# Patient Record
Sex: FEMALE | Race: WHITE | NOT HISPANIC OR LATINO | URBAN - METROPOLITAN AREA
[De-identification: names, ages, dates, MRNs, and addresses within clinical notes are randomized per-mention and may not be internally consistent; named-entity substitution may affect disease eponyms.]

---

## 2018-03-14 ENCOUNTER — OUTPATIENT (OUTPATIENT)
Dept: OUTPATIENT SERVICES | Facility: HOSPITAL | Age: 73
LOS: 1 days | Discharge: HOME | End: 2018-03-14
Payer: MEDICARE

## 2018-03-14 ENCOUNTER — INPATIENT (INPATIENT)
Facility: HOSPITAL | Age: 73
LOS: 15 days | Discharge: NURSING, REHAB OR LONG-TERM CARE | End: 2018-03-30
Attending: INTERNAL MEDICINE | Admitting: INTERNAL MEDICINE
Payer: MEDICARE

## 2018-03-14 VITALS
OXYGEN SATURATION: 95 % | HEART RATE: 138 BPM | TEMPERATURE: 98 F | RESPIRATION RATE: 16 BRPM | HEIGHT: 65.5 IN | DIASTOLIC BLOOD PRESSURE: 77 MMHG | SYSTOLIC BLOOD PRESSURE: 124 MMHG | WEIGHT: 259.93 LBS

## 2018-03-14 VITALS — HEIGHT: 65 IN | WEIGHT: 220.02 LBS

## 2018-03-14 DIAGNOSIS — M17.11 UNILATERAL PRIMARY OSTEOARTHRITIS, RIGHT KNEE: ICD-10-CM

## 2018-03-14 DIAGNOSIS — R33.9 RETENTION OF URINE, UNSPECIFIED: ICD-10-CM

## 2018-03-14 DIAGNOSIS — R60.9 EDEMA, UNSPECIFIED: ICD-10-CM

## 2018-03-14 DIAGNOSIS — Z98.890 OTHER SPECIFIED POSTPROCEDURAL STATES: Chronic | ICD-10-CM

## 2018-03-14 DIAGNOSIS — N39.0 URINARY TRACT INFECTION, SITE NOT SPECIFIED: ICD-10-CM

## 2018-03-14 DIAGNOSIS — M19.90 UNSPECIFIED OSTEOARTHRITIS, UNSPECIFIED SITE: ICD-10-CM

## 2018-03-14 DIAGNOSIS — Z98.891 HISTORY OF UTERINE SCAR FROM PREVIOUS SURGERY: Chronic | ICD-10-CM

## 2018-03-14 DIAGNOSIS — J15.9 UNSPECIFIED BACTERIAL PNEUMONIA: ICD-10-CM

## 2018-03-14 DIAGNOSIS — E78.5 HYPERLIPIDEMIA, UNSPECIFIED: ICD-10-CM
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
## 2018-03-14 DIAGNOSIS — I10 ESSENTIAL (PRIMARY) HYPERTENSION: ICD-10-CM

## 2018-03-14 DIAGNOSIS — Z88.0 ALLERGY STATUS TO PENICILLIN: ICD-10-CM

## 2018-03-14 DIAGNOSIS — L40.9 PSORIASIS, UNSPECIFIED: ICD-10-CM

## 2018-03-14 DIAGNOSIS — E87.2 ACIDOSIS: ICD-10-CM

## 2018-03-14 DIAGNOSIS — J44.1 CHRONIC OBSTRUCTIVE PULMONARY DISEASE WITH (ACUTE) EXACERBATION: ICD-10-CM

## 2018-03-14 DIAGNOSIS — N17.0 ACUTE KIDNEY FAILURE WITH TUBULAR NECROSIS: ICD-10-CM
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
## 2018-03-14 DIAGNOSIS — M17.10 UNILATERAL PRIMARY OSTEOARTHRITIS, UNSPECIFIED KNEE: ICD-10-CM

## 2018-03-14 DIAGNOSIS — J95.821 ACUTE POSTPROCEDURAL RESPIRATORY FAILURE: ICD-10-CM

## 2018-03-14 DIAGNOSIS — I08.0 RHEUMATIC DISORDERS OF BOTH MITRAL AND AORTIC VALVES: ICD-10-CM

## 2018-03-14 DIAGNOSIS — F17.210 NICOTINE DEPENDENCE, CIGARETTES, UNCOMPLICATED: ICD-10-CM

## 2018-03-14 DIAGNOSIS — N13.2 HYDRONEPHROSIS WITH RENAL AND URETERAL CALCULOUS OBSTRUCTION: ICD-10-CM

## 2018-03-14 DIAGNOSIS — M79.81 NONTRAUMATIC HEMATOMA OF SOFT TISSUE: ICD-10-CM

## 2018-03-14 DIAGNOSIS — Z79.82 LONG TERM (CURRENT) USE OF ASPIRIN: ICD-10-CM

## 2018-03-14 DIAGNOSIS — R65.21 SEVERE SEPSIS WITH SEPTIC SHOCK: ICD-10-CM

## 2018-03-14 DIAGNOSIS — J80 ACUTE RESPIRATORY DISTRESS SYNDROME: ICD-10-CM

## 2018-03-14 DIAGNOSIS — E87.0 HYPEROSMOLALITY AND HYPERNATREMIA: ICD-10-CM

## 2018-03-14 DIAGNOSIS — T83.598A INFECTION AND INFLAMMATORY REACTION DUE TO OTHER PROSTHETIC DEVICE, IMPLANT AND GRAFT IN URINARY SYSTEM, INITIAL ENCOUNTER: ICD-10-CM

## 2018-03-14 DIAGNOSIS — I48.91 UNSPECIFIED ATRIAL FIBRILLATION: ICD-10-CM

## 2018-03-14 DIAGNOSIS — I50.33 ACUTE ON CHRONIC DIASTOLIC (CONGESTIVE) HEART FAILURE: ICD-10-CM

## 2018-03-14 DIAGNOSIS — E87.6 HYPOKALEMIA: ICD-10-CM

## 2018-03-14 DIAGNOSIS — A41.51 SEPSIS DUE TO ESCHERICHIA COLI [E. COLI]: ICD-10-CM

## 2018-03-14 DIAGNOSIS — R31.9 HEMATURIA, UNSPECIFIED: ICD-10-CM

## 2018-03-14 LAB
ALBUMIN SERPL ELPH-MCNC: 2.7 G/DL — LOW (ref 3.3–5)
ALP SERPL-CCNC: 103 U/L — SIGNIFICANT CHANGE UP (ref 40–120)
ALT FLD-CCNC: 20 U/L — SIGNIFICANT CHANGE UP (ref 12–78)
ANION GAP SERPL CALC-SCNC: 10 MMOL/L — SIGNIFICANT CHANGE UP (ref 5–17)
ANION GAP SERPL CALC-SCNC: 10 MMOL/L — SIGNIFICANT CHANGE UP (ref 5–17)
APPEARANCE UR: (no result)
APTT BLD: 22.2 SEC — LOW (ref 27.5–37.4)
AST SERPL-CCNC: 24 U/L — SIGNIFICANT CHANGE UP (ref 15–37)
BACTERIA # UR AUTO: (no result)
BASOPHILS # BLD AUTO: 0.04 K/UL — SIGNIFICANT CHANGE UP (ref 0–0.2)
BASOPHILS # BLD AUTO: 0.1 K/UL — SIGNIFICANT CHANGE UP (ref 0–0.2)
BASOPHILS NFR BLD AUTO: 0.2 % — SIGNIFICANT CHANGE UP (ref 0–2)
BASOPHILS NFR BLD AUTO: 0.5 % — SIGNIFICANT CHANGE UP (ref 0–2)
BILIRUB SERPL-MCNC: 0.8 MG/DL — SIGNIFICANT CHANGE UP (ref 0.2–1.2)
BILIRUB UR-MCNC: (no result)
BLD GP AB SCN SERPL QL: SIGNIFICANT CHANGE UP
BUN SERPL-MCNC: 25 MG/DL — HIGH (ref 7–23)
BUN SERPL-MCNC: 25 MG/DL — HIGH (ref 7–23)
CALCIUM SERPL-MCNC: 8.8 MG/DL — SIGNIFICANT CHANGE UP (ref 8.5–10.1)
CALCIUM SERPL-MCNC: 8.9 MG/DL — SIGNIFICANT CHANGE UP (ref 8.5–10.1)
CHLORIDE SERPL-SCNC: 110 MMOL/L — HIGH (ref 96–108)
CHLORIDE SERPL-SCNC: 110 MMOL/L — HIGH (ref 96–108)
CO2 SERPL-SCNC: 22 MMOL/L — SIGNIFICANT CHANGE UP (ref 22–31)
CO2 SERPL-SCNC: 23 MMOL/L — SIGNIFICANT CHANGE UP (ref 22–31)
COLOR SPEC: YELLOW — SIGNIFICANT CHANGE UP
CREAT SERPL-MCNC: 1.55 MG/DL — HIGH (ref 0.5–1.3)
CREAT SERPL-MCNC: 1.56 MG/DL — HIGH (ref 0.5–1.3)
DIFF PNL FLD: (no result)
EOSINOPHIL # BLD AUTO: 0.01 K/UL — SIGNIFICANT CHANGE UP (ref 0–0.5)
EOSINOPHIL # BLD AUTO: 0.01 K/UL — SIGNIFICANT CHANGE UP (ref 0–0.5)
EOSINOPHIL NFR BLD AUTO: 0.1 % — SIGNIFICANT CHANGE UP (ref 0–6)
EOSINOPHIL NFR BLD AUTO: 0.1 % — SIGNIFICANT CHANGE UP (ref 0–6)
EPI CELLS # UR: (no result)
GLUCOSE SERPL-MCNC: 116 MG/DL — HIGH (ref 70–99)
GLUCOSE SERPL-MCNC: 127 MG/DL — HIGH (ref 70–99)
GLUCOSE UR QL: NEGATIVE MG/DL — SIGNIFICANT CHANGE UP
HCT VFR BLD CALC: 37.9 % — SIGNIFICANT CHANGE UP (ref 34.5–45)
HCT VFR BLD CALC: 40.1 % — SIGNIFICANT CHANGE UP (ref 34.5–45)
HGB BLD-MCNC: 12.4 G/DL — SIGNIFICANT CHANGE UP (ref 11.5–15.5)
HGB BLD-MCNC: 13 G/DL — SIGNIFICANT CHANGE UP (ref 11.5–15.5)
IMM GRANULOCYTES NFR BLD AUTO: 0.6 % — SIGNIFICANT CHANGE UP (ref 0–1.5)
IMM GRANULOCYTES NFR BLD AUTO: 1.2 % — SIGNIFICANT CHANGE UP (ref 0–1.5)
INR BLD: 1.25 RATIO — HIGH (ref 0.88–1.16)
KETONES UR-MCNC: NEGATIVE — SIGNIFICANT CHANGE UP
LEUKOCYTE ESTERASE UR-ACNC: (no result)
LYMPHOCYTES # BLD AUTO: 0.62 K/UL — LOW (ref 1–3.3)
LYMPHOCYTES # BLD AUTO: 0.66 K/UL — LOW (ref 1–3.3)
LYMPHOCYTES # BLD AUTO: 3.1 % — LOW (ref 13–44)
LYMPHOCYTES # BLD AUTO: 3.4 % — LOW (ref 13–44)
MCHC RBC-ENTMCNC: 28.8 PG — SIGNIFICANT CHANGE UP (ref 27–34)
MCHC RBC-ENTMCNC: 29 PG — SIGNIFICANT CHANGE UP (ref 27–34)
MCHC RBC-ENTMCNC: 32.4 GM/DL — SIGNIFICANT CHANGE UP (ref 32–36)
MCHC RBC-ENTMCNC: 32.7 GM/DL — SIGNIFICANT CHANGE UP (ref 32–36)
MCV RBC AUTO: 88.8 FL — SIGNIFICANT CHANGE UP (ref 80–100)
MCV RBC AUTO: 88.9 FL — SIGNIFICANT CHANGE UP (ref 80–100)
MONOCYTES # BLD AUTO: 0.59 K/UL — SIGNIFICANT CHANGE UP (ref 0–0.9)
MONOCYTES # BLD AUTO: 0.81 K/UL — SIGNIFICANT CHANGE UP (ref 0–0.9)
MONOCYTES NFR BLD AUTO: 3.1 % — SIGNIFICANT CHANGE UP (ref 2–14)
MONOCYTES NFR BLD AUTO: 4.1 % — SIGNIFICANT CHANGE UP (ref 2–14)
NEUTROPHILS # BLD AUTO: 17.83 K/UL — HIGH (ref 1.8–7.4)
NEUTROPHILS # BLD AUTO: 17.96 K/UL — HIGH (ref 1.8–7.4)
NEUTROPHILS NFR BLD AUTO: 91 % — HIGH (ref 43–77)
NEUTROPHILS NFR BLD AUTO: 92.6 % — HIGH (ref 43–77)
NITRITE UR-MCNC: POSITIVE
NRBC # BLD: 0 /100 WBCS — SIGNIFICANT CHANGE UP (ref 0–0)
NRBC # BLD: 0 /100 WBCS — SIGNIFICANT CHANGE UP (ref 0–0)
PH UR: 5 — SIGNIFICANT CHANGE UP (ref 5–8)
PLATELET # BLD AUTO: 232 K/UL — SIGNIFICANT CHANGE UP (ref 150–400)
PLATELET # BLD AUTO: 252 K/UL — SIGNIFICANT CHANGE UP (ref 150–400)
POTASSIUM SERPL-MCNC: 3.5 MMOL/L — SIGNIFICANT CHANGE UP (ref 3.5–5.3)
POTASSIUM SERPL-MCNC: 3.8 MMOL/L — SIGNIFICANT CHANGE UP (ref 3.5–5.3)
POTASSIUM SERPL-SCNC: 3.5 MMOL/L — SIGNIFICANT CHANGE UP (ref 3.5–5.3)
POTASSIUM SERPL-SCNC: 3.8 MMOL/L — SIGNIFICANT CHANGE UP (ref 3.5–5.3)
PROT SERPL-MCNC: 6.9 GM/DL — SIGNIFICANT CHANGE UP (ref 6–8.3)
PROT UR-MCNC: 100 MG/DL
PROTHROM AB SERPL-ACNC: 13.6 SEC — HIGH (ref 9.8–12.7)
RBC # BLD: 4.27 M/UL — SIGNIFICANT CHANGE UP (ref 3.8–5.2)
RBC # BLD: 4.51 M/UL — SIGNIFICANT CHANGE UP (ref 3.8–5.2)
RBC # FLD: 16 % — HIGH (ref 10.3–14.5)
RBC # FLD: 16.1 % — HIGH (ref 10.3–14.5)
RBC CASTS # UR COMP ASSIST: (no result) /HPF (ref 0–4)
SODIUM SERPL-SCNC: 142 MMOL/L — SIGNIFICANT CHANGE UP (ref 135–145)
SODIUM SERPL-SCNC: 143 MMOL/L — SIGNIFICANT CHANGE UP (ref 135–145)
SP GR SPEC: 1.02 — SIGNIFICANT CHANGE UP (ref 1.01–1.02)
TROPONIN I SERPL-MCNC: <0.015 NG/ML — SIGNIFICANT CHANGE UP (ref 0.01–0.04)
TROPONIN I SERPL-MCNC: <0.015 NG/ML — SIGNIFICANT CHANGE UP (ref 0.01–0.04)
TYPE + AB SCN PNL BLD: SIGNIFICANT CHANGE UP
UROBILINOGEN FLD QL: 4 MG/DL
WBC # BLD: 19.25 K/UL — HIGH (ref 3.8–10.5)
WBC # BLD: 19.73 K/UL — HIGH (ref 3.8–10.5)
WBC # FLD AUTO: 19.25 K/UL — HIGH (ref 3.8–10.5)
WBC # FLD AUTO: 19.73 K/UL — HIGH (ref 3.8–10.5)
WBC UR QL: >50

## 2018-03-14 PROCEDURE — 93010 ELECTROCARDIOGRAM REPORT: CPT

## 2018-03-14 PROCEDURE — 99285 EMERGENCY DEPT VISIT HI MDM: CPT

## 2018-03-14 PROCEDURE — 71045 X-RAY EXAM CHEST 1 VIEW: CPT | Mod: 26

## 2018-03-14 PROCEDURE — 74176 CT ABD & PELVIS W/O CONTRAST: CPT | Mod: 26

## 2018-03-14 PROCEDURE — 93306 TTE W/DOPPLER COMPLETE: CPT | Mod: 26

## 2018-03-14 RX ORDER — SODIUM CHLORIDE 9 MG/ML
2000 INJECTION INTRAMUSCULAR; INTRAVENOUS; SUBCUTANEOUS ONCE
Qty: 0 | Refills: 0 | Status: COMPLETED | OUTPATIENT
Start: 2018-03-14 | End: 2018-03-14

## 2018-03-14 RX ORDER — SODIUM CHLORIDE 9 MG/ML
1000 INJECTION INTRAMUSCULAR; INTRAVENOUS; SUBCUTANEOUS
Qty: 0 | Refills: 0 | Status: DISCONTINUED | OUTPATIENT
Start: 2018-03-14 | End: 2018-03-15

## 2018-03-14 RX ORDER — AZITHROMYCIN 500 MG/1
500 TABLET, FILM COATED ORAL EVERY 24 HOURS
Qty: 0 | Refills: 0 | Status: DISCONTINUED | OUTPATIENT
Start: 2018-03-15 | End: 2018-03-18

## 2018-03-14 RX ORDER — CEFTRIAXONE 500 MG/1
1000 INJECTION, POWDER, FOR SOLUTION INTRAMUSCULAR; INTRAVENOUS EVERY 24 HOURS
Qty: 0 | Refills: 0 | Status: DISCONTINUED | OUTPATIENT
Start: 2018-03-14 | End: 2018-03-17

## 2018-03-14 RX ORDER — SIMVASTATIN 20 MG/1
20 TABLET, FILM COATED ORAL AT BEDTIME
Qty: 0 | Refills: 0 | Status: DISCONTINUED | OUTPATIENT
Start: 2018-03-14 | End: 2018-03-21

## 2018-03-14 RX ORDER — SODIUM CHLORIDE 9 MG/ML
3 INJECTION INTRAMUSCULAR; INTRAVENOUS; SUBCUTANEOUS ONCE
Qty: 0 | Refills: 0 | Status: COMPLETED | OUTPATIENT
Start: 2018-03-14 | End: 2018-03-14

## 2018-03-14 RX ORDER — ASPIRIN/CALCIUM CARB/MAGNESIUM 324 MG
325 TABLET ORAL DAILY
Qty: 0 | Refills: 0 | Status: DISCONTINUED | OUTPATIENT
Start: 2018-03-14 | End: 2018-03-18

## 2018-03-14 RX ORDER — HEPARIN SODIUM 5000 [USP'U]/ML
5000 INJECTION INTRAVENOUS; SUBCUTANEOUS EVERY 12 HOURS
Qty: 0 | Refills: 0 | Status: DISCONTINUED | OUTPATIENT
Start: 2018-03-14 | End: 2018-03-15

## 2018-03-14 RX ADMIN — CEFTRIAXONE 1000 MILLIGRAM(S): 500 INJECTION, POWDER, FOR SOLUTION INTRAMUSCULAR; INTRAVENOUS at 23:17

## 2018-03-14 RX ADMIN — SODIUM CHLORIDE 2000 MILLILITER(S): 9 INJECTION INTRAMUSCULAR; INTRAVENOUS; SUBCUTANEOUS at 18:47

## 2018-03-14 RX ADMIN — SIMVASTATIN 20 MILLIGRAM(S): 20 TABLET, FILM COATED ORAL at 23:17

## 2018-03-14 RX ADMIN — SODIUM CHLORIDE 3 MILLILITER(S): 9 INJECTION INTRAMUSCULAR; INTRAVENOUS; SUBCUTANEOUS at 17:12

## 2018-03-14 RX ADMIN — SODIUM CHLORIDE 75 MILLILITER(S): 9 INJECTION INTRAMUSCULAR; INTRAVENOUS; SUBCUTANEOUS at 23:51

## 2018-03-14 NOTE — H&P PST ADULT - HISTORY OF PRESENT ILLNESS
72 year old female PMH of HTN, HLD, bilateral lower extremity edema; pt diagnosed with OA of the right knee c/o knee pain x 1.5 years presents to PST for Right TKR  Pt sent to the ER after PST for Rapid Afib in EKG 72 year old female PMH of HTN, HLD, bilateral lower extremity edema; pt diagnosed with OA of the right knee c/o knee pain x 1.5 years presents to PST for Right TKR  Pt sent to the ER from PST for Rapid Afib in EKG
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
## 2018-03-14 NOTE — ED PROVIDER NOTE - NS_ ATTENDINGSCRIBEDETAILS _ED_A_ED_FT
I, Reese Lewis MD,  performed the initial face to face bedside interview with this patient regarding history of present illness, review of symptoms and relevant past medical, social and family history.  I completed an independent physical examination.    The history, relevant review of systems, past medical and surgical history, medical decision making, and physical examination was documented by the scribe in my presence and I attest to the accuracy of the documentation.
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
## 2018-03-14 NOTE — H&P PST ADULT - PROBLEM SELECTOR PLAN 1
right TKR  pt has loose tooth advised pt to go see dentist to have it removed due to risk of aspiration  Pt sent to ER  for rapid afib new onset spoke with pts covering cardiologist Dr Anderson  Informed Elyssa from Dr Clancy office regarding above right TKR  pt has loose tooth advised pt to go see dentist to have it removed due to risk of aspiration  EZ wash instructions and mupirocin instructions given   CAPRINI SCORE 11  Pt sent to ER  for rapid afib new onset spoke with pts covering cardiologist Dr Anderson  Informed Elyssa from Dr Clancy office regarding above

## 2018-03-14 NOTE — H&P PST ADULT - PSH
H/O arthroscopy of shoulder  left   H/O bladder repair surgery    H/O: hysterectomy  due to bleeding   S/P

## 2018-03-14 NOTE — ED ADULT NURSE REASSESSMENT NOTE - NS ED NURSE REASSESS COMMENT FT1
Received report from Adilene WANG. Patient resting comfortably. Returned from CT. Patient undressed and placed in gown. VS stable. Eating dinner. Family at bedside. Will continue to monitor.

## 2018-03-14 NOTE — ED PROVIDER NOTE - OBJECTIVE STATEMENT
73 y/o female PMHx HTN, HLD, diverticulitis, kidney stones, psoriasis presents to the ED for evaluation of rapid heart beat starting today. Pt states she has never had rapid heart beat before. Denies SOB, CP, NVD, fever or any other sx. Smoker (0.5 pack a day). Caffeine intake today (0.5 can of diet coke). Cardio/ PCP- Dr. Pat 73 y/o female PMHx HTN, HLD, diverticulitis, kidney stones, psoriasis presents to the ED for evaluation of rapid heart beat found during presurgical testing. Pt states she has never had rapid heart beat before. Denies SOB, CP, NVD, fever or any other sx. Smoker (0.5 pack a day). Caffeine intake today (0.5 can of diet coke). Pt is scheduled for knee replacement surgery. Cardio/ PCP- Dr. Pat

## 2018-03-14 NOTE — H&P ADULT - NSHPPHYSICALEXAM_GEN_ALL_CORE
Vital Signs Last 24 Hrs  T(C): 36.9 (14 Mar 2018 16:16), Max: 36.9 (14 Mar 2018 16:16)  T(F): 98.4 (14 Mar 2018 16:16), Max: 98.4 (14 Mar 2018 16:16)  HR: 106 (14 Mar 2018 18:48) (106 - 140)  BP: 117/66 (14 Mar 2018 18:48) (117/66 - 138/101)  BP(mean): 92 (14 Mar 2018 15:21) (92 - 92)  RR: 16 (14 Mar 2018 18:48) (16 - 18)  SpO2: 99% (14 Mar 2018 18:48) (95% - 99%)    GEN: appears comfortable  Neuro: AAOx3, nonfocal  HEENT: NC/AT, EOMI  Neck: no thyroidmegaly, no JVD  Cardiovascular: S1S2 present, irregularly irregular rhythm, no murmur  Respiratory: breath sounds normal bilaterally, no wheezing, no rales, no rhonchi  Gastrointestinal: bowel sounds normal, soft, no abdominal tenderness  Musculoskeletal: no muscle tenderness  Extremities: 2+ pitting edema bilaterally  Skin: No rash

## 2018-03-15 LAB
ADD ON TEST-SPECIMEN IN LAB: SIGNIFICANT CHANGE UP
ANION GAP SERPL CALC-SCNC: 11 MMOL/L — SIGNIFICANT CHANGE UP (ref 5–17)
ANION GAP SERPL CALC-SCNC: 11 MMOL/L — SIGNIFICANT CHANGE UP (ref 5–17)
APTT BLD: 29 SEC — SIGNIFICANT CHANGE UP (ref 27.5–37.4)
BASOPHILS NFR BLD AUTO: 0 % — SIGNIFICANT CHANGE UP (ref 0–2)
BUN SERPL-MCNC: 24 MG/DL — HIGH (ref 7–23)
BUN SERPL-MCNC: 31 MG/DL — HIGH (ref 7–23)
CALCIUM SERPL-MCNC: 6.4 MG/DL — CRITICAL LOW (ref 8.5–10.1)
CALCIUM SERPL-MCNC: 8.8 MG/DL — SIGNIFICANT CHANGE UP (ref 8.5–10.1)
CHLORIDE SERPL-SCNC: 112 MMOL/L — HIGH (ref 96–108)
CHLORIDE SERPL-SCNC: 119 MMOL/L — HIGH (ref 96–108)
CO2 SERPL-SCNC: 17 MMOL/L — LOW (ref 22–31)
CO2 SERPL-SCNC: 22 MMOL/L — SIGNIFICANT CHANGE UP (ref 22–31)
CREAT SERPL-MCNC: 1.37 MG/DL — HIGH (ref 0.5–1.3)
CREAT SERPL-MCNC: 1.66 MG/DL — HIGH (ref 0.5–1.3)
E COLI DNA BLD POS QL NAA+NON-PROBE: SIGNIFICANT CHANGE UP
EOSINOPHIL NFR BLD AUTO: 0 % — SIGNIFICANT CHANGE UP (ref 0–6)
GIANT PLATELETS BLD QL SMEAR: PRESENT — SIGNIFICANT CHANGE UP
GLUCOSE BLDC GLUCOMTR-MCNC: 103 MG/DL — HIGH (ref 70–99)
GLUCOSE SERPL-MCNC: 132 MG/DL — HIGH (ref 70–99)
GLUCOSE SERPL-MCNC: 133 MG/DL — HIGH (ref 70–99)
GRAM STN FLD: SIGNIFICANT CHANGE UP
HCT VFR BLD CALC: 31.2 % — LOW (ref 34.5–45)
HCT VFR BLD CALC: 35.9 % — SIGNIFICANT CHANGE UP (ref 34.5–45)
HGB BLD-MCNC: 11.6 G/DL — SIGNIFICANT CHANGE UP (ref 11.5–15.5)
HGB BLD-MCNC: 9.7 G/DL — LOW (ref 11.5–15.5)
LACTATE SERPL-SCNC: 3.2 MMOL/L — HIGH (ref 0.7–2)
LACTATE SERPL-SCNC: 5.1 MMOL/L — CRITICAL HIGH (ref 0.7–2)
LYMPHOCYTES # BLD AUTO: 0.35 K/UL — LOW (ref 1–3.3)
LYMPHOCYTES # BLD AUTO: 2 % — LOW (ref 13–44)
MCHC RBC-ENTMCNC: 28.7 PG — SIGNIFICANT CHANGE UP (ref 27–34)
MCHC RBC-ENTMCNC: 28.7 PG — SIGNIFICANT CHANGE UP (ref 27–34)
MCHC RBC-ENTMCNC: 31.1 GM/DL — LOW (ref 32–36)
MCHC RBC-ENTMCNC: 32.3 GM/DL — SIGNIFICANT CHANGE UP (ref 32–36)
MCV RBC AUTO: 88.9 FL — SIGNIFICANT CHANGE UP (ref 80–100)
MCV RBC AUTO: 92.3 FL — SIGNIFICANT CHANGE UP (ref 80–100)
METAMYELOCYTES # FLD: 1 % — HIGH (ref 0–0)
METHOD TYPE: SIGNIFICANT CHANGE UP
MONOCYTES NFR BLD AUTO: 1 % — LOW (ref 2–14)
MRSA PCR RESULT.: SIGNIFICANT CHANGE UP
NEUTROPHILS # BLD AUTO: 16.37 K/UL — HIGH (ref 1.8–7.4)
NEUTROPHILS NFR BLD AUTO: 90 % — HIGH (ref 43–77)
NEUTS BAND # BLD: 4 % — SIGNIFICANT CHANGE UP (ref 0–8)
NRBC # BLD: 0 /100 WBCS — SIGNIFICANT CHANGE UP (ref 0–0)
NRBC # BLD: 0 /100 — SIGNIFICANT CHANGE UP (ref 0–0)
NRBC # BLD: SIGNIFICANT CHANGE UP /100 WBCS (ref 0–0)
PLAT MORPH BLD: SIGNIFICANT CHANGE UP
PLATELET # BLD AUTO: 175 K/UL — SIGNIFICANT CHANGE UP (ref 150–400)
PLATELET # BLD AUTO: 217 K/UL — SIGNIFICANT CHANGE UP (ref 150–400)
PLATELET CLUMP BLD QL SMEAR: SIGNIFICANT CHANGE UP
POLYCHROMASIA BLD QL SMEAR: SLIGHT — SIGNIFICANT CHANGE UP
POTASSIUM SERPL-MCNC: 3.2 MMOL/L — LOW (ref 3.5–5.3)
POTASSIUM SERPL-MCNC: 3.8 MMOL/L — SIGNIFICANT CHANGE UP (ref 3.5–5.3)
POTASSIUM SERPL-SCNC: 3.2 MMOL/L — LOW (ref 3.5–5.3)
POTASSIUM SERPL-SCNC: 3.8 MMOL/L — SIGNIFICANT CHANGE UP (ref 3.5–5.3)
RBC # BLD: 3.38 M/UL — LOW (ref 3.8–5.2)
RBC # BLD: 4.04 M/UL — SIGNIFICANT CHANGE UP (ref 3.8–5.2)
RBC # FLD: 16 % — HIGH (ref 10.3–14.5)
RBC # FLD: 16.3 % — HIGH (ref 10.3–14.5)
RBC BLD AUTO: SIGNIFICANT CHANGE UP
S AUREUS DNA NOSE QL NAA+PROBE: SIGNIFICANT CHANGE UP
SODIUM SERPL-SCNC: 145 MMOL/L — SIGNIFICANT CHANGE UP (ref 135–145)
SODIUM SERPL-SCNC: 147 MMOL/L — HIGH (ref 135–145)
SPECIMEN SOURCE: SIGNIFICANT CHANGE UP
SPECIMEN SOURCE: SIGNIFICANT CHANGE UP
SPHEROCYTES BLD QL SMEAR: SIGNIFICANT CHANGE UP
T3 SERPL-MCNC: 83 NG/DL — SIGNIFICANT CHANGE UP (ref 80–200)
T3FREE SERPL-MCNC: 1.61 PG/ML — LOW (ref 1.8–4.6)
T4 FREE SERPL-MCNC: 1.79 NG/DL — HIGH (ref 0.76–1.46)
TROPONIN I SERPL-MCNC: <0.015 NG/ML — SIGNIFICANT CHANGE UP (ref 0.01–0.04)
TSH SERPL-MCNC: 0.32 UU/ML — LOW (ref 0.36–3.74)
VARIANT LYMPHS # BLD: 2 % — SIGNIFICANT CHANGE UP (ref 0–6)
WBC # BLD: 0.79 K/UL — CRITICAL LOW (ref 3.8–10.5)
WBC # BLD: 17.42 K/UL — HIGH (ref 3.8–10.5)
WBC # FLD AUTO: 0.79 K/UL — CRITICAL LOW (ref 3.8–10.5)
WBC # FLD AUTO: 17.42 K/UL — HIGH (ref 3.8–10.5)

## 2018-03-15 PROCEDURE — 50432 PLMT NEPHROSTOMY CATHETER: CPT | Mod: LT

## 2018-03-15 PROCEDURE — 71045 X-RAY EXAM CHEST 1 VIEW: CPT | Mod: 26

## 2018-03-15 PROCEDURE — 93010 ELECTROCARDIOGRAM REPORT: CPT

## 2018-03-15 RX ORDER — HEPARIN SODIUM 5000 [USP'U]/ML
INJECTION INTRAVENOUS; SUBCUTANEOUS
Qty: 25000 | Refills: 0 | Status: DISCONTINUED | OUTPATIENT
Start: 2018-03-15 | End: 2018-03-21

## 2018-03-15 RX ORDER — OXYCODONE HYDROCHLORIDE 5 MG/1
5 TABLET ORAL EVERY 4 HOURS
Qty: 0 | Refills: 0 | Status: DISCONTINUED | OUTPATIENT
Start: 2018-03-15 | End: 2018-03-17

## 2018-03-15 RX ORDER — DIGOXIN 250 MCG
0.25 TABLET ORAL ONCE
Qty: 0 | Refills: 0 | Status: COMPLETED | OUTPATIENT
Start: 2018-03-15 | End: 2018-03-15

## 2018-03-15 RX ORDER — ACETAMINOPHEN 500 MG
1000 TABLET ORAL ONCE
Qty: 0 | Refills: 0 | Status: COMPLETED | OUTPATIENT
Start: 2018-03-15 | End: 2018-03-15

## 2018-03-15 RX ORDER — FUROSEMIDE 40 MG
40 TABLET ORAL ONCE
Qty: 0 | Refills: 0 | Status: COMPLETED | OUTPATIENT
Start: 2018-03-15 | End: 2018-03-15

## 2018-03-15 RX ORDER — ACETAMINOPHEN 500 MG
650 TABLET ORAL EVERY 6 HOURS
Qty: 0 | Refills: 0 | Status: DISCONTINUED | OUTPATIENT
Start: 2018-03-15 | End: 2018-03-15

## 2018-03-15 RX ORDER — METOPROLOL TARTRATE 50 MG
5 TABLET ORAL ONCE
Qty: 0 | Refills: 0 | Status: COMPLETED | OUTPATIENT
Start: 2018-03-15 | End: 2018-03-15

## 2018-03-15 RX ORDER — SODIUM CHLORIDE 9 MG/ML
1500 INJECTION INTRAMUSCULAR; INTRAVENOUS; SUBCUTANEOUS ONCE
Qty: 0 | Refills: 0 | Status: COMPLETED | OUTPATIENT
Start: 2018-03-15 | End: 2018-03-15

## 2018-03-15 RX ORDER — OXYCODONE AND ACETAMINOPHEN 5; 325 MG/1; MG/1
1 TABLET ORAL EVERY 4 HOURS
Qty: 0 | Refills: 0 | Status: DISCONTINUED | OUTPATIENT
Start: 2018-03-15 | End: 2018-03-20

## 2018-03-15 RX ORDER — HEPARIN SODIUM 5000 [USP'U]/ML
8000 INJECTION INTRAVENOUS; SUBCUTANEOUS EVERY 6 HOURS
Qty: 0 | Refills: 0 | Status: DISCONTINUED | OUTPATIENT
Start: 2018-03-15 | End: 2018-03-21

## 2018-03-15 RX ORDER — ONDANSETRON 8 MG/1
4 TABLET, FILM COATED ORAL EVERY 6 HOURS
Qty: 0 | Refills: 0 | Status: DISCONTINUED | OUTPATIENT
Start: 2018-03-15 | End: 2018-03-30

## 2018-03-15 RX ORDER — DILTIAZEM HCL 120 MG
5 CAPSULE, EXT RELEASE 24 HR ORAL
Qty: 125 | Refills: 0 | Status: DISCONTINUED | OUTPATIENT
Start: 2018-03-15 | End: 2018-03-18

## 2018-03-15 RX ORDER — SODIUM CHLORIDE 9 MG/ML
1000 INJECTION INTRAMUSCULAR; INTRAVENOUS; SUBCUTANEOUS ONCE
Qty: 0 | Refills: 0 | Status: COMPLETED | OUTPATIENT
Start: 2018-03-15 | End: 2018-03-15

## 2018-03-15 RX ORDER — DOCUSATE SODIUM 100 MG
100 CAPSULE ORAL
Qty: 0 | Refills: 0 | Status: DISCONTINUED | OUTPATIENT
Start: 2018-03-15 | End: 2018-03-30

## 2018-03-15 RX ORDER — FENTANYL CITRATE 50 UG/ML
25 INJECTION INTRAVENOUS
Qty: 0 | Refills: 0 | Status: DISCONTINUED | OUTPATIENT
Start: 2018-03-15 | End: 2018-03-16

## 2018-03-15 RX ORDER — POTASSIUM CHLORIDE 20 MEQ
40 PACKET (EA) ORAL ONCE
Qty: 0 | Refills: 0 | Status: COMPLETED | OUTPATIENT
Start: 2018-03-15 | End: 2018-03-15

## 2018-03-15 RX ORDER — SODIUM CHLORIDE 9 MG/ML
1000 INJECTION, SOLUTION INTRAVENOUS
Qty: 0 | Refills: 0 | Status: DISCONTINUED | OUTPATIENT
Start: 2018-03-15 | End: 2018-03-15

## 2018-03-15 RX ORDER — HEPARIN SODIUM 5000 [USP'U]/ML
4000 INJECTION INTRAVENOUS; SUBCUTANEOUS EVERY 6 HOURS
Qty: 0 | Refills: 0 | Status: DISCONTINUED | OUTPATIENT
Start: 2018-03-15 | End: 2018-03-21

## 2018-03-15 RX ORDER — ONDANSETRON 8 MG/1
4 TABLET, FILM COATED ORAL ONCE
Qty: 0 | Refills: 0 | Status: DISCONTINUED | OUTPATIENT
Start: 2018-03-15 | End: 2018-03-17

## 2018-03-15 RX ORDER — IPRATROPIUM/ALBUTEROL SULFATE 18-103MCG
3 AEROSOL WITH ADAPTER (GRAM) INHALATION EVERY 6 HOURS
Qty: 0 | Refills: 0 | Status: DISCONTINUED | OUTPATIENT
Start: 2018-03-15 | End: 2018-03-18

## 2018-03-15 RX ADMIN — OXYCODONE AND ACETAMINOPHEN 1 TABLET(S): 5; 325 TABLET ORAL at 11:49

## 2018-03-15 RX ADMIN — SODIUM CHLORIDE 2000 MILLILITER(S): 9 INJECTION INTRAMUSCULAR; INTRAVENOUS; SUBCUTANEOUS at 19:00

## 2018-03-15 RX ADMIN — Medication 1000 MILLIGRAM(S): at 17:32

## 2018-03-15 RX ADMIN — Medication 40 MILLIEQUIVALENT(S): at 23:22

## 2018-03-15 RX ADMIN — Medication 5 MILLIGRAM(S): at 02:33

## 2018-03-15 RX ADMIN — AZITHROMYCIN 255 MILLIGRAM(S): 500 TABLET, FILM COATED ORAL at 23:52

## 2018-03-15 RX ADMIN — HEPARIN SODIUM 1800 UNIT(S)/HR: 5000 INJECTION INTRAVENOUS; SUBCUTANEOUS at 23:33

## 2018-03-15 RX ADMIN — Medication 40 MILLIGRAM(S): at 23:23

## 2018-03-15 RX ADMIN — SODIUM CHLORIDE 100 MILLILITER(S): 9 INJECTION, SOLUTION INTRAVENOUS at 19:32

## 2018-03-15 RX ADMIN — CEFTRIAXONE 1000 MILLIGRAM(S): 500 INJECTION, POWDER, FOR SOLUTION INTRAMUSCULAR; INTRAVENOUS at 23:07

## 2018-03-15 RX ADMIN — Medication 0.25 MILLIGRAM(S): at 20:17

## 2018-03-15 RX ADMIN — Medication 3 MILLILITER(S): at 20:18

## 2018-03-15 RX ADMIN — AZITHROMYCIN 255 MILLIGRAM(S): 500 TABLET, FILM COATED ORAL at 00:48

## 2018-03-15 RX ADMIN — SODIUM CHLORIDE 1500 MILLILITER(S): 9 INJECTION INTRAMUSCULAR; INTRAVENOUS; SUBCUTANEOUS at 17:30

## 2018-03-15 RX ADMIN — HEPARIN SODIUM 5000 UNIT(S): 5000 INJECTION INTRAVENOUS; SUBCUTANEOUS at 05:30

## 2018-03-15 NOTE — CHART NOTE - NSCHARTNOTEFT_GEN_A_CORE
Called by RN to evaluate pt for rapid a. fib and increased work of breathing. Pt is s/p PCN tube. She was a code sepsis earlier in the day. On IV abx ceftriaxone and azithromycin. Lactate earlier 5.1. Was bolused 1.5L (concern for CHF). Cardizem drip is maximized to 15mg. Pt seen and examined. Pt noted to be tachypneic but denies SOB. No chest pain.     Cardio: s1s2+, irregularly irregular  Lungs: mild wheezes b/l    A/P  Sepsis s/p PCN placement  Rapid A. fib  Wheezing in smoker  - transfer to CICU  - f/u repeat lactate  - continue IV fluids  - continue IV abx - ceftriaxone and azithromycin  - continue cardizem drip  - give dose of digoxin  - duonebs for wheezing  - check CXR Called by RN to evaluate pt for rapid a. fib and increased work of breathing. Pt is s/p PCN tube. She was a code sepsis earlier in the day. On IV abx ceftriaxone and azithromycin. Lactate earlier 5.1. Was bolused 1.5L (concern for CHF). Cardizem drip is maximized to 15mg. Pt seen and examined. Pt noted to be tachypneic but denies SOB. No chest pain.     Cardio: s1s2+, irregularly irregular  Lungs: mild wheezes b/l    A/P  Sepsis s/p PCN placement  Rapid A. fib  Wheezing in smoker  - transfer to CICU  - f/u repeat lactate  - continue IV fluids  - continue IV abx - ceftriaxone and azithromycin  - continue cardizem drip  - give dose of digoxin  - duonebs for wheezing  - check CXR    Addendum:   CXR with pulmonary edema  stop fluids  lasix 40mg IV x1  rosado for strict I/Os Called by RN to evaluate pt for rapid a. fib and increased work of breathing. Pt is s/p PCN tube. She was a code sepsis earlier in the day. On IV abx ceftriaxone and azithromycin. Lactate earlier 5.1. Was bolused 1.5L (concern for CHF). Cardizem drip is maximized to 15mg. Pt seen and examined. Pt noted to be tachypneic but denies SOB. No chest pain.     Cardio: s1s2+, irregularly irregular  Lungs: mild wheezes b/l    A/P  Sepsis s/p PCN placement  Rapid A. fib  Wheezing in smoker  - transfer to CICU  - f/u repeat lactate  - continue IV fluids  - continue IV abx - ceftriaxone and azithromycin  - continue cardizem drip  - give dose of digoxin  - duonebs for wheezing  - check CXR    Addendum:   CXR with pulmonary edema  stop fluids  lasix 40mg IV x1  rosado for strict I/Os  if does not improve will consider bipap and nitropaste if BP tolerates

## 2018-03-15 NOTE — CHART NOTE - NSCHARTNOTEFT_GEN_A_CORE
Called to a CODE Sepsis in PACU    PAtient seen and chart reviewed    Patient returned from a L PCN.  After arrival she developed severe rigors and a temp yo 100.  She tachy to 150s--But has been in AFIB and on a Cardizem drip.    Septic post PCN placement    BP [id number]/80    Plan:  Stat Lactate, CBC, BMP, BC  Bolus 1.5 L because did not want to fluid overload  IV tylenol given  Continue Cardizem drip  likely go back to 3N  She does not require the ICU at this time    Time spent 30 min

## 2018-03-15 NOTE — SEPSIS NOTE - ASSESSMENT
A/P: Sepsis    Plan:  Repeat lactate in 3 HRS  Bolus done  BP stable  HR improving  Continue cardizem drip  She is more awake and alert
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
## 2018-03-15 NOTE — PROGRESS NOTE ADULT - SUBJECTIVE AND OBJECTIVE BOX
Follow up as bedside RN concerned about pt resp status.  Pt is awake and alert.  No complaints other than thirsty.  Slight increase in WOB but denies SOB--she is a long time smoker.  On dilt gtt for New onset AF 140s.  Hemodynamically stable.  O2 sat 100% on face tent.  + mild wheezing on exam.    Case d/w Dr JESSICA Russell at bedside.  Would upgrade pt to SD level bed.  Does not need ICU.  Consider Rx for bronchospasm (BD and short course of steroids)
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
## 2018-03-15 NOTE — PROGRESS NOTE ADULT - SUBJECTIVE AND OBJECTIVE BOX
CC Abnormal ECG  HPI Pt transferred to ED from admitting d/t abnormal ECG demostrating AF with RVR. Office redocrds reviewed. This is new AF. States having on and off leg swelling but no orthopnea, angina, palp, PND.      3/15 - Not feeling better, had episode of sweating and SOB recently. Blood work revealed hydronephrosis, obstructive calculus, UTI, questionable pneumonia, low TSH, elevated WBC, elevated cr. Tele with AF and VR around 135 now.  CT reviewed, no signs of pleural effusions or lung edema in included lungs segments. ECHO reviewed too. Mild AS, MR, preserved LVEF.   Suggest starting IV cardizem drip titratable to HR < 100. Also start UFH IV for stroke ppx. Cont PO cardizem as well, this will assist in weaning her off IV cardizem ultimately. Cont statin too.   Obtain urology consult re: hydronephrosis. Also endocrinology re: low TSH.  Would defer stress MPI until her HR is more stable and normal.  Discussed with patient also re: CV. She would need 3 weeks strict OAC and therefore gigi also interfere should she need percutaneous hydronephrosis drainage, not to mention her future knee surgery as discussed yesterday. Should HR become an issue difficult to control with drugs, would then proceed to CV and deal with the rest later on as needed. For time being, and as long as she is hemodynamically stable, would cont rate control, AC and address first UTI and possible hyperthyroidism.  Case d/w hospitalist.  Will follow    Review of systems:  Negative except for HPI    PMH  HLP, HTN, knee OA  Allergies reviewed with pt  SH: No smoker etoh/druhs  Fam hx: irrelevant  Surgical Hx: No prior cardiac surgeries  Meds reviewed with pt.      Physical exam  Gral; alert, in no distress  Neck, supple, no JVD  Resp, BLAE, no rales, wheezing  CV IIR, tachy, no m/g/r  Abd, benign  Neuro AAOX3, non focal    ECG AF with RVR and PVCs vs aberrant supraventricular beats.    A/P  * New onset AF with RVR.  Rate controlled advised at moment, she wants to have knee surgery 3/23, having her go through CV imposes at least 3 weeks mandatory OAC which would preclude surgery.  Suggest IV cardizem as needed and then transition to PO cardizem for rest HR < 100.  Will discuss with her OAC of choice after echo and recent chem is available  Cont optimal BP control, statin.  Will do stress pharm MPI and echo in AM.  Check CBC, TSH, trops and chem today per ED protocol  Suggest admit to tele    * leg swelling, possible PVD, will do echo to exclude HF.  *HTN  *HLP    Will follow up in AM    Case d/w ED staff  Daughters at bedside
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
## 2018-03-16 DIAGNOSIS — J96.01 ACUTE RESPIRATORY FAILURE WITH HYPOXIA: ICD-10-CM

## 2018-03-16 DIAGNOSIS — J15.9 UNSPECIFIED BACTERIAL PNEUMONIA: ICD-10-CM

## 2018-03-16 DIAGNOSIS — J81.0 ACUTE PULMONARY EDEMA: ICD-10-CM

## 2018-03-16 DIAGNOSIS — A41.51 SEPSIS DUE TO ESCHERICHIA COLI [E. COLI]: ICD-10-CM

## 2018-03-16 DIAGNOSIS — N39.0 URINARY TRACT INFECTION, SITE NOT SPECIFIED: ICD-10-CM

## 2018-03-16 LAB
ANION GAP SERPL CALC-SCNC: 13 MMOL/L — SIGNIFICANT CHANGE UP (ref 5–17)
APTT BLD: 46.9 SEC — HIGH (ref 27.5–37.4)
APTT BLD: 96.3 SEC — HIGH (ref 27.5–37.4)
BUN SERPL-MCNC: 40 MG/DL — HIGH (ref 7–23)
CALCIUM SERPL-MCNC: 8.3 MG/DL — LOW (ref 8.5–10.1)
CHLORIDE SERPL-SCNC: 113 MMOL/L — HIGH (ref 96–108)
CO2 SERPL-SCNC: 17 MMOL/L — LOW (ref 22–31)
CREAT SERPL-MCNC: 2.23 MG/DL — HIGH (ref 0.5–1.3)
CULTURE RESULTS: SIGNIFICANT CHANGE UP
GLUCOSE SERPL-MCNC: 204 MG/DL — HIGH (ref 70–99)
HCT VFR BLD CALC: 37.4 % — SIGNIFICANT CHANGE UP (ref 34.5–45)
HGB BLD-MCNC: 12 G/DL — SIGNIFICANT CHANGE UP (ref 11.5–15.5)
LACTATE SERPL-SCNC: 2.1 MMOL/L — HIGH (ref 0.7–2)
MCHC RBC-ENTMCNC: 28.7 PG — SIGNIFICANT CHANGE UP (ref 27–34)
MCHC RBC-ENTMCNC: 32.1 GM/DL — SIGNIFICANT CHANGE UP (ref 32–36)
MCV RBC AUTO: 89.5 FL — SIGNIFICANT CHANGE UP (ref 80–100)
NRBC # BLD: 0 /100 WBCS — SIGNIFICANT CHANGE UP (ref 0–0)
PLATELET # BLD AUTO: 153 K/UL — SIGNIFICANT CHANGE UP (ref 150–400)
POTASSIUM SERPL-MCNC: 4.5 MMOL/L — SIGNIFICANT CHANGE UP (ref 3.5–5.3)
POTASSIUM SERPL-SCNC: 4.5 MMOL/L — SIGNIFICANT CHANGE UP (ref 3.5–5.3)
RBC # BLD: 4.18 M/UL — SIGNIFICANT CHANGE UP (ref 3.8–5.2)
RBC # FLD: 16.7 % — HIGH (ref 10.3–14.5)
SODIUM SERPL-SCNC: 143 MMOL/L — SIGNIFICANT CHANGE UP (ref 135–145)
SPECIMEN SOURCE: SIGNIFICANT CHANGE UP
WBC # BLD: 19.61 K/UL — HIGH (ref 3.8–10.5)
WBC # FLD AUTO: 19.61 K/UL — HIGH (ref 3.8–10.5)

## 2018-03-16 PROCEDURE — 99223 1ST HOSP IP/OBS HIGH 75: CPT

## 2018-03-16 PROCEDURE — 71045 X-RAY EXAM CHEST 1 VIEW: CPT | Mod: 26

## 2018-03-16 RX ORDER — FUROSEMIDE 40 MG
40 TABLET ORAL DAILY
Qty: 0 | Refills: 0 | Status: DISCONTINUED | OUTPATIENT
Start: 2018-03-16 | End: 2018-03-17

## 2018-03-16 RX ADMIN — Medication 3 MILLILITER(S): at 14:20

## 2018-03-16 RX ADMIN — Medication 0.5 MILLIGRAM(S): at 13:30

## 2018-03-16 RX ADMIN — Medication 0.5 MILLIGRAM(S): at 11:03

## 2018-03-16 RX ADMIN — Medication 40 MILLIGRAM(S): at 06:00

## 2018-03-16 RX ADMIN — Medication 5 MG/HR: at 13:30

## 2018-03-16 RX ADMIN — Medication 40 MILLIGRAM(S): at 22:18

## 2018-03-16 RX ADMIN — Medication 0.5 MILLIGRAM(S): at 00:18

## 2018-03-16 RX ADMIN — CEFTRIAXONE 1000 MILLIGRAM(S): 500 INJECTION, POWDER, FOR SOLUTION INTRAMUSCULAR; INTRAVENOUS at 22:17

## 2018-03-16 RX ADMIN — HEPARIN SODIUM 2000 UNIT(S)/HR: 5000 INJECTION INTRAVENOUS; SUBCUTANEOUS at 17:42

## 2018-03-16 RX ADMIN — HEPARIN SODIUM 2000 UNIT(S)/HR: 5000 INJECTION INTRAVENOUS; SUBCUTANEOUS at 09:44

## 2018-03-16 RX ADMIN — Medication 40 MILLIGRAM(S): at 09:53

## 2018-03-16 RX ADMIN — Medication 40 MILLIGRAM(S): at 00:18

## 2018-03-16 RX ADMIN — Medication 60 MILLIGRAM(S): at 00:19

## 2018-03-16 RX ADMIN — Medication 40 MILLIGRAM(S): at 15:48

## 2018-03-16 RX ADMIN — Medication 3 MILLILITER(S): at 19:39

## 2018-03-16 RX ADMIN — Medication 0.5 MILLIGRAM(S): at 21:54

## 2018-03-16 RX ADMIN — Medication 3 MILLILITER(S): at 08:51

## 2018-03-16 RX ADMIN — Medication 3 MILLILITER(S): at 01:56

## 2018-03-16 RX ADMIN — HEPARIN SODIUM 4000 UNIT(S): 5000 INJECTION INTRAVENOUS; SUBCUTANEOUS at 09:53

## 2018-03-16 NOTE — PROGRESS NOTE ADULT - SUBJECTIVE AND OBJECTIVE BOX
CC Abnormal ECG  HPI Pt transferred to ED from admitting d/t abnormal ECG demostrating AF with RVR. Office redocrds reviewed. This is new AF. States having on and off leg swelling but no orthopnea, angina, palp, PND.      3/15 - Not feeling better, had episode of sweating and SOB recently. Blood work revealed hydronephrosis, obstructive calculus, UTI, questionable pneumonia, low TSH, elevated WBC, elevated cr. Tele with AF and VR around 135 now.  CT reviewed, no signs of pleural effusions or lung edema in included lungs segments. ECHO reviewed too. Mild AS, MR, preserved LVEF.   Suggest starting IV cardizem drip titratable to HR < 100. Also start UFH IV for stroke ppx. Cont PO cardizem as well, this will assist in weaning her off IV cardizem ultimately. Cont statin too.   Obtain urology consult re: hydronephrosis. Also endocrinology re: low TSH.  Would defer stress MPI until her HR is more stable and normal.  Discussed with patient also re: CV. She would need 3 weeks strict OAC and therefore gigi also interfere should she need percutaneous hydronephrosis drainage, not to mention her future knee surgery as discussed yesterday. Should HR become an issue difficult to control with drugs, would then proceed to CV and deal with the rest later on as needed. For time being, and as long as she is hemodynamically stable, would cont rate control, AC and address first UTI and possible hyperthyroidism.  Case d/w hospitalist.  Will follow    3/16 - Pt on BiPAP, in no resp distress at moment. Tele AF with HR round 70-90. BP stable off pressors. Awake and alert. Had resp distress yesterday after PCN, possibly diastolic heart failure. Suggest continuing IV diuresis as needed, continue rate control with IV diltiazem, will start weaning her to PO once off BiPAP. Continue UFH, will transition to OAC once kidney fx is stable as well. Will continue to defer stress test and CV for time being.  Will follow.     Review of systems:  Negative except for HPI    PMH  HLP, HTN, knee OA  Allergies reviewed with pt  SH: No smoker etoh/druhs  Fam hx: irrelevant  Surgical Hx: No prior cardiac surgeries  Meds reviewed with pt.      Physical exam  Gral; alert, in no distress  Neck, supple, no JVD  Resp, BLAE, no rales, wheezing  CV IIR, tachy, no m/g/r  Abd, benign  Neuro AAOX3, non focal    ECG AF with RVR and PVCs vs aberrant supraventricular beats.    A/P  * New onset AF with RVR.  Rate controlled advised at moment, she wants to have knee surgery 3/23, having her go through CV imposes at least 3 weeks mandatory OAC which would preclude surgery.  Suggest IV cardizem as needed and then transition to PO cardizem for rest HR < 100.  Will discuss with her OAC of choice after echo and recent chem is available  Cont optimal BP control, statin.  Will do stress pharm MPI and echo in AM.  Check CBC, TSH, trops and chem today per ED protocol  Suggest admit to tele    * leg swelling, possible PVD, will do echo to exclude HF.  *HTN  *HLP    Will follow up in AM    Case d/w ED staff  Daughters at bedside

## 2018-03-16 NOTE — PROGRESS NOTE ADULT - SUBJECTIVE AND OBJECTIVE BOX
s/p neph tube placement improving  will need definitive mgmt of stone with eswl as outpt when medicall stable
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
## 2018-03-16 NOTE — PROGRESS NOTE ADULT - ASSESSMENT
IMP:    Acute lung edema--suspected pt has HFpEF and along with mild as and tachycardia--which are all contributing factors to pulm edema--pt is also life long smoker so most likely has element of COPD  S/P L PCN  New onset AF    Suggest:    Tx to CCU    Another dose of lasix 40  Bipap   Ativan for anxiety  Steroids and BD  Tylenol for fever control  Cont with IV abx  NPO except for meds  Repeat CXR in AM    Above d/w CICU and CCU staff and RN supervisor

## 2018-03-16 NOTE — PROGRESS NOTE ADULT - SUBJECTIVE AND OBJECTIVE BOX
Responded to RRT on CICU for Resp distress    CC:  Sepsis     HPI:    71 y/o female with HTN, HL, smoker and renal stone underwent L PCN earlier today.  Pt was seen in PACU following IR by Dr may--pt had elevated lactate and was given 2.5L NS--lactate decreased to 3.2  Pt was again seen by myself in PACU and decision was made to upgrade pt to SD level bed.  CXR revealed fluid overload--given lasix 40 IV with 200ml uo thus far.    On arrival, pt is awake and alert, increased WOB compared to prior, BP stable, HR 120s AF and on IV hep and dilt gtt.      TTE 0n 3/14--EF 50-55%/dilated LA/mild AS/1+ MR    Pt is tx to CCU       PMH:  As above.     PSH:  As above.     FH: Non Contributory other than those listed in HPI    Social History:      MEDICATIONS  (STANDING):  ALBUTerol/ipratropium for Nebulization 3 milliLiter(s) Nebulizer every 6 hours  aspirin 325 milliGRAM(s) Oral daily  azithromycin  IVPB 500 milliGRAM(s) IV Intermittent every 24 hours  cefTRIAXone Injectable. 1000 milliGRAM(s) IV Push every 24 hours  diltiazem    Tablet 60 milliGRAM(s) Oral every 4 hours  diltiazem Infusion 5 mG/Hr (5 mL/Hr) IV Continuous <Continuous>  furosemide   Injectable 40 milliGRAM(s) IV Push once  heparin  Infusion.  Unit(s)/Hr (18 mL/Hr) IV Continuous <Continuous>  LORazepam   Injectable 0.5 milliGRAM(s) IV Push once  methylPREDNISolone sodium succinate Injectable 60 milliGRAM(s) IV Push once  methylPREDNISolone sodium succinate Injectable 40 milliGRAM(s) IV Push every 8 hours  simvastatin 20 milliGRAM(s) Oral at bedtime    MEDICATIONS  (PRN):  docusate sodium 100 milliGRAM(s) Oral two times a day PRN Constipation  fentaNYL    Injectable 25 MICROGram(s) IV Push every 5 minutes PRN Moderate Pain  heparin  Injectable 8000 Unit(s) IV Push every 6 hours PRN For aPTT less than 40  heparin  Injectable 4000 Unit(s) IV Push every 6 hours PRN For aPTT between 40 - 57  ondansetron Injectable 4 milliGRAM(s) IV Push every 6 hours PRN Nausea and/or Vomiting  ondansetron Injectable 4 milliGRAM(s) IV Push once PRN Nausea and/or Vomiting  oxyCODONE    5 mG/acetaminophen 325 mG 1 Tablet(s) Oral every 4 hours PRN Moderate Pain (4 - 6)  oxyCODONE    IR 5 milliGRAM(s) Oral every 4 hours PRN For moderate pain      Allergies: NKDA    ROS:  SEE BELOW        ICU Vital Signs Last 24 Hrs  T(C): 37.8 (15 Mar 2018 22:26), Max: 37.8 (15 Mar 2018 22:26)  T(F): 100.1 (15 Mar 2018 22:26), Max: 100.1 (15 Mar 2018 22:26)  HR: 133 (15 Mar 2018 21:45) (107 - 146)  BP: 146/86 (15 Mar 2018 22:) (92/51 - 152/76)  BP(mean): 99 (15 Mar 2018 18:33) (81 - 99)  ABP: --  ABP(mean): --  RR: 31 (15 Mar 2018 22:26) (16 - 39)  SpO2: 96% (15 Mar 2018 22:26) (88% - 100%)          I&O's Summary    14 Mar 2018 07:01  -  15 Mar 2018 07:00  --------------------------------------------------------  IN: 525 mL / OUT: 0 mL / NET: 525 mL    15 Mar 2018 07:01  -  16 Mar 2018 00:01  --------------------------------------------------------  IN: 2130 mL / OUT: 500 mL / NET: 1630 mL        Physical Exam:  SEE BELOW                          9.7    0.79  )-----------( 175      ( 15 Mar 2018 17:20 )             31.2       03-15    147<H>  |  119<H>  |  24<H>  ----------------------------<  132<H>  3.2<L>   |  17<L>  |  1.37<H>    Ca    6.4<LL>      15 Mar 2018 17:37    TPro  6.9  /  Alb  2.7<L>  /  TBili  0.8  /  DBili  x   /  AST  24  /  ALT  20  /  AlkPhos  103  03-14      CARDIAC MARKERS ( 15 Mar 2018 06:23 )  <0.015 ng/mL / x     / x     / x     / x      CARDIAC MARKERS ( 14 Mar 2018 19:40 )  <0.015 ng/mL / x     / x     / x     / x      CARDIAC MARKERS ( 14 Mar 2018 16:30 )  <0.015 ng/mL / x     / x     / x     / x                Urinalysis Basic - ( 14 Mar 2018 15:06 )    Color: Yellow / Appearance: very cloudy / S.020 / pH: x  Gluc: x / Ketone: Negative  / Bili: Small / Urobili: 4 mg/dL   Blood: x / Protein: 100 mg/dL / Nitrite: Positive   Leuk Esterase: Moderate / RBC: 6-10 /HPF / WBC >50   Sq Epi: x / Non Sq Epi: Moderate / Bacteria: Many        DVT Prophylaxis:                                                            Contraindication:     Advanced Directives:    Discussed with:    Visit Information:  Time spent excluding procedure:  45 cc mins    ** Time is exclusive of billed procedures and/or teaching and/or routine family updates.

## 2018-03-16 NOTE — PROGRESS NOTE ADULT - SUBJECTIVE AND OBJECTIVE BOX
Patient is a 71 y/o female smoker who presented for a pre op testing and noted to be in AFIB.  She was sent to  and noted to be STACIE.  CT ABD showed a L ureteral obstructing.  She had a L PCN placed and then developed severe sepsis in the PACU.  After the IVF bolus she went into pulmonary edema and required diuresis and NIV.        3/16: Patient on NIV and desated after it was removed.  She is more comfortable today.  CXR c/w pulm edema      PMH: as above  PSH: c section x2, hysterectomy, left torn rotator cuff sx  Social Hx: denies tobacco, EtOH  Family Hx: Mother-breast ca; Grandfather-heart disease  ROS: per hpi (14 Mar 2018 21:50)       PAST MEDICAL & SURGICAL HISTORY:  Diverticulitis  Kidney stones  Psoriasis  Edema  HLD (hyperlipidemia)  HTN (hypertension)  S/P   H/O bladder repair surgery  H/O arthroscopy of shoulder: left   H/O: hysterectomy: due to bleeding       FAMILY HISTORY:      Social Hx:    Allergies    Macrobid (Other)    Intolerances            ICU Vital Signs Last 24 Hrs  T(C): 36.4 (16 Mar 2018 08:46), Max: 37.8 (15 Mar 2018 22:26)  T(F): 97.5 (16 Mar 2018 08:46), Max: 100.1 (15 Mar 2018 22:26)  HR: 88 (16 Mar 2018 06:00) (88 - 146)  BP: 97/67 (16 Mar 2018 06:00) (90/66 - 152/76)  BP(mean): 72 (16 Mar 2018 06:00) (63 - 99)  ABP: --  ABP(mean): --  RR: 30 (16 Mar 2018 06:00) (16 - 39)  SpO2: 97% (16 Mar 2018 06:00) (88% - 100%)          I&O's Summary    15 Mar 2018 07:01  -  16 Mar 2018 07:00  --------------------------------------------------------  IN: 2130 mL / OUT: 1290 mL / NET: 840 mL                              12.0   19.61 )-----------( 153      ( 16 Mar 2018 07:04 )             37.4       03-16    143  |  113<H>  |  40<H>  ----------------------------<  204<H>  4.5   |  17<L>  |  2.23<H>    Ca    8.3<L>      16 Mar 2018 07:04    TPro  6.9  /  Alb  2.7<L>  /  TBili  0.8  /  DBili  x   /  AST  24  /  ALT  20  /  AlkPhos  103  03-14      CARDIAC MARKERS ( 15 Mar 2018 06:23 )  <0.015 ng/mL / x     / x     / x     / x      CARDIAC MARKERS ( 14 Mar 2018 19:40 )  <0.015 ng/mL / x     / x     / x     / x      CARDIAC MARKERS ( 14 Mar 2018 16:30 )  <0.015 ng/mL / x     / x     / x     / x                Urinalysis Basic - ( 14 Mar 2018 15:06 )    Color: Yellow / Appearance: very cloudy / S.020 / pH: x  Gluc: x / Ketone: Negative  / Bili: Small / Urobili: 4 mg/dL   Blood: x / Protein: 100 mg/dL / Nitrite: Positive   Leuk Esterase: Moderate / RBC: 6-10 /HPF / WBC >50   Sq Epi: x / Non Sq Epi: Moderate / Bacteria: Many        MEDICATIONS  (STANDING):  ALBUTerol/ipratropium for Nebulization 3 milliLiter(s) Nebulizer every 6 hours  aspirin 325 milliGRAM(s) Oral daily  azithromycin  IVPB 500 milliGRAM(s) IV Intermittent every 24 hours  cefTRIAXone Injectable. 1000 milliGRAM(s) IV Push every 24 hours  diltiazem    Tablet 60 milliGRAM(s) Oral every 4 hours  diltiazem Infusion 5 mG/Hr (5 mL/Hr) IV Continuous <Continuous>  furosemide   Injectable 40 milliGRAM(s) IV Push daily  heparin  Infusion.  Unit(s)/Hr (18 mL/Hr) IV Continuous <Continuous>  methylPREDNISolone sodium succinate Injectable 40 milliGRAM(s) IV Push every 8 hours  simvastatin 20 milliGRAM(s) Oral at bedtime    MEDICATIONS  (PRN):  docusate sodium 100 milliGRAM(s) Oral two times a day PRN Constipation  fentaNYL    Injectable 25 MICROGram(s) IV Push every 5 minutes PRN Moderate Pain  heparin  Injectable 8000 Unit(s) IV Push every 6 hours PRN For aPTT less than 40  heparin  Injectable 4000 Unit(s) IV Push every 6 hours PRN For aPTT between 40 - 57  ondansetron Injectable 4 milliGRAM(s) IV Push every 6 hours PRN Nausea and/or Vomiting  ondansetron Injectable 4 milliGRAM(s) IV Push once PRN Nausea and/or Vomiting  oxyCODONE    5 mG/acetaminophen 325 mG 1 Tablet(s) Oral every 4 hours PRN Moderate Pain (4 - 6)  oxyCODONE    IR 5 milliGRAM(s) Oral every 4 hours PRN For moderate pain      DVT Prophylaxis:    Advanced Directives:  Discussed with:    Visit Information: 30 min    ** Time is exclusive of billed procedures and/or teaching and/or routine family updates.
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
## 2018-03-16 NOTE — PROGRESS NOTE ADULT - ASSESSMENT
A/P72 female who developed septic shock from L hydronephrosis, Rapid AFIB, ? LLL PNA vs. lung mass      Plan:  CCU    PULM: Continue NIV, will try again later to take off NIV.  Continue antibiotics for possible PNA.  Consulted pulmonary so she can have F/U as an out patiet to make sure the  possible LLL pneumonia resolves and if not w/u for a mass.      Cardio: AFIB--Continue cardizem drip, on AC now, cardio following, Continue UFH    Renal: In STACIE from likely ATN, worsening acidosis.  Will add a low dose bicarb drip.  IF UO does not improve and cr. continues to worsen will need to get nephro involved.      GI: NPO while on NIV A/P72 female who developed septic shock from L hydronephrosis, Rapid AFIB, ? LLL PNA vs. lung mass      Plan:  CCU    PULM: Continue NIV, will try again later to take off NIV.  Continue antibiotics for possible PNA.  Consulted pulmonary so she can have F/U as an out patiet to make sure the  possible LLL pneumonia resolves and if not w/u for a mass.  Continue rocephin and zithromax    Cardio: AFIB--Continue cardizem drip, on AC now, cardio following, Continue UFH    Renal: In STACIE from likely ATN, worsening acidosis.  Will add a low dose bicarb drip.  IF UO does not improve and cr. continues to worsen will need to get nephro involved.      GI: NPO while on NIV    ID: Gram negative bacteremia from L hydronephrosis.  Continue Rocephin  for Now    D/W Pulmonary

## 2018-03-17 DIAGNOSIS — N17.9 ACUTE KIDNEY FAILURE, UNSPECIFIED: ICD-10-CM

## 2018-03-17 DIAGNOSIS — I48.91 UNSPECIFIED ATRIAL FIBRILLATION: ICD-10-CM

## 2018-03-17 DIAGNOSIS — I35.0 NONRHEUMATIC AORTIC (VALVE) STENOSIS: ICD-10-CM

## 2018-03-17 DIAGNOSIS — I50.31 ACUTE DIASTOLIC (CONGESTIVE) HEART FAILURE: ICD-10-CM

## 2018-03-17 LAB
-  AMIKACIN: SIGNIFICANT CHANGE UP
-  AMPICILLIN/SULBACTAM: SIGNIFICANT CHANGE UP
-  AMPICILLIN: SIGNIFICANT CHANGE UP
-  AZTREONAM: SIGNIFICANT CHANGE UP
-  CEFAZOLIN: SIGNIFICANT CHANGE UP
-  CEFEPIME: SIGNIFICANT CHANGE UP
-  CEFOXITIN: SIGNIFICANT CHANGE UP
-  CEFTAZIDIME: SIGNIFICANT CHANGE UP
-  CEFTRIAXONE: SIGNIFICANT CHANGE UP
-  CIPROFLOXACIN: SIGNIFICANT CHANGE UP
-  ERTAPENEM: SIGNIFICANT CHANGE UP
-  GENTAMICIN: SIGNIFICANT CHANGE UP
-  IMIPENEM: SIGNIFICANT CHANGE UP
-  LEVOFLOXACIN: SIGNIFICANT CHANGE UP
-  MEROPENEM: SIGNIFICANT CHANGE UP
-  PIPERACILLIN/TAZOBACTAM: SIGNIFICANT CHANGE UP
-  TOBRAMYCIN: SIGNIFICANT CHANGE UP
-  TRIMETHOPRIM/SULFAMETHOXAZOLE: SIGNIFICANT CHANGE UP
ADD ON TEST-SPECIMEN IN LAB: SIGNIFICANT CHANGE UP
ANION GAP SERPL CALC-SCNC: 11 MMOL/L — SIGNIFICANT CHANGE UP (ref 5–17)
ANION GAP SERPL CALC-SCNC: 13 MMOL/L — SIGNIFICANT CHANGE UP (ref 5–17)
APTT BLD: 78.9 SEC — HIGH (ref 27.5–37.4)
BASE EXCESS BLDA CALC-SCNC: -6.4 MMOL/L — LOW (ref -2–2)
BUN SERPL-MCNC: 58 MG/DL — HIGH (ref 7–23)
BUN SERPL-MCNC: 64 MG/DL — HIGH (ref 7–23)
CALCIUM SERPL-MCNC: 8.1 MG/DL — LOW (ref 8.5–10.1)
CALCIUM SERPL-MCNC: 8.2 MG/DL — LOW (ref 8.5–10.1)
CHLORIDE SERPL-SCNC: 110 MMOL/L — HIGH (ref 96–108)
CHLORIDE SERPL-SCNC: 110 MMOL/L — HIGH (ref 96–108)
CO2 SERPL-SCNC: 20 MMOL/L — LOW (ref 22–31)
CO2 SERPL-SCNC: 20 MMOL/L — LOW (ref 22–31)
CREAT SERPL-MCNC: 2.75 MG/DL — HIGH (ref 0.5–1.3)
CREAT SERPL-MCNC: 2.85 MG/DL — HIGH (ref 0.5–1.3)
CULTURE RESULTS: SIGNIFICANT CHANGE UP
CULTURE RESULTS: SIGNIFICANT CHANGE UP
GAS PNL BLDA: SIGNIFICANT CHANGE UP
GLUCOSE SERPL-MCNC: 177 MG/DL — HIGH (ref 70–99)
GLUCOSE SERPL-MCNC: 204 MG/DL — HIGH (ref 70–99)
HCO3 BLDA-SCNC: 18 MMOL/L — LOW (ref 21–29)
HCT VFR BLD CALC: 35.7 % — SIGNIFICANT CHANGE UP (ref 34.5–45)
HGB BLD-MCNC: 11.7 G/DL — SIGNIFICANT CHANGE UP (ref 11.5–15.5)
MAGNESIUM SERPL-MCNC: 2.3 MG/DL — SIGNIFICANT CHANGE UP (ref 1.6–2.6)
MCHC RBC-ENTMCNC: 29.3 PG — SIGNIFICANT CHANGE UP (ref 27–34)
MCHC RBC-ENTMCNC: 32.8 GM/DL — SIGNIFICANT CHANGE UP (ref 32–36)
MCV RBC AUTO: 89.5 FL — SIGNIFICANT CHANGE UP (ref 80–100)
METHOD TYPE: SIGNIFICANT CHANGE UP
NRBC # BLD: 0 /100 WBCS — SIGNIFICANT CHANGE UP (ref 0–0)
NT-PROBNP SERPL-SCNC: HIGH PG/ML (ref 0–125)
ORGANISM # SPEC MICROSCOPIC CNT: SIGNIFICANT CHANGE UP
PCO2 BLDA: 36 MMHG — SIGNIFICANT CHANGE UP (ref 32–46)
PH BLDA: 7.33 — LOW (ref 7.35–7.45)
PHOSPHATE SERPL-MCNC: 4.9 MG/DL — HIGH (ref 2.5–4.5)
PLATELET # BLD AUTO: 168 K/UL — SIGNIFICANT CHANGE UP (ref 150–400)
PO2 BLDA: 77 MMHG — SIGNIFICANT CHANGE UP (ref 74–108)
POTASSIUM SERPL-MCNC: 3.8 MMOL/L — SIGNIFICANT CHANGE UP (ref 3.5–5.3)
POTASSIUM SERPL-MCNC: 4.3 MMOL/L — SIGNIFICANT CHANGE UP (ref 3.5–5.3)
POTASSIUM SERPL-SCNC: 3.8 MMOL/L — SIGNIFICANT CHANGE UP (ref 3.5–5.3)
POTASSIUM SERPL-SCNC: 4.3 MMOL/L — SIGNIFICANT CHANGE UP (ref 3.5–5.3)
RBC # BLD: 3.99 M/UL — SIGNIFICANT CHANGE UP (ref 3.8–5.2)
RBC # FLD: 16.7 % — HIGH (ref 10.3–14.5)
SAO2 % BLDA: 94 % — SIGNIFICANT CHANGE UP (ref 92–96)
SODIUM SERPL-SCNC: 141 MMOL/L — SIGNIFICANT CHANGE UP (ref 135–145)
SODIUM SERPL-SCNC: 143 MMOL/L — SIGNIFICANT CHANGE UP (ref 135–145)
SPECIMEN SOURCE: SIGNIFICANT CHANGE UP
SPECIMEN SOURCE: SIGNIFICANT CHANGE UP
WBC # BLD: 25.13 K/UL — HIGH (ref 3.8–10.5)
WBC # FLD AUTO: 25.13 K/UL — HIGH (ref 3.8–10.5)

## 2018-03-17 PROCEDURE — 99233 SBSQ HOSP IP/OBS HIGH 50: CPT

## 2018-03-17 PROCEDURE — 74018 RADEX ABDOMEN 1 VIEW: CPT | Mod: 26,59

## 2018-03-17 PROCEDURE — 71045 X-RAY EXAM CHEST 1 VIEW: CPT | Mod: 26,76

## 2018-03-17 RX ORDER — CEFEPIME 1 G/1
INJECTION, POWDER, FOR SOLUTION INTRAMUSCULAR; INTRAVENOUS
Qty: 0 | Refills: 0 | Status: DISCONTINUED | OUTPATIENT
Start: 2018-03-17 | End: 2018-03-22

## 2018-03-17 RX ORDER — PROPOFOL 10 MG/ML
10 INJECTION, EMULSION INTRAVENOUS
Qty: 1000 | Refills: 0 | Status: DISCONTINUED | OUTPATIENT
Start: 2018-03-17 | End: 2018-03-20

## 2018-03-17 RX ORDER — BUMETANIDE 0.25 MG/ML
1 INJECTION INTRAMUSCULAR; INTRAVENOUS
Qty: 10 | Refills: 0 | Status: DISCONTINUED | OUTPATIENT
Start: 2018-03-17 | End: 2018-03-19

## 2018-03-17 RX ORDER — CEFEPIME 1 G/1
1000 INJECTION, POWDER, FOR SOLUTION INTRAMUSCULAR; INTRAVENOUS ONCE
Qty: 0 | Refills: 0 | Status: COMPLETED | OUTPATIENT
Start: 2018-03-17 | End: 2018-03-17

## 2018-03-17 RX ORDER — CEFEPIME 1 G/1
1000 INJECTION, POWDER, FOR SOLUTION INTRAMUSCULAR; INTRAVENOUS EVERY 12 HOURS
Qty: 0 | Refills: 0 | Status: DISCONTINUED | OUTPATIENT
Start: 2018-03-17 | End: 2018-03-22

## 2018-03-17 RX ORDER — CHLORHEXIDINE GLUCONATE 213 G/1000ML
15 SOLUTION TOPICAL
Qty: 0 | Refills: 0 | Status: DISCONTINUED | OUTPATIENT
Start: 2018-03-17 | End: 2018-03-21

## 2018-03-17 RX ADMIN — HEPARIN SODIUM 2000 UNIT(S)/HR: 5000 INJECTION INTRAVENOUS; SUBCUTANEOUS at 00:50

## 2018-03-17 RX ADMIN — Medication 40 MILLIGRAM(S): at 05:49

## 2018-03-17 RX ADMIN — Medication 325 MILLIGRAM(S): at 18:18

## 2018-03-17 RX ADMIN — BUMETANIDE 10 MG/HR: 0.25 INJECTION INTRAMUSCULAR; INTRAVENOUS at 11:19

## 2018-03-17 RX ADMIN — CEFEPIME 100 MILLIGRAM(S): 1 INJECTION, POWDER, FOR SOLUTION INTRAMUSCULAR; INTRAVENOUS at 12:10

## 2018-03-17 RX ADMIN — Medication 5 MG/HR: at 01:42

## 2018-03-17 RX ADMIN — Medication 1 MILLIGRAM(S): at 20:24

## 2018-03-17 RX ADMIN — PROPOFOL 5.99 MICROGRAM(S)/KG/MIN: 10 INJECTION, EMULSION INTRAVENOUS at 20:23

## 2018-03-17 RX ADMIN — Medication 1 MILLIGRAM(S): at 16:15

## 2018-03-17 RX ADMIN — Medication 0.5 MILLIGRAM(S): at 12:47

## 2018-03-17 RX ADMIN — Medication 40 MILLIGRAM(S): at 22:39

## 2018-03-17 RX ADMIN — Medication 3 MILLILITER(S): at 02:50

## 2018-03-17 RX ADMIN — Medication 0.5 MILLIGRAM(S): at 01:51

## 2018-03-17 RX ADMIN — PROPOFOL 5.99 MICROGRAM(S)/KG/MIN: 10 INJECTION, EMULSION INTRAVENOUS at 12:37

## 2018-03-17 RX ADMIN — Medication 0.5 MILLIGRAM(S): at 05:30

## 2018-03-17 RX ADMIN — AZITHROMYCIN 255 MILLIGRAM(S): 500 TABLET, FILM COATED ORAL at 00:27

## 2018-03-17 RX ADMIN — BUMETANIDE 10 MG/HR: 0.25 INJECTION INTRAMUSCULAR; INTRAVENOUS at 19:40

## 2018-03-17 RX ADMIN — CHLORHEXIDINE GLUCONATE 15 MILLILITER(S): 213 SOLUTION TOPICAL at 18:48

## 2018-03-17 RX ADMIN — SIMVASTATIN 20 MILLIGRAM(S): 20 TABLET, FILM COATED ORAL at 22:39

## 2018-03-17 RX ADMIN — Medication 40 MILLIGRAM(S): at 14:43

## 2018-03-17 RX ADMIN — Medication 3 MILLILITER(S): at 08:07

## 2018-03-17 RX ADMIN — CEFEPIME 100 MILLIGRAM(S): 1 INJECTION, POWDER, FOR SOLUTION INTRAMUSCULAR; INTRAVENOUS at 18:47

## 2018-03-17 RX ADMIN — PROPOFOL 5.99 MICROGRAM(S)/KG/MIN: 10 INJECTION, EMULSION INTRAVENOUS at 10:30

## 2018-03-17 NOTE — PROGRESS NOTE ADULT - SUBJECTIVE AND OBJECTIVE BOX
CC:  Resp failure    HPI:    73 y/o female with HTN, HL, smoker and renal stone underwent L PCN earlier today.  Pt was seen in PACU following IR by Dr may--pt had elevated lactate and was given 2.5L NS--lactate decreased to 3.2  Pt was again seen by myself in PACU and decision was made to upgrade pt to SD level bed.  CXR revealed fluid overload--given lasix 40 IV with 200ml uo thus far.    On arrival, pt is awake and alert, increased WOB compared to prior, BP stable, HR 120s AF and on IV hep and dilt gtt.      TTE 0n 3/14--EF 50-55%/dilated LA/mild AS/1+ MR    3/17:  Pt seen and examined in CCU--on NIV--not much better--CXR with worsening B/L edema--Cr increasing--will plan for intubation      PMH:  As above.     PSH:  As above.     FH: Non Contributory other than those listed in HPI    Social History:      MEDICATIONS  (STANDING):  ALBUTerol/ipratropium for Nebulization 3 milliLiter(s) Nebulizer every 6 hours  aspirin 325 milliGRAM(s) Oral daily  azithromycin  IVPB 500 milliGRAM(s) IV Intermittent every 24 hours  buMETAnide Infusion 1 mG/Hr (10 mL/Hr) IV Continuous <Continuous>  cefTRIAXone Injectable. 1000 milliGRAM(s) IV Push every 24 hours  chlorhexidine 0.12% Liquid 15 milliLiter(s) Swish and Spit two times a day  diltiazem    Tablet 60 milliGRAM(s) Oral every 4 hours  diltiazem Infusion 5 mG/Hr (5 mL/Hr) IV Continuous <Continuous>  heparin  Infusion.  Unit(s)/Hr (18 mL/Hr) IV Continuous <Continuous>  methylPREDNISolone sodium succinate Injectable 40 milliGRAM(s) IV Push every 8 hours  propofol Infusion 10 MICROgram(s)/kG/Min (5.988 mL/Hr) IV Continuous <Continuous>  simvastatin 20 milliGRAM(s) Oral at bedtime    MEDICATIONS  (PRN):  docusate sodium 100 milliGRAM(s) Oral two times a day PRN Constipation  heparin  Injectable 8000 Unit(s) IV Push every 6 hours PRN For aPTT less than 40  heparin  Injectable 4000 Unit(s) IV Push every 6 hours PRN For aPTT between 40 - 57  LORazepam   Injectable 0.5 milliGRAM(s) IntraMuscular three times a day PRN Anxiety  ondansetron Injectable 4 milliGRAM(s) IV Push every 6 hours PRN Nausea and/or Vomiting  oxyCODONE    5 mG/acetaminophen 325 mG 1 Tablet(s) Oral every 4 hours PRN Moderate Pain (4 - 6)      Allergies: NKDA    ROS:  SEE BELOW        ICU Vital Signs Last 24 Hrs  T(C): 37.1 (17 Mar 2018 07:57), Max: 37.1 (17 Mar 2018 05:56)  T(F): 98.8 (17 Mar 2018 07:57), Max: 98.8 (17 Mar 2018 05:56)  HR: 82 (17 Mar 2018 10:00) (74 - 95)  BP: 107/58 (17 Mar 2018 09:00) (101/66 - 126/54)  BP(mean): 68 (17 Mar 2018 09:00) (36 - 88)  ABP: --  ABP(mean): --  RR: 27 (17 Mar 2018 09:00) (26 - 35)  SpO2: 94% (17 Mar 2018 10:00) (87% - 100%)      Mode: AC/ CMV (Assist Control/ Continuous Mandatory Ventilation)  RR (machine): 12  TV (machine): 500  PEEP: 5      I&O's Summary    16 Mar 2018 07:01  -  17 Mar 2018 07:00  --------------------------------------------------------  IN: 724 mL / OUT: 730 mL / NET: -6 mL        Physical Exam:  SEE BELOW                          11.7   25.13 )-----------( 168      ( 17 Mar 2018 06:09 )             35.7       03-17    141  |  110<H>  |  58<H>  ----------------------------<  177<H>  4.3   |  20<L>  |  2.75<H>    Ca    8.1<L>      17 Mar 2018 06:09  Phos  4.9     03-17  Mg     2.3     03-17                      DVT Prophylaxis:                                                            Contraindication:     Advanced Directives:    Discussed with:    Visit Information:  Time spent excluding procedure:  75 cc mins    ** Time is exclusive of billed procedures and/or teaching and/or routine family updates.

## 2018-03-17 NOTE — PROGRESS NOTE ADULT - SUBJECTIVE AND OBJECTIVE BOX
Subjective:  Mrs. Clancy is a 72-year-old female who initially presented to the hospital for preoperative evaluation for knee surgery. She is found to be in atrial fibrillation in [facility] for evaluation. Patient is complaining of flank pain. A CT scan of the abdomen and pelvis was performed which showed hydronephrosis with a kidney stone. There was also evidence of a left lower lobe infiltrate. As patient had a nephrostomy tube placed and was started on antibiotics. Mrs. Cruz became progressively more dyspneic. Blood cultures were positive for Escherichia coli. She developed sepsis with hypotension and was resuscitated with over 3 L of fluid. She developed significant shortness of breath which was treated with BiPAP. She is now on a Ventimask. Mrs. Clancy has no previous history of significant pulmonary disease. She does have a 40+-pack-year smoking history. There is no history of, system anorexia or weight loss. Current O2 saturation is 90% on 50% Ventimask.    3/17: events noted. intubated this AM b/c obtunded on BIPAP. CXR shows increase in bilat alveolar type infitrates. began on IV bumex drip. blood C&S positve for e coli. currently sedated on vent. O2 sat 100%.     MEDICATIONS  (STANDING):  ALBUTerol/ipratropium for Nebulization 3 milliLiter(s) Nebulizer every 6 hours  aspirin 325 milliGRAM(s) Oral daily  azithromycin  IVPB 500 milliGRAM(s) IV Intermittent every 24 hours  cefTRIAXone Injectable. 1000 milliGRAM(s) IV Push every 24 hours  diltiazem    Tablet 60 milliGRAM(s) Oral every 4 hours  diltiazem Infusion 5 mG/Hr (5 mL/Hr) IV Continuous <Continuous>  furosemide   Injectable 40 milliGRAM(s) IV Push daily  heparin  Infusion.  Unit(s)/Hr (18 mL/Hr) IV Continuous <Continuous>  methylPREDNISolone sodium succinate Injectable 40 milliGRAM(s) IV Push every 8 hours  simvastatin 20 milliGRAM(s) Oral at bedtime    MEDICATIONS  (PRN):  docusate sodium 100 milliGRAM(s) Oral two times a day PRN Constipation  heparin  Injectable 8000 Unit(s) IV Push every 6 hours PRN For aPTT less than 40  heparin  Injectable 4000 Unit(s) IV Push every 6 hours PRN For aPTT between 40 - 57  LORazepam   Injectable 0.5 milliGRAM(s) IntraMuscular three times a day PRN Anxiety  ondansetron Injectable 4 milliGRAM(s) IV Push every 6 hours PRN Nausea and/or Vomiting  ondansetron Injectable 4 milliGRAM(s) IV Push once PRN Nausea and/or Vomiting  oxyCODONE    5 mG/acetaminophen 325 mG 1 Tablet(s) Oral every 4 hours PRN Moderate Pain (4 - 6)  oxyCODONE    IR 5 milliGRAM(s) Oral every 4 hours PRN For moderate pain      Allergies    Macrobid (Other)    Intolerances        REVIEW OF SYSTEMS:    CONSTITUTIONAL:  As per HPI.  HEENT:  Eyes:  No diplopia or blurred vision. ENT:  No earache, sore throat or runny nose.  CARDIOVASCULAR:  No pressure, squeezing, tightness, heaviness or aching about the chest, neck, axilla or epigastrium.  RESPIRATORY: see above.  GASTROINTESTINAL:  No nausea, vomiting or diarrhea.  GENITOURINARY:  No dysuria, frequency or urgency.  MUSCULOSKELETAL:  no joint pain, deformity, tenderness  EXTREMITIES: no clubbing cyanosis,edema  SKIN:  No change in skin, hair or nails.  NEUROLOGIC:  No paresthesias, fasciculations, seizures or weakness.  PSYCHIATRIC:  No disorder of thought or mood.  ENDOCRINE:  No heat or cold intolerance, polyuria or polydipsia.  HEMATOLOGICAL:  No easy bruising or bleedings:    Vital Signs Last 24 Hrs  T(C): 36.4 (16 Mar 2018 12:46), Max: 37.8 (15 Mar 2018 22:26)  T(F): 97.6 (16 Mar 2018 12:46), Max: 100.1 (15 Mar 2018 22:26)  HR: 89 (16 Mar 2018 17:44) (80 - 146)  BP: 103/67 (16 Mar 2018 17:00) (90/66 - 146/86)  BP(mean): 74 (16 Mar 2018 17:00) (62 - 99)  RR: 31 (16 Mar 2018 17:00) (16 - 36)  SpO2: 97% (16 Mar 2018 17:44) (90% - 100%)    PHYSICAL EXAMINATION:  SKIN: no rashes  HEAD: NC/AT  EYES: PERRLA, EOMI  EARS: TM's intact  NOSE: no abnormalities  NECK:  Supple. No lymphadenopathy. Jugular venous pressure not elevated. Carotids equal.   HEART:   The cardiac impulse has a normal quality. HR 90, irreg.  CHEST:  Bilateral rhonchi.  ABDOMEN:  Soft and nontender.   EXTREMITIES: swelling of LE's.  NEURO: sedated on ventilator.       LABS:                        12.0   19.61 )-----------( 153      ( 16 Mar 2018 07:04 )             37.4     03-16    143  |  113<H>  |  40<H>  ----------------------------<  204<H>  4.5   |  17<L>  |  2.23<H>    Ca    8.3<L>      16 Mar 2018 07:04      PTT - ( 16 Mar 2018 16:29 )  PTT:96.3 sec      RADIOLOGY & ADDITIONAL TESTS:

## 2018-03-17 NOTE — PROGRESS NOTE ADULT - ASSESSMENT
Continue ventilatory support, nebs, solumedrol.  ET tube position is good on following CXR.  decrease FIO2 as tolerated.  Diuresis as tolerated. (on IV bumex drip).  ID following regarding antibiotic coverage.  for sputum gram stain and C&S.   pro BNP level.    DVT prophylaxis. on therapeutic heparin drip. Continue ventilatory support, nebs, solumedrol.  ET tube position is good on following CXR.  decrease FIO2 as tolerated.  Diuresis as tolerated. (on IV bumex drip).  ID following regarding antibiotic coverage. on cefepime, azithro.  for sputum gram stain and C&S.   pro BNP level.    DVT prophylaxis. on therapeutic heparin drip.

## 2018-03-17 NOTE — PROGRESS NOTE ADULT - SUBJECTIVE AND OBJECTIVE BOX
Patient is a 72y old  Female who presents with a chief complaint of came for pre op testing (15 Mar 2018 00:51)    HPI Pt transferred to ED from admitting d/t abnormal ECG demostrating AF with RVR. Office redocrds reviewed. This is new AF. States having on and off leg swelling but no orthopnea, angina, palp, PND.      3/15 - Not feeling better, had episode of sweating and SOB recently. Blood work revealed hydronephrosis, obstructive calculus, UTI, questionable pneumonia, low TSH, elevated WBC, elevated cr. Tele with AF and VR around 135 now.  CT reviewed, no signs of pleural effusions or lung edema in included lungs segments. ECHO reviewed too. Mild AS, MR, preserved LVEF.   Suggest starting IV cardizem drip titratable to HR < 100. Also start UFH IV for stroke ppx. Cont PO cardizem as well, this will assist in weaning her off IV cardizem ultimately. Cont statin too.   Obtain urology consult re: hydronephrosis. Also endocrinology re: low TSH.  Would defer stress MPI until her HR is more stable and normal.  Discussed with patient also re: CV. She would need 3 weeks strict OAC and therefore gigi also interfere should she need percutaneous hydronephrosis drainage, not to mention her future knee surgery as discussed yesterday. Should HR become an issue difficult to control with drugs, would then proceed to CV and deal with the rest later on as needed. For time being, and as long as she is hemodynamically stable, would cont rate control, AC and address first UTI and possible hyperthyroidism.  Case d/w hospitalist.  Will follow    3/16 - Pt on BiPAP, in no resp distress at moment. Tele AF with HR round 70-90. BP stable off pressors. Awake and alert. Had resp distress yesterday after PCN, possibly diastolic heart failure. Suggest continuing IV diuresis as needed, continue rate control with IV diltiazem, will start weaning her to PO once off BiPAP. Continue UFH, will transition to OAC once kidney fx is stable as well. Will continue to defer stress test and CV for time being.    3/17-  Hasn't responded to BIPAP and seemed obtunded today. Chest xray showed worsened heart failure. Critical care decided to intubate patient.   She remains in afib. On a cardizem drip. After sedation, blood pressure decreased to the high 90s.  overnight, her vital signs have been stable.   Received Lasix 40 mg IV at 5 am and has responded with urine output.     Allergies    Macrobid (Other)    Intolerances        MEDICATIONS  (STANDING):  ALBUTerol/ipratropium for Nebulization 3 milliLiter(s) Nebulizer every 6 hours  aspirin 325 milliGRAM(s) Oral daily  azithromycin  IVPB 500 milliGRAM(s) IV Intermittent every 24 hours  cefTRIAXone Injectable. 1000 milliGRAM(s) IV Push every 24 hours  diltiazem    Tablet 60 milliGRAM(s) Oral every 4 hours  diltiazem Infusion 5 mG/Hr (5 mL/Hr) IV Continuous <Continuous>  furosemide   Injectable 40 milliGRAM(s) IV Push daily  heparin  Infusion.  Unit(s)/Hr (18 mL/Hr) IV Continuous <Continuous>  methylPREDNISolone sodium succinate Injectable 40 milliGRAM(s) IV Push every 8 hours  simvastatin 20 milliGRAM(s) Oral at bedtime    MEDICATIONS  (PRN):  docusate sodium 100 milliGRAM(s) Oral two times a day PRN Constipation  heparin  Injectable 8000 Unit(s) IV Push every 6 hours PRN For aPTT less than 40  heparin  Injectable 4000 Unit(s) IV Push every 6 hours PRN For aPTT between 40 - 57  LORazepam   Injectable 0.5 milliGRAM(s) IntraMuscular three times a day PRN Anxiety  ondansetron Injectable 4 milliGRAM(s) IV Push every 6 hours PRN Nausea and/or Vomiting  oxyCODONE    5 mG/acetaminophen 325 mG 1 Tablet(s) Oral every 4 hours PRN Moderate Pain (4 - 6)    REVIEW OF SYSTEMS:    RESPIRATORY: No cough, wheezing, hemoptysis; No shortness of breath  CARDIOVASCULAR: No chest pain or palpitations  All other review of systems is negative unless indicated above      PHYSICAL EXAM:  Daily     Daily Weight in k.3 (17 Mar 2018 05:56)  Vital Signs Last 24 Hrs  T(C): 37.1 (17 Mar 2018 07:57), Max: 37.1 (17 Mar 2018 05:56)  T(F): 98.8 (17 Mar 2018 07:57), Max: 98.8 (17 Mar 2018 05:56)  HR: 86 (17 Mar 2018 09:00) (74 - 95)  BP: 107/58 (17 Mar 2018 09:00) (101/66 - 126/54)  BP(mean): 68 (17 Mar 2018 09:00) (36 - 88)  RR: 27 (17 Mar 2018 09:00) (26 - 35)  SpO2: 98% (17 Mar 2018 09:00) (87% - 100%)    Constitutional: NAD, awake and alert, well-developed  HEENT: PERR, EOMI, Normal Hearing, MMM  Neck: Soft and supple, No LAD, No JVD  Respiratory: Breath sounds are clear bilaterally, No wheezing, rales or rhonchi  Cardiovascular: S1 and S2, regular rate and rhythm, no Murmurs, gallops or rubs  Gastrointestinal: Bowel Sounds present, soft, nontender, nondistended, no guarding, no rebound  Extremities: No peripheral edema  Vascular: 2+ peripheral pulses  Neurological: A/O x 3, no focal deficits  Musculoskeletal: 5/5 strength b/l upper and lower extremities  Skin: No rashes    LABS: All Labs Reviewed:                        11.7   25.13 )-----------( 168      ( 17 Mar 2018 06:09 )             35.7     03-17    141  |  110<H>  |  58<H>  ----------------------------<  177<H>  4.3   |  20<L>  |  2.75<H>    Ca    8.1<L>      17 Mar 2018 06:09  Phos  4.9     03-17  Mg     2.3     03-17      PTT - ( 16 Mar 2018 23:51 )  PTT:78.9 sec      CHEST XRAY:  < from: Xray Chest 1 View AP/PA. (18 @ 09:39) >  EXAM:  XR CHEST AP OR PA 1V                            PROCEDURE DATE:  2018          INTERPRETATION:  Exam Date: 3/16/2018 9:39 AM    History: Difficulty breathing    Technique: Single frontal portable view of the chest with comparison to    3/15/2018    Findings:    Heart is enlarged. Pulmonary vascular congestion with small bilateral   pleural effusions with questionable superimposed left upper lobe   pneumonia versus compared to prior exam.. Degenerative changes of the   visualized osseous structures.    Impression:    Pulmonary vascular congestion with small bilateral pleural effusions with   questionable superimposed left upper lobe pneumonia versus compared to   prior exam          TELEMETRY/EKG: rate controlled Afib in the 90s

## 2018-03-17 NOTE — PROGRESS NOTE ADULT - ASSESSMENT
Pt transferred to ED from admitting d/t abnormal ECG demostrating AF with RVR. Office redocrds reviewed. This is new AF. States having on and off leg swelling but no orthopnea, angina, palp, PND.  Admitted with Diastolic Heart Failure and Afib, Renal disease. Was intubated this morning, urgently, for respiratory failure and worsening heart failure.     3/17-  Intubated  Bumex drip.   If the blood pressure remains low ( from the cardiazem, diuresis, and sedation ) will hold the diltiazem. We can give it, if tolerated, by OGT.  continued care as per pulmonary in terms of vent management and need for antibiotics.
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
## 2018-03-17 NOTE — PROGRESS NOTE ADULT - ASSESSMENT
IMP:    Acute hypoxic resp failure--will need intubation--failed NIV--?? worsening edema vs infiltrates  STACIE  COPD with life long smoking hx  S/P L PCN  New onset AF    Suggest:    Vent support  Sedate with propofol  Cont with CTX/zithromax  Bumex gtt as per cards  Steroids and BD  Tylenol for fever control  NPO except for meds  HOB > 30    CCU care--d/w CCU staff and goddaughter at bedside--also d/w pulm and cards

## 2018-03-17 NOTE — PROGRESS NOTE ADULT - SUBJECTIVE AND OBJECTIVE BOX
Patient is a 72y old  Female who presents with a chief complaint of came for pre op testing (15 Mar 2018 00:51)    Date of service: 18 @ 11:24    Events noted: noted with worsening SOB  She was intubated and placed on ventilatory support  No fever or chills  Has minimal respiratory secretions    ROS: unobtainable    MEDICATIONS  (STANDING):  ALBUTerol/ipratropium for Nebulization 3 milliLiter(s) Nebulizer every 6 hours  aspirin 325 milliGRAM(s) Oral daily  azithromycin  IVPB 500 milliGRAM(s) IV Intermittent every 24 hours  buMETAnide Infusion 1 mG/Hr (10 mL/Hr) IV Continuous <Continuous>  cefTRIAXone Injectable. 1000 milliGRAM(s) IV Push every 24 hours  chlorhexidine 0.12% Liquid 15 milliLiter(s) Swish and Spit two times a day  diltiazem    Tablet 60 milliGRAM(s) Oral every 4 hours  diltiazem Infusion 5 mG/Hr (5 mL/Hr) IV Continuous <Continuous>  heparin  Infusion.  Unit(s)/Hr (18 mL/Hr) IV Continuous <Continuous>  methylPREDNISolone sodium succinate Injectable 40 milliGRAM(s) IV Push every 8 hours  propofol Infusion 10 MICROgram(s)/kG/Min (5.988 mL/Hr) IV Continuous <Continuous>  simvastatin 20 milliGRAM(s) Oral at bedtime    MEDICATIONS  (PRN):  docusate sodium 100 milliGRAM(s) Oral two times a day PRN Constipation  heparin  Injectable 8000 Unit(s) IV Push every 6 hours PRN For aPTT less than 40  heparin  Injectable 4000 Unit(s) IV Push every 6 hours PRN For aPTT between 40 - 57  LORazepam   Injectable 0.5 milliGRAM(s) IntraMuscular three times a day PRN Anxiety  ondansetron Injectable 4 milliGRAM(s) IV Push every 6 hours PRN Nausea and/or Vomiting  oxyCODONE    5 mG/acetaminophen 325 mG 1 Tablet(s) Oral every 4 hours PRN Moderate Pain (4 - 6)      Vital Signs Last 24 Hrs  T(C): 37.1 (17 Mar 2018 07:57), Max: 37.1 (17 Mar 2018 05:56)  T(F): 98.8 (17 Mar 2018 07:57), Max: 98.8 (17 Mar 2018 05:56)  HR: 82 (17 Mar 2018 10:00) (74 - 95)  BP: 107/58 (17 Mar 2018 09:00) (101/66 - 126/54)  BP(mean): 68 (17 Mar 2018 09:00) (36 - 88)  RR: 27 (17 Mar 2018 09:00) (26 - 35)  SpO2: 94% (17 Mar 2018 10:00) (87% - 100%)    Physical Exam:      Constitutional: frail looking  HEENT: NC/AT, EOMI, PERRLA  Neck: supple  Back: no tenderness  Respiratory: b/l rhonchi  Cardiovascular: S1S2 irregular, no murmurs  Abdomen: soft, not tender, not distended, positive BS  Genitourinary: no LN palpable; left nephrostomy tube  Rectal: deferred  Musculoskeletal: no muscle tenderness, no joint swelling or tenderness  Extremities: no pedal edema  Neurological: AxOx3, moving all extremities  Skin: no rashes    Labs:                        11.7   25.13 )-----------( 168      ( 17 Mar 2018 06:09 )             35.7     03-17    141  |  110<H>  |  58<H>  ----------------------------<  177<H>  4.3   |  20<L>  |  2.75<H>    Ca    8.1<L>      17 Mar 2018 06:09  Phos  4.9     03-17  Mg     2.3     03-17                        12.0   19.61 )-----------( 153      ( 16 Mar 2018 07:04 )             37.4     03-16    143  |  113<H>  |  40<H>  ----------------------------<  204<H>  4.5   |  17<L>  |  2.23<H>    Ca    8.3<L>      16 Mar 2018 07:04    TPro  6.9  /  Alb  2.7<L>  /  TBili  0.8  /  DBili  x   /  AST  24  /  ALT  20  /  AlkPhos  103  03-14     LIVER FUNCTIONS - ( 14 Mar 2018 16:30 )  Alb: 2.7 g/dL / Pro: 6.9 gm/dL / ALK PHOS: 103 U/L / ALT: 20 U/L / AST: 24 U/L / GGT: x           Urinalysis Basic - ( 14 Mar 2018 15:06 )    Color: Yellow / Appearance: very cloudy / S.020 / pH: x  Gluc: x / Ketone: Negative  / Bili: Small / Urobili: 4 mg/dL   Blood: x / Protein: 100 mg/dL / Nitrite: Positive   Leuk Esterase: Moderate / RBC: 6-10 /HPF / WBC >50   Sq Epi: x / Non Sq Epi: Moderate / Bacteria: Many      Culture - Blood (collected 14 Mar 2018 19:41)  Source: .Blood None  Gram Stain (15 Mar 2018 20:51):    Growth in aerobic bottle: Gram Negative Rods    Growth in anaerobic bottle: Gram Negative Rods  Preliminary Report (15 Mar 2018 20:51):    Growth in aerobic bottle: Gram Negative Rods    Growth in anaerobic bottle: Gram Negative Rods     Organism: Blood Culture PCR (15 Mar 2018 18:32)      -  Escherichia coli: Detec      Method Type: PCR    Culture - Blood (collected 14 Mar 2018 19:40)  Source: .Blood None  Gram Stain (15 Mar 2018 20:53):    Growth in anaerobic bottle: Gram Negative Rods  Preliminary Report (15 Mar 2018 20:53):    Growth in anaerobic bottle: Gram Negative Rods    Culture - Urine (03.15.18 @ 16:05)    Specimen Source: .Urine None    Culture Results:   <10,000 CFU/ml  Normal Urogenital radha present      Radiology:    < from: CT Abdomen and Pelvis No Cont (18 @ 19:12) >  mild LEFT hydronephrosis secondary to an obstructing 6 mm   calculus at the LEFT ureteropelvic junction. There is no RIGHT   nephrolithiasis or hydronephrosis. Small adrenal adenomas.   Cholelithiasis. 3.1 cm duodenal diverticulum. Moderate sigmoid   diverticulosis. Small alveolar infiltrate in the LEFT lower lobe.     < end of copied text >    < from: Xray Chest 1 View AP/PA. (18 @ 17:39) >   No active pulmonary disease.    < end of copied text >    < from: Xray Chest 1 View AP/PA. (18 @ 09:39) >  Pulmonary vascular congestion with small bilateral pleural effusions with   questionable superimposed left upper lobe pneumonia versus compared to   prior exam    < end of copied text >      Advanced directives addressed: full resuscitation

## 2018-03-18 LAB
ANION GAP SERPL CALC-SCNC: 13 MMOL/L — SIGNIFICANT CHANGE UP (ref 5–17)
APTT BLD: 43.9 SEC — HIGH (ref 27.5–37.4)
APTT BLD: 67.1 SEC — HIGH (ref 27.5–37.4)
APTT BLD: 76.1 SEC — HIGH (ref 27.5–37.4)
BUN SERPL-MCNC: 78 MG/DL — HIGH (ref 7–23)
CALCIUM SERPL-MCNC: 8.3 MG/DL — LOW (ref 8.5–10.1)
CHLORIDE SERPL-SCNC: 105 MMOL/L — SIGNIFICANT CHANGE UP (ref 96–108)
CO2 SERPL-SCNC: 22 MMOL/L — SIGNIFICANT CHANGE UP (ref 22–31)
CREAT SERPL-MCNC: 3.04 MG/DL — HIGH (ref 0.5–1.3)
GLUCOSE SERPL-MCNC: 178 MG/DL — HIGH (ref 70–99)
GRAM STN FLD: SIGNIFICANT CHANGE UP
HCT VFR BLD CALC: 32.5 % — LOW (ref 34.5–45)
HCT VFR BLD CALC: 34.3 % — LOW (ref 34.5–45)
HCT VFR BLD CALC: 34.6 % — SIGNIFICANT CHANGE UP (ref 34.5–45)
HCT VFR BLD CALC: 35.9 % — SIGNIFICANT CHANGE UP (ref 34.5–45)
HGB BLD-MCNC: 10.8 G/DL — LOW (ref 11.5–15.5)
HGB BLD-MCNC: 11.2 G/DL — LOW (ref 11.5–15.5)
HGB BLD-MCNC: 11.6 G/DL — SIGNIFICANT CHANGE UP (ref 11.5–15.5)
HGB BLD-MCNC: 11.6 G/DL — SIGNIFICANT CHANGE UP (ref 11.5–15.5)
MCHC RBC-ENTMCNC: 28.4 PG — SIGNIFICANT CHANGE UP (ref 27–34)
MCHC RBC-ENTMCNC: 28.4 PG — SIGNIFICANT CHANGE UP (ref 27–34)
MCHC RBC-ENTMCNC: 29 PG — SIGNIFICANT CHANGE UP (ref 27–34)
MCHC RBC-ENTMCNC: 29.1 PG — SIGNIFICANT CHANGE UP (ref 27–34)
MCHC RBC-ENTMCNC: 32.3 GM/DL — SIGNIFICANT CHANGE UP (ref 32–36)
MCHC RBC-ENTMCNC: 32.7 GM/DL — SIGNIFICANT CHANGE UP (ref 32–36)
MCHC RBC-ENTMCNC: 33.2 GM/DL — SIGNIFICANT CHANGE UP (ref 32–36)
MCHC RBC-ENTMCNC: 33.5 GM/DL — SIGNIFICANT CHANGE UP (ref 32–36)
MCV RBC AUTO: 86.7 FL — SIGNIFICANT CHANGE UP (ref 80–100)
MCV RBC AUTO: 87.1 FL — SIGNIFICANT CHANGE UP (ref 80–100)
MCV RBC AUTO: 87.1 FL — SIGNIFICANT CHANGE UP (ref 80–100)
MCV RBC AUTO: 87.8 FL — SIGNIFICANT CHANGE UP (ref 80–100)
NRBC # BLD: 0 /100 WBCS — SIGNIFICANT CHANGE UP (ref 0–0)
PLATELET # BLD AUTO: 147 K/UL — LOW (ref 150–400)
PLATELET # BLD AUTO: 148 K/UL — LOW (ref 150–400)
PLATELET # BLD AUTO: 160 K/UL — SIGNIFICANT CHANGE UP (ref 150–400)
PLATELET # BLD AUTO: 165 K/UL — SIGNIFICANT CHANGE UP (ref 150–400)
POTASSIUM SERPL-MCNC: 3.7 MMOL/L — SIGNIFICANT CHANGE UP (ref 3.5–5.3)
POTASSIUM SERPL-SCNC: 3.7 MMOL/L — SIGNIFICANT CHANGE UP (ref 3.5–5.3)
RBC # BLD: 3.73 M/UL — LOW (ref 3.8–5.2)
RBC # BLD: 3.94 M/UL — SIGNIFICANT CHANGE UP (ref 3.8–5.2)
RBC # BLD: 3.99 M/UL — SIGNIFICANT CHANGE UP (ref 3.8–5.2)
RBC # BLD: 4.09 M/UL — SIGNIFICANT CHANGE UP (ref 3.8–5.2)
RBC # FLD: 16.1 % — HIGH (ref 10.3–14.5)
RBC # FLD: 16.1 % — HIGH (ref 10.3–14.5)
RBC # FLD: 16.2 % — HIGH (ref 10.3–14.5)
RBC # FLD: 16.5 % — HIGH (ref 10.3–14.5)
SODIUM SERPL-SCNC: 140 MMOL/L — SIGNIFICANT CHANGE UP (ref 135–145)
SPECIMEN SOURCE: SIGNIFICANT CHANGE UP
WBC # BLD: 14.85 K/UL — HIGH (ref 3.8–10.5)
WBC # BLD: 15.79 K/UL — HIGH (ref 3.8–10.5)
WBC # BLD: 15.88 K/UL — HIGH (ref 3.8–10.5)
WBC # BLD: 16.24 K/UL — HIGH (ref 3.8–10.5)
WBC # FLD AUTO: 14.85 K/UL — HIGH (ref 3.8–10.5)
WBC # FLD AUTO: 15.79 K/UL — HIGH (ref 3.8–10.5)
WBC # FLD AUTO: 15.88 K/UL — HIGH (ref 3.8–10.5)
WBC # FLD AUTO: 16.24 K/UL — HIGH (ref 3.8–10.5)

## 2018-03-18 PROCEDURE — 99233 SBSQ HOSP IP/OBS HIGH 50: CPT

## 2018-03-18 RX ORDER — PANTOPRAZOLE SODIUM 20 MG/1
40 TABLET, DELAYED RELEASE ORAL DAILY
Qty: 0 | Refills: 0 | Status: DISCONTINUED | OUTPATIENT
Start: 2018-03-18 | End: 2018-03-19

## 2018-03-18 RX ORDER — ALBUTEROL 90 UG/1
2 AEROSOL, METERED ORAL EVERY 6 HOURS
Qty: 0 | Refills: 0 | Status: DISCONTINUED | OUTPATIENT
Start: 2018-03-18 | End: 2018-03-18

## 2018-03-18 RX ORDER — ALBUTEROL 90 UG/1
2 AEROSOL, METERED ORAL EVERY 6 HOURS
Qty: 0 | Refills: 0 | Status: DISCONTINUED | OUTPATIENT
Start: 2018-03-18 | End: 2018-03-20

## 2018-03-18 RX ADMIN — Medication 1 MILLIGRAM(S): at 19:39

## 2018-03-18 RX ADMIN — CEFEPIME 100 MILLIGRAM(S): 1 INJECTION, POWDER, FOR SOLUTION INTRAMUSCULAR; INTRAVENOUS at 17:05

## 2018-03-18 RX ADMIN — Medication 1 MILLIGRAM(S): at 01:17

## 2018-03-18 RX ADMIN — CHLORHEXIDINE GLUCONATE 15 MILLILITER(S): 213 SOLUTION TOPICAL at 17:08

## 2018-03-18 RX ADMIN — PANTOPRAZOLE SODIUM 40 MILLIGRAM(S): 20 TABLET, DELAYED RELEASE ORAL at 13:56

## 2018-03-18 RX ADMIN — SIMVASTATIN 20 MILLIGRAM(S): 20 TABLET, FILM COATED ORAL at 21:29

## 2018-03-18 RX ADMIN — Medication 40 MILLIGRAM(S): at 05:15

## 2018-03-18 RX ADMIN — OXYCODONE AND ACETAMINOPHEN 1 TABLET(S): 5; 325 TABLET ORAL at 21:30

## 2018-03-18 RX ADMIN — PROPOFOL 5.99 MICROGRAM(S)/KG/MIN: 10 INJECTION, EMULSION INTRAVENOUS at 23:27

## 2018-03-18 RX ADMIN — HEPARIN SODIUM 4000 UNIT(S): 5000 INJECTION INTRAVENOUS; SUBCUTANEOUS at 07:10

## 2018-03-18 RX ADMIN — CEFEPIME 100 MILLIGRAM(S): 1 INJECTION, POWDER, FOR SOLUTION INTRAMUSCULAR; INTRAVENOUS at 05:15

## 2018-03-18 RX ADMIN — PROPOFOL 5.99 MICROGRAM(S)/KG/MIN: 10 INJECTION, EMULSION INTRAVENOUS at 10:30

## 2018-03-18 RX ADMIN — HEPARIN SODIUM 2200 UNIT(S)/HR: 5000 INJECTION INTRAVENOUS; SUBCUTANEOUS at 07:13

## 2018-03-18 RX ADMIN — CHLORHEXIDINE GLUCONATE 15 MILLILITER(S): 213 SOLUTION TOPICAL at 05:15

## 2018-03-18 RX ADMIN — Medication 1 MILLIGRAM(S): at 15:56

## 2018-03-18 RX ADMIN — Medication 40 MILLIGRAM(S): at 17:09

## 2018-03-18 RX ADMIN — OXYCODONE AND ACETAMINOPHEN 1 TABLET(S): 5; 325 TABLET ORAL at 21:29

## 2018-03-18 RX ADMIN — AZITHROMYCIN 255 MILLIGRAM(S): 500 TABLET, FILM COATED ORAL at 00:30

## 2018-03-18 RX ADMIN — HEPARIN SODIUM 2200 UNIT(S)/HR: 5000 INJECTION INTRAVENOUS; SUBCUTANEOUS at 13:40

## 2018-03-18 RX ADMIN — ALBUTEROL 2 PUFF(S): 90 AEROSOL, METERED ORAL at 18:08

## 2018-03-18 RX ADMIN — Medication 325 MILLIGRAM(S): at 10:33

## 2018-03-18 RX ADMIN — HEPARIN SODIUM 2200 UNIT(S)/HR: 5000 INJECTION INTRAVENOUS; SUBCUTANEOUS at 20:32

## 2018-03-18 RX ADMIN — PROPOFOL 5.99 MICROGRAM(S)/KG/MIN: 10 INJECTION, EMULSION INTRAVENOUS at 03:13

## 2018-03-18 NOTE — PROGRESS NOTE ADULT - SUBJECTIVE AND OBJECTIVE BOX
CC:  Resp failure    HPI:    73 y/o female with HTN, HL, smoker and renal stone underwent L PCN earlier today.  Pt was seen in PACU following IR by Dr may--pt had elevated lactate and was given 2.5L NS--lactate decreased to 3.2  Pt was again seen by myself in PACU and decision was made to upgrade pt to SD level bed.  CXR revealed fluid overload--given lasix 40 IV with 200ml uo thus far.    On arrival, pt is awake and alert, increased WOB compared to prior, BP stable, HR 120s AF and on IV hep and dilt gtt.      TTE 0n 3/14--EF 50-55%/dilated LA/mild AS/1+ MR    3/17:  Pt seen and examined in CCU--on NIV--not much better--CXR with worsening B/L edema--Cr increasing--will plan for intubation    3/18:  As above.  Vented. FIO2 60%.  Bumex gtt with good uo.  Cr up to 3. Off dilt gtt.       PMH:  As above.     PSH:  As above.     FH: Non Contributory other than those listed in HPI    Social History:      MEDICATIONS  (STANDING):  ALBUTerol/ipratropium for Nebulization 3 milliLiter(s) Nebulizer every 6 hours  aspirin 325 milliGRAM(s) Oral daily  azithromycin  IVPB 500 milliGRAM(s) IV Intermittent every 24 hours  buMETAnide Infusion 1 mG/Hr (10 mL/Hr) IV Continuous <Continuous>  cefepime  IVPB      cefepime  IVPB 1000 milliGRAM(s) IV Intermittent every 12 hours  chlorhexidine 0.12% Liquid 15 milliLiter(s) Swish and Spit two times a day  diltiazem    Tablet 60 milliGRAM(s) Oral every 4 hours  heparin  Infusion.  Unit(s)/Hr (18 mL/Hr) IV Continuous <Continuous>  methylPREDNISolone sodium succinate Injectable 40 milliGRAM(s) IV Push every 8 hours  propofol Infusion 10 MICROgram(s)/kG/Min (5.988 mL/Hr) IV Continuous <Continuous>  simvastatin 20 milliGRAM(s) Oral at bedtime    MEDICATIONS  (PRN):  docusate sodium 100 milliGRAM(s) Oral two times a day PRN Constipation  heparin  Injectable 8000 Unit(s) IV Push every 6 hours PRN For aPTT less than 40  heparin  Injectable 4000 Unit(s) IV Push every 6 hours PRN For aPTT between 40 - 57  LORazepam   Injectable 1 milliGRAM(s) IV Push every 4 hours PRN Anxiety  ondansetron Injectable 4 milliGRAM(s) IV Push every 6 hours PRN Nausea and/or Vomiting  oxyCODONE    5 mG/acetaminophen 325 mG 1 Tablet(s) Oral every 4 hours PRN Moderate Pain (4 - 6)      Allergies: NKDA    ROS:  SEE BELOW        ICU Vital Signs Last 24 Hrs  T(C): 36.5 (18 Mar 2018 00:25), Max: 36.9 (17 Mar 2018 21:22)  T(F): 97.7 (18 Mar 2018 00:25), Max: 98.4 (17 Mar 2018 21:22)  HR: 91 (18 Mar 2018 08:00) (82 - 106)  BP: 107/64 (18 Mar 2018 08:00) (85/49 - 137/110)  BP(mean): 73 (18 Mar 2018 08:00) (56 - 115)  ABP: --  ABP(mean): --  RR: 29 (18 Mar 2018 08:00) (22 - 34)  SpO2: 99% (18 Mar 2018 08:00) (90% - 100%)      Mode: AC/ CMV (Assist Control/ Continuous Mandatory Ventilation)  RR (machine): 12  TV (machine): 500  FiO2: 60  PEEP: 5  ITime: 1  MAP: 9.5  PIP: 18      I&O's Summary    17 Mar 2018 07:01  -  18 Mar 2018 07:00  --------------------------------------------------------  IN: 1355 mL / OUT: 2870 mL / NET: -1515 mL        Physical Exam:  SEE BELOW                          11.6   15.79 )-----------( 160      ( 18 Mar 2018 05:16 )             35.9       03-18    140  |  105  |  78<H>  ----------------------------<  178<H>  3.7   |  22  |  3.04<H>    Ca    8.3<L>      18 Mar 2018 05:16  Phos  4.9     03-17  Mg     2.3     03-17              ABG - ( 17 Mar 2018 13:22 )  pH: 7.33  /  pCO2: 36    /  pO2: 77    / HCO3: 18    / Base Excess: -6.4  /  SaO2: 94                      DVT Prophylaxis:                                                            Contraindication:     Advanced Directives:    Discussed with:    Visit Information:  Time spent excluding procedure:  45 cc mins    ** Time is exclusive of billed procedures and/or teaching and/or routine family updates.

## 2018-03-18 NOTE — PROGRESS NOTE ADULT - SUBJECTIVE AND OBJECTIVE BOX
Patient is a 72y old  Female who presents with a chief complaint of came for pre op testing (15 Mar 2018 00:51)    HPI Pt transferred to ED from admitting d/t abnormal ECG demostrating AF with RVR. Office redocrds reviewed. This is new AF. States having on and off leg swelling but no orthopnea, angina, palp, PND.      3/15 - Not feeling better, had episode of sweating and SOB recently. Blood work revealed hydronephrosis, obstructive calculus, UTI, questionable pneumonia, low TSH, elevated WBC, elevated cr. Tele with AF and VR around 135 now.  CT reviewed, no signs of pleural effusions or lung edema in included lungs segments. ECHO reviewed too. Mild AS, MR, preserved LVEF.   Suggest starting IV cardizem drip titratable to HR < 100. Also start UFH IV for stroke ppx. Cont PO cardizem as well, this will assist in weaning her off IV cardizem ultimately. Cont statin too.   Obtain urology consult re: hydronephrosis. Also endocrinology re: low TSH.  Would defer stress MPI until her HR is more stable and normal.  Discussed with patient also re: CV. She would need 3 weeks strict OAC and therefore gigi also interfere should she need percutaneous hydronephrosis drainage, not to mention her future knee surgery as discussed yesterday. Should HR become an issue difficult to control with drugs, would then proceed to CV and deal with the rest later on as needed. For time being, and as long as she is hemodynamically stable, would cont rate control, AC and address first UTI and possible hyperthyroidism.  Case d/w hospitalist.  Will follow    3/16 - Pt on BiPAP, in no resp distress at moment. Tele AF with HR round 70-90. BP stable off pressors. Awake and alert. Had resp distress yesterday after PCN, possibly diastolic heart failure. Suggest continuing IV diuresis as needed, continue rate control with IV diltiazem, will start weaning her to PO once off BiPAP. Continue UFH, will transition to OAC once kidney fx is stable as well. Will continue to defer stress test and CV for time being.    3/17-  Hasn't responded to BIPAP and seemed obtunded today. Chest xray showed worsened heart failure. Critical care decided to intubate patient.   She remains in afib. On a cardizem drip. After sedation, blood pressure decreased to the high 90s.  overnight, her vital signs have been stable.   Received Lasix 40 mg IV at 5 am and has responded with urine output.     3/18-  Intubated yesterday.   Cardizem drip was discontinued and started on QID cardizem by OGT. She has tolerated it well with good heart rate control and adequate blood pressures.   Was started on a Bumex drip. Has diuresed over 2000 ml and there was been an increase in the serum creatinine.     Allergies    Macrobid (Other)    Intolerances        MEDICATIONS  (STANDING):  ALBUTerol/ipratropium for Nebulization 3 milliLiter(s) Nebulizer every 6 hours  aspirin 325 milliGRAM(s) Oral daily  azithromycin  IVPB 500 milliGRAM(s) IV Intermittent every 24 hours  buMETAnide Infusion 1 mG/Hr (10 mL/Hr) IV Continuous <Continuous>  cefepime  IVPB      cefepime  IVPB 1000 milliGRAM(s) IV Intermittent every 12 hours  chlorhexidine 0.12% Liquid 15 milliLiter(s) Swish and Spit two times a day  diltiazem    Tablet 60 milliGRAM(s) Oral every 4 hours  heparin  Infusion.  Unit(s)/Hr (18 mL/Hr) IV Continuous <Continuous>  methylPREDNISolone sodium succinate Injectable 40 milliGRAM(s) IV Push every 8 hours  propofol Infusion 10 MICROgram(s)/kG/Min (5.988 mL/Hr) IV Continuous <Continuous>  simvastatin 20 milliGRAM(s) Oral at bedtime    MEDICATIONS  (PRN):  docusate sodium 100 milliGRAM(s) Oral two times a day PRN Constipation  heparin  Injectable 8000 Unit(s) IV Push every 6 hours PRN For aPTT less than 40  heparin  Injectable 4000 Unit(s) IV Push every 6 hours PRN For aPTT between 40 - 57  LORazepam   Injectable 1 milliGRAM(s) IV Push every 4 hours PRN Anxiety  ondansetron Injectable 4 milliGRAM(s) IV Push every 6 hours PRN Nausea and/or Vomiting  oxyCODONE    5 mG/acetaminophen 325 mG 1 Tablet(s) Oral every 4 hours PRN Moderate Pain (4 - 6)    REVIEW OF SYSTEMS:    RESPIRATORY: No cough, wheezing, hemoptysis; No shortness of breath  CARDIOVASCULAR: No chest pain or palpitations  All other review of systems is negative unless indicated above      PHYSICAL EXAM:  Daily     Daily Weight in k.5 (18 Mar 2018 05:00)  Vital Signs Last 24 Hrs  T(C): 36.4 (18 Mar 2018 08:51), Max: 36.9 (17 Mar 2018 21:22)  T(F): 97.5 (18 Mar 2018 08:51), Max: 98.4 (17 Mar 2018 21:22)  HR: 91 (18 Mar 2018 08:00) (86 - 106)  BP: 107/64 (18 Mar 2018 08:00) (85/49 - 137/110)  BP(mean): 73 (18 Mar 2018 08:00) (56 - 115)  RR: 29 (18 Mar 2018 08:00) (26 - 34)  SpO2: 99% (18 Mar 2018 08:00) (94% - 100%)    Constitutional: NAD, awake and alert, well-developed  HEENT: PERR, EOMI, Normal Hearing, MMM  Neck: Soft and supple, No LAD, No JVD  Respiratory: Breath sounds are clear bilaterally, No wheezing, rales or rhonchi  Cardiovascular: S1 and S2, regular rate and rhythm, no Murmurs, gallops or rubs  Gastrointestinal: Bowel Sounds present, soft, nontender, nondistended, no guarding, no rebound  Extremities: No peripheral edema  Vascular: 2+ peripheral pulses  Neurological: A/O x 3, no focal deficits  Musculoskeletal: 5/5 strength b/l upper and lower extremities  Skin: No rashes    LABS: All Labs Reviewed:                        11.6   15.79 )-----------( 160      ( 18 Mar 2018 05:16 )             35.9     03-18    140  |  105  |  78<H>  ----------------------------<  178<H>  3.7   |  22  |  3.04<H>    Ca    8.3<L>      18 Mar 2018 05:16  Phos  4.9     03-17  Mg     2.3     03-17      PTT - ( 18 Mar 2018 05:16 )  PTT:43.9 sec          TELEMETRY/EKG: rate controlled Afib

## 2018-03-18 NOTE — PROGRESS NOTE ADULT - SUBJECTIVE AND OBJECTIVE BOX
Subjective:  Mrs. Clancy is a 72-year-old female who initially presented to the hospital for preoperative evaluation for knee surgery. She is found to be in atrial fibrillation in [facility] for evaluation. Patient is complaining of flank pain. A CT scan of the abdomen and pelvis was performed which showed hydronephrosis with a kidney stone. There was also evidence of a left lower lobe infiltrate. As patient had a nephrostomy tube placed and was started on antibiotics. Mrs. Cruz became progressively more dyspneic. Blood cultures were positive for Escherichia coli. She developed sepsis with hypotension and was resuscitated with over 3 L of fluid. She developed significant shortness of breath which was treated with BiPAP. She is now on a Ventimask. Mrs. Clancy has no previous history of significant pulmonary disease. She does have a 40+-pack-year smoking history. There is no history of, system anorexia or weight loss. Current O2 saturation is 90% on 50% Ventimask.    3/17: events noted. intubated this AM b/c obtunded on BIPAP. CXR shows increase in bilat alveolar type infitrates. began on IV bumex drip. blood C&S positve for e coli. currently sedated on vent. O2 sat 100%.   3/18: sedated on ventilator. O2 sat 100%.  brown coffee grounds noted on OG tube aspirate  (start PPI).    MEDICATIONS  (STANDING):  ALBUTerol/ipratropium for Nebulization 3 milliLiter(s) Nebulizer every 6 hours  aspirin 325 milliGRAM(s) Oral daily  azithromycin  IVPB 500 milliGRAM(s) IV Intermittent every 24 hours  cefTRIAXone Injectable. 1000 milliGRAM(s) IV Push every 24 hours  diltiazem    Tablet 60 milliGRAM(s) Oral every 4 hours  diltiazem Infusion 5 mG/Hr (5 mL/Hr) IV Continuous <Continuous>  furosemide   Injectable 40 milliGRAM(s) IV Push daily  heparin  Infusion.  Unit(s)/Hr (18 mL/Hr) IV Continuous <Continuous>  methylPREDNISolone sodium succinate Injectable 40 milliGRAM(s) IV Push every 8 hours  simvastatin 20 milliGRAM(s) Oral at bedtime    MEDICATIONS  (PRN):  docusate sodium 100 milliGRAM(s) Oral two times a day PRN Constipation  heparin  Injectable 8000 Unit(s) IV Push every 6 hours PRN For aPTT less than 40  heparin  Injectable 4000 Unit(s) IV Push every 6 hours PRN For aPTT between 40 - 57  LORazepam   Injectable 0.5 milliGRAM(s) IntraMuscular three times a day PRN Anxiety  ondansetron Injectable 4 milliGRAM(s) IV Push every 6 hours PRN Nausea and/or Vomiting  ondansetron Injectable 4 milliGRAM(s) IV Push once PRN Nausea and/or Vomiting  oxyCODONE    5 mG/acetaminophen 325 mG 1 Tablet(s) Oral every 4 hours PRN Moderate Pain (4 - 6)  oxyCODONE    IR 5 milliGRAM(s) Oral every 4 hours PRN For moderate pain      Allergies    Macrobid (Other)    Intolerances        REVIEW OF SYSTEMS:    CONSTITUTIONAL:  As per HPI.  HEENT:  Eyes:  No diplopia or blurred vision. ENT:  No earache, sore throat or runny nose.  CARDIOVASCULAR:  No pressure, squeezing, tightness, heaviness or aching about the chest, neck, axilla or epigastrium.  RESPIRATORY: see above.  GASTROINTESTINAL:  No nausea, vomiting or diarrhea.  GENITOURINARY:  No dysuria, frequency or urgency.  MUSCULOSKELETAL:  no joint pain, deformity, tenderness  EXTREMITIES: no clubbing cyanosis,edema  SKIN:  No change in skin, hair or nails.  NEUROLOGIC:  No paresthesias, fasciculations, seizures or weakness.  PSYCHIATRIC:  No disorder of thought or mood.  ENDOCRINE:  No heat or cold intolerance, polyuria or polydipsia.  HEMATOLOGICAL:  No easy bruising or bleedings:    Vital Signs Last 24 Hrs  T(C): 36.4 (16 Mar 2018 12:46), Max: 37.8 (15 Mar 2018 22:26)  T(F): 97.6 (16 Mar 2018 12:46), Max: 100.1 (15 Mar 2018 22:26)  HR: 89 (16 Mar 2018 17:44) (80 - 146)  BP: 103/67 (16 Mar 2018 17:00) (90/66 - 146/86)  BP(mean): 74 (16 Mar 2018 17:00) (62 - 99)  RR: 31 (16 Mar 2018 17:00) (16 - 36)  SpO2: 97% (16 Mar 2018 17:44) (90% - 100%)    PHYSICAL EXAMINATION:  SKIN: no rashes  HEAD: NC/AT  EYES: PERRLA, EOMI  EARS: TM's intact  NOSE: no abnormalities  NECK:  Supple. No lymphadenopathy. Jugular venous pressure not elevated. Carotids equal.   HEART:   The cardiac impulse has a normal quality. HR 90, irreg.  CHEST:  few rhonchi bilat.   ABDOMEN:  Soft and nontender.   EXTREMITIES: swelling of LE's.  NEURO: sedated on ventilator.       LABS:                        12.0   19.61 )-----------( 153      ( 16 Mar 2018 07:04 )             37.4     03-16    143  |  113<H>  |  40<H>  ----------------------------<  204<H>  4.5   |  17<L>  |  2.23<H>    Ca    8.3<L>      16 Mar 2018 07:04      PTT - ( 16 Mar 2018 16:29 )  PTT:96.3 sec      RADIOLOGY & ADDITIONAL TESTS:    Mode: AC/ CMV (Assist Control/ Continuous Mandatory Ventilation)  RR (machine): 12  TV (machine): 500  FiO2: 60  PEEP: 5  ITime: 1  MAP: 9.5  PIP: 25
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
## 2018-03-18 NOTE — PROGRESS NOTE ADULT - ASSESSMENT
Pt transferred to ED from admitting d/t abnormal ECG demostrating AF with RVR. Office redocrds reviewed. This is new AF. States having on and off leg swelling but no orthopnea, angina, palp, PND.  Admitted with Diastolic Heart Failure and Afib, Renal disease. Was intubated this morning, urgently, for respiratory failure and worsening heart failure.     3/17-  Intubated  Bumex drip.   If the blood pressure remains low ( from the cardizem, diuresis, and sedation ) will hold the diltiazem. We can give it, if tolerated, by OGT.  continued care as per pulmonary in terms of vent management and need for antibiotics.     3/18-  Has responded well to Bumex drip. Will place on hold today and follow urine output, weight, and Serum Creatinine.   continue Cardizem drip.   repeat chest xray today  respiratory and vent management as per pulmonary.

## 2018-03-18 NOTE — PROGRESS NOTE ADULT - CARDIOVASCULAR DETAILS
irregular rate and rhythm
irregular rate and rhythm/positive S1/positive S2
irregular rate and rhythm
irregular rate and rhythm

## 2018-03-18 NOTE — PROGRESS NOTE ADULT - ASSESSMENT
IMP:    Acute hypoxic resp failure--pulm edema--acute decompensated HFpEF  STACIE  COPD with life long smoking hx  S/P L PCN  New onset AF on IV hep    Suggest:    Vent support--decrease FIO2 as tolerated  Sedate with propofol  Cont with IV abx per ID  PO dilt for rate control  Start TF  Bumex gtt as per cards--strict I+Os--keep rosado in  Follow renal Fx  Steroids and BD  Tylenol for fever control  HOB > 30  DVT prophy--IV hep    CCU care--d/w CCU staff and goddaughter at bedside--also d/w pulm and cards

## 2018-03-18 NOTE — PROGRESS NOTE ADULT - SUBJECTIVE AND OBJECTIVE BOX
Patient is a 72y old  Female who presents with a chief complaint of came for pre op testing (15 Mar 2018 00:51)    Date of service: 18 @ 11:33    Remains intubated on ventilatory support  Lethargic  No fever or chills  Sedated    ROS: unobtainable    MEDICATIONS  (STANDING):  ALBUTerol    90 MICROgram(s) HFA Inhaler 2 Puff(s) Inhalation every 6 hours  aspirin 325 milliGRAM(s) Oral daily  buMETAnide Infusion 1 mG/Hr (10 mL/Hr) IV Continuous <Continuous>  cefepime  IVPB      cefepime  IVPB 1000 milliGRAM(s) IV Intermittent every 12 hours  chlorhexidine 0.12% Liquid 15 milliLiter(s) Swish and Spit two times a day  diltiazem    Tablet 60 milliGRAM(s) Oral every 4 hours  heparin  Infusion.  Unit(s)/Hr (18 mL/Hr) IV Continuous <Continuous>  methylPREDNISolone sodium succinate Injectable 40 milliGRAM(s) IV Push every 12 hours  propofol Infusion 10 MICROgram(s)/kG/Min (5.988 mL/Hr) IV Continuous <Continuous>  simvastatin 20 milliGRAM(s) Oral at bedtime    MEDICATIONS  (PRN):  docusate sodium 100 milliGRAM(s) Oral two times a day PRN Constipation  heparin  Injectable 8000 Unit(s) IV Push every 6 hours PRN For aPTT less than 40  heparin  Injectable 4000 Unit(s) IV Push every 6 hours PRN For aPTT between 40 - 57  LORazepam   Injectable 1 milliGRAM(s) IV Push every 4 hours PRN Anxiety  ondansetron Injectable 4 milliGRAM(s) IV Push every 6 hours PRN Nausea and/or Vomiting  oxyCODONE    5 mG/acetaminophen 325 mG 1 Tablet(s) Oral every 4 hours PRN Moderate Pain (4 - 6)      Vital Signs Last 24 Hrs  T(C): 36.4 (18 Mar 2018 08:51), Max: 36.9 (17 Mar 2018 21:22)  T(F): 97.5 (18 Mar 2018 08:51), Max: 98.4 (17 Mar 2018 21:22)  HR: 91 (18 Mar 2018 10:00) (86 - 106)  BP: 105/69 (18 Mar 2018 10:00) (85/49 - 137/110)  BP(mean): 78 (18 Mar 2018 10:00) (58 - 115)  RR: 23 (18 Mar 2018 10:00) (23 - 34)  SpO2: 100% (18 Mar 2018 10:00) (94% - 100%)    Physical Exam:      Constitutional: frail looking  HEENT: NC/AT, EOMI, PERRLA  Neck: supple  Back: no tenderness  Respiratory: b/l rhonchi  Cardiovascular: S1S2 irregular, no murmurs  Abdomen: soft, not tender, not distended, positive BS  Genitourinary: no LN palpable; left nephrostomy tube  Rectal: deferred  Musculoskeletal: no muscle tenderness, no joint swelling or tenderness  Extremities: no pedal edema  Neurological: confused, moving all extremities  Skin: no rashes    Labs:                        11.6   15.79 )-----------( 160      ( 18 Mar 2018 05:16 )             35.9     03-18    140  |  105  |  78<H>  ----------------------------<  178<H>  3.7   |  22  |  3.04<H>    Ca    8.3<L>      18 Mar 2018 05:16  Phos  4.9     03-17  Mg     2.3     03-17                        11.7   25.13 )-----------( 168      ( 17 Mar 2018 06:09 )             35.7     03-17    141  |  110<H>  |  58<H>  ----------------------------<  177<H>  4.3   |  20<L>  |  2.75<H>    Ca    8.1<L>      17 Mar 2018 06:09  Phos  4.9     03-17  Mg     2.3     03-17               TPro  6.9  /  Alb  2.7<L>  /  TBili  0.8  /  DBili  x   /  AST  24  /  ALT  20  /  AlkPhos  103  03-14     LIVER FUNCTIONS - ( 14 Mar 2018 16:30 )  Alb: 2.7 g/dL / Pro: 6.9 gm/dL / ALK PHOS: 103 U/L / ALT: 20 U/L / AST: 24 U/L / GGT: x           Urinalysis Basic - ( 14 Mar 2018 15:06 )    Color: Yellow / Appearance: very cloudy / S.020 / pH: x  Gluc: x / Ketone: Negative  / Bili: Small / Urobili: 4 mg/dL   Blood: x / Protein: 100 mg/dL / Nitrite: Positive   Leuk Esterase: Moderate / RBC: 6-10 /HPF / WBC >50   Sq Epi: x / Non Sq Epi: Moderate / Bacteria: Many      Culture - Blood (collected 14 Mar 2018 19:41)  Source: .Blood None  Gram Stain (15 Mar 2018 20:51):    Growth in aerobic bottle: Gram Negative Rods    Growth in anaerobic bottle: Gram Negative Rods  Preliminary Report (15 Mar 2018 20:51):    Growth in aerobic bottle: Gram Negative Rods    Growth in anaerobic bottle: Gram Negative Rods     Organism: Blood Culture PCR (15 Mar 2018 18:32)      -  Escherichia coli: Detec      Method Type: PCR    Culture - Blood (collected 14 Mar 2018 19:40)  Source: .Blood None  Gram Stain (15 Mar 2018 20:53):    Growth in anaerobic bottle: Gram Negative Rods  Preliminary Report (15 Mar 2018 20:53):    Growth in anaerobic bottle: Gram Negative Rods    Culture - Urine (03.15.18 @ 16:05)    Specimen Source: .Urine None    Culture Results:   <10,000 CFU/ml  Normal Urogenital radha present    Culture - Blood (03.15.18 @ 17:37)    Specimen Source: .Blood None    Culture Results:   No growth to date.        Radiology:    < from: CT Abdomen and Pelvis No Cont (18 @ 19:12) >  mild LEFT hydronephrosis secondary to an obstructing 6 mm   calculus at the LEFT ureteropelvic junction. There is no RIGHT   nephrolithiasis or hydronephrosis. Small adrenal adenomas.   Cholelithiasis. 3.1 cm duodenal diverticulum. Moderate sigmoid   diverticulosis. Small alveolar infiltrate in the LEFT lower lobe.     < end of copied text >    < from: Xray Chest 1 View AP/PA. (18 @ 17:39) >   No active pulmonary disease.    < end of copied text >    < from: Xray Chest 1 View AP/PA. (18 @ 09:39) >  Pulmonary vascular congestion with small bilateral pleural effusions with   questionable superimposed left upper lobe pneumonia versus compared to   prior exam    < end of copied text >      Advanced directives addressed: full resuscitation

## 2018-03-18 NOTE — PROGRESS NOTE ADULT - ASSESSMENT
Continue ventilatory support (peak airway pressure about 23), bronchodilaor, solumedrol decreased to 40 mg IV q 12 hrs (continue taper in AM).  decrease FIO2 as tolerated.  Off bumex drip.  ID following regarding antibiotic coverage. on cefepime.   sputum gram stain and C&S.     brown coffee ground noted OG tube aspirate. to begin PPI. follow H/H's closely. GI consultation.    DVT prophylaxis. On heparin drip.

## 2018-03-19 DIAGNOSIS — J80 ACUTE RESPIRATORY DISTRESS SYNDROME: ICD-10-CM

## 2018-03-19 LAB
ANION GAP SERPL CALC-SCNC: 11 MMOL/L — SIGNIFICANT CHANGE UP (ref 5–17)
APTT BLD: 91.3 SEC — HIGH (ref 27.5–37.4)
BUN SERPL-MCNC: 84 MG/DL — HIGH (ref 7–23)
CALCIUM SERPL-MCNC: 8.3 MG/DL — LOW (ref 8.5–10.1)
CHLORIDE SERPL-SCNC: 105 MMOL/L — SIGNIFICANT CHANGE UP (ref 96–108)
CO2 SERPL-SCNC: 24 MMOL/L — SIGNIFICANT CHANGE UP (ref 22–31)
CREAT SERPL-MCNC: 3.01 MG/DL — HIGH (ref 0.5–1.3)
GLUCOSE SERPL-MCNC: 212 MG/DL — HIGH (ref 70–99)
HCT VFR BLD CALC: 35 % — SIGNIFICANT CHANGE UP (ref 34.5–45)
HGB BLD-MCNC: 11.5 G/DL — SIGNIFICANT CHANGE UP (ref 11.5–15.5)
MAGNESIUM SERPL-MCNC: 2.5 MG/DL — SIGNIFICANT CHANGE UP (ref 1.6–2.6)
MCHC RBC-ENTMCNC: 28.4 PG — SIGNIFICANT CHANGE UP (ref 27–34)
MCHC RBC-ENTMCNC: 32.9 GM/DL — SIGNIFICANT CHANGE UP (ref 32–36)
MCV RBC AUTO: 86.4 FL — SIGNIFICANT CHANGE UP (ref 80–100)
NRBC # BLD: 0 /100 WBCS — SIGNIFICANT CHANGE UP (ref 0–0)
OB PNL STL: POSITIVE
PHOSPHATE SERPL-MCNC: 4.9 MG/DL — HIGH (ref 2.5–4.5)
PLATELET # BLD AUTO: 157 K/UL — SIGNIFICANT CHANGE UP (ref 150–400)
POTASSIUM SERPL-MCNC: 3.5 MMOL/L — SIGNIFICANT CHANGE UP (ref 3.5–5.3)
POTASSIUM SERPL-SCNC: 3.5 MMOL/L — SIGNIFICANT CHANGE UP (ref 3.5–5.3)
RBC # BLD: 4.05 M/UL — SIGNIFICANT CHANGE UP (ref 3.8–5.2)
RBC # FLD: 16.3 % — HIGH (ref 10.3–14.5)
SODIUM SERPL-SCNC: 140 MMOL/L — SIGNIFICANT CHANGE UP (ref 135–145)
WBC # BLD: 16.64 K/UL — HIGH (ref 3.8–10.5)
WBC # FLD AUTO: 16.64 K/UL — HIGH (ref 3.8–10.5)

## 2018-03-19 PROCEDURE — 99233 SBSQ HOSP IP/OBS HIGH 50: CPT

## 2018-03-19 PROCEDURE — 71045 X-RAY EXAM CHEST 1 VIEW: CPT | Mod: 26

## 2018-03-19 RX ORDER — FAMOTIDINE 10 MG/ML
20 INJECTION INTRAVENOUS DAILY
Qty: 0 | Refills: 0 | Status: DISCONTINUED | OUTPATIENT
Start: 2018-03-19 | End: 2018-03-22

## 2018-03-19 RX ADMIN — PROPOFOL 5.99 MICROGRAM(S)/KG/MIN: 10 INJECTION, EMULSION INTRAVENOUS at 16:03

## 2018-03-19 RX ADMIN — HEPARIN SODIUM 2200 UNIT(S)/HR: 5000 INJECTION INTRAVENOUS; SUBCUTANEOUS at 06:43

## 2018-03-19 RX ADMIN — CHLORHEXIDINE GLUCONATE 15 MILLILITER(S): 213 SOLUTION TOPICAL at 05:52

## 2018-03-19 RX ADMIN — Medication 20 MILLIGRAM(S): at 19:04

## 2018-03-19 RX ADMIN — Medication 40 MILLIGRAM(S): at 05:45

## 2018-03-19 RX ADMIN — CHLORHEXIDINE GLUCONATE 15 MILLILITER(S): 213 SOLUTION TOPICAL at 19:05

## 2018-03-19 RX ADMIN — SIMVASTATIN 20 MILLIGRAM(S): 20 TABLET, FILM COATED ORAL at 21:51

## 2018-03-19 RX ADMIN — CEFEPIME 100 MILLIGRAM(S): 1 INJECTION, POWDER, FOR SOLUTION INTRAMUSCULAR; INTRAVENOUS at 19:04

## 2018-03-19 RX ADMIN — FAMOTIDINE 20 MILLIGRAM(S): 10 INJECTION INTRAVENOUS at 13:16

## 2018-03-19 RX ADMIN — CEFEPIME 100 MILLIGRAM(S): 1 INJECTION, POWDER, FOR SOLUTION INTRAMUSCULAR; INTRAVENOUS at 05:45

## 2018-03-19 NOTE — PROGRESS NOTE ADULT - ASSESSMENT
73 yo female with PMHX of HTN, OA, presenting with asymptomatic new onset Afib during Presurgical testing so admitted with this and found to have Leukocytosi and UTI and left hdyronephrosis with requriing acute PCN post insertion E coli bactaremia w sepsis and now new onset pulm edema after IVF boluses for sepsis. Required lasix IV and Bumex gtt for pulm edema . UOP was low now improved      STACIE - mulitfactorial -  Cr was rising in setting of UTI/Sepsis/borderline BP - leading to hypoperfusion +/- large diuresis with Bumex gtt    Cr platueaed today with brisk UOP     s/p left hydro with PCN - doubt obstruction as cause as Cr was 1.5 on admission      - continue to hold Bumex gtt today and monitor UOP    - keep slight negative I < O's - if Cr stabilizes then eventually resume intermittent diuretics   - keep SBP > 110 for renal perfusion    - check urine lytes today    - repeat renal sonogram in 24 hours if Cr rises    - strict I and O's - continue rosado    - dose all meds for Cr Cl ~ 15 cc/min with rising Cr   - avoid nephrotoxins    - daily labs    - no acute indication for dialysis - repeat labs and reasses daily    - PCN per urology     Respiratory Failure  - acute CHF vs ARDS - FiO2 improving and CXR improving also    - wean as able per CCU team     Azotemia - noted - pt on IV steroids    - monitor    - taper steroids as able per Pulmonary    CKD - possible Cr baseline ~ 1.3 while on chronic lasix and Advil use PTA.    UTI/E coli sepsis    - IV abx per ID      Thank you for the courtesy of this consult. We will follow this patient with you.   Management is subject to change if new information becomes available or patient condition changes.
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
## 2018-03-19 NOTE — PROGRESS NOTE ADULT - SUBJECTIVE AND OBJECTIVE BOX
Patient is a 72y old  Female who presents with a chief complaint of came for pre op testing (15 Mar 2018 00:51) and found to have incidental rapid afib and WBC 19   - found to have UTI with E.coli and Left hydronpehrosis due to obstructed stone - s.p PCN on 3/15 which revealed purulent drainage, pt subsequently become SOb and given IVF for sepsis in IR . Pt required Bipap and given IV lasix for new bilalteral infiltrates on Cxr c.w pulm edema. pt was then transferred to CCU for repsiratory failure and required intuabtion on 3/17.   On chronc home dose lasix for LE edema and on chronic Advil and oxycontin at home.  Cr was 1.5 --> then 1.36 --> then continued rise to Cr 3 - renal eval called for STACIE       Today   pt is intubated , sedated  on less on Fio2 - 40%   UOP continues to be brisk off the Bumex since 3/18    TOTAL Urine OUT: 2720 mL off the Bumex gtt x 24 hours       PAST MEDICAL & SURGICAL HISTORY:  Diverticulitis  Kidney stones  Psoriasis  Edema  HLD (hyperlipidemia)  HTN (hypertension)  S/P   H/O bladder repair surgery  H/O arthroscopy of shoulder: left   H/O: hysterectomy: due to bleeding     MEDICATIONS  (STANDING):  cefepime  IVPB      cefepime  IVPB 1000 milliGRAM(s) IV Intermittent every 12 hours  chlorhexidine 0.12% Liquid 15 milliLiter(s) Swish and Spit two times a day  diltiazem    Tablet 60 milliGRAM(s) Oral every 4 hours  famotidine    Tablet 20 milliGRAM(s) Oral daily  heparin  Infusion.  Unit(s)/Hr (18 mL/Hr) IV Continuous <Continuous>  methylPREDNISolone sodium succinate Injectable 20 milliGRAM(s) IV Push every 12 hours  propofol Infusion 10 MICROgram(s)/kG/Min (5.988 mL/Hr) IV Continuous <Continuous>  simvastatin 20 milliGRAM(s) Oral at bedtime      Allergies    Macrobid (Other)    Intolerances        REVIEW OF SYSTEMS:  sedated - UTO     All other review of systems is negative unless indicated above.    Vital Signs Last 24 Hrs  T(C): 36.9 (19 Mar 2018 08:20), Max: 36.9 (18 Mar 2018 20:40)  T(F): 98.4 (19 Mar 2018 08:20), Max: 98.4 (18 Mar 2018 20:40)  HR: 86 (19 Mar 2018 06:00) (75 - 118)  BP: 115/64 (19 Mar 2018 06:00) (94/60 - 134/68)  BP(mean): 77 (19 Mar 2018 06:00) (63 - 84)  RR: 18 (18 Mar 2018 12:00) (18 - 26)  SpO2: 100% (19 Mar 2018 06:00) (98% - 100%)    I&O's Summary    18 Mar 2018 07:01  -  19 Mar 2018 07:00  --------------------------------------------------------  IN: 1606 mL / OUT: 2720 mL / NET: -1114 mL      PHYSICAL EXAM:    Constitutional: NAD  HEENT:  EOMI,  MMM  Neck: No LAD, No JVD  Respiratory: coarse breath sounds  Cardiovascular: S1 and S2  Gastrointestinal: BS+, soft, NT/ND  Extremities: ++ peripheral edema  Neurological: sedated on vent  Psychiatric: sedated   : ++ rosado draining clear urine and left PCN draining dark blood tinged fluid  Skin: No rashes  Access: Not applicable    LABS:                                             11.5   16.64 )-----------( 157      ( 19 Mar 2018 05:07 )             35.0                         10.8   14.85 )-----------( 148      ( 18 Mar 2018 23:38 )             32.5     140    |  105    |  84     ----------------------------<  212       19 Mar 2018 05:08  3.5     |  24     |  3.01     140    |  105    |  78     ----------------------------<  178       18 Mar 2018 05:16  3.7     |  22     |  3.04     143    |  110    |  64     ----------------------------<  204       17 Mar 2018 11:37  3.8     |  20     |  2.85     Ca    8.3        19 Mar 2018 05:08  Ca    8.3        18 Mar 2018 05:16    Phos  4.9       19 Mar 2018 05:08  Phos  4.9       17 Mar 2018 06:09      Urine Studies:      Culture - Urine (03.15.18 @ 16:05)    Specimen Source: .Urine None    Culture Results:   <10,000 CFU/ml  Normal Urogenital radha present    Urine Microscopic-Add On (NC) (18 @ 15:06)    Red Blood Cell - Urine: 6-10 /HPF    White Blood Cell - Urine: >50    Epithelial Cells: Moderate    Bacteria: Many    Urinalysis (18 @ 15:06)    Glucose Qualitative, Urine: Negative mg/dL    Blood, Urine: Moderate    pH Urine: 5.0    Color: Yellow    Urine Appearance: very cloudy    Bilirubin: Small    Ketone - Urine: Negative    Specific Gravity: 1.020    Protein, Urine: 100 mg/dL    Urobilinogen: 4 mg/dL    Nitrite: Positive    Leukocyte Esterase Concentration: Moderate    Culture - Blood (18 @ 19:41)    Gram Stain:   Growth in aerobic bottle: Gram Negative Rods  Growth in anaerobic bottle: Gram Negative Rods    -  Amikacin: S <=8    -  Ampicillin: S <=2    -  Ampicillin/Sulbactam: S <=4/2    -  Aztreonam: S <=4    -  Cefazolin: S <=2    -  Cefepime: S <=2    -  Cefoxitin: S <=4    -  Ceftazidime: S <=1    -  Ceftriaxone: S <=1    -  Ciprofloxacin: S <=0.5    -  Ertapenem: S <=0.5    -  Gentamicin: S <=1    -  Imipenem: S <=1    -  Levofloxacin: S <=1    -  Meropenem: S <=1    -  Piperacillin/Tazobactam: S <=8    -  Tobramycin: S <=2    -  Trimethoprim/Sulfamethoxazole: S <=0.5/9.5    -  Escherichia coli: Detec    Specimen Source: .Blood None    Organism: Blood Culture PCR    Organism: Escherichia coli    Culture Results:   Growth in aerobic and anaerobic bottles: Escherichia coli  "Due to technical problems, Proteus sp. will Not be reported as part of  the BCID panel until further notice"    Organism Identification: Blood Culture PCR  Escherichia coli    Method Type: PCR    Method Type: NOEL        RADIOLOGY & ADDITIONAL STUDIES:    Abd Ct   IMPRESSION:  mild LEFT hydronephrosis secondary to an obstructing 6 mm   calculus at the LEFT ureteropelvic junction. There is no RIGHT   nephrolithiasis or hydronephrosis. Small adrenal adenomas.   Cholelithiasis. 3.1 cm duodenal diverticulum. Moderate sigmoid   diverticulosis. Small alveolar infiltrate in the LEFT lower lobe.

## 2018-03-19 NOTE — PROGRESS NOTE ADULT - ASSESSMENT
1. Patient with new onset Atrial fibrillation with e. coli sepsis bacteremia.      developed acutre renal failure, ards.      afib on Hepairn      will give weaning trial      continue cefepime e coli in blood, sputum culture no organisms       will give weaning trial      titrate down steroids
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
## 2018-03-19 NOTE — DIETITIAN INITIAL EVALUATION ADULT. - ENERGY NEEDS
Ht. 65 "        Wt. 99.8 kg         37 BMI                  IBW 59 kg               Pt is at 169 %  IBW

## 2018-03-19 NOTE — PROGRESS NOTE ADULT - SUBJECTIVE AND OBJECTIVE BOX
Patient is a 72y old  Female who presents with a chief complaint of came for pre op testing (15 Mar 2018 00:51)    Date of service: 18 @ 15:14    Lying in bed in NAD  On ventilatory support  No fever or chills    ROS: unobtainable    MEDICATIONS  (STANDING):  cefepime  IVPB      cefepime  IVPB 1000 milliGRAM(s) IV Intermittent every 12 hours  chlorhexidine 0.12% Liquid 15 milliLiter(s) Swish and Spit two times a day  diltiazem    Tablet 60 milliGRAM(s) Oral every 4 hours  famotidine    Tablet 20 milliGRAM(s) Oral daily  heparin  Infusion.  Unit(s)/Hr (18 mL/Hr) IV Continuous <Continuous>  methylPREDNISolone sodium succinate Injectable 20 milliGRAM(s) IV Push every 12 hours  propofol Infusion 10 MICROgram(s)/kG/Min (5.988 mL/Hr) IV Continuous <Continuous>  simvastatin 20 milliGRAM(s) Oral at bedtime    MEDICATIONS  (PRN):  ALBUTerol    90 MICROgram(s) HFA Inhaler 2 Puff(s) Inhalation every 6 hours PRN on ventilator  docusate sodium 100 milliGRAM(s) Oral two times a day PRN Constipation  heparin  Injectable 8000 Unit(s) IV Push every 6 hours PRN For aPTT less than 40  heparin  Injectable 4000 Unit(s) IV Push every 6 hours PRN For aPTT between 40 - 57  LORazepam   Injectable 1 milliGRAM(s) IV Push every 4 hours PRN Anxiety  ondansetron Injectable 4 milliGRAM(s) IV Push every 6 hours PRN Nausea and/or Vomiting  oxyCODONE    5 mG/acetaminophen 325 mG 1 Tablet(s) Oral every 4 hours PRN Moderate Pain (4 - 6)      Vital Signs Last 24 Hrs  T(C): 37 (19 Mar 2018 13:39), Max: 37 (19 Mar 2018 13:39)  T(F): 98.6 (19 Mar 2018 13:39), Max: 98.6 (19 Mar 2018 13:39)  HR: 85 (19 Mar 2018 13:48) (75 - 118)  BP: 115/64 (19 Mar 2018 06:00) (94/60 - 134/68)  BP(mean): 77 (19 Mar 2018 06:00) (63 - 84)  RR: --  SpO2: 99% (19 Mar 2018 13:53) (96% - 100%)    Physical Exam:      Constitutional: frail looking  HEENT: NC/AT, EOMI, PERRLA  Neck: supple  Back: no tenderness  Respiratory: few b/l rhonchi  Cardiovascular: S1S2 irregular, no murmurs  Abdomen: soft, not tender, not distended, positive BS  Genitourinary: no LN palpable; left nephrostomy tube  Rectal: deferred  Musculoskeletal: no muscle tenderness, no joint swelling or tenderness  Extremities: no pedal edema  Neurological: confused, moving all extremities  Skin: no rashes    Labs:                        11.5   16.64 )-----------( 157      ( 19 Mar 2018 05:07 )             35.0     03-19    140  |  105  |  84<H>  ----------------------------<  212<H>  3.5   |  24  |  3.01<H>    Ca    8.3<L>      19 Mar 2018 05:08  Phos  4.9     03-19  Mg     2.5     -19                        11.7   25.13 )-----------( 168      ( 17 Mar 2018 06:09 )             35.7     03-17    141  |  110<H>  |  58<H>  ----------------------------<  177<H>  4.3   |  20<L>  |  2.75<H>    Ca    8.1<L>      17 Mar 2018 06:09  Phos  4.9     03-17  Mg     2.3     03-17               TPro  6.9  /  Alb  2.7<L>  /  TBili  0.8  /  DBili  x   /  AST  24  /  ALT  20  /  AlkPhos  103  03-14     LIVER FUNCTIONS - ( 14 Mar 2018 16:30 )  Alb: 2.7 g/dL / Pro: 6.9 gm/dL / ALK PHOS: 103 U/L / ALT: 20 U/L / AST: 24 U/L / GGT: x           Urinalysis Basic - ( 14 Mar 2018 15:06 )    Color: Yellow / Appearance: very cloudy / S.020 / pH: x  Gluc: x / Ketone: Negative  / Bili: Small / Urobili: 4 mg/dL   Blood: x / Protein: 100 mg/dL / Nitrite: Positive   Leuk Esterase: Moderate / RBC: 6-10 /HPF / WBC >50   Sq Epi: x / Non Sq Epi: Moderate / Bacteria: Many      Culture - Sputum (collected 18 Mar 2018 11:00)  Source: .Sputum Sputum  Gram Stain (18 Mar 2018 22:22):    Few polymorphonuclear leukocytes per low power field    No Squamous epithelial cells per low power field    No organisms seen per oil power field    Culture - Blood (collected 15 Mar 2018 17:37)  Source: .Blood None  Preliminary Report (17 Mar 2018 01:02):    No growth to date.    Culture - Fungal, Other (collected 15 Mar 2018 16:05)  Source: .Other None-urine  Preliminary Report (19 Mar 2018 10:41):    Testing in progress    Culture - Urine (collected 15 Mar 2018 16:05)  Source: .Urine None  Final Report (16 Mar 2018 22:14):    <10,000 CFU/ml    Normal Urogenital radha present    Culture - Blood (collected 14 Mar 2018 19:41)  Source: .Blood None  Gram Stain (15 Mar 2018 20:51):    Growth in aerobic bottle: Gram Negative Rods    Growth in anaerobic bottle: Gram Negative Rods  Final Report (17 Mar 2018 23:26):    Growth in aerobic and anaerobic bottles: Escherichia coli    "Due to technical problems, Proteus sp. will Not be reported as part of    the BCID panel until further notice"  Organism: Blood Culture PCR  Escherichia coli (17 Mar 2018 23:26)  Organism: Escherichia coli (17 Mar 2018 23:26)      -  Amikacin: S <=8      -  Ampicillin: S <=2      -  Ampicillin/Sulbactam: S <=4/2      -  Aztreonam: S <=4      -  Cefazolin: S <=2      -  Cefepime: S <=2      -  Cefoxitin: S <=4      -  Ceftazidime: S <=1      -  Ceftriaxone: S <=1      -  Ciprofloxacin: S <=0.5      -  Ertapenem: S <=0.5      -  Gentamicin: S <=1      -  Imipenem: S <=1      -  Levofloxacin: S <=1      -  Meropenem: S <=1      -  Piperacillin/Tazobactam: S <=8      -  Tobramycin: S <=2      -  Trimethoprim/Sulfamethoxazole: S <=0.5/9.5      Method Type: NOEL  Organism: Blood Culture PCR (17 Mar 2018 23:26)      -  Escherichia coli: Detec      Method Type: PCR    Culture - Blood (collected 14 Mar 2018 19:40)  Source: .Blood None  Gram Stain (15 Mar 2018 20:53):    Growth in anaerobic bottle: Gram Negative Rods  Final Report (17 Mar 2018 23:24):    Growth in anaerobic bottle: Escherichia coli    See previous culture [id number]          Radiology:    < from: CT Abdomen and Pelvis No Cont (18 @ 19:12) >  mild LEFT hydronephrosis secondary to an obstructing 6 mm   calculus at the LEFT ureteropelvic junction. There is no RIGHT   nephrolithiasis or hydronephrosis. Small adrenal adenomas.   Cholelithiasis. 3.1 cm duodenal diverticulum. Moderate sigmoid   diverticulosis. Small alveolar infiltrate in the LEFT lower lobe.     < end of copied text >    < from: Xray Chest 1 View AP/PA. (18 @ 17:39) >   No active pulmonary disease.    < end of copied text >    < from: Xray Chest 1 View AP/PA. (18 @ 09:39) >  Pulmonary vascular congestion with small bilateral pleural effusions with   questionable superimposed left upper lobe pneumonia versus compared to   prior exam    < end of copied text >      Advanced directives addressed: full resuscitation

## 2018-03-19 NOTE — PROGRESS NOTE ADULT - SUBJECTIVE AND OBJECTIVE BOX
Patient is a 72y old  Female who presents with a chief complaint of came for pre op testing (15 Mar 2018 00:51)    HPI Pt transferred to ED from admitting d/t abnormal ECG demostrating AF with RVR. Office redocrds reviewed. This is new AF. States having on and off leg swelling but no orthopnea, angina, palp, PND.      3/15 - Not feeling better, had episode of sweating and SOB recently. Blood work revealed hydronephrosis, obstructive calculus, UTI, questionable pneumonia, low TSH, elevated WBC, elevated cr. Tele with AF and VR around 135 now.  CT reviewed, no signs of pleural effusions or lung edema in included lungs segments. ECHO reviewed too. Mild AS, MR, preserved LVEF.   Suggest starting IV cardizem drip titratable to HR < 100. Also start UFH IV for stroke ppx. Cont PO cardizem as well, this will assist in weaning her off IV cardizem ultimately. Cont statin too.   Obtain urology consult re: hydronephrosis. Also endocrinology re: low TSH.  Would defer stress MPI until her HR is more stable and normal.  Discussed with patient also re: CV. She would need 3 weeks strict OAC and therefore gigi also interfere should she need percutaneous hydronephrosis drainage, not to mention her future knee surgery as discussed yesterday. Should HR become an issue difficult to control with drugs, would then proceed to CV and deal with the rest later on as needed. For time being, and as long as she is hemodynamically stable, would cont rate control, AC and address first UTI and possible hyperthyroidism.  Case d/w hospitalist.  Will follow    3/16 - Pt on BiPAP, in no resp distress at moment. Tele AF with HR round 70-90. BP stable off pressors. Awake and alert. Had resp distress yesterday after PCN, possibly diastolic heart failure. Suggest continuing IV diuresis as needed, continue rate control with IV diltiazem, will start weaning her to PO once off BiPAP. Continue UFH, will transition to OAC once kidney fx is stable as well. Will continue to defer stress test and CV for time being.    3/17-  Hasn't responded to BIPAP and seemed obtunded today. Chest xray showed worsened heart failure. Critical care decided to intubate patient.   She remains in afib. On a cardizem drip. After sedation, blood pressure decreased to the high 90s.  overnight, her vital signs have been stable.   Received Lasix 40 mg IV at 5 am and has responded with urine output.     3/18-  Intubated yesterday.   Cardizem drip was discontinued and started on QID cardizem by OGT. She has tolerated it well with good heart rate control and adequate blood pressures.   Was started on a Bumex drip. Has diuresed over 2000 ml and there was been an increase in the serum creatinine.     3/19 - Examined today, on sedation and vent. CXR 3/17 and clinically without significant improvement despite aggressive IV diuresis and significant UOP, rather her Cr went up. I suspect her bilateral alveolar infiltrates and resp distress may not be cardiogenic at the moment ( ARDS in setting of sepsis?). Would suggest cont to hold diuretics and cont optimal vent support while addressing other acute clinical issues. Appreciated pulm and nephro input.  She remains in AF with appropriate rate control on CCB via OGT. Cont that as well.   Will follow up closely.     Allergies    Macrobid (Other)    Intolerances        MEDICATIONS  (STANDING):  ALBUTerol/ipratropium for Nebulization 3 milliLiter(s) Nebulizer every 6 hours  aspirin 325 milliGRAM(s) Oral daily  azithromycin  IVPB 500 milliGRAM(s) IV Intermittent every 24 hours  buMETAnide Infusion 1 mG/Hr (10 mL/Hr) IV Continuous <Continuous>  cefepime  IVPB      cefepime  IVPB 1000 milliGRAM(s) IV Intermittent every 12 hours  chlorhexidine 0.12% Liquid 15 milliLiter(s) Swish and Spit two times a day  diltiazem    Tablet 60 milliGRAM(s) Oral every 4 hours  heparin  Infusion.  Unit(s)/Hr (18 mL/Hr) IV Continuous <Continuous>  methylPREDNISolone sodium succinate Injectable 40 milliGRAM(s) IV Push every 8 hours  propofol Infusion 10 MICROgram(s)/kG/Min (5.988 mL/Hr) IV Continuous <Continuous>  simvastatin 20 milliGRAM(s) Oral at bedtime    MEDICATIONS  (PRN):  docusate sodium 100 milliGRAM(s) Oral two times a day PRN Constipation  heparin  Injectable 8000 Unit(s) IV Push every 6 hours PRN For aPTT less than 40  heparin  Injectable 4000 Unit(s) IV Push every 6 hours PRN For aPTT between 40 - 57  LORazepam   Injectable 1 milliGRAM(s) IV Push every 4 hours PRN Anxiety  ondansetron Injectable 4 milliGRAM(s) IV Push every 6 hours PRN Nausea and/or Vomiting  oxyCODONE    5 mG/acetaminophen 325 mG 1 Tablet(s) Oral every 4 hours PRN Moderate Pain (4 - 6)    REVIEW OF SYSTEMS:    RESPIRATORY: No cough, wheezing, hemoptysis; No shortness of breath  CARDIOVASCULAR: No chest pain or palpitations  All other review of systems is negative unless indicated above      PHYSICAL EXAM:  Daily     Daily Weight in k.5 (18 Mar 2018 05:00)  Vital Signs Last 24 Hrs  T(C): 36.4 (18 Mar 2018 08:51), Max: 36.9 (17 Mar 2018 21:22)  T(F): 97.5 (18 Mar 2018 08:51), Max: 98.4 (17 Mar 2018 21:22)  HR: 91 (18 Mar 2018 08:00) (86 - 106)  BP: 107/64 (18 Mar 2018 08:00) (85/49 - 137/110)  BP(mean): 73 (18 Mar 2018 08:00) (56 - 115)  RR: 29 (18 Mar 2018 08:00) (26 - 34)  SpO2: 99% (18 Mar 2018 08:00) (94% - 100%)    Constitutional: NAD, awake and alert, well-developed  HEENT: PERR, EOMI, Normal Hearing, MMM  Neck: Soft and supple, No LAD, No JVD  Respiratory: Breath sounds are clear bilaterally, No wheezing, rales or rhonchi  Cardiovascular: S1 and S2, regular rate and rhythm, no Murmurs, gallops or rubs  Gastrointestinal: Bowel Sounds present, soft, nontender, nondistended, no guarding, no rebound  Extremities: No peripheral edema  Vascular: 2+ peripheral pulses  Neurological: A/O x 3, no focal deficits  Musculoskeletal: 5/5 strength b/l upper and lower extremities  Skin: No rashes    LABS: All Labs Reviewed:                        11.6   15.79 )-----------( 160      ( 18 Mar 2018 05:16 )             35.9     03-18    140  |  105  |  78<H>  ----------------------------<  178<H>  3.7   |  22  |  3.04<H>    Ca    8.3<L>      18 Mar 2018 05:16  Phos  4.9     03-17  Mg     2.3     03-17      PTT - ( 18 Mar 2018 05:16 )  PTT:43.9 sec          TELEMETRY/EKG: rate controlled Afib

## 2018-03-19 NOTE — PROGRESS NOTE ADULT - ASSESSMENT
cont vent support  on bumex drip  would decrease steroids  on cefepime for Ecoli bacteremia  CXR's noted  DVT prophylaxis cont vent support  on bumex drip  would decrease steroids  on cefepime for Ecoli bacteremia  CXR's noted  renal fxn worsening  DVT prophylaxis
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
## 2018-03-19 NOTE — PROGRESS NOTE ADULT - SUBJECTIVE AND OBJECTIVE BOX
Subjective:  intubated    MEDICATIONS  (STANDING):  buMETAnide Infusion 1 mG/Hr (10 mL/Hr) IV Continuous <Continuous>  cefepime  IVPB      cefepime  IVPB 1000 milliGRAM(s) IV Intermittent every 12 hours  chlorhexidine 0.12% Liquid 15 milliLiter(s) Swish and Spit two times a day  diltiazem    Tablet 60 milliGRAM(s) Oral every 4 hours  heparin  Infusion.  Unit(s)/Hr (18 mL/Hr) IV Continuous <Continuous>  methylPREDNISolone sodium succinate Injectable 40 milliGRAM(s) IV Push every 12 hours  pantoprazole  Injectable 40 milliGRAM(s) IV Push daily  propofol Infusion 10 MICROgram(s)/kG/Min (5.988 mL/Hr) IV Continuous <Continuous>  simvastatin 20 milliGRAM(s) Oral at bedtime    MEDICATIONS  (PRN):  ALBUTerol    90 MICROgram(s) HFA Inhaler 2 Puff(s) Inhalation every 6 hours PRN on ventilator  docusate sodium 100 milliGRAM(s) Oral two times a day PRN Constipation  heparin  Injectable 8000 Unit(s) IV Push every 6 hours PRN For aPTT less than 40  heparin  Injectable 4000 Unit(s) IV Push every 6 hours PRN For aPTT between 40 - 57  LORazepam   Injectable 1 milliGRAM(s) IV Push every 4 hours PRN Anxiety  ondansetron Injectable 4 milliGRAM(s) IV Push every 6 hours PRN Nausea and/or Vomiting  oxyCODONE    5 mG/acetaminophen 325 mG 1 Tablet(s) Oral every 4 hours PRN Moderate Pain (4 - 6)      Allergies    Macrobid (Other)    Intolerances        REVIEW OF SYSTEMS: intubated    Mode: AC/ CMV (Assist Control/ Continuous Mandatory Ventilation)  RR (machine): 12  TV (machine): 500  FiO2: 60  PEEP: 5  ITime: 1  MAP: 21  PIP: 22      Vital Signs Last 24 Hrs  T(C): 36.4 (19 Mar 2018 05:00), Max: 36.9 (18 Mar 2018 20:40)  T(F): 97.5 (19 Mar 2018 05:00), Max: 98.4 (18 Mar 2018 20:40)  HR: 86 (19 Mar 2018 06:00) (75 - 118)  BP: 115/64 (19 Mar 2018 06:00) (94/60 - 134/68)  BP(mean): 77 (19 Mar 2018 06:00) (63 - 84)  RR: 18 (18 Mar 2018 12:00) (18 - 29)  SpO2: 100% (19 Mar 2018 06:00) (98% - 100%)    PHYSICAL EXAMINATION:  SKIN: no rashes  HEAD: NC/AT  EYES: PERRLA, EOMI  EARS: TM's intact  NOSE: no abnormalities  NECK:  Supple. No lymphadenopathy. Jugular venous pressure not elevated. Carotids equal.   HEART:   The cardiac impulse has a normal quality. Reg., Nl S1 and S2.  There are no murmurs, rubs or gallops noted  CHEST:  bilat ronchi w crackles  ABDOMEN:  Soft and nontender.   EXTREMITIES:  + edema  NEURO: sedated      LABS:                        11.5   16.64 )-----------( 157      ( 19 Mar 2018 05:07 )             35.0     03-19    140  |  105  |  84<H>  ----------------------------<  212<H>  3.5   |  24  |  3.01<H>    Ca    8.3<L>      19 Mar 2018 05:08  Phos  4.9     03-19  Mg     2.5     03-19      PTT - ( 19 Mar 2018 05:08 )  PTT:91.3 sec      RADIOLOGY & ADDITIONAL TESTS:

## 2018-03-19 NOTE — PROGRESS NOTE ADULT - SUBJECTIVE AND OBJECTIVE BOX
Events Overnight: on ventilator, in afib , not on pressors, remains on ventilator                            Acute renal failure creatinine 3.0  HPI:    Mrs. Clancy is a 72-year-old female who initially presented to the hospital for preoperative evaluation for knee surgery. She is found to be in atrial fibrillation and was sent to emergency room. f.  Patient wascomplaining of flank pain. A CT scan of the abdomen and pelvis was performed which showed hydronephrosis with a kidney stone. There was also evidence of a left lower lobe infiltrate. As patient had a nephrostomy tube placed and was started on antibiotics. Mrs. Cruz became progressively more dyspneic. Blood cultures were positive for Escherichia coli. She developed sepsis with hypotension and was resuscitated with over 3 L of fluid. She developed significant shortness of breath which was treated with BiPAP. She is now on a Ventimask. Mrs. Clancy has no previous history of significant pulmonary disease. She does have a 40+-pack-year smoking history. There is no history of, system anorexia or weight loss. Current O2 saturation is 90% on 50% Ventimask.    3/17: events noted. intubated this AM b/c obtunded on BIPAP. CXR shows increase in bilat alveolar type infitrates. began on IV bumex drip. blood C&S positve for e coli. currently sedated on vent. O2 sat 100%.   3/18: sedated on ventilator. O2 sat 100%.  brown coffee grounds noted on OG tube aspirate  (start PPI).                               MEDICATIONS  (STANDING):  buMETAnide Infusion 1 mG/Hr (10 mL/Hr) IV Continuous <Continuous>  cefepime  IVPB      cefepime  IVPB 1000 milliGRAM(s) IV Intermittent every 12 hours  chlorhexidine 0.12% Liquid 15 milliLiter(s) Swish and Spit two times a day  diltiazem    Tablet 60 milliGRAM(s) Oral every 4 hours  heparin  Infusion.  Unit(s)/Hr (18 mL/Hr) IV Continuous <Continuous>  methylPREDNISolone sodium succinate Injectable 40 milliGRAM(s) IV Push every 12 hours  pantoprazole  Injectable 40 milliGRAM(s) IV Push daily  propofol Infusion 10 MICROgram(s)/kG/Min (5.988 mL/Hr) IV Continuous <Continuous>  simvastatin 20 milliGRAM(s) Oral at bedtime    MEDICATIONS  (PRN):  ALBUTerol    90 MICROgram(s) HFA Inhaler 2 Puff(s) Inhalation every 6 hours PRN on ventilator  docusate sodium 100 milliGRAM(s) Oral two times a day PRN Constipation  heparin  Injectable 8000 Unit(s) IV Push every 6 hours PRN For aPTT less than 40  heparin  Injectable 4000 Unit(s) IV Push every 6 hours PRN For aPTT between 40 - 57  LORazepam   Injectable 1 milliGRAM(s) IV Push every 4 hours PRN Anxiety  ondansetron Injectable 4 milliGRAM(s) IV Push every 6 hours PRN Nausea and/or Vomiting  oxyCODONE    5 mG/acetaminophen 325 mG 1 Tablet(s) Oral every 4 hours PRN Moderate Pain (4 - 6)                    ICU Vital Signs Last 24 Hrs  T(C): 36.9 (19 Mar 2018 08:20), Max: 36.9 (18 Mar 2018 20:40)  T(F): 98.4 (19 Mar 2018 08:20), Max: 98.4 (18 Mar 2018 20:40)  HR: 86 (19 Mar 2018 06:00) (75 - 118)  BP: 115/64 (19 Mar 2018 06:00) (94/60 - 134/68)  BP(mean): 77 (19 Mar 2018 06:00) (63 - 84)  ABP: --  ABP(mean): --  RR: 18 (18 Mar 2018 12:00) (18 - 26)  SpO2: 100% (19 Mar 2018 06:00) (98% - 100%)      Mode: AC/ CMV (Assist Control/ Continuous Mandatory Ventilation)  RR (machine): 12  TV (machine): 500  FiO2: 60  PEEP: 5  ITime: 1  MAP: 21  PIP: 22      I&O's Summary    18 Mar 2018 07:01  -  19 Mar 2018 07:00  --------------------------------------------------------  IN: 1606 mL / OUT: 2720 mL / NET: -1114 mL        Physical Exam:                               11.5   16.64 )-----------( 157      ( 19 Mar 2018 05:07 )             35.0       03-19    140  |  105  |  84<H>  ----------------------------<  212<H>  3.5   |  24  |  3.01<H>    Ca    8.3<L>      19 Mar 2018 05:08  Phos  4.9     03-19  Mg     2.5     03-19              ABG - ( 17 Mar 2018 13:22 )  pH: 7.33  /  pCO2: 36    /  pO2: 77    / HCO3: 18    / Base Excess: -6.4  /  SaO2: 94                      DVT Prophylaxis:                                                                 Advanced Directives: Events Overnight: on ventilator, in afib , not on pressors, remains on ventilator                            Acute renal failure creatinine 3.0  HPI:    Mrs. Clancy is a 72-year-old female who initially presented to the hospital for preoperative evaluation for knee surgery. She is found to be in atrial fibrillation and was sent to emergency room. f.  Patient wascomplaining of flank pain. A CT scan of the abdomen and pelvis was performed which showed hydronephrosis with a kidney stone. There was also evidence of a left lower lobe infiltrate.  Patient  had nephrostomy tube  was than a code sepsis in PACU.  Blood cultures were positive for Escherichia coli. She developed sepsis with hypotension and was resuscitated with over 3 L of fluid. She developed significant shortness of breath which was treated with BiPAP . she was subseqauently intubated. cxr showed pneumonia, vs. failure, vs. ARDS,  with diuresis developed acute renal failure, now on vent, cxr improved this am. aspirin help secondary to some coffee grounds       MEDICATIONS  (STANDING):  buMETAnide Infusion 1 mG/Hr (10 mL/Hr) IV Continuous <Continuous>  cefepime  IVPB      cefepime  IVPB 1000 milliGRAM(s) IV Intermittent every 12 hours  chlorhexidine 0.12% Liquid 15 milliLiter(s) Swish and Spit two times a day  diltiazem    Tablet 60 milliGRAM(s) Oral every 4 hours  heparin  Infusion.  Unit(s)/Hr (18 mL/Hr) IV Continuous <Continuous>  methylPREDNISolone sodium succinate Injectable 40 milliGRAM(s) IV Push every 12 hours  pantoprazole  Injectable 40 milliGRAM(s) IV Push daily  propofol Infusion 10 MICROgram(s)/kG/Min (5.988 mL/Hr) IV Continuous <Continuous>  simvastatin 20 milliGRAM(s) Oral at bedtime    MEDICATIONS  (PRN):  ALBUTerol    90 MICROgram(s) HFA Inhaler 2 Puff(s) Inhalation every 6 hours PRN on ventilator  docusate sodium 100 milliGRAM(s) Oral two times a day PRN Constipation  heparin  Injectable 8000 Unit(s) IV Push every 6 hours PRN For aPTT less than 40  heparin  Injectable 4000 Unit(s) IV Push every 6 hours PRN For aPTT between 40 - 57  LORazepam   Injectable 1 milliGRAM(s) IV Push every 4 hours PRN Anxiety  ondansetron Injectable 4 milliGRAM(s) IV Push every 6 hours PRN Nausea and/or Vomiting  oxyCODONE    5 mG/acetaminophen 325 mG 1 Tablet(s) Oral every 4 hours PRN Moderate Pain (4 - 6)           ICU Vital Signs Last 24 Hrs  T(C): 36.9 (19 Mar 2018 08:20), Max: 36.9 (18 Mar 2018 20:40)  T(F): 98.4 (19 Mar 2018 08:20), Max: 98.4 (18 Mar 2018 20:40)  HR: 86 (19 Mar 2018 06:00) (75 - 118)  BP: 115/64 (19 Mar 2018 06:00) (94/60 - 134/68)  BP(mean): 77 (19 Mar 2018 06:00) (63 - 84)  ABP: --  ABP(mean): --  RR: 18 (18 Mar 2018 12:00) (18 - 26)  SpO2: 100% (19 Mar 2018 06:00) (98% - 100%)      Mode: AC/ CMV (Assist Control/ Continuous Mandatory Ventilation)  RR (machine): 12  TV (machine): 500  FiO2: 60  PEEP: 5  ITime: 1  MAP: 21  PIP: 22      I&O's Summary    18 Mar 2018 07:01  -  19 Mar 2018 07:00  --------------------------------------------------------  IN: 1606 mL / OUT: 2720 mL / NET: -1114 mL      PE:  Review of Systems:   · Additional ROS	Unobtainable	    Physical Exam:   · Constitutional	intubated comfortable	  · Constitutional Details	ill	  · ENMT	detailed exam	  · ENMT Comments	Orally intubated	  · Respiratory	detailed exam	  · Respiratory Details	diminished breath sounds, L; diminished breath sounds, R	  · Cardiovascular	detailed exam no murmur	  · Cardiovascular Details	irregular rate and rhythm	  · Gastrointestinal	Soft, non-tender, no hepatosplenomegaly, normal bowel sounds	  · Extremities	detailed exam	  · Extremities Details	pedal edema	  · Pedal Edema Severity	1+	  · Neurological	detailed exam	  · Neurological Details	sedated	  · Musculoskeletal	No joint pain, swelling or deformity; no limitation of movement	                           11.5   16.64 )-----------( 157      ( 19 Mar 2018 05:07 )             35.0       03-19    140  |  105  |  84<H>  ----------------------------<  212<H>  3.5   |  24  |  3.01<H>    Ca    8.3<L>      19 Mar 2018 05:08  Phos  4.9     03-19  Mg     2.5     03-19              ABG - ( 17 Mar 2018 13:22 )  pH: 7.33  /  pCO2: 36    /  pO2: 77    / HCO3: 18    / Base Excess: -6.4  /  SaO2: 94            DVT Prophylaxis:  IV hepairn                                                               Advanced Directives: Full code

## 2018-03-20 LAB
ANION GAP SERPL CALC-SCNC: 9 MMOL/L — SIGNIFICANT CHANGE UP (ref 5–17)
APTT BLD: 131 SEC — CRITICAL HIGH (ref 27.5–37.4)
APTT BLD: 88.8 SEC — HIGH (ref 27.5–37.4)
APTT BLD: 89.1 SEC — HIGH (ref 27.5–37.4)
BUN SERPL-MCNC: 87 MG/DL — HIGH (ref 7–23)
CALCIUM SERPL-MCNC: 8.3 MG/DL — LOW (ref 8.5–10.1)
CHLORIDE SERPL-SCNC: 106 MMOL/L — SIGNIFICANT CHANGE UP (ref 96–108)
CO2 SERPL-SCNC: 25 MMOL/L — SIGNIFICANT CHANGE UP (ref 22–31)
CREAT SERPL-MCNC: 2.42 MG/DL — HIGH (ref 0.5–1.3)
CULTURE RESULTS: SIGNIFICANT CHANGE UP
GLUCOSE SERPL-MCNC: 215 MG/DL — HIGH (ref 70–99)
HCT VFR BLD CALC: 35.9 % — SIGNIFICANT CHANGE UP (ref 34.5–45)
HGB BLD-MCNC: 11.9 G/DL — SIGNIFICANT CHANGE UP (ref 11.5–15.5)
MCHC RBC-ENTMCNC: 28.7 PG — SIGNIFICANT CHANGE UP (ref 27–34)
MCHC RBC-ENTMCNC: 33.1 GM/DL — SIGNIFICANT CHANGE UP (ref 32–36)
MCV RBC AUTO: 86.5 FL — SIGNIFICANT CHANGE UP (ref 80–100)
NRBC # BLD: 0 /100 WBCS — SIGNIFICANT CHANGE UP (ref 0–0)
PLATELET # BLD AUTO: 128 K/UL — LOW (ref 150–400)
POTASSIUM SERPL-MCNC: 3.3 MMOL/L — LOW (ref 3.5–5.3)
POTASSIUM SERPL-SCNC: 3.3 MMOL/L — LOW (ref 3.5–5.3)
RBC # BLD: 4.15 M/UL — SIGNIFICANT CHANGE UP (ref 3.8–5.2)
RBC # FLD: 16.1 % — HIGH (ref 10.3–14.5)
SODIUM SERPL-SCNC: 140 MMOL/L — SIGNIFICANT CHANGE UP (ref 135–145)
SPECIMEN SOURCE: SIGNIFICANT CHANGE UP
WBC # BLD: 15.12 K/UL — HIGH (ref 3.8–10.5)
WBC # FLD AUTO: 15.12 K/UL — HIGH (ref 3.8–10.5)

## 2018-03-20 PROCEDURE — 99233 SBSQ HOSP IP/OBS HIGH 50: CPT

## 2018-03-20 RX ORDER — POTASSIUM CHLORIDE 20 MEQ
40 PACKET (EA) ORAL ONCE
Qty: 0 | Refills: 0 | Status: COMPLETED | OUTPATIENT
Start: 2018-03-20 | End: 2018-03-20

## 2018-03-20 RX ORDER — ALBUTEROL 90 UG/1
2.5 AEROSOL, METERED ORAL EVERY 6 HOURS
Qty: 0 | Refills: 0 | Status: DISCONTINUED | OUTPATIENT
Start: 2018-03-20 | End: 2018-03-30

## 2018-03-20 RX ORDER — ALBUTEROL 90 UG/1
2.5 AEROSOL, METERED ORAL EVERY 6 HOURS
Qty: 0 | Refills: 0 | Status: DISCONTINUED | OUTPATIENT
Start: 2018-03-20 | End: 2018-03-20

## 2018-03-20 RX ADMIN — HEPARIN SODIUM 0 UNIT(S)/HR: 5000 INJECTION INTRAVENOUS; SUBCUTANEOUS at 07:29

## 2018-03-20 RX ADMIN — Medication 40 MILLIEQUIVALENT(S): at 10:38

## 2018-03-20 RX ADMIN — HEPARIN SODIUM 1900 UNIT(S)/HR: 5000 INJECTION INTRAVENOUS; SUBCUTANEOUS at 08:33

## 2018-03-20 RX ADMIN — Medication 5 MILLIGRAM(S): at 10:38

## 2018-03-20 RX ADMIN — CHLORHEXIDINE GLUCONATE 15 MILLILITER(S): 213 SOLUTION TOPICAL at 17:37

## 2018-03-20 RX ADMIN — SIMVASTATIN 20 MILLIGRAM(S): 20 TABLET, FILM COATED ORAL at 21:25

## 2018-03-20 RX ADMIN — FAMOTIDINE 20 MILLIGRAM(S): 10 INJECTION INTRAVENOUS at 16:18

## 2018-03-20 RX ADMIN — CEFEPIME 100 MILLIGRAM(S): 1 INJECTION, POWDER, FOR SOLUTION INTRAMUSCULAR; INTRAVENOUS at 17:36

## 2018-03-20 RX ADMIN — CEFEPIME 100 MILLIGRAM(S): 1 INJECTION, POWDER, FOR SOLUTION INTRAMUSCULAR; INTRAVENOUS at 05:42

## 2018-03-20 RX ADMIN — CHLORHEXIDINE GLUCONATE 15 MILLILITER(S): 213 SOLUTION TOPICAL at 05:42

## 2018-03-20 RX ADMIN — PROPOFOL 5.99 MICROGRAM(S)/KG/MIN: 10 INJECTION, EMULSION INTRAVENOUS at 10:38

## 2018-03-20 RX ADMIN — HEPARIN SODIUM 1900 UNIT(S)/HR: 5000 INJECTION INTRAVENOUS; SUBCUTANEOUS at 16:14

## 2018-03-20 RX ADMIN — Medication 20 MILLIGRAM(S): at 05:42

## 2018-03-20 NOTE — PROGRESS NOTE ADULT - SUBJECTIVE AND OBJECTIVE BOX
Subjective:  sedated on vent  family at bedside    MEDICATIONS  (STANDING):  cefepime  IVPB      cefepime  IVPB 1000 milliGRAM(s) IV Intermittent every 12 hours  chlorhexidine 0.12% Liquid 15 milliLiter(s) Swish and Spit two times a day  diltiazem    Tablet 60 milliGRAM(s) Oral every 4 hours  famotidine    Tablet 20 milliGRAM(s) Oral daily  heparin  Infusion.  Unit(s)/Hr (18 mL/Hr) IV Continuous <Continuous>  methylPREDNISolone sodium succinate Injectable 20 milliGRAM(s) IV Push every 12 hours  propofol Infusion 10 MICROgram(s)/kG/Min (5.988 mL/Hr) IV Continuous <Continuous>  simvastatin 20 milliGRAM(s) Oral at bedtime    MEDICATIONS  (PRN):  ALBUTerol    90 MICROgram(s) HFA Inhaler 2 Puff(s) Inhalation every 6 hours PRN on ventilator  docusate sodium 100 milliGRAM(s) Oral two times a day PRN Constipation  heparin  Injectable 8000 Unit(s) IV Push every 6 hours PRN For aPTT less than 40  heparin  Injectable 4000 Unit(s) IV Push every 6 hours PRN For aPTT between 40 - 57  LORazepam   Injectable 1 milliGRAM(s) IV Push every 4 hours PRN Anxiety  ondansetron Injectable 4 milliGRAM(s) IV Push every 6 hours PRN Nausea and/or Vomiting  oxyCODONE    5 mG/acetaminophen 325 mG 1 Tablet(s) Oral every 4 hours PRN Moderate Pain (4 - 6)      Allergies    Macrobid (Other)    Intolerances        REVIEW OF SYSTEMS: intubated        Vital Signs Last 24 Hrs  T(C): 36.9 (20 Mar 2018 08:00), Max: 37.3 (20 Mar 2018 00:00)  T(F): 98.4 (20 Mar 2018 08:00), Max: 99.2 (20 Mar 2018 00:00)  HR: 79 (20 Mar 2018 08:00) (70 - 116)  BP: 125/69 (20 Mar 2018 08:00) (104/58 - 172/81)  BP(mean): 83 (20 Mar 2018 08:00) (68 - 103)  RR: --  SpO2: 99% (20 Mar 2018 08:00) (88% - 100%)    PHYSICAL EXAMINATION:  SKIN: no rashes  HEAD: NC/AT  EYES: PERRLA, EOMI  EARS: TM's intact  NOSE: no abnormalities  NECK:  Supple. No lymphadenopathy. Jugular venous pressure not elevated. Carotids equal.   HEART:  S1S2 irreg  CHEST:  bilateral ronchi  ABDOMEN:  Soft and nontender.   EXTREMITIES:  no C/C/E  NEURO: sedated on vent       LABS:                        11.9   15.12 )-----------( 128      ( 20 Mar 2018 06:35 )             35.9     03-20    140  |  106  |  87<H>  ----------------------------<  215<H>  3.3<L>   |  25  |  2.42<H>    Ca    8.3<L>      20 Mar 2018 06:35  Phos  4.9     03-19  Mg     2.5     03-19      PTT - ( 20 Mar 2018 06:35 )  PTT:131.0 sec      RADIOLOGY & ADDITIONAL TESTS:

## 2018-03-20 NOTE — PROGRESS NOTE ADULT - SUBJECTIVE AND OBJECTIVE BOX
Patient is a 72y Female who had no new events. Remains intubated.     REVIEW OF SYSTEMS:    UTO, intubated    MEDICATIONS  (STANDING):  cefepime  IVPB      cefepime  IVPB 1000 milliGRAM(s) IV Intermittent every 12 hours  chlorhexidine 0.12% Liquid 15 milliLiter(s) Swish and Spit two times a day  diltiazem    Tablet 60 milliGRAM(s) Oral every 4 hours  famotidine    Tablet 20 milliGRAM(s) Oral daily  heparin  Infusion.  Unit(s)/Hr (18 mL/Hr) IV Continuous <Continuous>  predniSONE   Tablet 5 milliGRAM(s) Oral daily  propofol Infusion 10 MICROgram(s)/kG/Min (5.988 mL/Hr) IV Continuous <Continuous>  simvastatin 20 milliGRAM(s) Oral at bedtime    MEDICATIONS  (PRN):  ALBUTerol    90 MICROgram(s) HFA Inhaler 2 Puff(s) Inhalation every 6 hours PRN on ventilator  docusate sodium 100 milliGRAM(s) Oral two times a day PRN Constipation  heparin  Injectable 8000 Unit(s) IV Push every 6 hours PRN For aPTT less than 40  heparin  Injectable 4000 Unit(s) IV Push every 6 hours PRN For aPTT between 40 - 57  LORazepam   Injectable 1 milliGRAM(s) IV Push every 4 hours PRN Anxiety  ondansetron Injectable 4 milliGRAM(s) IV Push every 6 hours PRN Nausea and/or Vomiting  oxyCODONE    5 mG/acetaminophen 325 mG 1 Tablet(s) Oral every 4 hours PRN Moderate Pain (4 - 6)        T(C): , Max: 37.3 (03-20-18 @ 00:00)  T(F): , Max: 99.2 (03-20-18 @ 00:00)  HR: 86 (03-20-18 @ 10:00)  BP: 125/73 (03-20-18 @ 09:00)  BP(mean): 86 (03-20-18 @ 09:00)  RR: --  SpO2: 96% (03-20-18 @ 10:00)  Wt(kg): --    03-19 @ 07:01  -  03-20 @ 07:00  --------------------------------------------------------  IN: 400 mL / OUT: 950 mL / NET: -550 mL    03-20 @ 07:01  - 03-20 @ 10:50  --------------------------------------------------------  IN: 0 mL / OUT: 100 mL / NET: -100 mL          PHYSICAL EXAM:    Constitutional: frail  HEENT: PERRLA, EOMI,  MMM  Neck: No LAD, No JVD  Respiratory: good aeration, dist BS  Cardiovascular: S1 and S2, RRR  Gastrointestinal: BS+, soft, NT/ND  Extremities: ++ edema, anasraca  Neurological: int  : No Pilar L drain  Skin: No rashes  Access: Not applicable        LABS:                        11.9   15.12 )-----------( 128      ( 20 Mar 2018 06:35 )             35.9     20 Mar 2018 06:35    140    |  106    |  87     ----------------------------<  215    3.3     |  25     |  2.42   19 Mar 2018 05:08    140    |  105    |  84     ----------------------------<  212    3.5     |  24     |  3.01   18 Mar 2018 05:16    140    |  105    |  78     ----------------------------<  178    3.7     |  22     |  3.04   17 Mar 2018 11:37    143    |  110    |  64     ----------------------------<  204    3.8     |  20     |  2.85   17 Mar 2018 06:09    141    |  110    |  58     ----------------------------<  177    4.3     |  20     |  2.75     Ca    8.3        20 Mar 2018 06:35  Ca    8.3        19 Mar 2018 05:08  Ca    8.3        18 Mar 2018 05:16  Ca    8.2        17 Mar 2018 11:37  Ca    8.1        17 Mar 2018 06:09  Phos  4.9       19 Mar 2018 05:08  Phos  4.9       17 Mar 2018 06:09  Mg     2.5       19 Mar 2018 05:08  Mg     2.3       17 Mar 2018 06:09            Urine Studies:          RADIOLOGY & ADDITIONAL STUDIES:

## 2018-03-20 NOTE — PROGRESS NOTE ADULT - ASSESSMENT
cont vent support - wean as tolerated  off bumex drip  would decrease steroids  on cefepime for Ecoli bacteremia  CXR  improved  renal fxn improving  DVT prophylaxis

## 2018-03-20 NOTE — PROGRESS NOTE ADULT - SUBJECTIVE AND OBJECTIVE BOX
Patient is a 72y old  Female who presents with a chief complaint of came for pre op testing (15 Mar 2018 00:51)    HPI Pt transferred to ED from admitting d/t abnormal ECG demostrating AF with RVR. Office redocrds reviewed. This is new AF. States having on and off leg swelling but no orthopnea, angina, palp, PND.      3/15 - Not feeling better, had episode of sweating and SOB recently. Blood work revealed hydronephrosis, obstructive calculus, UTI, questionable pneumonia, low TSH, elevated WBC, elevated cr. Tele with AF and VR around 135 now.  CT reviewed, no signs of pleural effusions or lung edema in included lungs segments. ECHO reviewed too. Mild AS, MR, preserved LVEF.   Suggest starting IV cardizem drip titratable to HR < 100. Also start UFH IV for stroke ppx. Cont PO cardizem as well, this will assist in weaning her off IV cardizem ultimately. Cont statin too.   Obtain urology consult re: hydronephrosis. Also endocrinology re: low TSH.  Would defer stress MPI until her HR is more stable and normal.  Discussed with patient also re: CV. She would need 3 weeks strict OAC and therefore gigi also interfere should she need percutaneous hydronephrosis drainage, not to mention her future knee surgery as discussed yesterday. Should HR become an issue difficult to control with drugs, would then proceed to CV and deal with the rest later on as needed. For time being, and as long as she is hemodynamically stable, would cont rate control, AC and address first UTI and possible hyperthyroidism.  Case d/w hospitalist.  Will follow    3/16 - Pt on BiPAP, in no resp distress at moment. Tele AF with HR round 70-90. BP stable off pressors. Awake and alert. Had resp distress yesterday after PCN, possibly diastolic heart failure. Suggest continuing IV diuresis as needed, continue rate control with IV diltiazem, will start weaning her to PO once off BiPAP. Continue UFH, will transition to OAC once kidney fx is stable as well. Will continue to defer stress test and CV for time being.    3/17-  Hasn't responded to BIPAP and seemed obtunded today. Chest xray showed worsened heart failure. Critical care decided to intubate patient.   She remains in afib. On a cardizem drip. After sedation, blood pressure decreased to the high 90s.  overnight, her vital signs have been stable.   Received Lasix 40 mg IV at 5 am and has responded with urine output.     3/18-  Intubated yesterday.   Cardizem drip was discontinued and started on QID cardizem by OGT. She has tolerated it well with good heart rate control and adequate blood pressures.   Was started on a Bumex drip. Has diuresed over 2000 ml and there was been an increase in the serum creatinine.     3/19 - Examined today, on sedation and vent. CXR 3/17 and clinically without significant improvement despite aggressive IV diuresis and significant UOP, rather her Cr went up. I suspect her bilateral alveolar infiltrates and resp distress may not be cardiogenic at the moment ( ARDS in setting of sepsis?). Would suggest cont to hold diuretics and cont optimal vent support while addressing other acute clinical issues. Appreciated pulm and nephro input.  She remains in AF with appropriate rate control on CCB via OGT. Cont that as well.   Will follow up closely.     3/20 - Sedated, in no distress, FIO2 40% on vent and sats 99%, hemodynamically stable of pressors, rate controlled on CCB via OGT, on UFH, H/H stable, if needed can hold it.  CXR much improved yesterday off IV diuretics, keep holding that, Cr getting better too.   Abx per ID  Vent mgt per ICU/Pulm  Will follow up closely.     Allergies    Macrobid (Other)    Intolerances        MEDICATIONS  (STANDING):  ALBUTerol/ipratropium for Nebulization 3 milliLiter(s) Nebulizer every 6 hours  aspirin 325 milliGRAM(s) Oral daily  azithromycin  IVPB 500 milliGRAM(s) IV Intermittent every 24 hours  buMETAnide Infusion 1 mG/Hr (10 mL/Hr) IV Continuous <Continuous>  cefepime  IVPB      cefepime  IVPB 1000 milliGRAM(s) IV Intermittent every 12 hours  chlorhexidine 0.12% Liquid 15 milliLiter(s) Swish and Spit two times a day  diltiazem    Tablet 60 milliGRAM(s) Oral every 4 hours  heparin  Infusion.  Unit(s)/Hr (18 mL/Hr) IV Continuous <Continuous>  methylPREDNISolone sodium succinate Injectable 40 milliGRAM(s) IV Push every 8 hours  propofol Infusion 10 MICROgram(s)/kG/Min (5.988 mL/Hr) IV Continuous <Continuous>  simvastatin 20 milliGRAM(s) Oral at bedtime    MEDICATIONS  (PRN):  docusate sodium 100 milliGRAM(s) Oral two times a day PRN Constipation  heparin  Injectable 8000 Unit(s) IV Push every 6 hours PRN For aPTT less than 40  heparin  Injectable 4000 Unit(s) IV Push every 6 hours PRN For aPTT between 40 - 57  LORazepam   Injectable 1 milliGRAM(s) IV Push every 4 hours PRN Anxiety  ondansetron Injectable 4 milliGRAM(s) IV Push every 6 hours PRN Nausea and/or Vomiting  oxyCODONE    5 mG/acetaminophen 325 mG 1 Tablet(s) Oral every 4 hours PRN Moderate Pain (4 - 6)    REVIEW OF SYSTEMS:    RESPIRATORY: No cough, wheezing, hemoptysis; No shortness of breath  CARDIOVASCULAR: No chest pain or palpitations  All other review of systems is negative unless indicated above      PHYSICAL EXAM:  Daily     Daily Weight in k.5 (18 Mar 2018 05:00)  Vital Signs Last 24 Hrs  T(C): 36.4 (18 Mar 2018 08:51), Max: 36.9 (17 Mar 2018 21:22)  T(F): 97.5 (18 Mar 2018 08:51), Max: 98.4 (17 Mar 2018 21:22)  HR: 91 (18 Mar 2018 08:00) (86 - 106)  BP: 107/64 (18 Mar 2018 08:00) (85/49 - 137/110)  BP(mean): 73 (18 Mar 2018 08:00) (56 - 115)  RR: 29 (18 Mar 2018 08:00) (26 - 34)  SpO2: 99% (18 Mar 2018 08:00) (94% - 100%)    Constitutional: NAD, awake and alert, well-developed  HEENT: PERR, EOMI, Normal Hearing, MMM  Neck: Soft and supple, No LAD, No JVD  Respiratory: Breath sounds are clear bilaterally, No wheezing, rales or rhonchi  Cardiovascular: S1 and S2, regular rate and rhythm, no Murmurs, gallops or rubs  Gastrointestinal: Bowel Sounds present, soft, nontender, nondistended, no guarding, no rebound  Extremities: No peripheral edema  Vascular: 2+ peripheral pulses  Neurological: A/O x 3, no focal deficits  Musculoskeletal: 5/5 strength b/l upper and lower extremities  Skin: No rashes    LABS: All Labs Reviewed:                        11.6   15.79 )-----------( 160      ( 18 Mar 2018 05:16 )             35.9     03-18    140  |  105  |  78<H>  ----------------------------<  178<H>  3.7   |  22  |  3.04<H>    Ca    8.3<L>      18 Mar 2018 05:16  Phos  4.9     03-17  Mg     2.3     03-17      PTT - ( 18 Mar 2018 05:16 )  PTT:43.9 sec          TELEMETRY/EKG: rate controlled Afib

## 2018-03-20 NOTE — PROGRESS NOTE ADULT - SUBJECTIVE AND OBJECTIVE BOX
Events Overnight: on vent, creatinine better, weaned better yesterday     HPI:      Mrs. Clancy is a 72-year-old female who initially presented to the hospital for preoperative evaluation for knee surgery. She is found to be in atrial fibrillation and was sent to emergency room. f.  Patient wascomplaining of flank pain. A CT scan of the abdomen and pelvis was performed which showed hydronephrosis with a kidney stone. There was also evidence of a left lower lobe infiltrate.  Patient  had nephrostomy tube  was than a code sepsis in PACU.  Blood cultures were positive for Escherichia coli. She developed sepsis with hypotension and was resuscitated with over 3 L of fluid. She developed significant shortness of breath which was treated with BiPAP . she was subseqauently intubated. cxr showed pneumonia, vs. failure, vs. ARDS,  with diuresis developed acute renal failure, now on vent, cxr improved this am.  on heparin for afib  slight bloody drainage in nephrostomy tube         MEDICATIONS  (STANDING):  cefepime  IVPB      cefepime  IVPB 1000 milliGRAM(s) IV Intermittent every 12 hours  chlorhexidine 0.12% Liquid 15 milliLiter(s) Swish and Spit two times a day  diltiazem    Tablet 60 milliGRAM(s) Oral every 4 hours  famotidine    Tablet 20 milliGRAM(s) Oral daily  heparin  Infusion.  Unit(s)/Hr (18 mL/Hr) IV Continuous <Continuous>  potassium chloride   Powder 40 milliEquivalent(s) Oral once  predniSONE   Tablet 5 milliGRAM(s) Oral daily  propofol Infusion 10 MICROgram(s)/kG/Min (5.988 mL/Hr) IV Continuous <Continuous>  simvastatin 20 milliGRAM(s) Oral at bedtime    MEDICATIONS  (PRN):  ALBUTerol    90 MICROgram(s) HFA Inhaler 2 Puff(s) Inhalation every 6 hours PRN on ventilator  docusate sodium 100 milliGRAM(s) Oral two times a day PRN Constipation  heparin  Injectable 8000 Unit(s) IV Push every 6 hours PRN For aPTT less than 40  heparin  Injectable 4000 Unit(s) IV Push every 6 hours PRN For aPTT between 40 - 57  LORazepam   Injectable 1 milliGRAM(s) IV Push every 4 hours PRN Anxiety  ondansetron Injectable 4 milliGRAM(s) IV Push every 6 hours PRN Nausea and/or Vomiting  oxyCODONE    5 mG/acetaminophen 325 mG 1 Tablet(s) Oral every 4 hours PRN Moderate Pain (4 - 6)       ROS - cant obtain, patient is intubated       ICU Vital Signs Last 24 Hrs  T(C): 36.9 (20 Mar 2018 08:00), Max: 37.3 (20 Mar 2018 00:00)  T(F): 98.4 (20 Mar 2018 08:00), Max: 99.2 (20 Mar 2018 00:00)  HR: 86 (20 Mar 2018 10:00) (70 - 116)  BP: 125/73 (20 Mar 2018 09:00) (104/58 - 172/81)  BP(mean): 86 (20 Mar 2018 09:00) (68 - 103)  ABP: --  ABP(mean): --  RR: --  SpO2: 96% (20 Mar 2018 10:00) (88% - 100%)      Mode: AC/ CMV (Assist Control/ Continuous Mandatory Ventilation)  RR (machine): 12  TV (machine): 500  FiO2: 40  PEEP: 5  ITime: 1  MAP: 10  PIP: 25      I&O's Summary    19 Mar 2018 07:01  -  20 Mar 2018 07:00  --------------------------------------------------------  IN: 400 mL / OUT: 950 mL / NET: -550 mL    20 Mar 2018 07:01  -  20 Mar 2018 10:11  --------------------------------------------------------  IN: 0 mL / OUT: 100 mL / NET: -100 mL        Physical Exam:  Physical Exam:   · Constitutional	intubated comfortable	  · Constitutional Details	ill	  · ENMT	detailed exam	  · ENMT Comments	Orally intubated	  · Respiratory	detailed exam	  · Respiratory Details	diminished breath sounds, L; diminished breath sounds, R	  · Cardiovascular	detailed exam no murmur	  · Cardiovascular Details	irregular rate and rhythm	  · Gastrointestinal	Soft, non-tender, no hepatosplenomegaly, normal bowel sounds	  · Extremities	detailed exam	  · Extremities Details	pedal edema	  · Pedal Edema Severity	1+	  · Neurological	detailed exam	  · Neurological Details	sedated	  · Musculoskeletal	No joint pain, swelling or deformity; no limitation of movement	                               11.9   15.12 )-----------( 128      ( 20 Mar 2018 06:35 )             35.9       03-20    140  |  106  |  87<H>  ----------------------------<  215<H>  3.3<L>   |  25  |  2.42<H>    Ca    8.3<L>      20 Mar 2018 06:35  Phos  4.9     03-19  Mg     2.5     03-19      DVT Prophylaxis:  IV heparin Full code

## 2018-03-20 NOTE — PROGRESS NOTE ADULT - ASSESSMENT
Patient with new onset Atrial fibrillation with e. coli sepsis bacteremia.      developed acutre renal failure, ards.      afib on Hepairn      will give weaning trial today      continue cefepime  coli in blood, sputum culture no organisms      will give weaning trial      titrate down steroids change to prednisone

## 2018-03-20 NOTE — PROGRESS NOTE ADULT - ASSESSMENT
71 yo female with PMHX of HTN, OA, presenting with new onset Afib noted left hdyronephrosis with requriing acute PCN post insertion E coli bacteremia w sepsis with respiratory failure.      STACIE -   -Renal function stabilizing, elevated volume  -Keep net even for now, may need intermittent IVP diuresis to optimize edema/volume status  -Daily labs/lytes  -K repltion for goal near 4.0     Azotemia   - monitor values, steroids play a role     CKD - possible Cr baseline ~ 1.3    UTI/E coli sepsis    - IV abx per ID, PCN in place      d/c with Dr. Lovell

## 2018-03-21 LAB
ANION GAP SERPL CALC-SCNC: 6 MMOL/L — SIGNIFICANT CHANGE UP (ref 5–17)
APPEARANCE UR: CLEAR — SIGNIFICANT CHANGE UP
APTT BLD: 113.8 SEC — HIGH (ref 27.5–37.4)
BACTERIA # UR AUTO: (no result)
BILIRUB UR-MCNC: NEGATIVE — SIGNIFICANT CHANGE UP
BUN SERPL-MCNC: 69 MG/DL — HIGH (ref 7–23)
CALCIUM SERPL-MCNC: 8.2 MG/DL — LOW (ref 8.5–10.1)
CHLORIDE SERPL-SCNC: 112 MMOL/L — HIGH (ref 96–108)
CO2 SERPL-SCNC: 28 MMOL/L — SIGNIFICANT CHANGE UP (ref 22–31)
COLOR SPEC: YELLOW — SIGNIFICANT CHANGE UP
CREAT SERPL-MCNC: 1.71 MG/DL — HIGH (ref 0.5–1.3)
CULTURE RESULTS: SIGNIFICANT CHANGE UP
DIFF PNL FLD: (no result)
EPI CELLS # UR: SIGNIFICANT CHANGE UP
GLUCOSE SERPL-MCNC: 116 MG/DL — HIGH (ref 70–99)
GLUCOSE UR QL: NEGATIVE MG/DL — SIGNIFICANT CHANGE UP
HCT VFR BLD CALC: 30.7 % — LOW (ref 34.5–45)
HGB BLD-MCNC: 10.2 G/DL — LOW (ref 11.5–15.5)
KETONES UR-MCNC: NEGATIVE — SIGNIFICANT CHANGE UP
LEUKOCYTE ESTERASE UR-ACNC: (no result)
MCHC RBC-ENTMCNC: 28.6 PG — SIGNIFICANT CHANGE UP (ref 27–34)
MCHC RBC-ENTMCNC: 33.2 GM/DL — SIGNIFICANT CHANGE UP (ref 32–36)
MCV RBC AUTO: 86 FL — SIGNIFICANT CHANGE UP (ref 80–100)
NITRITE UR-MCNC: NEGATIVE — SIGNIFICANT CHANGE UP
NRBC # BLD: 0 /100 WBCS — SIGNIFICANT CHANGE UP (ref 0–0)
PH UR: 5 — SIGNIFICANT CHANGE UP (ref 5–8)
PLATELET # BLD AUTO: 174 K/UL — SIGNIFICANT CHANGE UP (ref 150–400)
POTASSIUM SERPL-MCNC: 3.3 MMOL/L — LOW (ref 3.5–5.3)
POTASSIUM SERPL-SCNC: 3.3 MMOL/L — LOW (ref 3.5–5.3)
PROT UR-MCNC: 30 MG/DL
RBC # BLD: 3.57 M/UL — LOW (ref 3.8–5.2)
RBC # FLD: 15.9 % — HIGH (ref 10.3–14.5)
RBC CASTS # UR COMP ASSIST: (no result) /HPF (ref 0–4)
SODIUM SERPL-SCNC: 146 MMOL/L — HIGH (ref 135–145)
SP GR SPEC: 1.01 — SIGNIFICANT CHANGE UP (ref 1.01–1.02)
SPECIMEN SOURCE: SIGNIFICANT CHANGE UP
UROBILINOGEN FLD QL: NEGATIVE MG/DL — SIGNIFICANT CHANGE UP
WBC # BLD: 25.71 K/UL — HIGH (ref 3.8–10.5)
WBC # FLD AUTO: 25.71 K/UL — HIGH (ref 3.8–10.5)
WBC UR QL: (no result)

## 2018-03-21 PROCEDURE — 74018 RADEX ABDOMEN 1 VIEW: CPT | Mod: 26

## 2018-03-21 PROCEDURE — 99233 SBSQ HOSP IP/OBS HIGH 50: CPT

## 2018-03-21 PROCEDURE — 71045 X-RAY EXAM CHEST 1 VIEW: CPT | Mod: 26

## 2018-03-21 RX ORDER — SODIUM CHLORIDE 9 MG/ML
1000 INJECTION, SOLUTION INTRAVENOUS
Qty: 0 | Refills: 0 | Status: DISCONTINUED | OUTPATIENT
Start: 2018-03-21 | End: 2018-03-22

## 2018-03-21 RX ORDER — SIMVASTATIN 20 MG/1
20 TABLET, FILM COATED ORAL AT BEDTIME
Qty: 0 | Refills: 0 | Status: DISCONTINUED | OUTPATIENT
Start: 2018-03-21 | End: 2018-03-30

## 2018-03-21 RX ORDER — MORPHINE SULFATE 50 MG/1
2 CAPSULE, EXTENDED RELEASE ORAL EVERY 4 HOURS
Qty: 0 | Refills: 0 | Status: DISCONTINUED | OUTPATIENT
Start: 2018-03-21 | End: 2018-03-25

## 2018-03-21 RX ORDER — POTASSIUM CHLORIDE 20 MEQ
40 PACKET (EA) ORAL ONCE
Qty: 0 | Refills: 0 | Status: COMPLETED | OUTPATIENT
Start: 2018-03-21 | End: 2018-03-21

## 2018-03-21 RX ORDER — DILTIAZEM HCL 120 MG
180 CAPSULE, EXT RELEASE 24 HR ORAL DAILY
Qty: 0 | Refills: 0 | Status: DISCONTINUED | OUTPATIENT
Start: 2018-03-21 | End: 2018-03-30

## 2018-03-21 RX ADMIN — CEFEPIME 100 MILLIGRAM(S): 1 INJECTION, POWDER, FOR SOLUTION INTRAMUSCULAR; INTRAVENOUS at 06:16

## 2018-03-21 RX ADMIN — Medication 40 MILLIEQUIVALENT(S): at 08:47

## 2018-03-21 RX ADMIN — Medication 5 MILLIGRAM(S): at 06:16

## 2018-03-21 RX ADMIN — MORPHINE SULFATE 2 MILLIGRAM(S): 50 CAPSULE, EXTENDED RELEASE ORAL at 22:03

## 2018-03-21 RX ADMIN — SIMVASTATIN 20 MILLIGRAM(S): 20 TABLET, FILM COATED ORAL at 22:03

## 2018-03-21 RX ADMIN — SODIUM CHLORIDE 75 MILLILITER(S): 9 INJECTION, SOLUTION INTRAVENOUS at 08:59

## 2018-03-21 RX ADMIN — Medication 180 MILLIGRAM(S): at 08:47

## 2018-03-21 RX ADMIN — CEFEPIME 100 MILLIGRAM(S): 1 INJECTION, POWDER, FOR SOLUTION INTRAMUSCULAR; INTRAVENOUS at 17:33

## 2018-03-21 RX ADMIN — FAMOTIDINE 20 MILLIGRAM(S): 10 INJECTION INTRAVENOUS at 11:03

## 2018-03-21 RX ADMIN — HEPARIN SODIUM 1900 UNIT(S)/HR: 5000 INJECTION INTRAVENOUS; SUBCUTANEOUS at 00:38

## 2018-03-21 NOTE — PHYSICAL THERAPY INITIAL EVALUATION ADULT - ADDITIONAL COMMENTS
Pt. reports residing alone in house w/ no steps w/ 5 NURA w/ HR. Pt. reports ambulating w/ RW at baseline.

## 2018-03-21 NOTE — PROGRESS NOTE ADULT - ASSESSMENT
extubated - cont O2  on cefepime for Ecoli bacteremia  creat improved  repeat cxr  remains in afib  DVT prophylaxis
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
## 2018-03-21 NOTE — PROGRESS NOTE ADULT - SUBJECTIVE AND OBJECTIVE BOX
Subjective:  extubated  awake and alert    MEDICATIONS  (STANDING):  cefepime  IVPB      cefepime  IVPB 1000 milliGRAM(s) IV Intermittent every 12 hours  diltiazem    milliGRAM(s) Oral daily  famotidine    Tablet 20 milliGRAM(s) Oral daily  lactated ringers. 1000 milliLiter(s) (75 mL/Hr) IV Continuous <Continuous>  simvastatin 20 milliGRAM(s) Oral at bedtime    MEDICATIONS  (PRN):  ALBUTerol    0.083% 2.5 milliGRAM(s) Nebulizer every 6 hours PRN Wheezing  docusate sodium 100 milliGRAM(s) Oral two times a day PRN Constipation  ondansetron Injectable 4 milliGRAM(s) IV Push every 6 hours PRN Nausea and/or Vomiting      Allergies    Macrobid (Other)    Intolerances        REVIEW OF SYSTEMS:    CONSTITUTIONAL:  As per HPI.  HEENT:  Eyes:  No diplopia or blurred vision. ENT:  No earache, sore throat or runny nose.  CARDIOVASCULAR:  No pressure, squeezing, tightness, heaviness or aching about the chest, neck, axilla or epigastrium.  RESPIRATORY:  No cough, shortness of breath, PND or orthopnea.  GASTROINTESTINAL:  No nausea, vomiting or diarrhea.  GENITOURINARY:  No dysuria, frequency or urgency.  MUSCULOSKELETAL:  no joint pain, deformity, tenderness  EXTREMITIES: no clubbing cyanosis,edema  SKIN:  No change in skin, hair or nails.  NEUROLOGIC:  No paresthesias, fasciculations, seizures or weakness.  PSYCHIATRIC:  No disorder of thought or mood.  ENDOCRINE:  No heat or cold intolerance, polyuria or polydipsia.  HEMATOLOGICAL:  No easy bruising or bleedings:    Vital Signs Last 24 Hrs  T(C): 36.6 (21 Mar 2018 08:39), Max: 37.1 (21 Mar 2018 05:00)  T(F): 97.8 (21 Mar 2018 08:39), Max: 98.7 (21 Mar 2018 05:00)  HR: 106 (21 Mar 2018 08:00) (78 - 115)  BP: 148/72 (21 Mar 2018 08:00) (125/73 - 158/76)  BP(mean): 87 (21 Mar 2018 08:00) (82 - 102)  RR: 26 (21 Mar 2018 08:00) (15 - 32)  SpO2: 97% (21 Mar 2018 08:00) (89% - 99%)    PHYSICAL EXAMINATION:  SKIN: no rashes  HEAD: NC/AT  EYES: PERRLA, EOMI  EARS: TM's intact  NOSE: no abnormalities  NECK:  Supple. No lymphadenopathy. Jugular venous pressure not elevated. Carotids equal.   HEART:   S1S2 irreg  CHEST:  bilat ronchi  ABDOMEN:  Soft and nontender.   EXTREMITIES:  no C/C/E  NEURO: AAO x 3, no focal deficts       LABS:                        10.2   25.71 )-----------( 174      ( 21 Mar 2018 05:59 )             30.7     03-21    146<H>  |  112<H>  |  69<H>  ----------------------------<  116<H>  3.3<L>   |  28  |  1.71<H>    Ca    8.2<L>      21 Mar 2018 05:59      PTT - ( 21 Mar 2018 05:59 )  PTT:113.8 sec      RADIOLOGY & ADDITIONAL TESTS:

## 2018-03-21 NOTE — PHYSICAL THERAPY INITIAL EVALUATION ADULT - PERTINENT HX OF CURRENT PROBLEM, REHAB EVAL
Pt. presents to  w/ new onset of Afib. Pt. went for pre-op testing for knee replacement surgery when pt. noted to have Afib. Pt. also c/o flank pain. CT scan abd/pelvis: hydronephrosis w/ kidney stone. S/p nephrostomy 3/15/18-code sepsis was called for desaturation in 80s. Blood cultures (+) for E. coli.

## 2018-03-21 NOTE — PROGRESS NOTE ADULT - SUBJECTIVE AND OBJECTIVE BOX
Patient is a 72y old  Female who presents with a chief complaint of came for pre op testing (15 Mar 2018 00:51)    HPI Pt transferred to ED from admitting d/t abnormal ECG demostrating AF with RVR. Office redocrds reviewed. This is new AF. States having on and off leg swelling but no orthopnea, angina, palp, PND.      3/15 - Not feeling better, had episode of sweating and SOB recently. Blood work revealed hydronephrosis, obstructive calculus, UTI, questionable pneumonia, low TSH, elevated WBC, elevated cr. Tele with AF and VR around 135 now.  CT reviewed, no signs of pleural effusions or lung edema in included lungs segments. ECHO reviewed too. Mild AS, MR, preserved LVEF.   Suggest starting IV cardizem drip titratable to HR < 100. Also start UFH IV for stroke ppx. Cont PO cardizem as well, this will assist in weaning her off IV cardizem ultimately. Cont statin too.   Obtain urology consult re: hydronephrosis. Also endocrinology re: low TSH.  Would defer stress MPI until her HR is more stable and normal.  Discussed with patient also re: CV. She would need 3 weeks strict OAC and therefore gigi also interfere should she need percutaneous hydronephrosis drainage, not to mention her future knee surgery as discussed yesterday. Should HR become an issue difficult to control with drugs, would then proceed to CV and deal with the rest later on as needed. For time being, and as long as she is hemodynamically stable, would cont rate control, AC and address first UTI and possible hyperthyroidism.  Case d/w hospitalist.  Will follow    3/16 - Pt on BiPAP, in no resp distress at moment. Tele AF with HR round 70-90. BP stable off pressors. Awake and alert. Had resp distress yesterday after PCN, possibly diastolic heart failure. Suggest continuing IV diuresis as needed, continue rate control with IV diltiazem, will start weaning her to PO once off BiPAP. Continue UFH, will transition to OAC once kidney fx is stable as well. Will continue to defer stress test and CV for time being.    3/17-  Hasn't responded to BIPAP and seemed obtunded today. Chest xray showed worsened heart failure. Critical care decided to intubate patient.   She remains in afib. On a cardizem drip. After sedation, blood pressure decreased to the high 90s.  overnight, her vital signs have been stable.   Received Lasix 40 mg IV at 5 am and has responded with urine output.     3/18-  Intubated yesterday.   Cardizem drip was discontinued and started on QID cardizem by OGT. She has tolerated it well with good heart rate control and adequate blood pressures.   Was started on a Bumex drip. Has diuresed over 2000 ml and there was been an increase in the serum creatinine.     3/19 - Examined today, on sedation and vent. CXR 3/17 and clinically without significant improvement despite aggressive IV diuresis and significant UOP, rather her Cr went up. I suspect her bilateral alveolar infiltrates and resp distress may not be cardiogenic at the moment ( ARDS in setting of sepsis?). Would suggest cont to hold diuretics and cont optimal vent support while addressing other acute clinical issues. Appreciated pulm and nephro input.  She remains in AF with appropriate rate control on CCB via OGT. Cont that as well.   Will follow up closely.     3/20 - Sedated, in no distress, FIO2 40% on vent and sats 99%, hemodynamically stable of pressors, rate controlled on CCB via OGT, on UFH, H/H stable, if needed can hold it.  CXR much improved yesterday off IV diuretics, keep holding that, Cr getting better too.   Abx per ID  Vent mgt per ICU/Pulm  Will follow up closely.     3/21 - Doing better on aerosol mask, good sats, thirsty. Hemodynamically stable off pressors, HR trending up in AF, would switch her to ER Cardizem PO and titrate accordingly or would resume IV drip if unable to tolerate PO route.  Can hold off heparin for the moment d/t bleeding issues, would resume it with OAC once cleared from this. Will defer stress test for later when she is clinically stable. Can use IV furosemide as needed.   Will follow up closely.   Case d/w ICU staff      Allergies    Macrobid (Other)    Intolerances        MEDICATIONS  (STANDING):  ALBUTerol/ipratropium for Nebulization 3 milliLiter(s) Nebulizer every 6 hours  aspirin 325 milliGRAM(s) Oral daily  azithromycin  IVPB 500 milliGRAM(s) IV Intermittent every 24 hours  buMETAnide Infusion 1 mG/Hr (10 mL/Hr) IV Continuous <Continuous>  cefepime  IVPB      cefepime  IVPB 1000 milliGRAM(s) IV Intermittent every 12 hours  chlorhexidine 0.12% Liquid 15 milliLiter(s) Swish and Spit two times a day  diltiazem    Tablet 60 milliGRAM(s) Oral every 4 hours  heparin  Infusion.  Unit(s)/Hr (18 mL/Hr) IV Continuous <Continuous>  methylPREDNISolone sodium succinate Injectable 40 milliGRAM(s) IV Push every 8 hours  propofol Infusion 10 MICROgram(s)/kG/Min (5.988 mL/Hr) IV Continuous <Continuous>  simvastatin 20 milliGRAM(s) Oral at bedtime    MEDICATIONS  (PRN):  docusate sodium 100 milliGRAM(s) Oral two times a day PRN Constipation  heparin  Injectable 8000 Unit(s) IV Push every 6 hours PRN For aPTT less than 40  heparin  Injectable 4000 Unit(s) IV Push every 6 hours PRN For aPTT between 40 - 57  LORazepam   Injectable 1 milliGRAM(s) IV Push every 4 hours PRN Anxiety  ondansetron Injectable 4 milliGRAM(s) IV Push every 6 hours PRN Nausea and/or Vomiting  oxyCODONE    5 mG/acetaminophen 325 mG 1 Tablet(s) Oral every 4 hours PRN Moderate Pain (4 - 6)    REVIEW OF SYSTEMS:    RESPIRATORY: No cough, wheezing, hemoptysis; No shortness of breath  CARDIOVASCULAR: No chest pain or palpitations  All other review of systems is negative unless indicated above      PHYSICAL EXAM:  Daily     Daily Weight in k.5 (18 Mar 2018 05:00)  Vital Signs Last 24 Hrs  T(C): 36.4 (18 Mar 2018 08:51), Max: 36.9 (17 Mar 2018 21:22)  T(F): 97.5 (18 Mar 2018 08:51), Max: 98.4 (17 Mar 2018 21:22)  HR: 91 (18 Mar 2018 08:00) (86 - 106)  BP: 107/64 (18 Mar 2018 08:00) (85/49 - 137/110)  BP(mean): 73 (18 Mar 2018 08:00) (56 - 115)  RR: 29 (18 Mar 2018 08:00) (26 - 34)  SpO2: 99% (18 Mar 2018 08:00) (94% - 100%)    Constitutional: NAD, awake and alert, well-developed  HEENT: PERR, EOMI, Normal Hearing, MMM  Neck: Soft and supple, No LAD, No JVD  Respiratory: Breath sounds are clear bilaterally, No wheezing, rales or rhonchi  Cardiovascular: S1 and S2, regular rate and rhythm, no Murmurs, gallops or rubs  Gastrointestinal: Bowel Sounds present, soft, nontender, nondistended, no guarding, no rebound  Extremities: No peripheral edema  Vascular: 2+ peripheral pulses  Neurological: A/O x 3, no focal deficits  Musculoskeletal: 5/5 strength b/l upper and lower extremities  Skin: No rashes    LABS: All Labs Reviewed:                        11.6   15.79 )-----------( 160      ( 18 Mar 2018 05:16 )             35.9     03-18    140  |  105  |  78<H>  ----------------------------<  178<H>  3.7   |  22  |  3.04<H>    Ca    8.3<L>      18 Mar 2018 05:16  Phos  4.9     03-17  Mg     2.3     03-17      PTT - ( 18 Mar 2018 05:16 )  PTT:43.9 sec          TELEMETRY/EKG: rate controlled Afib

## 2018-03-21 NOTE — PHYSICAL THERAPY INITIAL EVALUATION ADULT - CRITERIA FOR SKILLED THERAPEUTIC INTERVENTIONS
anticipated discharge recommendation/impairments found/functional limitations in following categories

## 2018-03-21 NOTE — PROGRESS NOTE ADULT - SUBJECTIVE AND OBJECTIVE BOX
Events Overnight: extubated yesterday, some hematuria,  afebrile, but inc wbc today    HPI:      Mrs. Clancy is a 72-year-old female who initially presented to the hospital for preoperative evaluation for knee surgery. She is found to be in atrial fibrillation and was sent to emergency room. f.  Patient wascomplaining of flank pain. A CT scan of the abdomen and pelvis was performed which showed hydronephrosis with a kidney stone. There was also evidence of a left lower lobe infiltrate.  Patient  had nephrostomy tube  was than a code sepsis in PACU.  Blood cultures were positive for Escherichia coli. She developed sepsis with hypotension and was resuscitated with over 3 L of fluid. She developed significant shortness of breath which was treated with BiPAP . she was subseqauently intubated. cxr showed pneumonia, vs. failure, vs. ARDS,  with diuresis developed acute renal failure, creatinine improving,,  slight bloody drainage in nephrostomy tube       MEDICATIONS  (STANDING):  cefepime  IVPB      cefepime  IVPB 1000 milliGRAM(s) IV Intermittent every 12 hours  diltiazem    milliGRAM(s) Oral daily  famotidine    Tablet 20 milliGRAM(s) Oral daily  lactated ringers. 1000 milliLiter(s) (75 mL/Hr) IV Continuous <Continuous>  potassium chloride    Tablet ER 40 milliEquivalent(s) Oral once  simvastatin 20 milliGRAM(s) Oral at bedtime  simvastatin 20 milliGRAM(s) Oral at bedtime    MEDICATIONS  (PRN):  ALBUTerol    0.083% 2.5 milliGRAM(s) Nebulizer every 6 hours PRN Wheezing  docusate sodium 100 milliGRAM(s) Oral two times a day PRN Constipation  ondansetron Injectable 4 milliGRAM(s) IV Push every 6 hours PRN Nausea and/or Vomiting    ROS - weakness, no diarrhea        ICU Vital Signs Last 24 Hrs  T(C): 37.1 (21 Mar 2018 05:00), Max: 37.1 (21 Mar 2018 05:00)  T(F): 98.7 (21 Mar 2018 05:00), Max: 98.7 (21 Mar 2018 05:00)  HR: 106 (21 Mar 2018 08:00) (78 - 115)  BP: 148/72 (21 Mar 2018 08:00) (125/73 - 158/76)  BP(mean): 87 (21 Mar 2018 08:00) (82 - 102)  ABP: --  ABP(mean): --  RR: 26 (21 Mar 2018 08:00) (15 - 32)  SpO2: 97% (21 Mar 2018 08:00) (89% - 99%)      I&O's Summary    20 Mar 2018 07:01  -  21 Mar 2018 07:00  --------------------------------------------------------  IN: 640 mL / OUT: 1065 mL / NET: -425 mL                            10.2   25.71 )-----------( 174      ( 21 Mar 2018 05:59 )             30.7       03-21    146<H>  |  112<H>  |  69<H>  ----------------------------<  116<H>  3.3<L>   |  28  |  1.71<H>    Ca    8.2<L>      21 Mar 2018 05:59                      DVT Prophylaxis:                                                                 Advanced Directives:

## 2018-03-21 NOTE — PROGRESS NOTE ADULT - ASSESSMENT
Patient with septic shock ards new onset afib with obstructing kidney stone  breathing improved now extubated, titrate off steroids, check f/u cxr  hypernatremia, will give slight hydration start PO  creatnine improving  continue cefepime, no fever, concer over inc wbc, will follow  hold IV heparin considering persistent bloody drainagei in nephrostomy tube

## 2018-03-21 NOTE — PROGRESS NOTE ADULT - SUBJECTIVE AND OBJECTIVE BOX
Patient is a 72y old  Female who presents with a chief complaint of came for pre op testing (15 Mar 2018 00:51)    Date of service: 18 @ 11:35    Events noted: extubated  Comfortable  Weak looking  SOB is improved    ROS: no fever or chills; denies dizziness, no abdominal pain, no diarrhea or constipation; no dysuria, no urinary frequency, no legs pain, no rashes    MEDICATIONS  (STANDING):  cefepime  IVPB      cefepime  IVPB 1000 milliGRAM(s) IV Intermittent every 12 hours  diltiazem    milliGRAM(s) Oral daily  famotidine    Tablet 20 milliGRAM(s) Oral daily  lactated ringers. 1000 milliLiter(s) (75 mL/Hr) IV Continuous <Continuous>  simvastatin 20 milliGRAM(s) Oral at bedtime    MEDICATIONS  (PRN):  ALBUTerol    0.083% 2.5 milliGRAM(s) Nebulizer every 6 hours PRN Wheezing  docusate sodium 100 milliGRAM(s) Oral two times a day PRN Constipation  ondansetron Injectable 4 milliGRAM(s) IV Push every 6 hours PRN Nausea and/or Vomiting      Vital Signs Last 24 Hrs  T(C): 36.6 (21 Mar 2018 08:39), Max: 37.1 (21 Mar 2018 05:00)  T(F): 97.8 (21 Mar 2018 08:39), Max: 98.7 (21 Mar 2018 05:00)  HR: 110 (21 Mar 2018 11:00) (85 - 115)  BP: 136/78 (21 Mar 2018 11:00) (132/76 - 158/76)  BP(mean): 92 (21 Mar 2018 11:00) (82 - 102)  RR: 18 (21 Mar 2018 11:00) (15 - 32)  SpO2: 98% (21 Mar 2018 11:00) (89% - 99%)    Physical Exam:      Constitutional: frail looking  HEENT: NC/AT, EOMI, PERRLA  Neck: supple  Back: no tenderness  Respiratory: few b/l rhonchi; air entry is improved  Cardiovascular: S1S2 irregular, no murmurs  Abdomen: soft, not tender, not distended, positive BS  Genitourinary: no LN palpable; left nephrostomy tube  Rectal: deferred  Musculoskeletal: no muscle tenderness, no joint swelling or tenderness  Extremities: no pedal edema  Neurological: confused, moving all extremities  Skin: no rashes    Labs:                        10.2    )-----------( 174      ( 21 Mar 2018 05:59 )             30.7     03-21    146<H>  |  112<H>  |  69<H>  ----------------------------<  116<H>  3.3<L>   |  28  |  1.71<H>    Ca    8.2<L>      21 Mar 2018 05:59                                   11.7   25.13 )-----------( 168      ( 17 Mar 2018 06:09 )             35.7     03-17    141  |  110<H>  |  58<H>  ----------------------------<  177<H>  4.3   |  20<L>  |  2.75<H>    Ca    8.1<L>      17 Mar 2018 06:09  Phos  4.9     -  Mg     2.3     -17               TPro  6.9  /  Alb  2.7<L>  /  TBili  0.8  /  DBili  x   /  AST  24  /  ALT  20  /  AlkPhos  103  03-14     LIVER FUNCTIONS - ( 14 Mar 2018 16:30 )  Alb: 2.7 g/dL / Pro: 6.9 gm/dL / ALK PHOS: 103 U/L / ALT: 20 U/L / AST: 24 U/L / GGT: x           Urinalysis Basic - ( 14 Mar 2018 15:06 )    Color: Yellow / Appearance: very cloudy / S.020 / pH: x  Gluc: x / Ketone: Negative  / Bili: Small / Urobili: 4 mg/dL   Blood: x / Protein: 100 mg/dL / Nitrite: Positive   Leuk Esterase: Moderate / RBC: 6-10 /HPF / WBC >50   Sq Epi: x / Non Sq Epi: Moderate / Bacteria: Many      Culture - Sputum (collected 18 Mar 2018 11:00)  Source: .Sputum Sputum  Gram Stain (18 Mar 2018 22:22):    Few polymorphonuclear leukocytes per low power field    No Squamous epithelial cells per low power field    No organisms seen per oil power field    Culture - Blood (collected 15 Mar 2018 17:37)  Source: .Blood None  Preliminary Report (17 Mar 2018 01:02):    No growth to date.    Culture - Fungal, Other (collected 15 Mar 2018 16:05)  Source: .Other None-urine  Preliminary Report (19 Mar 2018 10:41):    Testing in progress    Culture - Urine (collected 15 Mar 2018 16:05)  Source: .Urine None  Final Report (16 Mar 2018 22:14):    <10,000 CFU/ml    Normal Urogenital radha present    Culture - Blood (collected 14 Mar 2018 19:41)  Source: .Blood None  Gram Stain (15 Mar 2018 20:51):    Growth in aerobic bottle: Gram Negative Rods    Growth in anaerobic bottle: Gram Negative Rods  Final Report (17 Mar 2018 23:26):    Growth in aerobic and anaerobic bottles: Escherichia coli    "Due to technical problems, Proteus sp. will Not be reported as part of    the BCID panel until further notice"  Organism: Blood Culture PCR  Escherichia coli (17 Mar 2018 23:26)  Organism: Escherichia coli (17 Mar 2018 23:26)      -  Amikacin: S <=8      -  Ampicillin: S <=2      -  Ampicillin/Sulbactam: S <=4/2      -  Aztreonam: S <=4      -  Cefazolin: S <=2      -  Cefepime: S <=2      -  Cefoxitin: S <=4      -  Ceftazidime: S <=1      -  Ceftriaxone: S <=1      -  Ciprofloxacin: S <=0.5      -  Ertapenem: S <=0.5      -  Gentamicin: S <=1      -  Imipenem: S <=1      -  Levofloxacin: S <=1      -  Meropenem: S <=1      -  Piperacillin/Tazobactam: S <=8      -  Tobramycin: S <=2      -  Trimethoprim/Sulfamethoxazole: S <=0.5/9.5      Method Type: NOEL  Organism: Blood Culture PCR (17 Mar 2018 23:26)      -  Escherichia coli: Detec      Method Type: PCR    Culture - Blood (collected 14 Mar 2018 19:40)  Source: .Blood None  Gram Stain (15 Mar 2018 20:53):    Growth in anaerobic bottle: Gram Negative Rods  Final Report (17 Mar 2018 23:24):    Growth in anaerobic bottle: Escherichia coli    See previous culture [id number]    Culture - Sputum . (18 @ 11:00)    Gram Stain:   Few polymorphonuclear leukocytes per low power field  No Squamous epithelial cells per low power field  No organisms seen per oil power field    Specimen Source: .Sputum Sputum    Culture Results:   No growth at 48 hours          Radiology:    < from: CT Abdomen and Pelvis No Cont (18 @ 19:12) >  mild LEFT hydronephrosis secondary to an obstructing 6 mm   calculus at the LEFT ureteropelvic junction. There is no RIGHT   nephrolithiasis or hydronephrosis. Small adrenal adenomas.   Cholelithiasis. 3.1 cm duodenal diverticulum. Moderate sigmoid   diverticulosis. Small alveolar infiltrate in the LEFT lower lobe.     < end of copied text >    < from: Xray Chest 1 View AP/PA. (18 @ 17:39) >   No active pulmonary disease.    < end of copied text >    < from: Xray Chest 1 View AP/PA. (18 @ 09:39) >  Pulmonary vascular congestion with small bilateral pleural effusions with   questionable superimposed left upper lobe pneumonia versus compared to   prior exam    < end of copied text >      Advanced directives addressed: full resuscitation

## 2018-03-21 NOTE — PROGRESS NOTE ADULT - SUBJECTIVE AND OBJECTIVE BOX
Patient is a 72y Female who had no new events.    extubated yest   arousable   c.o thirst     REVIEW OF SYSTEMS:    limiteddue to mental status     MEDICATIONS  (STANDING):  cefepime  IVPB      cefepime  IVPB 1000 milliGRAM(s) IV Intermittent every 12 hours  diltiazem    milliGRAM(s) Oral daily  famotidine    Tablet 20 milliGRAM(s) Oral daily  lactated ringers. 1000 milliLiter(s) (75 mL/Hr) IV Continuous <Continuous>  simvastatin 20 milliGRAM(s) Oral at bedtime      Vital Signs Last 24 Hrs  T(C): 36.6 (21 Mar 2018 08:39), Max: 37.1 (21 Mar 2018 05:00)  T(F): 97.8 (21 Mar 2018 08:39), Max: 98.7 (21 Mar 2018 05:00)  HR: 106 (21 Mar 2018 08:00) (85 - 115)  BP: 148/72 (21 Mar 2018 08:00) (132/76 - 158/76)  BP(mean): 87 (21 Mar 2018 08:00) (82 - 102)  RR: 26 (21 Mar 2018 08:00) (15 - 32)  SpO2: 97% (21 Mar 2018 08:00) (89% - 99%)        PHYSICAL EXAM:    Constitutional: frail  HEENT: PERRLA, EOMI,  MMM  Neck: No LAD, No JVD  Respiratory: good aeration, dist BS  Cardiovascular: S1 and S2, RRR  Gastrointestinal: BS+, soft, NT/ND  Extremities: ++ edema,   Neurological: lethargic   : No Quezada, L drain  Skin: No rashes  Access: Not applicable        LABS:                                       10.2   25.71 )-----------( 174      ( 21 Mar 2018 05:59 )             30.7                         11.9   15.12 )-----------( 128      ( 20 Mar 2018 06:35 )             35.9     146    |  112    |  69     ----------------------------<  116       21 Mar 2018 05:59  3.3     |  28     |  1.71     140    |  106    |  87     ----------------------------<  215       20 Mar 2018 06:35  3.3     |  25     |  2.42     140    |  105    |  84     ----------------------------<  212       19 Mar 2018 05:08  3.5     |  24     |  3.01     Ca    8.2        21 Mar 2018 05:59  Ca    8.3        20 Mar 2018 06:35    Phos  4.9       19 Mar 2018 05:08    Mg     2.5       19 Mar 2018 05:08      Urine Studies:          RADIOLOGY & ADDITIONAL STUDIES:                EXAM:  XR CHEST PORTABLE ROUTINE 1V                            PROCEDURE DATE:  03/19/2018          INTERPRETATION:  Clinical information: Edema, respiratory failure    Portable AP chest radiograph from 0836 hours:    COMPARISON:  March 17, 2018    FINDINGS:  Endotracheal tube with tip in satisfactory position above the   mahsa and enteric tube entering the stomach with tip not visualized.    Mild pulmonary interstitial edema is markedly improved as compared with   the prior study. There is nopneumothorax. More confluent right   infrahilar opacity is noted.    IMPRESSION:   Marked improvement in pulmonary edema.  More confluent opacity in the right infrahilar region could represent   superimposed pneumonia. Continued radiographic follow-up is recommended.

## 2018-03-21 NOTE — PROGRESS NOTE ADULT - ASSESSMENT
71 yo female with PMHX of HTN, OA, presenting with new onset Afib noted left hdyronephrosis with requriing acute PCN post insertion E coli bacteremia w sepsis with respiratory failure.      STACIE - sec to ATN/sepsis   - Renal function stabilizing,  - K - replete for goal near 4.0     Azotemia   - monitor values, steroids play a role    - monitor Cr trend     UTI/E coli sepsis    - IV abx per ID, PCN in place    d/w Dr. Lovell - would encourage po intake and limit IVF as pt may have element of hypervolemia as well  as pt is NPO now    would repeat cxr  and reasses volume status closely

## 2018-03-22 LAB
ANION GAP SERPL CALC-SCNC: 6 MMOL/L — SIGNIFICANT CHANGE UP (ref 5–17)
BUN SERPL-MCNC: 42 MG/DL — HIGH (ref 7–23)
CALCIUM SERPL-MCNC: 8.4 MG/DL — LOW (ref 8.5–10.1)
CHLORIDE SERPL-SCNC: 113 MMOL/L — HIGH (ref 96–108)
CO2 SERPL-SCNC: 29 MMOL/L — SIGNIFICANT CHANGE UP (ref 22–31)
CREAT SERPL-MCNC: 1.11 MG/DL — SIGNIFICANT CHANGE UP (ref 0.5–1.3)
GLUCOSE SERPL-MCNC: 110 MG/DL — HIGH (ref 70–99)
HCT VFR BLD CALC: 31.4 % — LOW (ref 34.5–45)
HGB BLD-MCNC: 10 G/DL — LOW (ref 11.5–15.5)
MCHC RBC-ENTMCNC: 27.9 PG — SIGNIFICANT CHANGE UP (ref 27–34)
MCHC RBC-ENTMCNC: 31.8 GM/DL — LOW (ref 32–36)
MCV RBC AUTO: 87.7 FL — SIGNIFICANT CHANGE UP (ref 80–100)
NRBC # BLD: 0 /100 WBCS — SIGNIFICANT CHANGE UP (ref 0–0)
PLATELET # BLD AUTO: 154 K/UL — SIGNIFICANT CHANGE UP (ref 150–400)
POTASSIUM SERPL-MCNC: 3.7 MMOL/L — SIGNIFICANT CHANGE UP (ref 3.5–5.3)
POTASSIUM SERPL-SCNC: 3.7 MMOL/L — SIGNIFICANT CHANGE UP (ref 3.5–5.3)
RBC # BLD: 3.58 M/UL — LOW (ref 3.8–5.2)
RBC # FLD: 16 % — HIGH (ref 10.3–14.5)
SODIUM SERPL-SCNC: 148 MMOL/L — HIGH (ref 135–145)
WBC # BLD: 23.32 K/UL — HIGH (ref 3.8–10.5)
WBC # FLD AUTO: 23.32 K/UL — HIGH (ref 3.8–10.5)

## 2018-03-22 PROCEDURE — 99233 SBSQ HOSP IP/OBS HIGH 50: CPT

## 2018-03-22 RX ORDER — DILTIAZEM HCL 120 MG
120 CAPSULE, EXT RELEASE 24 HR ORAL AT BEDTIME
Qty: 0 | Refills: 0 | Status: DISCONTINUED | OUTPATIENT
Start: 2018-03-22 | End: 2018-03-25

## 2018-03-22 RX ORDER — SODIUM CHLORIDE 9 MG/ML
1000 INJECTION, SOLUTION INTRAVENOUS
Qty: 0 | Refills: 0 | Status: DISCONTINUED | OUTPATIENT
Start: 2018-03-22 | End: 2018-03-25

## 2018-03-22 RX ADMIN — Medication 180 MILLIGRAM(S): at 05:20

## 2018-03-22 RX ADMIN — SIMVASTATIN 20 MILLIGRAM(S): 20 TABLET, FILM COATED ORAL at 22:04

## 2018-03-22 RX ADMIN — Medication 120 MILLIGRAM(S): at 22:03

## 2018-03-22 RX ADMIN — OXYCODONE AND ACETAMINOPHEN 1 TABLET(S): 5; 325 TABLET ORAL at 11:42

## 2018-03-22 RX ADMIN — CEFEPIME 100 MILLIGRAM(S): 1 INJECTION, POWDER, FOR SOLUTION INTRAMUSCULAR; INTRAVENOUS at 05:20

## 2018-03-22 NOTE — PROGRESS NOTE ADULT - ASSESSMENT
71 yo female with PMHX of HTN, OA, presenting with new onset Afib noted left hdyronephrosis with requriing acute PCN post insertion E coli bacteremia w sepsis with respiratory failure.      STACIE - sec to ATN/sepsis   - Renal function stable ,   - K - replete for goal near 4.0     Azotemia - improving    - monitor values, steroids played a role    UTI/E coli sepsis    - IV abx per ID, PCN in place   - recurrent Leukocytosis - ID followning - r/o pna based on cxr    Hypernatremia sec to intravascular depltion    cxr noted - r/o  Pna doubt CHF at this time    - gentle IVF - agree with D5W per CCU team  - keep on limited time and reasses volume status     Thank you for the courtesy of this consult. We will follow this patient with you.   Management is subject to change if new information becomes available or patient condition changes.

## 2018-03-22 NOTE — PROGRESS NOTE ADULT - SUBJECTIVE AND OBJECTIVE BOX
HPI:    Mrs. Clancy is a 72-year-old female who initially presented to the hospital for preoperative evaluation for knee surgery. She is found to be in atrial fibrillation and was sent to emergency room. f.  Patient was complaining of flank pain. A CT scan of the abdomen and pelvis was performed which showed hydronephrosis with a kidney stone. There was also evidence of a left lower lobe infiltrate.  Patient  had nephrostomy tube  was than a code sepsis in PACU.  Blood cultures were positive for Escherichia coli. She developed sepsis with hypotension and was resuscitated with over 3 L of fluid. She developed significant shortness of breath which was treated with BiPAP . she was subseqauently intubated. cxr showed pneumonia, vs. failure, vs. ARDS,   acute renal failure, creatinine improving,, now close to baseline  IV heparin held secondary hematuria       MEDICATIONS  (STANDING):  cefepime  IVPB      cefepime  IVPB 1000 milliGRAM(s) IV Intermittent every 12 hours  diltiazem    milliGRAM(s) Oral daily  famotidine    Tablet 20 milliGRAM(s) Oral daily  lactated ringers. 1000 milliLiter(s) (75 mL/Hr) IV Continuous <Continuous>  simvastatin 20 milliGRAM(s) Oral at bedtime    MEDICATIONS  (PRN):  ALBUTerol    0.083% 2.5 milliGRAM(s) Nebulizer every 6 hours PRN Wheezing  docusate sodium 100 milliGRAM(s) Oral two times a day PRN Constipation  morphine  - Injectable 2 milliGRAM(s) IV Push every 4 hours PRN Moderate Pain (4 - 6)  ondansetron Injectable 4 milliGRAM(s) IV Push every 6 hours PRN Nausea and/or Vomiting      ICU Vital Signs Last 24 Hrs  T(C): 36.4 (22 Mar 2018 08:35), Max: 36.9 (21 Mar 2018 23:53)  T(F): 97.5 (22 Mar 2018 08:35), Max: 98.4 (21 Mar 2018 23:53)  HR: 108 (22 Mar 2018 08:00) (101 - 120)  BP: 131/72 (22 Mar 2018 08:00) (123/65 - 159/77)  BP(mean): 88 (22 Mar 2018 08:00) (62 - 111)  ABP: --  ABP(mean): --  RR: 26 (22 Mar 2018 08:00) (18 - 28)  SpO2: 98% (22 Mar 2018 08:00) (86% - 100%)      I&O's Summary    21 Mar 2018 07:01  -  22 Mar 2018 07:00  --------------------------------------------------------  IN: 2890 mL / OUT: 901 mL / NET: 1989 mL                         10.0   23.32 )-----------( 154      ( 22 Mar 2018 05:20 )             31.4     03-22    148<H>  |  113<H>  |  42<H>  ----------------------------<  110<H>  3.7   |  29  |  1.11    Ca    8.4<L>      22 Mar 2018 05:20    Urinalysis Basic - ( 21 Mar 2018 18:15 )    Color: Yellow / Appearance: Clear / S.015 / pH: x  Gluc: x / Ketone: Negative  / Bili: Negative / Urobili: Negative mg/dL   Blood: x / Protein: 30 mg/dL / Nitrite: Negative   Leuk Esterase: Moderate / RBC: 25-50 /HPF / WBC 6-10   Sq Epi: x / Non Sq Epi: Few / Bacteria: Few      DVT Prophylaxis:    heparin subq                                                             Advanced Directives: Full Code
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
## 2018-03-22 NOTE — PROGRESS NOTE ADULT - SUBJECTIVE AND OBJECTIVE BOX
Patient is a 72y old  Female who presents with a chief complaint of came for pre op testing (15 Mar 2018 00:51)    HPI Pt transferred to ED from admitting d/t abnormal ECG demostrating AF with RVR. Office redocrds reviewed. This is new AF. States having on and off leg swelling but no orthopnea, angina, palp, PND.      3/15 - Not feeling better, had episode of sweating and SOB recently. Blood work revealed hydronephrosis, obstructive calculus, UTI, questionable pneumonia, low TSH, elevated WBC, elevated cr. Tele with AF and VR around 135 now.  CT reviewed, no signs of pleural effusions or lung edema in included lungs segments. ECHO reviewed too. Mild AS, MR, preserved LVEF.   Suggest starting IV cardizem drip titratable to HR < 100. Also start UFH IV for stroke ppx. Cont PO cardizem as well, this will assist in weaning her off IV cardizem ultimately. Cont statin too.   Obtain urology consult re: hydronephrosis. Also endocrinology re: low TSH.  Would defer stress MPI until her HR is more stable and normal.  Discussed with patient also re: CV. She would need 3 weeks strict OAC and therefore gigi also interfere should she need percutaneous hydronephrosis drainage, not to mention her future knee surgery as discussed yesterday. Should HR become an issue difficult to control with drugs, would then proceed to CV and deal with the rest later on as needed. For time being, and as long as she is hemodynamically stable, would cont rate control, AC and address first UTI and possible hyperthyroidism.  Case d/w hospitalist.  Will follow    3/16 - Pt on BiPAP, in no resp distress at moment. Tele AF with HR round 70-90. BP stable off pressors. Awake and alert. Had resp distress yesterday after PCN, possibly diastolic heart failure. Suggest continuing IV diuresis as needed, continue rate control with IV diltiazem, will start weaning her to PO once off BiPAP. Continue UFH, will transition to OAC once kidney fx is stable as well. Will continue to defer stress test and CV for time being.    3/17-  Hasn't responded to BIPAP and seemed obtunded today. Chest xray showed worsened heart failure. Critical care decided to intubate patient.   She remains in afib. On a cardizem drip. After sedation, blood pressure decreased to the high 90s.  overnight, her vital signs have been stable.   Received Lasix 40 mg IV at 5 am and has responded with urine output.     3/18-  Intubated yesterday.   Cardizem drip was discontinued and started on QID cardizem by OGT. She has tolerated it well with good heart rate control and adequate blood pressures.   Was started on a Bumex drip. Has diuresed over 2000 ml and there was been an increase in the serum creatinine.     3/19 - Examined today, on sedation and vent. CXR 3/17 and clinically without significant improvement despite aggressive IV diuresis and significant UOP, rather her Cr went up. I suspect her bilateral alveolar infiltrates and resp distress may not be cardiogenic at the moment ( ARDS in setting of sepsis?). Would suggest cont to hold diuretics and cont optimal vent support while addressing other acute clinical issues. Appreciated pulm and nephro input.  She remains in AF with appropriate rate control on CCB via OGT. Cont that as well.   Will follow up closely.     3/20 - Sedated, in no distress, FIO2 40% on vent and sats 99%, hemodynamically stable of pressors, rate controlled on CCB via OGT, on UFH, H/H stable, if needed can hold it.  CXR much improved yesterday off IV diuretics, keep holding that, Cr getting better too.   Abx per ID  Vent mgt per ICU/Pulm  Will follow up closely.     3/21 - Doing better on aerosol mask, good sats, thirsty. Hemodynamically stable off pressors, HR trending up in AF, would switch her to ER Cardizem PO and titrate accordingly or would resume IV drip if unable to tolerate PO route.  Can hold off heparin for the moment d/t bleeding issues, would resume it with OAC once cleared from this. Will defer stress test for later when she is clinically stable. Can use IV furosemide as needed.   Will follow up closely.   Case d/w ICU staff    3/22 - doing well on NC, CXR yesterday with minimal mostly left side infiltrates can use lasix prn. I added a QHS dose of cardizem CD with holding parameters for better average HR control. Cont rest of management as it is.  Will follow closely.   D/W nursing staff.     Allergies    Macrobid (Other)    Intolerances        MEDICATIONS  (STANDING):  ALBUTerol/ipratropium for Nebulization 3 milliLiter(s) Nebulizer every 6 hours  aspirin 325 milliGRAM(s) Oral daily  azithromycin  IVPB 500 milliGRAM(s) IV Intermittent every 24 hours  buMETAnide Infusion 1 mG/Hr (10 mL/Hr) IV Continuous <Continuous>  cefepime  IVPB      cefepime  IVPB 1000 milliGRAM(s) IV Intermittent every 12 hours  chlorhexidine 0.12% Liquid 15 milliLiter(s) Swish and Spit two times a day  diltiazem    Tablet 60 milliGRAM(s) Oral every 4 hours  heparin  Infusion.  Unit(s)/Hr (18 mL/Hr) IV Continuous <Continuous>  methylPREDNISolone sodium succinate Injectable 40 milliGRAM(s) IV Push every 8 hours  propofol Infusion 10 MICROgram(s)/kG/Min (5.988 mL/Hr) IV Continuous <Continuous>  simvastatin 20 milliGRAM(s) Oral at bedtime    MEDICATIONS  (PRN):  docusate sodium 100 milliGRAM(s) Oral two times a day PRN Constipation  heparin  Injectable 8000 Unit(s) IV Push every 6 hours PRN For aPTT less than 40  heparin  Injectable 4000 Unit(s) IV Push every 6 hours PRN For aPTT between 40 - 57  LORazepam   Injectable 1 milliGRAM(s) IV Push every 4 hours PRN Anxiety  ondansetron Injectable 4 milliGRAM(s) IV Push every 6 hours PRN Nausea and/or Vomiting  oxyCODONE    5 mG/acetaminophen 325 mG 1 Tablet(s) Oral every 4 hours PRN Moderate Pain (4 - 6)    REVIEW OF SYSTEMS:    RESPIRATORY: No cough, wheezing, hemoptysis; No shortness of breath  CARDIOVASCULAR: No chest pain or palpitations  All other review of systems is negative unless indicated above      PHYSICAL EXAM:  Daily     Daily Weight in k.5 (18 Mar 2018 05:00)  Vital Signs Last 24 Hrs  T(C): 36.4 (18 Mar 2018 08:51), Max: 36.9 (17 Mar 2018 21:22)  T(F): 97.5 (18 Mar 2018 08:51), Max: 98.4 (17 Mar 2018 21:22)  HR: 91 (18 Mar 2018 08:00) (86 - 106)  BP: 107/64 (18 Mar 2018 08:00) (85/49 - 137/110)  BP(mean): 73 (18 Mar 2018 08:00) (56 - 115)  RR: 29 (18 Mar 2018 08:00) (26 - 34)  SpO2: 99% (18 Mar 2018 08:00) (94% - 100%)    Constitutional: NAD, awake and alert, well-developed  HEENT: PERR, EOMI, Normal Hearing, MMM  Neck: Soft and supple, No LAD, No JVD  Respiratory: Breath sounds are clear bilaterally, No wheezing, rales or rhonchi  Cardiovascular: S1 and S2, regular rate and rhythm, no Murmurs, gallops or rubs  Gastrointestinal: Bowel Sounds present, soft, nontender, nondistended, no guarding, no rebound  Extremities: No peripheral edema  Vascular: 2+ peripheral pulses  Neurological: A/O x 3, no focal deficits  Musculoskeletal: 5/5 strength b/l upper and lower extremities  Skin: No rashes    LABS: All Labs Reviewed:                        11.6   15.79 )-----------( 160      ( 18 Mar 2018 05:16 )             35.9     03-18    140  |  105  |  78<H>  ----------------------------<  178<H>  3.7   |  22  |  3.04<H>    Ca    8.3<L>      18 Mar 2018 05:16  Phos  4.9     03-17  Mg     2.3     03-17      PTT - ( 18 Mar 2018 05:16 )  PTT:43.9 sec          TELEMETRY/EKG: rate controlled Afib

## 2018-03-22 NOTE — PROGRESS NOTE ADULT - SUBJECTIVE AND OBJECTIVE BOX
Patient is a 72y Female who had no new events.    awake  not sob   drinking fluids    REVIEW OF SYSTEMS:  negative        MEDICATIONS  (STANDING):  dextrose 5%. 1000 milliLiter(s) (75 mL/Hr) IV Continuous <Continuous>  diltiazem    milliGRAM(s) Oral daily  diltiazem    milliGRAM(s) Oral at bedtime  simvastatin 20 milliGRAM(s) Oral at bedtime      Vital Signs Last 24 Hrs  T(C): 36.4 (22 Mar 2018 08:35), Max: 36.9 (21 Mar 2018 23:53)  T(F): 97.5 (22 Mar 2018 08:35), Max: 98.4 (21 Mar 2018 23:53)  HR: 99 (22 Mar 2018 11:00) (99 - 120)  BP: 127/78 (22 Mar 2018 10:00) (111/67 - 159/77)  BP(mean): 86 (22 Mar 2018 10:00) (62 - 111)  RR: 25 (22 Mar 2018 11:00) (17 - 28)  SpO2: 93% (22 Mar 2018 11:00) (86% - 100%)  I&O's Summary    21 Mar 2018 07:01  -  22 Mar 2018 07:00  --------------------------------------------------------  IN: 2890 mL / OUT: 901 mL / NET: 1989 mL          PHYSICAL EXAM:    Constitutional: frail  HEENT:  EOMI,  MMM  Neck: No LAD, No JVD  Respiratory: good aeration, dist BS  Cardiovascular: S1 and S2, RRR  Gastrointestinal: BS+, soft, NT/ND  Extremities: ++ edema,   Neurological: lethargic   : No Quezada, L drain  Skin: No rashes  Access: Not applicable        LABS:                                             10.0   23.32 )-----------( 154      ( 22 Mar 2018 05:20 )             31.4                         10.2   25.71 )-----------( 174      ( 21 Mar 2018 05:59 )             30.7     148    |  113    |  42     ----------------------------<  110       22 Mar 2018 05:20  3.7     |  29     |  1.11     146    |  112    |  69     ----------------------------<  116       21 Mar 2018 05:59  3.3     |  28     |  1.71     140    |  106    |  87     ----------------------------<  215       20 Mar 2018 06:35  3.3     |  25     |  2.42     Ca    8.4        22 Mar 2018 05:20  Ca    8.2        21 Mar 2018 05:59    Phos  4.9       19 Mar 2018 05:08      Urine Studies:          RADIOLOGY & ADDITIONAL STUDIES:                EXAM:  XR CHEST PORTABLE ROUTINE 1V                            PROCEDURE DATE:  03/19/2018          INTERPRETATION:  Clinical information: Edema, respiratory failure    Portable AP chest radiograph from 0836 hours:    COMPARISON:  March 17, 2018    FINDINGS:  Endotracheal tube with tip in satisfactory position above the   mahsa and enteric tube entering the stomach with tip not visualized.    Mild pulmonary interstitial edema is markedly improved as compared with   the prior study. There is nopneumothorax. More confluent right   infrahilar opacity is noted.    IMPRESSION:   Marked improvement in pulmonary edema.  More confluent opacity in the right infrahilar region could represent   superimposed pneumonia. Continued radiographic follow-up is recommended.

## 2018-03-22 NOTE — PROGRESS NOTE ADULT - SUBJECTIVE AND OBJECTIVE BOX
Subjective:  awake and alert  on nasal canula  no distress    MEDICATIONS  (STANDING):  cefepime  IVPB      cefepime  IVPB 1000 milliGRAM(s) IV Intermittent every 12 hours  diltiazem    milliGRAM(s) Oral daily  famotidine    Tablet 20 milliGRAM(s) Oral daily  lactated ringers. 1000 milliLiter(s) (75 mL/Hr) IV Continuous <Continuous>  simvastatin 20 milliGRAM(s) Oral at bedtime    MEDICATIONS  (PRN):  ALBUTerol    0.083% 2.5 milliGRAM(s) Nebulizer every 6 hours PRN Wheezing  docusate sodium 100 milliGRAM(s) Oral two times a day PRN Constipation  morphine  - Injectable 2 milliGRAM(s) IV Push every 4 hours PRN Moderate Pain (4 - 6)  ondansetron Injectable 4 milliGRAM(s) IV Push every 6 hours PRN Nausea and/or Vomiting      Allergies    Macrobid (Other)    Intolerances        REVIEW OF SYSTEMS:    CONSTITUTIONAL:  As per HPI.  HEENT:  Eyes:  No diplopia or blurred vision. ENT:  No earache, sore throat or runny nose.  CARDIOVASCULAR:  No pressure, squeezing, tightness, heaviness or aching about the chest, neck, axilla or epigastrium.  RESPIRATORY:  No cough, shortness of breath, PND or orthopnea.  GASTROINTESTINAL:  No nausea, vomiting or diarrhea.  GENITOURINARY:  No dysuria, frequency or urgency.  MUSCULOSKELETAL:  no joint pain, deformity, tenderness  EXTREMITIES: no clubbing cyanosis,edema  SKIN:  No change in skin, hair or nails.  NEUROLOGIC:  No paresthesias, fasciculations, seizures or weakness.  PSYCHIATRIC:  No disorder of thought or mood.  ENDOCRINE:  No heat or cold intolerance, polyuria or polydipsia.  HEMATOLOGICAL:  No easy bruising or bleedings:    Vital Signs Last 24 Hrs  T(C): 36.8 (22 Mar 2018 06:39), Max: 36.9 (21 Mar 2018 23:53)  T(F): 98.3 (22 Mar 2018 06:39), Max: 98.4 (21 Mar 2018 23:53)  HR: 108 (22 Mar 2018 08:00) (101 - 120)  BP: 131/72 (22 Mar 2018 08:00) (123/65 - 159/77)  BP(mean): 88 (22 Mar 2018 08:00) (62 - 111)  RR: 26 (22 Mar 2018 08:00) (18 - 28)  SpO2: 98% (22 Mar 2018 08:00) (86% - 100%)    PHYSICAL EXAMINATION:  SKIN: no rashes  HEAD: NC/AT  EYES: PERRLA, EOMI  EARS: TM's intact  NOSE: no abnormalities  NECK:  Supple. No lymphadenopathy. Jugular venous pressure not elevated. Carotids equal.   HEART:   The cardiac impulse has a normal quality. Reg., Nl S1 and S2.  There are no murmurs, rubs or gallops noted  CHEST:  bilat ronchi w basilar crackles  ABDOMEN:  Soft and nontender.   EXTREMITIES:  no C/C/E  NEURO: AAO x 3, no focal deficts       LABS:                        10.0   23.32 )-----------( 154      ( 22 Mar 2018 05:20 )             31.4     03-22    148<H>  |  113<H>  |  42<H>  ----------------------------<  110<H>  3.7   |  29  |  1.11    Ca    8.4<L>      22 Mar 2018 05:20      PTT - ( 21 Mar 2018 05:59 )  PTT:113.8 sec  Urinalysis Basic - ( 21 Mar 2018 18:15 )    Color: Yellow / Appearance: Clear / S.015 / pH: x  Gluc: x / Ketone: Negative  / Bili: Negative / Urobili: Negative mg/dL   Blood: x / Protein: 30 mg/dL / Nitrite: Negative   Leuk Esterase: Moderate / RBC: 25-50 /HPF / WBC 6-10   Sq Epi: x / Non Sq Epi: Few / Bacteria: Few        RADIOLOGY & ADDITIONAL TESTS:

## 2018-03-22 NOTE — PROGRESS NOTE ADULT - SUBJECTIVE AND OBJECTIVE BOX
Patient is a 72y old  Female who presents with a chief complaint of came for pre op testing (15 Mar 2018 00:51)    Date of service: 18 @ 09:43    ROS: no fever or chills; denies dizziness, no HA, no SOB or cough, no abdominal pain, no diarrhea or constipation; no dysuria, no legs pain, no rashes    MEDICATIONS  (STANDING):  cefepime  IVPB      cefepime  IVPB 1000 milliGRAM(s) IV Intermittent every 12 hours  diltiazem    milliGRAM(s) Oral daily  famotidine    Tablet 20 milliGRAM(s) Oral daily  lactated ringers. 1000 milliLiter(s) (75 mL/Hr) IV Continuous <Continuous>  simvastatin 20 milliGRAM(s) Oral at bedtime    MEDICATIONS  (PRN):  ALBUTerol    0.083% 2.5 milliGRAM(s) Nebulizer every 6 hours PRN Wheezing  docusate sodium 100 milliGRAM(s) Oral two times a day PRN Constipation  morphine  - Injectable 2 milliGRAM(s) IV Push every 4 hours PRN Moderate Pain (4 - 6)  ondansetron Injectable 4 milliGRAM(s) IV Push every 6 hours PRN Nausea and/or Vomiting      Vital Signs Last 24 Hrs  T(C): 36.4 (22 Mar 2018 08:35), Max: 36.9 (21 Mar 2018 23:53)  T(F): 97.5 (22 Mar 2018 08:35), Max: 98.4 (21 Mar 2018 23:53)  HR: 108 (22 Mar 2018 08:00) (101 - 120)  BP: 131/72 (22 Mar 2018 08:00) (123/65 - 159/77)  BP(mean): 88 (22 Mar 2018 08:00) (62 - 111)  RR: 26 (22 Mar 2018 08:00) (18 - 28)  SpO2: 98% (22 Mar 2018 08:00) (86% - 100%)    Physical Exam:    Constitutional: frail looking  HEENT: NC/AT, EOMI, PERRLA  Neck: supple  Back: no tenderness  Respiratory: few b/l rhonchi; air entry is improved  Cardiovascular: S1S2 irregular, no murmurs  Abdomen: soft, not tender, not distended, positive BS  Genitourinary: no LN palpable; left nephrostomy tube  Rectal: deferred  Musculoskeletal: no muscle tenderness, no joint swelling or tenderness  Extremities: no pedal edema  Neurological: confused, moving all extremities  Skin: no rashes    Labs:                        10.2   .71 )-----------( 174      ( 21 Mar 2018 05:59 )             30.7     03-21    146<H>  |  112<H>  |  69<H>  ----------------------------<  116<H>  3.3<L>   |  28  |  1.71<H>    Ca    8.2<L>      21 Mar 2018 05:59                                   11.7   25.13 )-----------( 168      ( 17 Mar 2018 06:09 )             35.7     03-17    141  |  110<H>  |  58<H>  ----------------------------<  177<H>  4.3   |  20<L>  |  2.75<H>    Ca    8.1<L>      17 Mar 2018 06:09  Phos  4.9     03-17  Mg     2.3     03-17               TPro  6.9  /  Alb  2.7<L>  /  TBili  0.8  /  DBili  x   /  AST  24  /  ALT  20  /  AlkPhos  103  03-14     LIVER FUNCTIONS - ( 14 Mar 2018 16:30 )  Alb: 2.7 g/dL / Pro: 6.9 gm/dL / ALK PHOS: 103 U/L / ALT: 20 U/L / AST: 24 U/L / GGT: x           Urinalysis Basic - ( 14 Mar 2018 15:06 )    Color: Yellow / Appearance: very cloudy / S.020 / pH: x  Gluc: x / Ketone: Negative  / Bili: Small / Urobili: 4 mg/dL   Blood: x / Protein: 100 mg/dL / Nitrite: Positive   Leuk Esterase: Moderate / RBC: 6-10 /HPF / WBC >50   Sq Epi: x / Non Sq Epi: Moderate / Bacteria: Many      Culture - Sputum (collected 18 Mar 2018 11:00)  Source: .Sputum Sputum  Gram Stain (18 Mar 2018 22:22):    Few polymorphonuclear leukocytes per low power field    No Squamous epithelial cells per low power field    No organisms seen per oil power field    Culture - Blood (collected 15 Mar 2018 17:37)  Source: .Blood None  Preliminary Report (17 Mar 2018 01:02):    No growth to date.    Culture - Fungal, Other (collected 15 Mar 2018 16:05)  Source: .Other None-urine  Preliminary Report (19 Mar 2018 10:41):    Testing in progress    Culture - Urine (collected 15 Mar 2018 16:05)  Source: .Urine None  Final Report (16 Mar 2018 22:14):    <10,000 CFU/ml    Normal Urogenital radha present    Culture - Blood (collected 14 Mar 2018 19:41)  Source: .Blood None  Gram Stain (15 Mar 2018 20:51):    Growth in aerobic bottle: Gram Negative Rods    Growth in anaerobic bottle: Gram Negative Rods  Final Report (17 Mar 2018 23:26):    Growth in aerobic and anaerobic bottles: Escherichia coli    "Due to technical problems, Proteus sp. will Not be reported as part of    the BCID panel until further notice"  Organism: Blood Culture PCR  Escherichia coli (17 Mar 2018 23:26)  Organism: Escherichia coli (17 Mar 2018 23:26)      -  Amikacin: S <=8      -  Ampicillin: S <=2      -  Ampicillin/Sulbactam: S <=4/2      -  Aztreonam: S <=4      -  Cefazolin: S <=2      -  Cefepime: S <=2      -  Cefoxitin: S <=4      -  Ceftazidime: S <=1      -  Ceftriaxone: S <=1      -  Ciprofloxacin: S <=0.5      -  Ertapenem: S <=0.5      -  Gentamicin: S <=1      -  Imipenem: S <=1      -  Levofloxacin: S <=1      -  Meropenem: S <=1      -  Piperacillin/Tazobactam: S <=8      -  Tobramycin: S <=2      -  Trimethoprim/Sulfamethoxazole: S <=0.5/9.5      Method Type: NOEL  Organism: Blood Culture PCR (17 Mar 2018 23:26)      -  Escherichia coli: Detec      Method Type: PCR    Culture - Blood (collected 14 Mar 2018 19:40)  Source: .Blood None  Gram Stain (15 Mar 2018 20:53):    Growth in anaerobic bottle: Gram Negative Rods  Final Report (17 Mar 2018 23:24):    Growth in anaerobic bottle: Escherichia coli    See previous culture [id number]    Culture - Sputum . (18 @ 11:00)    Gram Stain:   Few polymorphonuclear leukocytes per low power field  No Squamous epithelial cells per low power field  No organisms seen per oil power field    Specimen Source: .Sputum Sputum    Culture Results:   No growth at 48 hours          Radiology:    < from: CT Abdomen and Pelvis No Cont (18 @ 19:12) >  mild LEFT hydronephrosis secondary to an obstructing 6 mm   calculus at the LEFT ureteropelvic junction. There is no RIGHT   nephrolithiasis or hydronephrosis. Small adrenal adenomas.   Cholelithiasis. 3.1 cm duodenal diverticulum. Moderate sigmoid   diverticulosis. Small alveolar infiltrate in the LEFT lower lobe.     < end of copied text >    < from: Xray Chest 1 View AP/PA. (18 @ 17:39) >   No active pulmonary disease.    < end of copied text >    < from: Xray Chest 1 View AP/PA. (18 @ 09:39) >  Pulmonary vascular congestion with small bilateral pleural effusions with   questionable superimposed left upper lobe pneumonia versus compared to   prior exam    < end of copied text >      Advanced directives addressed: full resuscitation

## 2018-03-22 NOTE — PROGRESS NOTE ADULT - ASSESSMENT
1. renal Failure - improved, has neprostomy  2. breathing improved on oxygen, titrate o2 down, completing course of abx  3. Diet as tolerated  4. PT oob to chair  5. D/w ID will d/c abx.  6. monitor wbc    amiodarone po

## 2018-03-23 LAB
ANION GAP SERPL CALC-SCNC: 6 MMOL/L — SIGNIFICANT CHANGE UP (ref 5–17)
BUN SERPL-MCNC: 26 MG/DL — HIGH (ref 7–23)
CALCIUM SERPL-MCNC: 7.9 MG/DL — LOW (ref 8.5–10.1)
CHLORIDE SERPL-SCNC: 107 MMOL/L — SIGNIFICANT CHANGE UP (ref 96–108)
CO2 SERPL-SCNC: 29 MMOL/L — SIGNIFICANT CHANGE UP (ref 22–31)
CREAT SERPL-MCNC: 0.83 MG/DL — SIGNIFICANT CHANGE UP (ref 0.5–1.3)
GLUCOSE SERPL-MCNC: 103 MG/DL — HIGH (ref 70–99)
HCT VFR BLD CALC: 28.5 % — LOW (ref 34.5–45)
HGB BLD-MCNC: 9.3 G/DL — LOW (ref 11.5–15.5)
MCHC RBC-ENTMCNC: 28.6 PG — SIGNIFICANT CHANGE UP (ref 27–34)
MCHC RBC-ENTMCNC: 32.6 GM/DL — SIGNIFICANT CHANGE UP (ref 32–36)
MCV RBC AUTO: 87.7 FL — SIGNIFICANT CHANGE UP (ref 80–100)
NRBC # BLD: 0 /100 WBCS — SIGNIFICANT CHANGE UP (ref 0–0)
PLATELET # BLD AUTO: 163 K/UL — SIGNIFICANT CHANGE UP (ref 150–400)
POTASSIUM SERPL-MCNC: 3.3 MMOL/L — LOW (ref 3.5–5.3)
POTASSIUM SERPL-SCNC: 3.3 MMOL/L — LOW (ref 3.5–5.3)
RBC # BLD: 3.25 M/UL — LOW (ref 3.8–5.2)
RBC # FLD: 15.9 % — HIGH (ref 10.3–14.5)
SODIUM SERPL-SCNC: 142 MMOL/L — SIGNIFICANT CHANGE UP (ref 135–145)
WBC # BLD: 18.12 K/UL — HIGH (ref 3.8–10.5)
WBC # FLD AUTO: 18.12 K/UL — HIGH (ref 3.8–10.5)

## 2018-03-23 PROCEDURE — 99233 SBSQ HOSP IP/OBS HIGH 50: CPT

## 2018-03-23 PROCEDURE — 71045 X-RAY EXAM CHEST 1 VIEW: CPT | Mod: 26

## 2018-03-23 RX ORDER — ENOXAPARIN SODIUM 100 MG/ML
30 INJECTION SUBCUTANEOUS EVERY 24 HOURS
Qty: 0 | Refills: 0 | Status: DISCONTINUED | OUTPATIENT
Start: 2018-03-23 | End: 2018-03-27

## 2018-03-23 RX ADMIN — Medication 180 MILLIGRAM(S): at 06:24

## 2018-03-23 RX ADMIN — SODIUM CHLORIDE 75 MILLILITER(S): 9 INJECTION, SOLUTION INTRAVENOUS at 20:21

## 2018-03-23 RX ADMIN — SODIUM CHLORIDE 75 MILLILITER(S): 9 INJECTION, SOLUTION INTRAVENOUS at 00:08

## 2018-03-23 RX ADMIN — SIMVASTATIN 20 MILLIGRAM(S): 20 TABLET, FILM COATED ORAL at 21:04

## 2018-03-23 RX ADMIN — ENOXAPARIN SODIUM 30 MILLIGRAM(S): 100 INJECTION SUBCUTANEOUS at 15:09

## 2018-03-23 RX ADMIN — Medication 120 MILLIGRAM(S): at 21:04

## 2018-03-23 NOTE — PROGRESS NOTE ADULT - ASSESSMENT
73 yo female with PMHX of HTN, OA, presenting with new onset Afib noted left hdyronephrosis with requriing acute PCN post insertion E coli bacteremia w sepsis with respiratory failure.      STACIE - sec to ATN/sepsis   - Renal function stable   - K - replete for goal near 4.0     Azotemia - improving    - monitor values, steroids played a role    UTI/E coli sepsis    - IV abx completed per ID, PCN in place   - Leukocytosis - improving     Hypernatremia sec to intravascular depltion - resolved    cxr noted - r/o  Pna doubt CHF at this time    - gentle IVF - agree with D5W per CCU team  - keep on limited time and reasses volume status     No further renal intervention - will follow PRN     Thank you for the courtesy of this consult.

## 2018-03-23 NOTE — PROGRESS NOTE ADULT - CARDIOVASCULAR
Regular rate & rhythm, normal S1, S2; no murmurs, gallops or rubs; no S3, S4
Regular rate & rhythm, normal S1, S2; no murmurs, gallops or rubs; no S3, S4
detailed exam

## 2018-03-23 NOTE — PROGRESS NOTE ADULT - SUBJECTIVE AND OBJECTIVE BOX
Patient is a 72y old  Female who presents with a chief complaint of came for pre op testing (15 Mar 2018 00:51)    HPI Pt transferred to ED from admitting d/t abnormal ECG demostrating AF with RVR. Office redocrds reviewed. This is new AF. States having on and off leg swelling but no orthopnea, angina, palp, PND.      3/15 - Not feeling better, had episode of sweating and SOB recently. Blood work revealed hydronephrosis, obstructive calculus, UTI, questionable pneumonia, low TSH, elevated WBC, elevated cr. Tele with AF and VR around 135 now.  CT reviewed, no signs of pleural effusions or lung edema in included lungs segments. ECHO reviewed too. Mild AS, MR, preserved LVEF.   Suggest starting IV cardizem drip titratable to HR < 100. Also start UFH IV for stroke ppx. Cont PO cardizem as well, this will assist in weaning her off IV cardizem ultimately. Cont statin too.   Obtain urology consult re: hydronephrosis. Also endocrinology re: low TSH.  Would defer stress MPI until her HR is more stable and normal.  Discussed with patient also re: CV. She would need 3 weeks strict OAC and therefore gigi also interfere should she need percutaneous hydronephrosis drainage, not to mention her future knee surgery as discussed yesterday. Should HR become an issue difficult to control with drugs, would then proceed to CV and deal with the rest later on as needed. For time being, and as long as she is hemodynamically stable, would cont rate control, AC and address first UTI and possible hyperthyroidism.  Case d/w hospitalist.  Will follow    3/16 - Pt on BiPAP, in no resp distress at moment. Tele AF with HR round 70-90. BP stable off pressors. Awake and alert. Had resp distress yesterday after PCN, possibly diastolic heart failure. Suggest continuing IV diuresis as needed, continue rate control with IV diltiazem, will start weaning her to PO once off BiPAP. Continue UFH, will transition to OAC once kidney fx is stable as well. Will continue to defer stress test and CV for time being.    3/17-  Hasn't responded to BIPAP and seemed obtunded today. Chest xray showed worsened heart failure. Critical care decided to intubate patient.   She remains in afib. On a cardizem drip. After sedation, blood pressure decreased to the high 90s.  overnight, her vital signs have been stable.   Received Lasix 40 mg IV at 5 am and has responded with urine output.     3/18-  Intubated yesterday.   Cardizem drip was discontinued and started on QID cardizem by OGT. She has tolerated it well with good heart rate control and adequate blood pressures.   Was started on a Bumex drip. Has diuresed over 2000 ml and there was been an increase in the serum creatinine.     3/19 - Examined today, on sedation and vent. CXR 3/17 and clinically without significant improvement despite aggressive IV diuresis and significant UOP, rather her Cr went up. I suspect her bilateral alveolar infiltrates and resp distress may not be cardiogenic at the moment ( ARDS in setting of sepsis?). Would suggest cont to hold diuretics and cont optimal vent support while addressing other acute clinical issues. Appreciated pulm and nephro input.  She remains in AF with appropriate rate control on CCB via OGT. Cont that as well.   Will follow up closely.     3/20 - Sedated, in no distress, FIO2 40% on vent and sats 99%, hemodynamically stable of pressors, rate controlled on CCB via OGT, on UFH, H/H stable, if needed can hold it.  CXR much improved yesterday off IV diuretics, keep holding that, Cr getting better too.   Abx per ID  Vent mgt per ICU/Pulm  Will follow up closely.     3/21 - Doing better on aerosol mask, good sats, thirsty. Hemodynamically stable off pressors, HR trending up in AF, would switch her to ER Cardizem PO and titrate accordingly or would resume IV drip if unable to tolerate PO route.  Can hold off heparin for the moment d/t bleeding issues, would resume it with OAC once cleared from this. Will defer stress test for later when she is clinically stable. Can use IV furosemide as needed.   Will follow up closely.   Case d/w ICU staff    3/22 - doing well on NC, CXR yesterday with minimal mostly left side infiltrates can use lasix prn. I added a QHS dose of cardizem CD with holding parameters for better average HR control. Cont rest of management as it is.  Will follow closely.   D/W nursing staff.       3/23 - Improving clinically, heart rate better controlled. Azotemia and leukocytosis improving too.  Will follow up.     Allergies    Macrobid (Other)    Intolerances        MEDICATIONS  (STANDING):  ALBUTerol/ipratropium for Nebulization 3 milliLiter(s) Nebulizer every 6 hours  aspirin 325 milliGRAM(s) Oral daily  azithromycin  IVPB 500 milliGRAM(s) IV Intermittent every 24 hours  buMETAnide Infusion 1 mG/Hr (10 mL/Hr) IV Continuous <Continuous>  cefepime  IVPB      cefepime  IVPB 1000 milliGRAM(s) IV Intermittent every 12 hours  chlorhexidine 0.12% Liquid 15 milliLiter(s) Swish and Spit two times a day  diltiazem    Tablet 60 milliGRAM(s) Oral every 4 hours  heparin  Infusion.  Unit(s)/Hr (18 mL/Hr) IV Continuous <Continuous>  methylPREDNISolone sodium succinate Injectable 40 milliGRAM(s) IV Push every 8 hours  propofol Infusion 10 MICROgram(s)/kG/Min (5.988 mL/Hr) IV Continuous <Continuous>  simvastatin 20 milliGRAM(s) Oral at bedtime    MEDICATIONS  (PRN):  docusate sodium 100 milliGRAM(s) Oral two times a day PRN Constipation  heparin  Injectable 8000 Unit(s) IV Push every 6 hours PRN For aPTT less than 40  heparin  Injectable 4000 Unit(s) IV Push every 6 hours PRN For aPTT between 40 - 57  LORazepam   Injectable 1 milliGRAM(s) IV Push every 4 hours PRN Anxiety  ondansetron Injectable 4 milliGRAM(s) IV Push every 6 hours PRN Nausea and/or Vomiting  oxyCODONE    5 mG/acetaminophen 325 mG 1 Tablet(s) Oral every 4 hours PRN Moderate Pain (4 - 6)    REVIEW OF SYSTEMS:    RESPIRATORY: No cough, wheezing, hemoptysis; No shortness of breath  CARDIOVASCULAR: No chest pain or palpitations  All other review of systems is negative unless indicated above      PHYSICAL EXAM:  Daily     Daily Weight in k.5 (18 Mar 2018 05:00)  Vital Signs Last 24 Hrs  T(C): 36.4 (18 Mar 2018 08:51), Max: 36.9 (17 Mar 2018 21:22)  T(F): 97.5 (18 Mar 2018 08:51), Max: 98.4 (17 Mar 2018 21:22)  HR: 91 (18 Mar 2018 08:00) (86 - 106)  BP: 107/64 (18 Mar 2018 08:00) (85/49 - 137/110)  BP(mean): 73 (18 Mar 2018 08:00) (56 - 115)  RR: 29 (18 Mar 2018 08:00) (26 - 34)  SpO2: 99% (18 Mar 2018 08:00) (94% - 100%)    Constitutional: NAD, awake and alert, well-developed  HEENT: PERR, EOMI, Normal Hearing, MMM  Neck: Soft and supple, No LAD, No JVD  Respiratory: Breath sounds are clear bilaterally, No wheezing, rales or rhonchi  Cardiovascular: S1 and S2, regular rate and rhythm, no Murmurs, gallops or rubs  Gastrointestinal: Bowel Sounds present, soft, nontender, nondistended, no guarding, no rebound  Extremities: No peripheral edema  Vascular: 2+ peripheral pulses  Neurological: A/O x 3, no focal deficits  Musculoskeletal: 5/5 strength b/l upper and lower extremities  Skin: No rashes    LABS: All Labs Reviewed:                        11.6   15.79 )-----------( 160      ( 18 Mar 2018 05:16 )             35.9     03-18    140  |  105  |  78<H>  ----------------------------<  178<H>  3.7   |  22  |  3.04<H>    Ca    8.3<L>      18 Mar 2018 05:16  Phos  4.9     03-17  Mg     2.3     03-17      PTT - ( 18 Mar 2018 05:16 )  PTT:43.9 sec          TELEMETRY/EKG: rate controlled Afib

## 2018-03-23 NOTE — PROGRESS NOTE ADULT - SUBJECTIVE AND OBJECTIVE BOX
Events Overnight; a little stronger today, working with PT, creatinine normal, nephrostomy no further blood    HPI:     Mrs. Clancy is a 72-year-old female who initially presented to the hospital for preoperative evaluation for knee surgery. She is found to be in atrial fibrillation and was sent to emergency room. f.  Patient was complaining of flank pain. A CT scan of the abdomen and pelvis was performed which showed hydronephrosis with a kidney stone. There was also evidence of a left lower lobe infiltrate.  Patient  had nephrostomy tube  was than a code sepsis in PACU.  Blood cultures were positive for Escherichia coli. She developed sepsis with hypotension and was resuscitated with over 3 L of fluid. She developed significant shortness of breath which was treated with BiPAP . she was subseqauently intubated. cxr showed pneumonia, vs. failure, vs. ARDS,   acute renal failure, creatinine  back to baseline, still slightly sob, completed course of abx. weak  IV heparin held secondary hematuria         MEDICATIONS  (STANDING):  dextrose 5%. 1000 milliLiter(s) (75 mL/Hr) IV Continuous <Continuous>  diltiazem    milliGRAM(s) Oral daily  diltiazem    milliGRAM(s) Oral at bedtime  simvastatin 20 milliGRAM(s) Oral at bedtime    MEDICATIONS  (PRN):  ALBUTerol    0.083% 2.5 milliGRAM(s) Nebulizer every 6 hours PRN Wheezing  docusate sodium 100 milliGRAM(s) Oral two times a day PRN Constipation  morphine  - Injectable 2 milliGRAM(s) IV Push every 4 hours PRN Moderate Pain (4 - 6)  ondansetron Injectable 4 milliGRAM(s) IV Push every 6 hours PRN Nausea and/or Vomiting      ICU Vital Signs Last 24 Hrs  T(C): 36.3 (23 Mar 2018 08:32), Max: 36.8 (23 Mar 2018 05:00)  T(F): 97.4 (23 Mar 2018 08:32), Max: 98.2 (23 Mar 2018 05:00)  HR: 96 (23 Mar 2018 08:00) (93 - 112)  BP: 122/72 (23 Mar 2018 08:00) (120/66 - 150/75)  BP(mean): 83 (23 Mar 2018 08:00) (74 - 92)  ABP: --  ABP(mean): --  RR: 22 (23 Mar 2018 08:00) (17 - 30)  SpO2: 96% (23 Mar 2018 08:00) (90% - 100%)      I&O's Summary    22 Mar 2018 07:01  -  23 Mar 2018 07:00  --------------------------------------------------------  IN: 1865 mL / OUT: 175 mL / NET: 1690 mL                            9.3    18.12 )-----------( 163      ( 23 Mar 2018 06:29 )             28.5       03-    142  |  107  |  26<H>  ----------------------------<  103<H>  3.3<L>   |  29  |  0.83    Ca    7.9<L>      23 Mar 2018 06:29        Urinalysis Basic - ( 21 Mar 2018 18:15 )    Color: Yellow / Appearance: Clear / S.015 / pH: x  Gluc: x / Ketone: Negative  / Bili: Negative / Urobili: Negative mg/dL   Blood: x / Protein: 30 mg/dL / Nitrite: Negative   Leuk Esterase: Moderate / RBC: 25-50 /HPF / WBC 6-10   Sq Epi: x / Non Sq Epi: Few / Bacteria: Few        DVT Prophylaxis:  lovenox                                                               Advanced Directives: Full code

## 2018-03-23 NOTE — PROGRESS NOTE ADULT - ASSESSMENT
O2 sat 95% on nasal canula  has increased crackles left side  will repeat cxr  WBC improved  remains in afib  cont cefepime  DVT prophylaxis

## 2018-03-23 NOTE — CHART NOTE - NSCHARTNOTEFT_GEN_A_CORE
Assessment:   Pt on PO intake  Full Liquids  Tolerating full liquids, no GI issues  plan to advance to regular texture  Suggest DASH diet  Will monitor PO intake, tolerance, labs and weight.      Diet Presciption: Diet, Regular (03-23-18 @ 12:37)      Wt Hx:  Height (cm): 166.37 (03-14-18 @ 16:30), 165.1 (03-14-18 @ 16:13)  Weight (kg): 117.9 (03-14-18 @ 16:30), 99.8 (03-14-18 @ 16:13)  BMI (kg/m2): 42.6 (03-14-18 @ 16:30), 36.6 (03-14-18 @ 16:13)    Pertinent Medications: MEDICATIONS  (STANDING):  dextrose 5%. 1000 milliLiter(s) (75 mL/Hr) IV Continuous <Continuous>  diltiazem    milliGRAM(s) Oral daily  diltiazem    milliGRAM(s) Oral at bedtime  enoxaparin Injectable 30 milliGRAM(s) SubCutaneous every 24 hours  simvastatin 20 milliGRAM(s) Oral at bedtime    MEDICATIONS  (PRN):  ALBUTerol    0.083% 2.5 milliGRAM(s) Nebulizer every 6 hours PRN Wheezing  docusate sodium 100 milliGRAM(s) Oral two times a day PRN Constipation  morphine  - Injectable 2 milliGRAM(s) IV Push every 4 hours PRN Moderate Pain (4 - 6)  ondansetron Injectable 4 milliGRAM(s) IV Push every 6 hours PRN Nausea and/or Vomiting    Pertinent Labs: 03-23 Na142 mmol/L Glu 103 mg/dL<H> K+ 3.3 mmol/L<L> Cr  0.83 mg/dL BUN 26 mg/dL<H> 03-19 Phos 4.9 mg/dL<H>     CAPILLARY BLOOD GLUCOSE          Skin: ernesto score =       Estimated Needs:   [x ] no change since previous assessment        Nutrition Diagnosis is [ ] ongoing  [ ] resolved [ ] not applicable       Previous Goal is [ ] met [ ] partially met [ ] not met [ ] no longer applicable [ ] improving    New Nutrition Diagnosis: [ ] not applicable     Recommendations:      Monitoring and Evaluation:   [x] PO intake/Nutr support infusion [ x ] Tolerance to Nutr [ x ] weights [ x ] labs[ x ] follow up per protocol  [ ] other:

## 2018-03-23 NOTE — PROGRESS NOTE ADULT - SUBJECTIVE AND OBJECTIVE BOX
Patient is a 72y Female who had no new events.    awake  not sob   drinking fluids    REVIEW OF SYSTEMS:  negative        MEDICATIONS  (STANDING):  dextrose 5%. 1000 milliLiter(s) (75 mL/Hr) IV Continuous <Continuous>  diltiazem    milliGRAM(s) Oral daily  diltiazem    milliGRAM(s) Oral at bedtime  enoxaparin Injectable 30 milliGRAM(s) SubCutaneous every 24 hours  simvastatin 20 milliGRAM(s) Oral at bedtime        Vital Signs Last 24 Hrs  T(C): 36.3 (23 Mar 2018 08:32), Max: 36.8 (23 Mar 2018 05:00)  T(F): 97.4 (23 Mar 2018 08:32), Max: 98.2 (23 Mar 2018 05:00)  HR: 96 (23 Mar 2018 08:00) (93 - 112)  BP: 122/72 (23 Mar 2018 08:00) (120/66 - 150/75)  BP(mean): 83 (23 Mar 2018 08:00) (74 - 92)  RR: 22 (23 Mar 2018 08:00) (21 - 30)  SpO2: 96% (23 Mar 2018 08:00) (90% - 100%)    I&O's Summary    22 Mar 2018 07:01  -  23 Mar 2018 07:00  --------------------------------------------------------  IN: 1865 mL / OUT: 175 mL / NET: 1690 mL          PHYSICAL EXAM:    Constitutional: frail  HEENT:  EOMI,  MMM  Neck: No LAD, No JVD  Respiratory: good aeration, dist BS  Cardiovascular: S1 and S2, RRR  Gastrointestinal: BS+, soft, NT/ND, Left PCN draining urine   Extremities: ++ edema,   Neurological: lethargic   : No Quezada, L drain  Skin: No rashes  Access: Not applicable        LABS:                                             9.3    18.12 )-----------( 163      ( 23 Mar 2018 06:29 )             28.5                         10.0   23.32 )-----------( 154      ( 22 Mar 2018 05:20 )             31.4     142    |  107    |  26     ----------------------------<  103       23 Mar 2018 06:29  3.3     |  29     |  0.83     148    |  113    |  42     ----------------------------<  110       22 Mar 2018 05:20  3.7     |  29     |  1.11     146    |  112    |  69     ----------------------------<  116       21 Mar 2018 05:59  3.3     |  28     |  1.71     Ca    7.9        23 Mar 2018 06:29  Ca    8.4        22 Mar 2018 05:20        Urine Studies:          RADIOLOGY & ADDITIONAL STUDIES:                EXAM:  XR CHEST PORTABLE ROUTINE 1V                            PROCEDURE DATE:  03/19/2018          INTERPRETATION:  Clinical information: Edema, respiratory failure    Portable AP chest radiograph from 0836 hours:    COMPARISON:  March 17, 2018    FINDINGS:  Endotracheal tube with tip in satisfactory position above the   mahsa and enteric tube entering the stomach with tip not visualized.    Mild pulmonary interstitial edema is markedly improved as compared with   the prior study. There is nopneumothorax. More confluent right   infrahilar opacity is noted.    IMPRESSION:   Marked improvement in pulmonary edema.  More confluent opacity in the right infrahilar region could represent   superimposed pneumonia. Continued radiographic follow-up is recommended.

## 2018-03-23 NOTE — PROGRESS NOTE ADULT - SUBJECTIVE AND OBJECTIVE BOX
Subjective:  feeling fatigued  still some dyspnea    MEDICATIONS  (STANDING):  dextrose 5%. 1000 milliLiter(s) (75 mL/Hr) IV Continuous <Continuous>  diltiazem    milliGRAM(s) Oral daily  diltiazem    milliGRAM(s) Oral at bedtime  simvastatin 20 milliGRAM(s) Oral at bedtime    MEDICATIONS  (PRN):  ALBUTerol    0.083% 2.5 milliGRAM(s) Nebulizer every 6 hours PRN Wheezing  docusate sodium 100 milliGRAM(s) Oral two times a day PRN Constipation  morphine  - Injectable 2 milliGRAM(s) IV Push every 4 hours PRN Moderate Pain (4 - 6)  ondansetron Injectable 4 milliGRAM(s) IV Push every 6 hours PRN Nausea and/or Vomiting      Allergies    Macrobid (Other)    Intolerances        REVIEW OF SYSTEMS:    CONSTITUTIONAL:  As per HPI.  HEENT:  Eyes:  No diplopia or blurred vision. ENT:  No earache, sore throat or runny nose.  CARDIOVASCULAR:  No pressure, squeezing, tightness, heaviness or aching about the chest, neck, axilla or epigastrium.  RESPIRATORY:  No cough, shortness of breath, PND or orthopnea.  GASTROINTESTINAL:  No nausea, vomiting or diarrhea.  GENITOURINARY:  No dysuria, frequency or urgency.  MUSCULOSKELETAL:  no joint pain, deformity, tenderness  EXTREMITIES: no clubbing cyanosis,edema  SKIN:  No change in skin, hair or nails.  NEUROLOGIC:  No paresthesias, fasciculations, seizures or weakness.  PSYCHIATRIC:  No disorder of thought or mood.  ENDOCRINE:  No heat or cold intolerance, polyuria or polydipsia.  HEMATOLOGICAL:  No easy bruising or bleedings:    Vital Signs Last 24 Hrs  T(C): 36.8 (23 Mar 2018 05:00), Max: 36.8 (23 Mar 2018 05:00)  T(F): 98.2 (23 Mar 2018 05:00), Max: 98.2 (23 Mar 2018 05:00)  HR: 98 (23 Mar 2018 06:00) (93 - 112)  BP: 133/61 (23 Mar 2018 06:00) (111/67 - 150/75)  BP(mean): 78 (23 Mar 2018 06:00) (74 - 92)  RR: 30 (23 Mar 2018 06:00) (17 - 30)  SpO2: 94% (23 Mar 2018 06:00) (90% - 99%)    PHYSICAL EXAMINATION:  SKIN: no rashes  HEAD: NC/AT  EYES: PERRLA, EOMI  EARS: TM's intact  NOSE: no abnormalities  NECK:  Supple. No lymphadenopathy. Jugular venous pressure not elevated. Carotids equal.   HEART:  S1S2 irreg  CHEST:  left sided crackles  ABDOMEN:  Soft and nontender.   EXTREMITIES:  no C/C/E  NEURO: AAO x 3, no focal deficts       LABS:                        9.3    18.12 )-----------( 163      ( 23 Mar 2018 06:29 )             28.5     03-23    142  |  107  |  26<H>  ----------------------------<  103<H>  3.3<L>   |  29  |  0.83    Ca    7.9<L>      23 Mar 2018 06:29        Urinalysis Basic - ( 21 Mar 2018 18:15 )    Color: Yellow / Appearance: Clear / S.015 / pH: x  Gluc: x / Ketone: Negative  / Bili: Negative / Urobili: Negative mg/dL   Blood: x / Protein: 30 mg/dL / Nitrite: Negative   Leuk Esterase: Moderate / RBC: 25-50 /HPF / WBC 6-10   Sq Epi: x / Non Sq Epi: Few / Bacteria: Few        RADIOLOGY & ADDITIONAL TESTS:

## 2018-03-23 NOTE — PROGRESS NOTE ADULT - NEUROLOGICAL
Alert & oriented; no sensory, motor or coordination deficits, normal reflexes
detailed exam
detailed exam

## 2018-03-23 NOTE — PROGRESS NOTE ADULT - GASTROINTESTINAL
Soft, non-tender, no hepatosplenomegaly, normal bowel sounds
detailed exam
Soft, non-tender, no hepatosplenomegaly, normal bowel sounds
detailed exam
Soft, non-tender, no hepatosplenomegaly, normal bowel sounds
Soft, non-tender, no hepatosplenomegaly, normal bowel sounds

## 2018-03-23 NOTE — PROGRESS NOTE ADULT - ASSESSMENT
1. Patient who presented with new onset atrial fibrillation      AC on hold secondary hematuria, better nacho need to be restarted at some point  2. Obstructing kidney stone, s/p nephrostomy, e coli sepsis,  completed course of abx  3. ards, pneumonia improving,   4. renal failure - improving  5.physical therapy ambulation

## 2018-03-24 LAB
ANION GAP SERPL CALC-SCNC: 6 MMOL/L — SIGNIFICANT CHANGE UP (ref 5–17)
BUN SERPL-MCNC: 19 MG/DL — SIGNIFICANT CHANGE UP (ref 7–23)
CALCIUM SERPL-MCNC: 7.6 MG/DL — LOW (ref 8.5–10.1)
CHLORIDE SERPL-SCNC: 105 MMOL/L — SIGNIFICANT CHANGE UP (ref 96–108)
CO2 SERPL-SCNC: 30 MMOL/L — SIGNIFICANT CHANGE UP (ref 22–31)
CREAT SERPL-MCNC: 0.74 MG/DL — SIGNIFICANT CHANGE UP (ref 0.5–1.3)
GLUCOSE SERPL-MCNC: 110 MG/DL — HIGH (ref 70–99)
HCT VFR BLD CALC: 26 % — LOW (ref 34.5–45)
HGB BLD-MCNC: 8.3 G/DL — LOW (ref 11.5–15.5)
MCHC RBC-ENTMCNC: 28.4 PG — SIGNIFICANT CHANGE UP (ref 27–34)
MCHC RBC-ENTMCNC: 31.9 GM/DL — LOW (ref 32–36)
MCV RBC AUTO: 89 FL — SIGNIFICANT CHANGE UP (ref 80–100)
NRBC # BLD: 0 /100 WBCS — SIGNIFICANT CHANGE UP (ref 0–0)
PLATELET # BLD AUTO: 192 K/UL — SIGNIFICANT CHANGE UP (ref 150–400)
POTASSIUM SERPL-MCNC: 3.3 MMOL/L — LOW (ref 3.5–5.3)
POTASSIUM SERPL-SCNC: 3.3 MMOL/L — LOW (ref 3.5–5.3)
RBC # BLD: 2.92 M/UL — LOW (ref 3.8–5.2)
RBC # FLD: 16.1 % — HIGH (ref 10.3–14.5)
SODIUM SERPL-SCNC: 141 MMOL/L — SIGNIFICANT CHANGE UP (ref 135–145)
WBC # BLD: 18.22 K/UL — HIGH (ref 3.8–10.5)
WBC # FLD AUTO: 18.22 K/UL — HIGH (ref 3.8–10.5)

## 2018-03-24 RX ORDER — POTASSIUM CHLORIDE 20 MEQ
40 PACKET (EA) ORAL EVERY 4 HOURS
Qty: 0 | Refills: 0 | Status: COMPLETED | OUTPATIENT
Start: 2018-03-24 | End: 2018-03-24

## 2018-03-24 RX ORDER — IPRATROPIUM/ALBUTEROL SULFATE 18-103MCG
3 AEROSOL WITH ADAPTER (GRAM) INHALATION EVERY 6 HOURS
Qty: 0 | Refills: 0 | Status: DISCONTINUED | OUTPATIENT
Start: 2018-03-24 | End: 2018-03-30

## 2018-03-24 RX ADMIN — SODIUM CHLORIDE 75 MILLILITER(S): 9 INJECTION, SOLUTION INTRAVENOUS at 21:30

## 2018-03-24 RX ADMIN — MORPHINE SULFATE 2 MILLIGRAM(S): 50 CAPSULE, EXTENDED RELEASE ORAL at 22:19

## 2018-03-24 RX ADMIN — SODIUM CHLORIDE 75 MILLILITER(S): 9 INJECTION, SOLUTION INTRAVENOUS at 09:27

## 2018-03-24 RX ADMIN — ENOXAPARIN SODIUM 30 MILLIGRAM(S): 100 INJECTION SUBCUTANEOUS at 16:32

## 2018-03-24 RX ADMIN — Medication 40 MILLIEQUIVALENT(S): at 16:32

## 2018-03-24 RX ADMIN — Medication 120 MILLIGRAM(S): at 21:30

## 2018-03-24 RX ADMIN — Medication 40 MILLIEQUIVALENT(S): at 11:40

## 2018-03-24 RX ADMIN — Medication 100 MILLIGRAM(S): at 09:22

## 2018-03-24 RX ADMIN — SIMVASTATIN 20 MILLIGRAM(S): 20 TABLET, FILM COATED ORAL at 21:30

## 2018-03-24 RX ADMIN — Medication 3 MILLILITER(S): at 16:34

## 2018-03-24 RX ADMIN — Medication 180 MILLIGRAM(S): at 05:11

## 2018-03-24 RX ADMIN — MORPHINE SULFATE 2 MILLIGRAM(S): 50 CAPSULE, EXTENDED RELEASE ORAL at 22:34

## 2018-03-24 RX ADMIN — Medication 3 MILLILITER(S): at 19:42

## 2018-03-24 RX ADMIN — Medication 100 MILLIGRAM(S): at 16:31

## 2018-03-24 NOTE — PROGRESS NOTE ADULT - SUBJECTIVE AND OBJECTIVE BOX
HPI:     Mrs. Clancy is a 72-year-old female who initially presented to the hospital for preoperative evaluation for knee surgery. She is found to be in atrial fibrillation and was sent to emergency room.  Patient was complaining of flank pain. A CT scan of the abdomen and pelvis was performed which showed hydronephrosis with a kidney stone. There was also evidence of a left lower lobe infiltrate.  Patient  had nephrostomy tube  was than a code sepsis in PACU.  Blood cultures were positive for Escherichia coli. She developed sepsis with hypotension and was resuscitated with over 3 L of fluid. She developed significant shortness of breath which was treated with BiPAP . she was subseqauently intubated. cxr showed pneumonia, vs. failure, vs. ARDS,   acute renal failure, creatinine  back to baseline, still slightly sob, completed course of abx. weak  IV heparin held secondary to  hematuria.     3/24: urine clear  feels weak  Hb 8.374/F admitted with  K 3.3      PHYSICAL EXAM:    Daily     Daily     ICU Vital Signs Last 24 Hrs  T(C): 36.9 (24 Mar 2018 05:10), Max: 36.9 (24 Mar 2018 05:10)  T(F): 98.5 (24 Mar 2018 05:10), Max: 98.5 (24 Mar 2018 05:10)  HR: 94 (24 Mar 2018 05:10) (93 - 114)  BP: 125/62 (24 Mar 2018 05:10) (113/52 - 127/55)  BP(mean): 80 (23 Mar 2018 16:00) (67 - 85)  ABP: --  ABP(mean): --  RR: 20 (24 Mar 2018 05:10) (20 - 32)  SpO2: 97% (24 Mar 2018 05:10) (94% - 99%)      Constitutional: Weak appearing  HEENT: Atraumatic,  Respiratory: Breath Sounds normal, no rhonchi/wheeze  Cardiovascular: N S1S2; АННА present  Gastrointestinal: Abdomen soft, non tender, Bowel Sounds present; Left sided nephrostomy tube  Extremities: No edema, peripheral pulses present  Neurological: AAO x 3, no gross focal motor deficits                          8.3    18.22 )-----------( 192      ( 24 Mar 2018 05:58 )             26.0       CBC Full  -  ( 24 Mar 2018 05:58 )  WBC Count : 18.22 K/uL  Hemoglobin : 8.3 g/dL  Hematocrit : 26.0 %  Platelet Count - Automated : 192 K/uL  Mean Cell Volume : 89.0 fl  Mean Cell Hemoglobin : 28.4 pg  Mean Cell Hemoglobin Concentration : 31.9 gm/dL  Auto Neutrophil # : x  Auto Lymphocyte # : x  Auto Monocyte # : x  Auto Eosinophil # : x  Auto Basophil # : x  Auto Neutrophil % : x  Auto Lymphocyte % : x  Auto Monocyte % : x  Auto Eosinophil % : x  Auto Basophil % : x      03-24    141  |  105  |  19  ----------------------------<  110<H>  3.3<L>   |  30  |  0.74    Ca    7.6<L>      24 Mar 2018 05:58                              MEDICATIONS  (STANDING):  dextrose 5%. 1000 milliLiter(s) (75 mL/Hr) IV Continuous <Continuous>  diltiazem    milliGRAM(s) Oral daily  diltiazem    milliGRAM(s) Oral at bedtime  enoxaparin Injectable 30 milliGRAM(s) SubCutaneous every 24 hours  potassium chloride    Tablet ER 40 milliEquivalent(s) Oral every 4 hours  simvastatin 20 milliGRAM(s) Oral at bedtime

## 2018-03-24 NOTE — PROGRESS NOTE ADULT - ASSESSMENT
O2 sat 95% on nasal canula  has increased crackles left side  cxr noted  WBC improved but remains elevated  remains in afib  off abx  DVT prophylaxis

## 2018-03-24 NOTE — PROGRESS NOTE ADULT - ASSESSMENT
72/F downgraded from ICU service for:    1. New onset atrial fibrillation:      AC on hold secondary hematuria,   would restart in a day or so if no hematuria reported  2. Obstructing kidney stone, s/p Left sided nephrostomy, e coli sepsis,  completed course of abx  3. ARDS, pneumonia improving,   4. renal failure - improving; Cr normal today  5.physical therapy ambulation  6. Hypokalemia of 3.3: replace and recheck  7. Anemia, Acute on chronic, sec to hematuria , monitor for now  8. DVT PPX: lovenox s/q    poc discussed with pt, team

## 2018-03-24 NOTE — PROGRESS NOTE ADULT - SUBJECTIVE AND OBJECTIVE BOX
Patient is a 72y old  Female who presents with a chief complaint of came for pre op testing (15 Mar 2018 00:51)    HPI Pt transferred to ED from admitting d/t abnormal ECG demostrating AF with RVR. Office redocrds reviewed. This is new AF. States having on and off leg swelling but no orthopnea, angina, palp, PND.      3/15 - Not feeling better, had episode of sweating and SOB recently. Blood work revealed hydronephrosis, obstructive calculus, UTI, questionable pneumonia, low TSH, elevated WBC, elevated cr. Tele with AF and VR around 135 now.  CT reviewed, no signs of pleural effusions or lung edema in included lungs segments. ECHO reviewed too. Mild AS, MR, preserved LVEF.   Suggest starting IV cardizem drip titratable to HR < 100. Also start UFH IV for stroke ppx. Cont PO cardizem as well, this will assist in weaning her off IV cardizem ultimately. Cont statin too.   Obtain urology consult re: hydronephrosis. Also endocrinology re: low TSH.  Would defer stress MPI until her HR is more stable and normal.  Discussed with patient also re: CV. She would need 3 weeks strict OAC and therefore gigi also interfere should she need percutaneous hydronephrosis drainage, not to mention her future knee surgery as discussed yesterday. Should HR become an issue difficult to control with drugs, would then proceed to CV and deal with the rest later on as needed. For time being, and as long as she is hemodynamically stable, would cont rate control, AC and address first UTI and possible hyperthyroidism.  Case d/w hospitalist.  Will follow    3/16 - Pt on BiPAP, in no resp distress at moment. Tele AF with HR round 70-90. BP stable off pressors. Awake and alert. Had resp distress yesterday after PCN, possibly diastolic heart failure. Suggest continuing IV diuresis as needed, continue rate control with IV diltiazem, will start weaning her to PO once off BiPAP. Continue UFH, will transition to OAC once kidney fx is stable as well. Will continue to defer stress test and CV for time being.    3/17-  Hasn't responded to BIPAP and seemed obtunded today. Chest xray showed worsened heart failure. Critical care decided to intubate patient.   She remains in afib. On a cardizem drip. After sedation, blood pressure decreased to the high 90s.  overnight, her vital signs have been stable.   Received Lasix 40 mg IV at 5 am and has responded with urine output.     3/18-  Intubated yesterday.   Cardizem drip was discontinued and started on QID cardizem by OGT. She has tolerated it well with good heart rate control and adequate blood pressures.   Was started on a Bumex drip. Has diuresed over 2000 ml and there was been an increase in the serum creatinine.     3/19 - Examined today, on sedation and vent. CXR 3/17 and clinically without significant improvement despite aggressive IV diuresis and significant UOP, rather her Cr went up. I suspect her bilateral alveolar infiltrates and resp distress may not be cardiogenic at the moment ( ARDS in setting of sepsis?). Would suggest cont to hold diuretics and cont optimal vent support while addressing other acute clinical issues. Appreciated pulm and nephro input.  She remains in AF with appropriate rate control on CCB via OGT. Cont that as well.   Will follow up closely.     3/20 - Sedated, in no distress, FIO2 40% on vent and sats 99%, hemodynamically stable of pressors, rate controlled on CCB via OGT, on UFH, H/H stable, if needed can hold it.  CXR much improved yesterday off IV diuretics, keep holding that, Cr getting better too.   Abx per ID  Vent mgt per ICU/Pulm  Will follow up closely.     3/21 - Doing better on aerosol mask, good sats, thirsty. Hemodynamically stable off pressors, HR trending up in AF, would switch her to ER Cardizem PO and titrate accordingly or would resume IV drip if unable to tolerate PO route.  Can hold off heparin for the moment d/t bleeding issues, would resume it with OAC once cleared from this. Will defer stress test for later when she is clinically stable. Can use IV furosemide as needed.   Will follow up closely.   Case d/w ICU staff    3/22 - doing well on NC, CXR yesterday with minimal mostly left side infiltrates can use lasix prn. I added a QHS dose of cardizem CD with holding parameters for better average HR control. Cont rest of management as it is.  Will follow closely.   D/W nursing staff.       3/23 - Improving clinically, heart rate better controlled. Azotemia and leukocytosis improving too.  Will follow up.     3/24: transferred to telemtry. AF on monitor with rate , on PO Cardizem.     Allergies    Macrobid (Other)    Intolerances        MEDICATIONS  (STANDING):  ALBUTerol/ipratropium for Nebulization 3 milliLiter(s) Nebulizer every 6 hours  aspirin 325 milliGRAM(s) Oral daily  azithromycin  IVPB 500 milliGRAM(s) IV Intermittent every 24 hours  buMETAnide Infusion 1 mG/Hr (10 mL/Hr) IV Continuous <Continuous>  cefepime  IVPB      cefepime  IVPB 1000 milliGRAM(s) IV Intermittent every 12 hours  chlorhexidine 0.12% Liquid 15 milliLiter(s) Swish and Spit two times a day  diltiazem    Tablet 60 milliGRAM(s) Oral every 4 hours  heparin  Infusion.  Unit(s)/Hr (18 mL/Hr) IV Continuous <Continuous>  methylPREDNISolone sodium succinate Injectable 40 milliGRAM(s) IV Push every 8 hours  propofol Infusion 10 MICROgram(s)/kG/Min (5.988 mL/Hr) IV Continuous <Continuous>  simvastatin 20 milliGRAM(s) Oral at bedtime    MEDICATIONS  (PRN):  docusate sodium 100 milliGRAM(s) Oral two times a day PRN Constipation  heparin  Injectable 8000 Unit(s) IV Push every 6 hours PRN For aPTT less than 40  heparin  Injectable 4000 Unit(s) IV Push every 6 hours PRN For aPTT between 40 - 57  LORazepam   Injectable 1 milliGRAM(s) IV Push every 4 hours PRN Anxiety  ondansetron Injectable 4 milliGRAM(s) IV Push every 6 hours PRN Nausea and/or Vomiting  oxyCODONE    5 mG/acetaminophen 325 mG 1 Tablet(s) Oral every 4 hours PRN Moderate Pain (4 - 6)    REVIEW OF SYSTEMS:    RESPIRATORY: No cough, wheezing, hemoptysis; No shortness of breath  CARDIOVASCULAR: No chest pain or palpitations  All other review of systems is negative unless indicated above      PHYSICAL EXAM:  Daily     Daily Weight in k.5 (18 Mar 2018 05:00)  Vital Signs Last 24 Hrs  T(C): 36.4 (18 Mar 2018 08:51), Max: 36.9 (17 Mar 2018 21:22)  T(F): 97.5 (18 Mar 2018 08:51), Max: 98.4 (17 Mar 2018 21:22)  HR: 91 (18 Mar 2018 08:00) (86 - 106)  BP: 107/64 (18 Mar 2018 08:00) (85/49 - 137/110)  BP(mean): 73 (18 Mar 2018 08:00) (56 - 115)  RR: 29 (18 Mar 2018 08:00) (26 - 34)  SpO2: 99% (18 Mar 2018 08:00) (94% - 100%)    Constitutional: NAD, awake and alert, well-developed  HEENT: PERR, EOMI, Normal Hearing, MMM  Neck: Soft and supple, No LAD, No JVD  Respiratory: Breath sounds are clear bilaterally, No wheezing, rales or rhonchi  Cardiovascular: S1 and S2, regular rate and rhythm, no Murmurs, gallops or rubs  Gastrointestinal: Bowel Sounds present, soft, nontender, nondistended, no guarding, no rebound  Extremities: No peripheral edema  Vascular: 2+ peripheral pulses  Neurological: A/O x 3, no focal deficits  Musculoskeletal: 5/5 strength b/l upper and lower extremities  Skin: No rashes    LABS: All Labs Reviewed:                        11.6   15.79 )-----------( 160      ( 18 Mar 2018 05:16 )             35.9     03-18    140  |  105  |  78<H>  ----------------------------<  178<H>  3.7   |  22  |  3.04<H>    Ca    8.3<L>      18 Mar 2018 05:16  Phos  4.9     03-17  Mg     2.3     03-17      PTT - ( 18 Mar 2018 05:16 )  PTT:43.9 sec          TELEMETRY/EKG: rate controlled Afib

## 2018-03-24 NOTE — PROGRESS NOTE ADULT - SUBJECTIVE AND OBJECTIVE BOX
Subjective:  awake and alert  mild dyspnea    MEDICATIONS  (STANDING):  dextrose 5%. 1000 milliLiter(s) (75 mL/Hr) IV Continuous <Continuous>  diltiazem    milliGRAM(s) Oral daily  diltiazem    milliGRAM(s) Oral at bedtime  enoxaparin Injectable 30 milliGRAM(s) SubCutaneous every 24 hours  simvastatin 20 milliGRAM(s) Oral at bedtime    MEDICATIONS  (PRN):  ALBUTerol    0.083% 2.5 milliGRAM(s) Nebulizer every 6 hours PRN Wheezing  docusate sodium 100 milliGRAM(s) Oral two times a day PRN Constipation  guaiFENesin    Syrup 100 milliGRAM(s) Oral every 6 hours PRN Cough  morphine  - Injectable 2 milliGRAM(s) IV Push every 4 hours PRN Moderate Pain (4 - 6)  ondansetron Injectable 4 milliGRAM(s) IV Push every 6 hours PRN Nausea and/or Vomiting      Allergies    Macrobid (Other)    Intolerances        REVIEW OF SYSTEMS:    CONSTITUTIONAL:  As per HPI.  HEENT:  Eyes:  No diplopia or blurred vision. ENT:  No earache, sore throat or runny nose.  CARDIOVASCULAR:  No pressure, squeezing, tightness, heaviness or aching about the chest, neck, axilla or epigastrium.  RESPIRATORY:  No cough, shortness of breath, PND or orthopnea.  GASTROINTESTINAL:  No nausea, vomiting or diarrhea.  GENITOURINARY:  No dysuria, frequency or urgency.  MUSCULOSKELETAL:  no joint pain, deformity, tenderness  EXTREMITIES: no clubbing cyanosis,edema  SKIN:  No change in skin, hair or nails.  NEUROLOGIC:  No paresthesias, fasciculations, seizures or weakness.  PSYCHIATRIC:  No disorder of thought or mood.  ENDOCRINE:  No heat or cold intolerance, polyuria or polydipsia.  HEMATOLOGICAL:  No easy bruising or bleedings:    Vital Signs Last 24 Hrs  T(C): 36.9 (24 Mar 2018 05:10), Max: 36.9 (24 Mar 2018 05:10)  T(F): 98.5 (24 Mar 2018 05:10), Max: 98.5 (24 Mar 2018 05:10)  HR: 94 (24 Mar 2018 05:10) (93 - 114)  BP: 125/62 (24 Mar 2018 05:10) (113/52 - 127/55)  BP(mean): 80 (23 Mar 2018 16:00) (67 - 85)  RR: 20 (24 Mar 2018 05:10) (20 - 32)  SpO2: 97% (24 Mar 2018 05:10) (94% - 99%)    PHYSICAL EXAMINATION:  SKIN: no rashes  HEAD: NC/AT  EYES: PERRLA, EOMI  EARS: TM's intact  NOSE: no abnormalities  NECK:  Supple. No lymphadenopathy. Jugular venous pressure not elevated. Carotids equal.   HEART:   S1S2 reg  CHEST:  bilateral ronchi  ABDOMEN:  Soft and nontender.   EXTREMITIES:  + edema  NEURO: AAO x 3, no focal deficts       LABS:                        8.3    18.22 )-----------( 192      ( 24 Mar 2018 05:58 )             26.0     03-24    141  |  105  |  19  ----------------------------<  110<H>  3.3<L>   |  30  |  0.74    Ca    7.6<L>      24 Mar 2018 05:58            RADIOLOGY & ADDITIONAL TESTS:

## 2018-03-25 LAB
ANION GAP SERPL CALC-SCNC: 7 MMOL/L — SIGNIFICANT CHANGE UP (ref 5–17)
BUN SERPL-MCNC: 16 MG/DL — SIGNIFICANT CHANGE UP (ref 7–23)
CALCIUM SERPL-MCNC: 8 MG/DL — LOW (ref 8.5–10.1)
CHLORIDE SERPL-SCNC: 107 MMOL/L — SIGNIFICANT CHANGE UP (ref 96–108)
CO2 SERPL-SCNC: 28 MMOL/L — SIGNIFICANT CHANGE UP (ref 22–31)
CREAT SERPL-MCNC: 0.79 MG/DL — SIGNIFICANT CHANGE UP (ref 0.5–1.3)
GLUCOSE SERPL-MCNC: 107 MG/DL — HIGH (ref 70–99)
HCT VFR BLD CALC: 26.1 % — LOW (ref 34.5–45)
HGB BLD-MCNC: 8.5 G/DL — LOW (ref 11.5–15.5)
MCHC RBC-ENTMCNC: 29 PG — SIGNIFICANT CHANGE UP (ref 27–34)
MCHC RBC-ENTMCNC: 32.6 GM/DL — SIGNIFICANT CHANGE UP (ref 32–36)
MCV RBC AUTO: 89.1 FL — SIGNIFICANT CHANGE UP (ref 80–100)
NRBC # BLD: 0 /100 WBCS — SIGNIFICANT CHANGE UP (ref 0–0)
PLATELET # BLD AUTO: 242 K/UL — SIGNIFICANT CHANGE UP (ref 150–400)
POTASSIUM SERPL-MCNC: 4 MMOL/L — SIGNIFICANT CHANGE UP (ref 3.5–5.3)
POTASSIUM SERPL-SCNC: 4 MMOL/L — SIGNIFICANT CHANGE UP (ref 3.5–5.3)
RAPID RVP RESULT: SIGNIFICANT CHANGE UP
RBC # BLD: 2.93 M/UL — LOW (ref 3.8–5.2)
RBC # FLD: 16.6 % — HIGH (ref 10.3–14.5)
SODIUM SERPL-SCNC: 142 MMOL/L — SIGNIFICANT CHANGE UP (ref 135–145)
WBC # BLD: 18.62 K/UL — HIGH (ref 3.8–10.5)
WBC # FLD AUTO: 18.62 K/UL — HIGH (ref 3.8–10.5)

## 2018-03-25 PROCEDURE — 71045 X-RAY EXAM CHEST 1 VIEW: CPT | Mod: 26

## 2018-03-25 PROCEDURE — 71250 CT THORAX DX C-: CPT | Mod: 26

## 2018-03-25 PROCEDURE — 99233 SBSQ HOSP IP/OBS HIGH 50: CPT

## 2018-03-25 RX ORDER — DILTIAZEM HCL 120 MG
180 CAPSULE, EXT RELEASE 24 HR ORAL AT BEDTIME
Qty: 0 | Refills: 0 | Status: DISCONTINUED | OUTPATIENT
Start: 2018-03-25 | End: 2018-03-30

## 2018-03-25 RX ADMIN — Medication 3 MILLILITER(S): at 01:56

## 2018-03-25 RX ADMIN — MORPHINE SULFATE 2 MILLIGRAM(S): 50 CAPSULE, EXTENDED RELEASE ORAL at 21:31

## 2018-03-25 RX ADMIN — Medication 3 MILLILITER(S): at 08:20

## 2018-03-25 RX ADMIN — Medication 3 MILLILITER(S): at 19:58

## 2018-03-25 RX ADMIN — ENOXAPARIN SODIUM 30 MILLIGRAM(S): 100 INJECTION SUBCUTANEOUS at 15:49

## 2018-03-25 RX ADMIN — MORPHINE SULFATE 2 MILLIGRAM(S): 50 CAPSULE, EXTENDED RELEASE ORAL at 21:46

## 2018-03-25 RX ADMIN — Medication 180 MILLIGRAM(S): at 21:31

## 2018-03-25 RX ADMIN — SIMVASTATIN 20 MILLIGRAM(S): 20 TABLET, FILM COATED ORAL at 21:31

## 2018-03-25 RX ADMIN — Medication 180 MILLIGRAM(S): at 05:51

## 2018-03-25 NOTE — PROGRESS NOTE ADULT - ASSESSMENT
72/F downgraded from ICU service for:    1. New onset atrial fibrillation:      AC on hold secondary to hematuria,   would restart in a day or so if no hematuria reported  2. Obstructing kidney stone, s/p Left sided nephrostomy, e coli sepsis,  completed course of abx  3. ARDS, acute hypoxic respiratory failure +  pneumonia:  improving, c/o dry cough with room mate found to have HMPV; check CXR, RVPanel  4. renal failure - improving; Cr normal today  5. Physical therapy for ambulation  6. Hypokalemia of 3.3: replace and rechecked; 4.0  7. Anemia, Acute on chronic, sec to hematuria , monitor for now; stable  8. DVT PPX: lovenox s/q    poc discussed with pt, team

## 2018-03-25 NOTE — PROGRESS NOTE ADULT - SUBJECTIVE AND OBJECTIVE BOX
Subjective:  less dyspneic  some cough  still desats w exertion    MEDICATIONS  (STANDING):  ALBUTerol/ipratropium for Nebulization 3 milliLiter(s) Nebulizer every 6 hours  diltiazem    milliGRAM(s) Oral daily  diltiazem    milliGRAM(s) Oral at bedtime  enoxaparin Injectable 30 milliGRAM(s) SubCutaneous every 24 hours  simvastatin 20 milliGRAM(s) Oral at bedtime    MEDICATIONS  (PRN):  ALBUTerol    0.083% 2.5 milliGRAM(s) Nebulizer every 6 hours PRN Wheezing  docusate sodium 100 milliGRAM(s) Oral two times a day PRN Constipation  guaiFENesin    Syrup 100 milliGRAM(s) Oral every 6 hours PRN Cough  morphine  - Injectable 2 milliGRAM(s) IV Push every 4 hours PRN Moderate Pain (4 - 6)  ondansetron Injectable 4 milliGRAM(s) IV Push every 6 hours PRN Nausea and/or Vomiting      Allergies    Macrobid (Other)    Intolerances        REVIEW OF SYSTEMS:    CONSTITUTIONAL:  As per HPI.  HEENT:  Eyes:  No diplopia or blurred vision. ENT:  No earache, sore throat or runny nose.  CARDIOVASCULAR:  No pressure, squeezing, tightness, heaviness or aching about the chest, neck, axilla or epigastrium.  RESPIRATORY:  cough,dyspnea  GASTROINTESTINAL:  No nausea, vomiting or diarrhea.  GENITOURINARY:  No dysuria, frequency or urgency.  MUSCULOSKELETAL:  no joint pain, deformity, tenderness  EXTREMITIES: no clubbing cyanosis,edema  SKIN:  No change in skin, hair or nails.  NEUROLOGIC:  No paresthesias, fasciculations, seizures or weakness.  PSYCHIATRIC:  No disorder of thought or mood.  ENDOCRINE:  No heat or cold intolerance, polyuria or polydipsia.  HEMATOLOGICAL:  No easy bruising or bleedings:    Vital Signs Last 24 Hrs  T(C): 36.7 (25 Mar 2018 05:49), Max: 36.7 (24 Mar 2018 16:17)  T(F): 98 (25 Mar 2018 05:49), Max: 98.1 (24 Mar 2018 16:17)  HR: 118 (25 Mar 2018 08:35) (99 - 126)  BP: 131/64 (25 Mar 2018 05:49) (114/62 - 131/64)  BP(mean): --  RR: 22 (25 Mar 2018 05:49) (20 - 28)  SpO2: 95% (25 Mar 2018 05:49) (92% - 97%)    PHYSICAL EXAMINATION:  SKIN: no rashes  HEAD: NC/AT  EYES: PERRLA, EOMI  EARS: TM's intact  NOSE: no abnormalities  NECK:  Supple. No lymphadenopathy. Jugular venous pressure not elevated. Carotids equal.   HEART:   The cardiac impulse has a normal quality. Reg., Nl S1 and S2.  There are no murmurs, rubs or gallops noted  CHEST:  bilateral ronchi w decreased BS bases  ABDOMEN:  Soft and nontender.   EXTREMITIES:  no C/C/E  NEURO: AAO x 3, no focal deficts       LABS:                        8.5    18.62 )-----------( 242      ( 25 Mar 2018 07:02 )             26.1     03-25    142  |  107  |  16  ----------------------------<  107<H>  4.0   |  28  |  0.79    Ca    8.0<L>      25 Mar 2018 07:02            RADIOLOGY & ADDITIONAL TESTS:

## 2018-03-25 NOTE — PROGRESS NOTE ADULT - ASSESSMENT
O2 sat 95% on nasal canula  desats w exertion  will repeat CT scan chest  WBC improved but remains elevated  has human metapneumovirus  remains in afib  off abx  DVT prophylaxis

## 2018-03-25 NOTE — PROGRESS NOTE ADULT - SUBJECTIVE AND OBJECTIVE BOX
HPI:     Mrs. Clancy is a 72-year-old female who initially presented to the hospital for preoperative evaluation for knee surgery. She is found to be in atrial fibrillation and was sent to emergency room.  Patient was complaining of flank pain. A CT scan of the abdomen and pelvis was performed which showed hydronephrosis with a kidney stone. There was also evidence of a left lower lobe infiltrate.  Patient  had nephrostomy tube  was than a code sepsis in PACU.  Blood cultures were positive for Escherichia coli. She developed sepsis with hypotension and was resuscitated with over 3 L of fluid. She developed significant shortness of breath which was treated with BiPAP . she was subseqauently intubated. cxr showed pneumonia, vs. failure, vs. ARDS,   acute renal failure, creatinine  back to baseline, still slightly sob, completed course of abx. weak  IV heparin held secondary to  hematuria.     3/24: urine clear  feels weak  Hb 8.3  K 3.3    3/25: c/o dry cough  room mate found to have HMPV  K 4  Hb 8.5      PHYSICAL EXAM:    Daily     Daily     ICU Vital Signs Last 24 Hrs  T(C): 36.7 (25 Mar 2018 05:49), Max: 36.7 (24 Mar 2018 16:17)  T(F): 98 (25 Mar 2018 05:49), Max: 98.1 (24 Mar 2018 16:17)  HR: 118 (25 Mar 2018 08:35) (99 - 126)  BP: 131/64 (25 Mar 2018 05:49) (114/62 - 131/64)  BP(mean): --  ABP: --  ABP(mean): --  RR: 22 (25 Mar 2018 05:49) (20 - 28)  SpO2: 95% (25 Mar 2018 05:49) (92% - 97%)      Constitutional: Weak appearing using O2 via nc  HEENT: Atraumatic, VERNELL, Normal, No congestion  Respiratory: Breath Sounds normal, no rhonchi/wheeze  Cardiovascular: N S1S2; АННА present  Gastrointestinal: Left sided nephrostomy tube , Abdomen soft, non tender, Bowel Sounds present  Extremities: No edema, peripheral pulses present  Neurological: AAO x 3, no gross focal motor deficits                          8.5    18.62 )-----------( 242      ( 25 Mar 2018 07:02 )             26.1       CBC Full  -  ( 25 Mar 2018 07:02 )  WBC Count : 18.62 K/uL  Hemoglobin : 8.5 g/dL  Hematocrit : 26.1 %  Platelet Count - Automated : 242 K/uL  Mean Cell Volume : 89.1 fl  Mean Cell Hemoglobin : 29.0 pg  Mean Cell Hemoglobin Concentration : 32.6 gm/dL  Auto Neutrophil # : x  Auto Lymphocyte # : x  Auto Monocyte # : x  Auto Eosinophil # : x  Auto Basophil # : x  Auto Neutrophil % : x  Auto Lymphocyte % : x  Auto Monocyte % : x  Auto Eosinophil % : x  Auto Basophil % : x      03-25    142  |  107  |  16  ----------------------------<  107<H>  4.0   |  28  |  0.79    Ca    8.0<L>      25 Mar 2018 07:02                              MEDICATIONS  (STANDING):  ALBUTerol/ipratropium for Nebulization 3 milliLiter(s) Nebulizer every 6 hours  diltiazem    milliGRAM(s) Oral daily  diltiazem    milliGRAM(s) Oral at bedtime  enoxaparin Injectable 30 milliGRAM(s) SubCutaneous every 24 hours  simvastatin 20 milliGRAM(s) Oral at bedtime

## 2018-03-25 NOTE — PROGRESS NOTE ADULT - SUBJECTIVE AND OBJECTIVE BOX
Patient is a 72y old  Female who presents with a chief complaint of came for pre op testing (15 Mar 2018 00:51)    HPI Pt transferred to ED from admitting d/t abnormal ECG demostrating AF with RVR. Office redocrds reviewed. This is new AF. States having on and off leg swelling but no orthopnea, angina, palp, PND.      3/15 - Not feeling better, had episode of sweating and SOB recently. Blood work revealed hydronephrosis, obstructive calculus, UTI, questionable pneumonia, low TSH, elevated WBC, elevated cr. Tele with AF and VR around 135 now.  CT reviewed, no signs of pleural effusions or lung edema in included lungs segments. ECHO reviewed too. Mild AS, MR, preserved LVEF.   Suggest starting IV cardizem drip titratable to HR < 100. Also start UFH IV for stroke ppx. Cont PO cardizem as well, this will assist in weaning her off IV cardizem ultimately. Cont statin too.   Obtain urology consult re: hydronephrosis. Also endocrinology re: low TSH.  Would defer stress MPI until her HR is more stable and normal.  Discussed with patient also re: CV. She would need 3 weeks strict OAC and therefore gigi also interfere should she need percutaneous hydronephrosis drainage, not to mention her future knee surgery as discussed yesterday. Should HR become an issue difficult to control with drugs, would then proceed to CV and deal with the rest later on as needed. For time being, and as long as she is hemodynamically stable, would cont rate control, AC and address first UTI and possible hyperthyroidism.  Case d/w hospitalist.  Will follow    3/16 - Pt on BiPAP, in no resp distress at moment. Tele AF with HR round 70-90. BP stable off pressors. Awake and alert. Had resp distress yesterday after PCN, possibly diastolic heart failure. Suggest continuing IV diuresis as needed, continue rate control with IV diltiazem, will start weaning her to PO once off BiPAP. Continue UFH, will transition to OAC once kidney fx is stable as well. Will continue to defer stress test and CV for time being.    3/17-  Hasn't responded to BIPAP and seemed obtunded today. Chest xray showed worsened heart failure. Critical care decided to intubate patient.   She remains in afib. On a cardizem drip. After sedation, blood pressure decreased to the high 90s.  overnight, her vital signs have been stable.   Received Lasix 40 mg IV at 5 am and has responded with urine output.     3/18-  Intubated yesterday.   Cardizem drip was discontinued and started on QID cardizem by OGT. She has tolerated it well with good heart rate control and adequate blood pressures.   Was started on a Bumex drip. Has diuresed over 2000 ml and there was been an increase in the serum creatinine.     3/19 - Examined today, on sedation and vent. CXR 3/17 and clinically without significant improvement despite aggressive IV diuresis and significant UOP, rather her Cr went up. I suspect her bilateral alveolar infiltrates and resp distress may not be cardiogenic at the moment ( ARDS in setting of sepsis?). Would suggest cont to hold diuretics and cont optimal vent support while addressing other acute clinical issues. Appreciated pulm and nephro input.  She remains in AF with appropriate rate control on CCB via OGT. Cont that as well.   Will follow up closely.     3/20 - Sedated, in no distress, FIO2 40% on vent and sats 99%, hemodynamically stable of pressors, rate controlled on CCB via OGT, on UFH, H/H stable, if needed can hold it.  CXR much improved yesterday off IV diuretics, keep holding that, Cr getting better too.   Abx per ID  Vent mgt per ICU/Pulm  Will follow up closely.     3/21 - Doing better on aerosol mask, good sats, thirsty. Hemodynamically stable off pressors, HR trending up in AF, would switch her to ER Cardizem PO and titrate accordingly or would resume IV drip if unable to tolerate PO route.  Can hold off heparin for the moment d/t bleeding issues, would resume it with OAC once cleared from this. Will defer stress test for later when she is clinically stable. Can use IV furosemide as needed.   Will follow up closely.   Case d/w ICU staff    3/22 - doing well on NC, CXR yesterday with minimal mostly left side infiltrates can use lasix prn. I added a QHS dose of cardizem CD with holding parameters for better average HR control. Cont rest of management as it is.  Will follow closely.   D/W nursing staff.       3/23 - Improving clinically, heart rate better controlled. Azotemia and leukocytosis improving too.  Will follow up.     3/24: transferred to telemtry. AF on monitor with rate , on PO Cardizem.     3/25: AF on monitor with -110. BP stable. Will increase Cardizem CD.    Allergies    Macrobid (Other)    Intolerances        MEDICATIONS  (STANDING):  ALBUTerol/ipratropium for Nebulization 3 milliLiter(s) Nebulizer every 6 hours  aspirin 325 milliGRAM(s) Oral daily  azithromycin  IVPB 500 milliGRAM(s) IV Intermittent every 24 hours  buMETAnide Infusion 1 mG/Hr (10 mL/Hr) IV Continuous <Continuous>  cefepime  IVPB      cefepime  IVPB 1000 milliGRAM(s) IV Intermittent every 12 hours  chlorhexidine 0.12% Liquid 15 milliLiter(s) Swish and Spit two times a day  diltiazem    Tablet 60 milliGRAM(s) Oral every 4 hours  heparin  Infusion.  Unit(s)/Hr (18 mL/Hr) IV Continuous <Continuous>  methylPREDNISolone sodium succinate Injectable 40 milliGRAM(s) IV Push every 8 hours  propofol Infusion 10 MICROgram(s)/kG/Min (5.988 mL/Hr) IV Continuous <Continuous>  simvastatin 20 milliGRAM(s) Oral at bedtime    MEDICATIONS  (PRN):  docusate sodium 100 milliGRAM(s) Oral two times a day PRN Constipation  heparin  Injectable 8000 Unit(s) IV Push every 6 hours PRN For aPTT less than 40  heparin  Injectable 4000 Unit(s) IV Push every 6 hours PRN For aPTT between 40 - 57  LORazepam   Injectable 1 milliGRAM(s) IV Push every 4 hours PRN Anxiety  ondansetron Injectable 4 milliGRAM(s) IV Push every 6 hours PRN Nausea and/or Vomiting  oxyCODONE    5 mG/acetaminophen 325 mG 1 Tablet(s) Oral every 4 hours PRN Moderate Pain (4 - 6)    REVIEW OF SYSTEMS:    RESPIRATORY: No cough, wheezing, hemoptysis; No shortness of breath  CARDIOVASCULAR: No chest pain or palpitations  All other review of systems is negative unless indicated above      PHYSICAL EXAM:  Daily     Daily Weight in k.5 (18 Mar 2018 05:00)  Vital Signs Last 24 Hrs  T(C): 36.4 (18 Mar 2018 08:51), Max: 36.9 (17 Mar 2018 21:22)  T(F): 97.5 (18 Mar 2018 08:51), Max: 98.4 (17 Mar 2018 21:22)  HR: 91 (18 Mar 2018 08:00) (86 - 106)  BP: 107/64 (18 Mar 2018 08:00) (85/49 - 137/110)  BP(mean): 73 (18 Mar 2018 08:00) (56 - 115)  RR: 29 (18 Mar 2018 08:00) (26 - 34)  SpO2: 99% (18 Mar 2018 08:00) (94% - 100%)    Constitutional: NAD, awake and alert, well-developed  HEENT: PERR, EOMI, Normal Hearing, MMM  Neck: Soft and supple, No LAD, No JVD  Respiratory: Breath sounds are clear bilaterally, No wheezing, rales or rhonchi  Cardiovascular: S1 and S2, regular rate and rhythm, no Murmurs, gallops or rubs  Gastrointestinal: Bowel Sounds present, soft, nontender, nondistended, no guarding, no rebound  Extremities: No peripheral edema  Vascular: 2+ peripheral pulses  Neurological: A/O x 3, no focal deficits  Musculoskeletal: 5/5 strength b/l upper and lower extremities  Skin: No rashes    LABS: All Labs Reviewed:                        11.6   15.79 )-----------( 160      ( 18 Mar 2018 05:16 )             35.9     03-18    140  |  105  |  78<H>  ----------------------------<  178<H>  3.7   |  22  |  3.04<H>    Ca    8.3<L>      18 Mar 2018 05:16  Phos  4.9     03-17  Mg     2.3     03-17      PTT - ( 18 Mar 2018 05:16 )  PTT:43.9 sec          TELEMETRY/EKG: rate controlled Afib

## 2018-03-26 LAB
HCT VFR BLD CALC: 25.2 % — LOW (ref 34.5–45)
HGB BLD-MCNC: 8.1 G/DL — LOW (ref 11.5–15.5)
MCHC RBC-ENTMCNC: 28.9 PG — SIGNIFICANT CHANGE UP (ref 27–34)
MCHC RBC-ENTMCNC: 32.1 GM/DL — SIGNIFICANT CHANGE UP (ref 32–36)
MCV RBC AUTO: 90 FL — SIGNIFICANT CHANGE UP (ref 80–100)
NRBC # BLD: 0 /100 WBCS — SIGNIFICANT CHANGE UP (ref 0–0)
NT-PROBNP SERPL-SCNC: 2800 PG/ML — HIGH (ref 0–125)
PLATELET # BLD AUTO: 292 K/UL — SIGNIFICANT CHANGE UP (ref 150–400)
RBC # BLD: 2.8 M/UL — LOW (ref 3.8–5.2)
RBC # FLD: 17 % — HIGH (ref 10.3–14.5)
WBC # BLD: 15.69 K/UL — HIGH (ref 3.8–10.5)
WBC # FLD AUTO: 15.69 K/UL — HIGH (ref 3.8–10.5)

## 2018-03-26 PROCEDURE — 71045 X-RAY EXAM CHEST 1 VIEW: CPT | Mod: 26

## 2018-03-26 PROCEDURE — 99233 SBSQ HOSP IP/OBS HIGH 50: CPT

## 2018-03-26 RX ORDER — FUROSEMIDE 40 MG
40 TABLET ORAL ONCE
Qty: 0 | Refills: 0 | Status: COMPLETED | OUTPATIENT
Start: 2018-03-26 | End: 2018-03-26

## 2018-03-26 RX ADMIN — Medication 180 MILLIGRAM(S): at 05:16

## 2018-03-26 RX ADMIN — Medication 40 MILLIGRAM(S): at 12:54

## 2018-03-26 RX ADMIN — SIMVASTATIN 20 MILLIGRAM(S): 20 TABLET, FILM COATED ORAL at 22:04

## 2018-03-26 RX ADMIN — Medication 3 MILLILITER(S): at 08:18

## 2018-03-26 RX ADMIN — Medication 3 MILLILITER(S): at 02:06

## 2018-03-26 RX ADMIN — ENOXAPARIN SODIUM 30 MILLIGRAM(S): 100 INJECTION SUBCUTANEOUS at 15:37

## 2018-03-26 RX ADMIN — Medication 180 MILLIGRAM(S): at 22:04

## 2018-03-26 NOTE — PROVIDER CONTACT NOTE (CHANGE IN STATUS NOTIFICATION) - ASSESSMENT
Patient sitting up in chair- assisted by PT. Pt alert and oriented x 3. No acute distress observed. VSS.  Temp-97.6; HR- 106; Resp- 18' BP- 103/60 O2 sats- 95-96%

## 2018-03-26 NOTE — PROGRESS NOTE ADULT - SUBJECTIVE AND OBJECTIVE BOX
Subjective:  less dyspneic  some cough  still desats w exertion    3/26: breathing about same. has dry cough.  positive LE swelling.  O2 sat 100% on 3 L NC bed to chair today.    MEDICATIONS  (STANDING):  ALBUTerol/ipratropium for Nebulization 3 milliLiter(s) Nebulizer every 6 hours  diltiazem    milliGRAM(s) Oral daily  diltiazem    milliGRAM(s) Oral at bedtime  enoxaparin Injectable 30 milliGRAM(s) SubCutaneous every 24 hours  simvastatin 20 milliGRAM(s) Oral at bedtime    MEDICATIONS  (PRN):  ALBUTerol    0.083% 2.5 milliGRAM(s) Nebulizer every 6 hours PRN Wheezing  docusate sodium 100 milliGRAM(s) Oral two times a day PRN Constipation  guaiFENesin    Syrup 100 milliGRAM(s) Oral every 6 hours PRN Cough  morphine  - Injectable 2 milliGRAM(s) IV Push every 4 hours PRN Moderate Pain (4 - 6)  ondansetron Injectable 4 milliGRAM(s) IV Push every 6 hours PRN Nausea and/or Vomiting      Allergies    Macrobid (Other)    Intolerances        REVIEW OF SYSTEMS:    CONSTITUTIONAL:  As per HPI.  HEENT:  Eyes:  No diplopia or blurred vision. ENT:  No earache, sore throat or runny nose.  CARDIOVASCULAR:  No pressure, squeezing, tightness, heaviness or aching about the chest, neck, axilla or epigastrium.  RESPIRATORY:  cough, dyspnea  GASTROINTESTINAL:  No nausea, vomiting or diarrhea.  GENITOURINARY:  No dysuria, frequency or urgency.  MUSCULOSKELETAL:  no joint pain, deformity, tenderness  EXTREMITIES: no clubbing cyanosis  SKIN:  No change in skin, hair or nails.  NEUROLOGIC:  No paresthesias, fasciculations, seizures or weakness.  PSYCHIATRIC:  No disorder of thought or mood.  ENDOCRINE:  No heat or cold intolerance, polyuria or polydipsia.  HEMATOLOGICAL:  No easy bruising or bleedings:    Vital Signs Last 24 Hrs  T(C): 36.7 (25 Mar 2018 05:49), Max: 36.7 (24 Mar 2018 16:17)  T(F): 98 (25 Mar 2018 05:49), Max: 98.1 (24 Mar 2018 16:17)  HR: 118 (25 Mar 2018 08:35) (99 - 126)  BP: 131/64 (25 Mar 2018 05:49) (114/62 - 131/64)  BP(mean): --  RR: 22 (25 Mar 2018 05:49) (20 - 28)  SpO2: 95% (25 Mar 2018 05:49) (92% - 97%)    PHYSICAL EXAMINATION:  SKIN: no rashes  HEAD: NC/AT  EYES: PERRLA, EOMI  EARS: TM's intact  NOSE: no abnormalities  NECK:  Supple. No lymphadenopathy. Jugular venous pressure not elevated. Carotids equal.   HEART:   Irreg, 104  CHEST:  bibasilar crackles.  ABDOMEN:  Soft and nontender.   EXTREMITIES:  no C/C. LE swelling present.  NEURO: AAO x 3, no focal deficts       LABS:                        8.5    18.62 )-----------( 242      ( 25 Mar 2018 07:02 )             26.1     03-25    142  |  107  |  16  ----------------------------<  107<H>  4.0   |  28  |  0.79    Ca    8.0<L>      25 Mar 2018 07:02            RADIOLOGY & ADDITIONAL TESTS:    EXAM:  XR CHEST AP OR PA 1V                            PROCEDURE DATE:  03/26/2018          INTERPRETATION:  Exam Date: 3/26/2018 2:59 PM    Chest radiograph (one view)         CLINICAL INFORMATION:  A. fib and shortness of breath    TECHNIQUE:  Single frontal view of the chest was obtained.    COMPARISON: 3/25/2018    FINDINGS/  IMPRESSION:          Scattered airspace opacities in the bilateral lungs, most prominent in   the left upper lobe is unchanged since prior exam. Cardiomediastinal   silhouette is intact.      EXAM:  CT CHEST                            PROCEDURE DATE:  03/25/2018          INTERPRETATION:  Exam Date: 3/25/2018 1:43 PM  Clinical Information: Cough, hypoxia, shortness of breath  Technique:       CT of the chest was performed with axial images obtained   from the thoracic to the inlet to the bilateral adrenal glands       without IV contrast. Maximum intensity projection images were obtained.  Comparison:  None    FINDINGS:    Lungs, Airways and Pleura: Central tracheobronchial tree is grossly   patent. No endobronchial lesions. Minimal bronchiectasis. Scattered   groundglass of the lower opacities throughout the lung fields   asymmetrically more prominent on the left with associated interlobular   septal thickening and small bilateral pleural effusions most likely   representing pulmonary edema however clinically correlate to exclude   multifocal pneumonia. Minimal bibasilar atelectasis. Scattered   subcentimeter pulmonary nodules.    Heart and Great Vessels: Atherosclerotic changes of the aorta and   coronary vasculature. Heart is normal in size. No pericardial effusions.    Mediastinum and Shaye: Subcentimeter mediastinal lymph nodes.    Neck and Chest Wall: Minimal anasarca.    Bones: Degenerative changes.    Upper Abdomen:  Gallstones and gallbladder sludge. Elevation of the right   hemidiaphragm. Low-attenuation nodular thickening of the bilateral   adrenal glands.    IMPRESSION:         Scattered groundglass of the lower opacities throughout the lung fields   asymmetrically more prominent on the left with associated interlobular   septal thickening and small bilateral pleural effusions most likely   representing pulmonary edema however clinically correlate to exclude   multifocal pneumonia.      Scattered subcentimeter pulmonary nodules, 6 month follow-up

## 2018-03-26 NOTE — PROGRESS NOTE ADULT - SUBJECTIVE AND OBJECTIVE BOX
HPI:     Mrs. Clancy is a 72-year-old female who initially presented to the hospital for preoperative evaluation for knee surgery. She is found to be in atrial fibrillation and was sent to emergency room.  Patient was complaining of flank pain. A CT scan of the abdomen and pelvis was performed which showed hydronephrosis with a kidney stone. There was also evidence of a left lower lobe infiltrate.  Patient  had nephrostomy tube  was than a code sepsis in PACU.  Blood cultures were positive for Escherichia coli. She developed sepsis with hypotension and was resuscitated with over 3 L of fluid. She developed significant shortness of breath which was treated with BiPAP . she was subseqauently intubated. cxr showed pneumonia, vs. failure, vs. ARDS,   acute renal failure, creatinine  back to baseline, still slightly sob, completed course of abx. weak  IV heparin held secondary to  hematuria.     3/24: urine clear  feels weak  Hb 8.3  K 3.3    3/25: c/o dry cough  room mate found to have HMPV  K 4  Hb 8.5    3/26: c/o dry cough  CT chest showed pulmonary edema  RVP negative  still on O2 via nc  Nephrostomy tube came out yesterday  passing a lot of urine  WBC down to 15.69 today      PHYSICAL EXAM:    Daily     Daily     ICU Vital Signs Last 24 Hrs  T(C): 36.7 (26 Mar 2018 09:55), Max: 37.2 (25 Mar 2018 15:37)  T(F): 98.1 (26 Mar 2018 09:55), Max: 99 (25 Mar 2018 15:37)  HR: 115 (26 Mar 2018 09:55) (95 - 117)  BP: 115/48 (26 Mar 2018 09:55) (112/53 - 129/62)  BP(mean): --  ABP: --  ABP(mean): --  RR: 18 (26 Mar 2018 09:55) (18 - 20)  SpO2: 96% (26 Mar 2018 09:55) (96% - 99%)      Constitutional: Weak appearing using O2 via nc  HEENT: Atraumatic, VRENELL, Normal, No congestion  Respiratory: Breath Sounds decreased, no rhonchi/wheeze  Cardiovascular: N S1S2; АННА present  Gastrointestinal: Left flank hematoma, Abdomen soft, non tender, Bowel Sounds present  Extremities: No edema, peripheral pulses present  Neurological: AAO x 3, no gross focal motor deficits                        8.1    15.69 )-----------( 292      ( 26 Mar 2018 07:14 )             25.2       CBC Full  -  ( 26 Mar 2018 07:14 )  WBC Count : 15.69 K/uL  Hemoglobin : 8.1 g/dL  Hematocrit : 25.2 %  Platelet Count - Automated : 292 K/uL  Mean Cell Volume : 90.0 fl  Mean Cell Hemoglobin : 28.9 pg  Mean Cell Hemoglobin Concentration : 32.1 gm/dL  Auto Neutrophil # : x  Auto Lymphocyte # : x  Auto Monocyte # : x  Auto Eosinophil # : x  Auto Basophil # : x  Auto Neutrophil % : x  Auto Lymphocyte % : x  Auto Monocyte % : x  Auto Eosinophil % : x  Auto Basophil % : x      03-25    142  |  107  |  16  ----------------------------<  107<H>  4.0   |  28  |  0.79    Ca    8.0<L>      25 Mar 2018 07:02                              MEDICATIONS  (STANDING):  ALBUTerol/ipratropium for Nebulization 3 milliLiter(s) Nebulizer every 6 hours  diltiazem    milliGRAM(s) Oral daily  diltiazem    milliGRAM(s) Oral at bedtime  enoxaparin Injectable 30 milliGRAM(s) SubCutaneous every 24 hours  furosemide   Injectable 40 milliGRAM(s) IV Push once  simvastatin 20 milliGRAM(s) Oral at bedtime

## 2018-03-26 NOTE — PROGRESS NOTE ADULT - ASSESSMENT
Agree with IV lasix today, recheck CXR in AM.   WBC down to 15,690 today. Afebrile.    In AF. treatment as per cardiology.    off abx.    Percutaneous nephrostomy tube came out yesterday. For urology following.    DVT prophylaxis

## 2018-03-26 NOTE — PROGRESS NOTE ADULT - ASSESSMENT
72/F downgraded from ICU service for:    1. New onset atrial fibrillation:      AC on hold secondary to hematuria,   would restart in a day or so if no hematuria reported  2. Obstructing kidney stone, s/p Left sided nephrostomy tube which fell off on 3/25; awaiting urology input;  E. coli sepsis,  completed course of abx  3. ARDS, acute hypoxic respiratory failure +  pneumonia:  improving, c/o dry cough with room mate found to have HMPV; RVP negative; CT chest showed congestion; lasix one dose given by cardio, monitor response.   4. renal failure - improving; Cr normal   5. Physical therapy for ambulation  6. Hypokalemia of 3.3: replace and rechecked; 4.0  7. Anemia, Acute on chronic, sec to hematuria , monitor for now; stable  8. Leukocytosis; down to 15.69 today  9. DVT PPX: lovenox s/q    poc discussed with pt, team

## 2018-03-26 NOTE — PROGRESS NOTE ADULT - SUBJECTIVE AND OBJECTIVE BOX
Patient is a 72y old  Female who presents with a chief complaint of came for pre op testing (15 Mar 2018 00:51)    HPI Pt transferred to ED from admitting d/t abnormal ECG demostrating AF with RVR. Office redocrds reviewed. This is new AF. States having on and off leg swelling but no orthopnea, angina, palp, PND.      3/15 - Not feeling better, had episode of sweating and SOB recently. Blood work revealed hydronephrosis, obstructive calculus, UTI, questionable pneumonia, low TSH, elevated WBC, elevated cr. Tele with AF and VR around 135 now.  CT reviewed, no signs of pleural effusions or lung edema in included lungs segments. ECHO reviewed too. Mild AS, MR, preserved LVEF.   Suggest starting IV cardizem drip titratable to HR < 100. Also start UFH IV for stroke ppx. Cont PO cardizem as well, this will assist in weaning her off IV cardizem ultimately. Cont statin too.   Obtain urology consult re: hydronephrosis. Also endocrinology re: low TSH.  Would defer stress MPI until her HR is more stable and normal.  Discussed with patient also re: CV. She would need 3 weeks strict OAC and therefore gigi also interfere should she need percutaneous hydronephrosis drainage, not to mention her future knee surgery as discussed yesterday. Should HR become an issue difficult to control with drugs, would then proceed to CV and deal with the rest later on as needed. For time being, and as long as she is hemodynamically stable, would cont rate control, AC and address first UTI and possible hyperthyroidism.  Case d/w hospitalist.  Will follow    3/16 - Pt on BiPAP, in no resp distress at moment. Tele AF with HR round 70-90. BP stable off pressors. Awake and alert. Had resp distress yesterday after PCN, possibly diastolic heart failure. Suggest continuing IV diuresis as needed, continue rate control with IV diltiazem, will start weaning her to PO once off BiPAP. Continue UFH, will transition to OAC once kidney fx is stable as well. Will continue to defer stress test and CV for time being.    3/17-  Hasn't responded to BIPAP and seemed obtunded today. Chest xray showed worsened heart failure. Critical care decided to intubate patient.   She remains in afib. On a cardizem drip. After sedation, blood pressure decreased to the high 90s.  overnight, her vital signs have been stable.   Received Lasix 40 mg IV at 5 am and has responded with urine output.     3/18-  Intubated yesterday.   Cardizem drip was discontinued and started on QID cardizem by OGT. She has tolerated it well with good heart rate control and adequate blood pressures.   Was started on a Bumex drip. Has diuresed over 2000 ml and there was been an increase in the serum creatinine.     3/19 - Examined today, on sedation and vent. CXR 3/17 and clinically without significant improvement despite aggressive IV diuresis and significant UOP, rather her Cr went up. I suspect her bilateral alveolar infiltrates and resp distress may not be cardiogenic at the moment ( ARDS in setting of sepsis?). Would suggest cont to hold diuretics and cont optimal vent support while addressing other acute clinical issues. Appreciated pulm and nephro input.  She remains in AF with appropriate rate control on CCB via OGT. Cont that as well.   Will follow up closely.     3/20 - Sedated, in no distress, FIO2 40% on vent and sats 99%, hemodynamically stable of pressors, rate controlled on CCB via OGT, on UFH, H/H stable, if needed can hold it.  CXR much improved yesterday off IV diuretics, keep holding that, Cr getting better too.   Abx per ID  Vent mgt per ICU/Pulm  Will follow up closely.     3/21 - Doing better on aerosol mask, good sats, thirsty. Hemodynamically stable off pressors, HR trending up in AF, would switch her to ER Cardizem PO and titrate accordingly or would resume IV drip if unable to tolerate PO route.  Can hold off heparin for the moment d/t bleeding issues, would resume it with OAC once cleared from this. Will defer stress test for later when she is clinically stable. Can use IV furosemide as needed.   Will follow up closely.   Case d/w ICU staff    3/22 - doing well on NC, CXR yesterday with minimal mostly left side infiltrates can use lasix prn. I added a QHS dose of cardizem CD with holding parameters for better average HR control. Cont rest of management as it is.  Will follow closely.   D/W nursing staff.       3/23 - Improving clinically, heart rate better controlled. Azotemia and leukocytosis improving too.  Will follow up.     3/24: transferred to telemtry. AF on monitor with rate , on PO Cardizem.     3/25: AF on monitor with -110. BP stable. Will increase Cardizem CD.    3/26 - Feels better but still with REGAN and orthopnea, CT chest done yesterday suggestive of pulmonary edema with bilateral pleural effusions, BMP yesterday with normal Cr. Will give her one dose of IV Lasix now and repeat CXR in AM to assess response. IF obvious response, will keep her on daily dose of Lasix. Also, her HR remains > 100 in AF, she is taking already a total dose of 360 mg PO Cardizem CD, if persistently RVR, will do digoxin loading tomorrow.  Her PCN catheter was dislodged yesterday, she feel fine so far, no back or abd pain, no fever/chills, will need urology/imaging to monitor for hydronephrosis recurrence.   Otherwise, cont rest of CV regimen as it is other than the above addition.   Will follow.       Allergies    Macrobid (Other)    Intolerances        MEDICATIONS  (STANDING):  ALBUTerol/ipratropium for Nebulization 3 milliLiter(s) Nebulizer every 6 hours  aspirin 325 milliGRAM(s) Oral daily  azithromycin  IVPB 500 milliGRAM(s) IV Intermittent every 24 hours  buMETAnide Infusion 1 mG/Hr (10 mL/Hr) IV Continuous <Continuous>  cefepime  IVPB      cefepime  IVPB 1000 milliGRAM(s) IV Intermittent every 12 hours  chlorhexidine 0.12% Liquid 15 milliLiter(s) Swish and Spit two times a day  diltiazem    Tablet 60 milliGRAM(s) Oral every 4 hours  heparin  Infusion.  Unit(s)/Hr (18 mL/Hr) IV Continuous <Continuous>  methylPREDNISolone sodium succinate Injectable 40 milliGRAM(s) IV Push every 8 hours  propofol Infusion 10 MICROgram(s)/kG/Min (5.988 mL/Hr) IV Continuous <Continuous>  simvastatin 20 milliGRAM(s) Oral at bedtime    MEDICATIONS  (PRN):  docusate sodium 100 milliGRAM(s) Oral two times a day PRN Constipation  heparin  Injectable 8000 Unit(s) IV Push every 6 hours PRN For aPTT less than 40  heparin  Injectable 4000 Unit(s) IV Push every 6 hours PRN For aPTT between 40 - 57  LORazepam   Injectable 1 milliGRAM(s) IV Push every 4 hours PRN Anxiety  ondansetron Injectable 4 milliGRAM(s) IV Push every 6 hours PRN Nausea and/or Vomiting  oxyCODONE    5 mG/acetaminophen 325 mG 1 Tablet(s) Oral every 4 hours PRN Moderate Pain (4 - 6)    REVIEW OF SYSTEMS:    RESPIRATORY: No cough, wheezing, hemoptysis; No shortness of breath  CARDIOVASCULAR: No chest pain or palpitations  All other review of systems is negative unless indicated above      PHYSICAL EXAM:  Daily     Daily Weight in k.5 (18 Mar 2018 05:00)  Vital Signs Last 24 Hrs  T(C): 36.4 (18 Mar 2018 08:51), Max: 36.9 (17 Mar 2018 21:22)  T(F): 97.5 (18 Mar 2018 08:51), Max: 98.4 (17 Mar 2018 21:22)  HR: 91 (18 Mar 2018 08:00) (86 - 106)  BP: 107/64 (18 Mar 2018 08:00) (85/49 - 137/110)  BP(mean): 73 (18 Mar 2018 08:00) (56 - 115)  RR: 29 (18 Mar 2018 08:00) (26 - 34)  SpO2: 99% (18 Mar 2018 08:00) (94% - 100%)    Constitutional: NAD, awake and alert, well-developed  HEENT: PERR, EOMI, Normal Hearing, MMM  Neck: Soft and supple, No LAD, No JVD  Respiratory: Breath sounds are clear bilaterally, No wheezing, rales or rhonchi  Cardiovascular: S1 and S2, regular rate and rhythm, no Murmurs, gallops or rubs  Gastrointestinal: Bowel Sounds present, soft, nontender, nondistended, no guarding, no rebound  Extremities: No peripheral edema  Vascular: 2+ peripheral pulses  Neurological: A/O x 3, no focal deficits  Musculoskeletal: 5/5 strength b/l upper and lower extremities  Skin: No rashes    LABS: All Labs Reviewed:                        11.6   15.79 )-----------( 160      ( 18 Mar 2018 05:16 )             35.9     03-18    140  |  105  |  78<H>  ----------------------------<  178<H>  3.7   |  22  |  3.04<H>    Ca    8.3<L>      18 Mar 2018 05:16  Phos  4.9     03-17  Mg     2.3     03-17      PTT - ( 18 Mar 2018 05:16 )  PTT:43.9 sec          TELEMETRY/EKG: rate controlled Afib

## 2018-03-26 NOTE — PROVIDER CONTACT NOTE (CHANGE IN STATUS NOTIFICATION) - ACTION/TREATMENT ORDERED:
Patient was assisted back to bed. Pt states she feels much better. Dr. Rodriguez aware. Will continue to monitor patient closely

## 2018-03-27 LAB
ANION GAP SERPL CALC-SCNC: 6 MMOL/L — SIGNIFICANT CHANGE UP (ref 5–17)
BUN SERPL-MCNC: 16 MG/DL — SIGNIFICANT CHANGE UP (ref 7–23)
CALCIUM SERPL-MCNC: 8.2 MG/DL — LOW (ref 8.5–10.1)
CHLORIDE SERPL-SCNC: 107 MMOL/L — SIGNIFICANT CHANGE UP (ref 96–108)
CO2 SERPL-SCNC: 29 MMOL/L — SIGNIFICANT CHANGE UP (ref 22–31)
CREAT SERPL-MCNC: 0.8 MG/DL — SIGNIFICANT CHANGE UP (ref 0.5–1.3)
GLUCOSE SERPL-MCNC: 101 MG/DL — HIGH (ref 70–99)
HCT VFR BLD CALC: 25.2 % — LOW (ref 34.5–45)
HGB BLD-MCNC: 8 G/DL — LOW (ref 11.5–15.5)
MCHC RBC-ENTMCNC: 29.1 PG — SIGNIFICANT CHANGE UP (ref 27–34)
MCHC RBC-ENTMCNC: 31.7 GM/DL — LOW (ref 32–36)
MCV RBC AUTO: 91.6 FL — SIGNIFICANT CHANGE UP (ref 80–100)
NRBC # BLD: 0 /100 WBCS — SIGNIFICANT CHANGE UP (ref 0–0)
PLATELET # BLD AUTO: 328 K/UL — SIGNIFICANT CHANGE UP (ref 150–400)
POTASSIUM SERPL-MCNC: 3.6 MMOL/L — SIGNIFICANT CHANGE UP (ref 3.5–5.3)
POTASSIUM SERPL-SCNC: 3.6 MMOL/L — SIGNIFICANT CHANGE UP (ref 3.5–5.3)
RBC # BLD: 2.75 M/UL — LOW (ref 3.8–5.2)
RBC # FLD: 17.2 % — HIGH (ref 10.3–14.5)
SODIUM SERPL-SCNC: 142 MMOL/L — SIGNIFICANT CHANGE UP (ref 135–145)
WBC # BLD: 15.01 K/UL — HIGH (ref 3.8–10.5)
WBC # FLD AUTO: 15.01 K/UL — HIGH (ref 3.8–10.5)

## 2018-03-27 PROCEDURE — 74176 CT ABD & PELVIS W/O CONTRAST: CPT | Mod: 26

## 2018-03-27 RX ORDER — FUROSEMIDE 40 MG
40 TABLET ORAL ONCE
Qty: 0 | Refills: 0 | Status: COMPLETED | OUTPATIENT
Start: 2018-03-27 | End: 2018-03-27

## 2018-03-27 RX ADMIN — Medication 40 MILLIGRAM(S): at 09:11

## 2018-03-27 RX ADMIN — SIMVASTATIN 20 MILLIGRAM(S): 20 TABLET, FILM COATED ORAL at 21:52

## 2018-03-27 RX ADMIN — Medication 180 MILLIGRAM(S): at 21:52

## 2018-03-27 RX ADMIN — Medication 3 MILLILITER(S): at 01:53

## 2018-03-27 RX ADMIN — Medication 3 MILLILITER(S): at 12:19

## 2018-03-27 RX ADMIN — Medication 180 MILLIGRAM(S): at 05:31

## 2018-03-27 RX ADMIN — Medication 3 MILLILITER(S): at 20:54

## 2018-03-27 NOTE — PROGRESS NOTE ADULT - ASSESSMENT
72/F downgraded from ICU service for:    1. New onset atrial fibrillation:      AC on hold secondary to hematuria, left rectus sheath hematoma; would restart in a few days once hematoma is stable  2. Obstructing kidney stone, s/p Left sided nephrostomy tube which fell off on 3/25; ;  E. coli sepsis,  completed course of abx  A CT abdo was done on 3/27 and NO hydronephrosis or Obstructing stone was reported; no need for replacement of nephrostomy tube by IR at this time.  2a)  small rectus sheath hematoma reported on CT: monitor for now; d/c lovenox, H/H stable, monitor surgical consult  3. ARDS, acute hypoxic respiratory failure +  pneumonia:  improving, c/o dry cough with room mate found to have HMPV; RVP negative; CT chest showed congestion; lasix one dose given by cardio, monitor response.   4. renal failure - improving; Cr normal   5. Physical therapy for ambulation  6. Hypokalemia of 3.3: replace and rechecked; 3.6  7. Anemia, Acute on chronic, sec to hematuria , monitor for now; stable  8. Leukocytosis; down to 15.01 today  9. DVT PPX: venodynes b/l    poc discussed with pt, team, Dr. Morrow 72/F downgraded from ICU service for:    1. New onset atrial fibrillation:      AC on hold secondary to hematuria, left rectus sheath hematoma; would restart in a few days once hematoma is stable  2. Obstructing kidney stone, s/p Left sided nephrostomy tube which fell off on 3/25; ;  E. coli sepsis,  completed course of abx  A CT abdo was done on 3/27 and NO hydronephrosis or Obstructing stone was reported; 6 mm left renal stone;  no need for replacement of nephrostomy tube by IR at this time.  2a)  small rectus sheath hematoma reported on CT: monitor for now; d/c lovenox, H/H stable, monitor surgical consult  3. ARDS, acute hypoxic respiratory failure +  pneumonia:  improving, c/o dry cough with room mate found to have HMPV; RVP negative; CT chest showed congestion; lasix one dose given by cardio, monitor response.   4. renal failure - improving; Cr normal   5. Physical therapy for ambulation  6. Hypokalemia of 3.3: replace and rechecked; 3.6  7. Anemia, Acute on chronic, sec to hematuria , monitor for now; stable  8. Leukocytosis; down to 15.01 today  9. DVT PPX: venodynes b/l    poc discussed with pt, team, Dr. Morrow

## 2018-03-27 NOTE — PROGRESS NOTE ADULT - SUBJECTIVE AND OBJECTIVE BOX
HPI:     Mrs. Clancy is a 72-year-old female who initially presented to the hospital for preoperative evaluation for knee surgery. She is found to be in atrial fibrillation and was sent to emergency room.  Patient was complaining of flank pain. A CT scan of the abdomen and pelvis was performed which showed hydronephrosis with a kidney stone. There was also evidence of a left lower lobe infiltrate.  Patient  had nephrostomy tube  was than a code sepsis in PACU.  Blood cultures were positive for Escherichia coli. She developed sepsis with hypotension and was resuscitated with over 3 L of fluid. She developed significant shortness of breath which was treated with BiPAP . she was subseqauently intubated. cxr showed pneumonia, vs. failure, vs. ARDS,   acute renal failure, creatinine  back to baseline, still slightly sob, completed course of abx. weak  IV heparin held secondary to  hematuria.     3/24: urine clear  feels weak  Hb 8.3  K 3.3    3/25: c/o dry cough  room mate found to have HMPV  K 4  Hb 8.5    3/26: c/o dry cough  CT chest showed pulmonary edema  RVP negative  still on O2 via nc  Nephrostomy tube came out yesterday  passing a lot of urine  WBC down to 15.69 today    3/27: no new complaints  passing urine      PHYSICAL EXAM:    Daily     Daily     ICU Vital Signs Last 24 Hrs  T(C): 36.7 (27 Mar 2018 10:13), Max: 36.9 (26 Mar 2018 21:26)  T(F): 98.1 (27 Mar 2018 10:13), Max: 98.4 (26 Mar 2018 21:26)  HR: 84 (27 Mar 2018 12:19) (84 - 110)  BP: 117/54 (27 Mar 2018 10:13) (101/60 - 117/54)  BP(mean): --  ABP: --  ABP(mean): --  RR: 18 (27 Mar 2018 10:13) (18 - 18)  SpO2: 99% (27 Mar 2018 10:13) (95% - 99%)    Constitutional: Weak appearing using O2 via nc  HEENT: Atraumatic, VERNELL, Normal, No congestion  Respiratory: Breath Sounds decreased, no rhonchi/wheeze  Cardiovascular: N S1S2; АННА present  Gastrointestinal: Left flank ecchymosis same as before, Abdomen soft, non tender, Bowel Sounds present  Extremities: No edema, peripheral pulses present  Neurological: AAO x 3, no gross focal motor deficits                          8.0    15.01 )-----------( 328      ( 27 Mar 2018 06:36 )             25.2       CBC Full  -  ( 27 Mar 2018 06:36 )  WBC Count : 15.01 K/uL  Hemoglobin : 8.0 g/dL  Hematocrit : 25.2 %  Platelet Count - Automated : 328 K/uL  Mean Cell Volume : 91.6 fl  Mean Cell Hemoglobin : 29.1 pg  Mean Cell Hemoglobin Concentration : 31.7 gm/dL  Auto Neutrophil # : x  Auto Lymphocyte # : x  Auto Monocyte # : x  Auto Eosinophil # : x  Auto Basophil # : x  Auto Neutrophil % : x  Auto Lymphocyte % : x  Auto Monocyte % : x  Auto Eosinophil % : x  Auto Basophil % : x      03-27    142  |  107  |  16  ----------------------------<  101<H>  3.6   |  29  |  0.80    Ca    8.2<L>      27 Mar 2018 06:36                              MEDICATIONS  (STANDING):  ALBUTerol/ipratropium for Nebulization 3 milliLiter(s) Nebulizer every 6 hours  diltiazem    milliGRAM(s) Oral daily  diltiazem    milliGRAM(s) Oral at bedtime  simvastatin 20 milliGRAM(s) Oral at bedtime HPI:     Mrs. Clancy is a 72-year-old female who initially presented to the hospital for preoperative evaluation for knee surgery. She is found to be in atrial fibrillation and was sent to emergency room.  Patient was complaining of flank pain. A CT scan of the abdomen and pelvis was performed which showed hydronephrosis with a kidney stone. There was also evidence of a left lower lobe infiltrate.  Patient  had nephrostomy tube  was than a code sepsis in PACU.  Blood cultures were positive for Escherichia coli. She developed sepsis with hypotension and was resuscitated with over 3 L of fluid. She developed significant shortness of breath which was treated with BiPAP . she was subseqauently intubated. cxr showed pneumonia, vs. failure, vs. ARDS,   acute renal failure, creatinine  back to baseline, still slightly sob, completed course of abx. weak  IV heparin held secondary to  hematuria.     3/24: urine clear  feels weak  Hb 8.3  K 3.3    3/25: c/o dry cough  room mate found to have HMPV  K 4  Hb 8.5    3/26: c/o dry cough  CT chest showed pulmonary edema  RVP negative  still on O2 via nc  Nephrostomy tube came out yesterday  passing a lot of urine  WBC down to 15.69 today    3/27: no new complaints  passing urine      PHYSICAL EXAM:    Daily     Daily     ICU Vital Signs Last 24 Hrs  T(C): 36.7 (27 Mar 2018 10:13), Max: 36.9 (26 Mar 2018 21:26)  T(F): 98.1 (27 Mar 2018 10:13), Max: 98.4 (26 Mar 2018 21:26)  HR: 84 (27 Mar 2018 12:19) (84 - 110)  BP: 117/54 (27 Mar 2018 10:13) (101/60 - 117/54)  BP(mean): --  ABP: --  ABP(mean): --  RR: 18 (27 Mar 2018 10:13) (18 - 18)  SpO2: 99% (27 Mar 2018 10:13) (95% - 99%)    Constitutional: Weak appearing using O2 via nc  HEENT: Atraumatic, VERNELL, Normal, No congestion  Respiratory: Breath Sounds decreased, no rhonchi/wheeze  Cardiovascular: N S1S2; АННА present  Gastrointestinal: Left flank ecchymosis same as before, Abdomen soft, non tender, Bowel Sounds present  Extremities: No edema, peripheral pulses present  Neurological: AAO x 3, no gross focal motor deficits                          8.0    15.01 )-----------( 328      ( 27 Mar 2018 06:36 )             25.2       CBC Full  -  ( 27 Mar 2018 06:36 )  WBC Count : 15.01 K/uL  Hemoglobin : 8.0 g/dL  Hematocrit : 25.2 %  Platelet Count - Automated : 328 K/uL  Mean Cell Volume : 91.6 fl  Mean Cell Hemoglobin : 29.1 pg  Mean Cell Hemoglobin Concentration : 31.7 gm/dL  Auto Neutrophil # : x  Auto Lymphocyte # : x  Auto Monocyte # : x  Auto Eosinophil # : x  Auto Basophil # : x  Auto Neutrophil % : x  Auto Lymphocyte % : x  Auto Monocyte % : x  Auto Eosinophil % : x  Auto Basophil % : x      03-27    142  |  107  |  16  ----------------------------<  101<H>  3.6   |  29  |  0.80    Ca    8.2<L>      27 Mar 2018 06:36            < from: CT Abdomen and Pelvis No Cont (03.27.18 @ 13:12) >  IMPRESSION:     Resolution of left hydronephrosis. No obstructing stone.  New small left rectus sheath hematoma.      < end of copied text >                    MEDICATIONS  (STANDING):  ALBUTerol/ipratropium for Nebulization 3 milliLiter(s) Nebulizer every 6 hours  diltiazem    milliGRAM(s) Oral daily  diltiazem    milliGRAM(s) Oral at bedtime  simvastatin 20 milliGRAM(s) Oral at bedtime

## 2018-03-27 NOTE — PROGRESS NOTE ADULT - SUBJECTIVE AND OBJECTIVE BOX
Patient is a 72y old  Female who presents with a chief complaint of came for pre op testing (15 Mar 2018 00:51)    HPI Pt transferred to ED from admitting d/t abnormal ECG demostrating AF with RVR. Office redocrds reviewed. This is new AF. States having on and off leg swelling but no orthopnea, angina, palp, PND.      3/15 - Not feeling better, had episode of sweating and SOB recently. Blood work revealed hydronephrosis, obstructive calculus, UTI, questionable pneumonia, low TSH, elevated WBC, elevated cr. Tele with AF and VR around 135 now.  CT reviewed, no signs of pleural effusions or lung edema in included lungs segments. ECHO reviewed too. Mild AS, MR, preserved LVEF.   Suggest starting IV cardizem drip titratable to HR < 100. Also start UFH IV for stroke ppx. Cont PO cardizem as well, this will assist in weaning her off IV cardizem ultimately. Cont statin too.   Obtain urology consult re: hydronephrosis. Also endocrinology re: low TSH.  Would defer stress MPI until her HR is more stable and normal.  Discussed with patient also re: CV. She would need 3 weeks strict OAC and therefore gigi also interfere should she need percutaneous hydronephrosis drainage, not to mention her future knee surgery as discussed yesterday. Should HR become an issue difficult to control with drugs, would then proceed to CV and deal with the rest later on as needed. For time being, and as long as she is hemodynamically stable, would cont rate control, AC and address first UTI and possible hyperthyroidism.  Case d/w hospitalist.  Will follow    3/16 - Pt on BiPAP, in no resp distress at moment. Tele AF with HR round 70-90. BP stable off pressors. Awake and alert. Had resp distress yesterday after PCN, possibly diastolic heart failure. Suggest continuing IV diuresis as needed, continue rate control with IV diltiazem, will start weaning her to PO once off BiPAP. Continue UFH, will transition to OAC once kidney fx is stable as well. Will continue to defer stress test and CV for time being.    3/17-  Hasn't responded to BIPAP and seemed obtunded today. Chest xray showed worsened heart failure. Critical care decided to intubate patient.   She remains in afib. On a cardizem drip. After sedation, blood pressure decreased to the high 90s.  overnight, her vital signs have been stable.   Received Lasix 40 mg IV at 5 am and has responded with urine output.     3/18-  Intubated yesterday.   Cardizem drip was discontinued and started on QID cardizem by OGT. She has tolerated it well with good heart rate control and adequate blood pressures.   Was started on a Bumex drip. Has diuresed over 2000 ml and there was been an increase in the serum creatinine.     3/19 - Examined today, on sedation and vent. CXR 3/17 and clinically without significant improvement despite aggressive IV diuresis and significant UOP, rather her Cr went up. I suspect her bilateral alveolar infiltrates and resp distress may not be cardiogenic at the moment ( ARDS in setting of sepsis?). Would suggest cont to hold diuretics and cont optimal vent support while addressing other acute clinical issues. Appreciated pulm and nephro input.  She remains in AF with appropriate rate control on CCB via OGT. Cont that as well.   Will follow up closely.     3/20 - Sedated, in no distress, FIO2 40% on vent and sats 99%, hemodynamically stable of pressors, rate controlled on CCB via OGT, on UFH, H/H stable, if needed can hold it.  CXR much improved yesterday off IV diuretics, keep holding that, Cr getting better too.   Abx per ID  Vent mgt per ICU/Pulm  Will follow up closely.     3/21 - Doing better on aerosol mask, good sats, thirsty. Hemodynamically stable off pressors, HR trending up in AF, would switch her to ER Cardizem PO and titrate accordingly or would resume IV drip if unable to tolerate PO route.  Can hold off heparin for the moment d/t bleeding issues, would resume it with OAC once cleared from this. Will defer stress test for later when she is clinically stable. Can use IV furosemide as needed.   Will follow up closely.   Case d/w ICU staff    3/22 - doing well on NC, CXR yesterday with minimal mostly left side infiltrates can use lasix prn. I added a QHS dose of cardizem CD with holding parameters for better average HR control. Cont rest of management as it is.  Will follow closely.   D/W nursing staff.       3/23 - Improving clinically, heart rate better controlled. Azotemia and leukocytosis improving too.  Will follow up.     3/24: transferred to telemtry. AF on monitor with rate , on PO Cardizem.     3/25: AF on monitor with -110. BP stable. Will increase Cardizem CD.    3/26 - Feels better but still with REGAN and orthopnea, CT chest done yesterday suggestive of pulmonary edema with bilateral pleural effusions, BMP yesterday with normal Cr. Will give her one dose of IV Lasix now and repeat CXR in AM to assess response. IF obvious response, will keep her on daily dose of Lasix. Also, her HR remains > 100 in AF, she is taking already a total dose of 360 mg PO Cardizem CD, if persistently RVR, will do digoxin loading tomorrow.  Her PCN catheter was dislodged yesterday, she feel fine so far, no back or abd pain, no fever/chills, will need urology/imaging to monitor for hydronephrosis recurrence.   Otherwise, cont rest of CV regimen as it is other than the above addition.   Will follow.     3/27 - breathing better, yet CXR yesterday shortly after Lasix dose was unchanged and today's physical exam still shows abundant rales all over left lung field. Will give her another Lasix IV dose today and repeat CXR tomorrow AM. HR is better controlled, around 100. Will keep current Cardizem dose and hold off dig for the moment. She is still awaiting urology follow up.      Allergies    Macrobid (Other)    Intolerances        MEDICATIONS  (STANDING):  ALBUTerol/ipratropium for Nebulization 3 milliLiter(s) Nebulizer every 6 hours  aspirin 325 milliGRAM(s) Oral daily  azithromycin  IVPB 500 milliGRAM(s) IV Intermittent every 24 hours  buMETAnide Infusion 1 mG/Hr (10 mL/Hr) IV Continuous <Continuous>  cefepime  IVPB      cefepime  IVPB 1000 milliGRAM(s) IV Intermittent every 12 hours  chlorhexidine 0.12% Liquid 15 milliLiter(s) Swish and Spit two times a day  diltiazem    Tablet 60 milliGRAM(s) Oral every 4 hours  heparin  Infusion.  Unit(s)/Hr (18 mL/Hr) IV Continuous <Continuous>  methylPREDNISolone sodium succinate Injectable 40 milliGRAM(s) IV Push every 8 hours  propofol Infusion 10 MICROgram(s)/kG/Min (5.988 mL/Hr) IV Continuous <Continuous>  simvastatin 20 milliGRAM(s) Oral at bedtime    MEDICATIONS  (PRN):  docusate sodium 100 milliGRAM(s) Oral two times a day PRN Constipation  heparin  Injectable 8000 Unit(s) IV Push every 6 hours PRN For aPTT less than 40  heparin  Injectable 4000 Unit(s) IV Push every 6 hours PRN For aPTT between 40 - 57  LORazepam   Injectable 1 milliGRAM(s) IV Push every 4 hours PRN Anxiety  ondansetron Injectable 4 milliGRAM(s) IV Push every 6 hours PRN Nausea and/or Vomiting  oxyCODONE    5 mG/acetaminophen 325 mG 1 Tablet(s) Oral every 4 hours PRN Moderate Pain (4 - 6)    REVIEW OF SYSTEMS:    RESPIRATORY: No cough, wheezing, hemoptysis; No shortness of breath  CARDIOVASCULAR: No chest pain or palpitations  All other review of systems is negative unless indicated above      PHYSICAL EXAM:  Daily     Daily Weight in k.5 (18 Mar 2018 05:00)  Vital Signs Last 24 Hrs  T(C): 36.4 (18 Mar 2018 08:51), Max: 36.9 (17 Mar 2018 21:22)  T(F): 97.5 (18 Mar 2018 08:51), Max: 98.4 (17 Mar 2018 21:22)  HR: 91 (18 Mar 2018 08:00) (86 - 106)  BP: 107/64 (18 Mar 2018 08:00) (85/49 - 137/110)  BP(mean): 73 (18 Mar 2018 08:00) (56 - 115)  RR: 29 (18 Mar 2018 08:00) (26 - 34)  SpO2: 99% (18 Mar 2018 08:00) (94% - 100%)    Constitutional: NAD, awake and alert, well-developed  HEENT: PERR, EOMI, Normal Hearing, MMM  Neck: Soft and supple, No LAD, No JVD  Respiratory: Breath sounds are clear bilaterally, No wheezing, rales or rhonchi  Cardiovascular: S1 and S2, regular rate and rhythm, no Murmurs, gallops or rubs  Gastrointestinal: Bowel Sounds present, soft, nontender, nondistended, no guarding, no rebound  Extremities: No peripheral edema  Vascular: 2+ peripheral pulses  Neurological: A/O x 3, no focal deficits  Musculoskeletal: 5/5 strength b/l upper and lower extremities  Skin: No rashes    LABS: All Labs Reviewed:                        11.6   15.79 )-----------( 160      ( 18 Mar 2018 05:16 )             35.9     03-18    140  |  105  |  78<H>  ----------------------------<  178<H>  3.7   |  22  |  3.04<H>    Ca    8.3<L>      18 Mar 2018 05:16  Phos  4.9     03-17  Mg     2.3     03-17      PTT - ( 18 Mar 2018 05:16 )  PTT:43.9 sec          TELEMETRY/EKG: rate controlled Afib

## 2018-03-28 LAB
BASE EXCESS BLDA CALC-SCNC: 4.2 MMOL/L — HIGH (ref -2–2)
BLOOD GAS COMMENTS ARTERIAL: SIGNIFICANT CHANGE UP
CULTURE RESULTS: SIGNIFICANT CHANGE UP
GAS PNL BLDA: SIGNIFICANT CHANGE UP
HCO3 BLDA-SCNC: 27 MMOL/L — SIGNIFICANT CHANGE UP (ref 21–29)
HCT VFR BLD CALC: 25.8 % — LOW (ref 34.5–45)
HGB BLD-MCNC: 8.5 G/DL — LOW (ref 11.5–15.5)
MCHC RBC-ENTMCNC: 29.5 PG — SIGNIFICANT CHANGE UP (ref 27–34)
MCHC RBC-ENTMCNC: 32.9 GM/DL — SIGNIFICANT CHANGE UP (ref 32–36)
MCV RBC AUTO: 89.6 FL — SIGNIFICANT CHANGE UP (ref 80–100)
NRBC # BLD: 0 /100 WBCS — SIGNIFICANT CHANGE UP (ref 0–0)
PCO2 BLDA: 34 MMHG — SIGNIFICANT CHANGE UP (ref 32–46)
PH BLDA: 7.51 — HIGH (ref 7.35–7.45)
PLATELET # BLD AUTO: 335 K/UL — SIGNIFICANT CHANGE UP (ref 150–400)
PO2 BLDA: 72 MMHG — LOW (ref 74–108)
RBC # BLD: 2.88 M/UL — LOW (ref 3.8–5.2)
RBC # FLD: 17.1 % — HIGH (ref 10.3–14.5)
SAO2 % BLDA: 95 % — SIGNIFICANT CHANGE UP (ref 92–96)
SPECIMEN SOURCE: SIGNIFICANT CHANGE UP
WBC # BLD: 17.85 K/UL — HIGH (ref 3.8–10.5)
WBC # FLD AUTO: 17.85 K/UL — HIGH (ref 3.8–10.5)

## 2018-03-28 PROCEDURE — 93308 TTE F-UP OR LMTD: CPT | Mod: 26

## 2018-03-28 PROCEDURE — 71045 X-RAY EXAM CHEST 1 VIEW: CPT | Mod: 26

## 2018-03-28 PROCEDURE — 99233 SBSQ HOSP IP/OBS HIGH 50: CPT

## 2018-03-28 RX ORDER — MAGNESIUM SULFATE 500 MG/ML
1 VIAL (ML) INJECTION ONCE
Qty: 0 | Refills: 0 | Status: COMPLETED | OUTPATIENT
Start: 2018-03-28 | End: 2018-03-28

## 2018-03-28 RX ORDER — DIGOXIN 250 MCG
0.25 TABLET ORAL ONCE
Qty: 0 | Refills: 0 | Status: COMPLETED | OUTPATIENT
Start: 2018-03-28 | End: 2018-03-28

## 2018-03-28 RX ORDER — DIGOXIN 250 MCG
0.5 TABLET ORAL ONCE
Qty: 0 | Refills: 0 | Status: COMPLETED | OUTPATIENT
Start: 2018-03-28 | End: 2018-03-28

## 2018-03-28 RX ORDER — FUROSEMIDE 40 MG
40 TABLET ORAL EVERY 12 HOURS
Qty: 0 | Refills: 0 | Status: DISCONTINUED | OUTPATIENT
Start: 2018-03-28 | End: 2018-03-30

## 2018-03-28 RX ORDER — DIGOXIN 250 MCG
0.25 TABLET ORAL DAILY
Qty: 0 | Refills: 0 | Status: DISCONTINUED | OUTPATIENT
Start: 2018-03-29 | End: 2018-03-30

## 2018-03-28 RX ADMIN — Medication 0.5 MILLIGRAM(S): at 08:37

## 2018-03-28 RX ADMIN — Medication 3 MILLILITER(S): at 01:35

## 2018-03-28 RX ADMIN — Medication 3 MILLILITER(S): at 13:43

## 2018-03-28 RX ADMIN — Medication 180 MILLIGRAM(S): at 22:17

## 2018-03-28 RX ADMIN — SIMVASTATIN 20 MILLIGRAM(S): 20 TABLET, FILM COATED ORAL at 22:17

## 2018-03-28 RX ADMIN — Medication 100 GRAM(S): at 03:29

## 2018-03-28 RX ADMIN — Medication 180 MILLIGRAM(S): at 06:44

## 2018-03-28 RX ADMIN — Medication 40 MILLIGRAM(S): at 08:41

## 2018-03-28 RX ADMIN — Medication 40 MILLIGRAM(S): at 18:08

## 2018-03-28 RX ADMIN — Medication 60 MILLIGRAM(S): at 03:29

## 2018-03-28 RX ADMIN — Medication 0.25 MILLIGRAM(S): at 15:38

## 2018-03-28 NOTE — PROGRESS NOTE ADULT - SUBJECTIVE AND OBJECTIVE BOX
HPI:     Mrs. Clancy is a 72-year-old female who initially presented to the hospital for preoperative evaluation for knee surgery. She is found to be in atrial fibrillation and was sent to emergency room.  Patient was complaining of flank pain. A CT scan of the abdomen and pelvis was performed which showed hydronephrosis with a kidney stone. There was also evidence of a left lower lobe infiltrate.  Patient  had nephrostomy tube  was than a code sepsis in PACU.  Blood cultures were positive for Escherichia coli. She developed sepsis with hypotension and was resuscitated with over 3 L of fluid. She developed significant shortness of breath which was treated with BiPAP . she was subseqauently intubated. cxr showed pneumonia, vs. failure, vs. ARDS,   acute renal failure, creatinine  back to baseline, still slightly sob, completed course of abx. weak  IV heparin held secondary to  hematuria.     3/24: urine clear  feels weak  Hb 8.3  K 3.3    3/25: c/o dry cough  room mate found to have HMPV  K 4  Hb 8.5    3/26: c/o dry cough  CT chest showed pulmonary edema  RVP negative  still on O2 via nc  Nephrostomy tube came out yesterday  passing a lot of urine  WBC down to 15.69 today    3/27: no new complaints  passing urine    3/28: became sob overnight, much better at this time  Left flank ecchymosis much softer today and did not increase from margins; pt has no pain  lasix increased  digoxin being loaded      PHYSICAL EXAM:    Daily     Daily     ICU Vital Signs Last 24 Hrs  T(C): 36.6 (28 Mar 2018 05:51), Max: 36.7 (27 Mar 2018 16:45)  T(F): 97.8 (28 Mar 2018 05:51), Max: 98.1 (27 Mar 2018 16:45)  HR: 108 (28 Mar 2018 08:42) (80 - 131)  BP: 106/55 (28 Mar 2018 08:42) (95/50 - 117/59)  BP(mean): 62 (28 Mar 2018 05:51) (62 - 62)  ABP: --  ABP(mean): --  RR: 19 (28 Mar 2018 05:51) (19 - 22)  SpO2: 100% (28 Mar 2018 05:51) (97% - 100%)         Constitutional: Weak appearing using O2 via nc  HEENT: Atraumatic, VERNELL, Normal, No congestion  Respiratory: Breath Sounds decreased, no rhonchi/wheeze  Cardiovascular: N S1S2; АННА present  Gastrointestinal: Left flank ecchymosis same as before, Abdomen soft, non tender, Bowel Sounds present  Extremities: No edema, peripheral pulses present  Neurological: AAO x 3, no gross focal motor deficits                     8.5    17.85 )-----------( 335      ( 28 Mar 2018 06:23 )             25.8       CBC Full  -  ( 28 Mar 2018 06:23 )  WBC Count : 17.85 K/uL  Hemoglobin : 8.5 g/dL  Hematocrit : 25.8 %  Platelet Count - Automated : 335 K/uL  Mean Cell Volume : 89.6 fl  Mean Cell Hemoglobin : 29.5 pg  Mean Cell Hemoglobin Concentration : 32.9 gm/dL  Auto Neutrophil # : x  Auto Lymphocyte # : x  Auto Monocyte # : x  Auto Eosinophil # : x  Auto Basophil # : x  Auto Neutrophil % : x  Auto Lymphocyte % : x  Auto Monocyte % : x  Auto Eosinophil % : x  Auto Basophil % : x      03-27    142  |  107  |  16  ----------------------------<  101<H>  3.6   |  29  |  0.80    Ca    8.2<L>      27 Mar 2018 06:36                          ABG - ( 28 Mar 2018 02:24 )  pH: 7.51  /  pCO2: 34    /  pO2: 72    / HCO3: 27    / Base Excess: 4.2   /  SaO2: 95                  MEDICATIONS  (STANDING):  ALBUTerol/ipratropium for Nebulization 3 milliLiter(s) Nebulizer every 6 hours  digoxin  Injectable 0.25 milliGRAM(s) IV Push once  digoxin  Injectable 0.25 milliGRAM(s) IV Push once  diltiazem    milliGRAM(s) Oral daily  diltiazem    milliGRAM(s) Oral at bedtime  furosemide   Injectable 40 milliGRAM(s) IV Push every 12 hours  simvastatin 20 milliGRAM(s) Oral at bedtime

## 2018-03-28 NOTE — CONSULT NOTE ADULT - SUBJECTIVE AND OBJECTIVE BOX
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 for new onset AFib. Pt went for pre-op testing prior to her knee replacement surgery and noted to have new AFib. Pt reported urinary incontinence, but no fever at home.     Workup shows bacteremia.    PMH: as above  PSH: as above; C section x 2, hysterectomy, left torn rotator cuff sx  Meds: per reconciliation sheet, noted below  MEDICATIONS  (STANDING):  ALBUTerol/ipratropium for Nebulization 3 milliLiter(s) Nebulizer every 6 hours  aspirin 325 milliGRAM(s) Oral daily  azithromycin  IVPB 500 milliGRAM(s) IV Intermittent every 24 hours  cefTRIAXone Injectable. 1000 milliGRAM(s) IV Push every 24 hours  diltiazem    Tablet 60 milliGRAM(s) Oral every 4 hours  diltiazem Infusion 5 mG/Hr (5 mL/Hr) IV Continuous <Continuous>  furosemide   Injectable 40 milliGRAM(s) IV Push daily  heparin  Infusion.  Unit(s)/Hr (18 mL/Hr) IV Continuous <Continuous>  methylPREDNISolone sodium succinate Injectable 40 milliGRAM(s) IV Push every 8 hours  simvastatin 20 milliGRAM(s) Oral at bedtime    MEDICATIONS  (PRN):  docusate sodium 100 milliGRAM(s) Oral two times a day PRN Constipation  heparin  Injectable 8000 Unit(s) IV Push every 6 hours PRN For aPTT less than 40  heparin  Injectable 4000 Unit(s) IV Push every 6 hours PRN For aPTT between 40 - 57  LORazepam   Injectable 0.5 milliGRAM(s) IntraMuscular three times a day PRN Anxiety  ondansetron Injectable 4 milliGRAM(s) IV Push every 6 hours PRN Nausea and/or Vomiting  ondansetron Injectable 4 milliGRAM(s) IV Push once PRN Nausea and/or Vomiting  oxyCODONE    5 mG/acetaminophen 325 mG 1 Tablet(s) Oral every 4 hours PRN Moderate Pain (4 - 6)  oxyCODONE    IR 5 milliGRAM(s) Oral every 4 hours PRN For moderate pain    Allergies    Macrobid (Other)    Intolerances      Social: no smoking, no alcohol, no illegal drugs; no recent travel, no exposure to TB  FAMILY HISTORY: Mother-breast ca; Grandfather-heart disease    ROS: the patient is on BiPAP; ROS is limited; feels SOB; dose not seem in pain    Vital Signs Last 24 Hrs  T(C): 36.4 (16 Mar 2018 08:46), Max: 37.8 (15 Mar 2018 22:26)  T(F): 97.5 (16 Mar 2018 08:46), Max: 100.1 (15 Mar 2018 22:26)  HR: 88 (16 Mar 2018 08:33) (88 - 146)  BP: 97/67 (16 Mar 2018 06:00) (90/66 - 152/76)  BP(mean): 72 (16 Mar 2018 06:00) (63 - 99)  RR: 30 (16 Mar 2018 06:00) (16 - 39)  SpO2: 100% (16 Mar 2018 08:33) (88% - 100%)  Daily     Daily Weight in k (16 Mar 2018 03:19)    PE:    Constitutional: frail looking  HEENT: NC/AT, EOMI, PERRLA  Neck: supple  Back: no tenderness  Respiratory: crackles at bases  Cardiovascular: S1S2 irregular, no murmurs  Abdomen: soft, not tender, not distended, positive BS  Genitourinary: no LN palpable; left nephrostomy tube  Rectal: deferred  Musculoskeletal: no muscle tenderness, no joint swelling or tenderness  Extremities: no pedal edema  Neurological: AxOx3, moving all extremities  Skin: no rashes    Labs:                        12.0   19.61 )-----------( 153      ( 16 Mar 2018 07:04 )             37.4     03-16    143  |  113<H>  |  40<H>  ----------------------------<  204<H>  4.5   |  17<L>  |  2.23<H>    Ca    8.3<L>      16 Mar 2018 07:04    TPro  6.9  /  Alb  2.7<L>  /  TBili  0.8  /  DBili  x   /  AST  24  /  ALT  20  /  AlkPhos  103  03-14     LIVER FUNCTIONS - ( 14 Mar 2018 16:30 )  Alb: 2.7 g/dL / Pro: 6.9 gm/dL / ALK PHOS: 103 U/L / ALT: 20 U/L / AST: 24 U/L / GGT: x           Urinalysis Basic - ( 14 Mar 2018 15:06 )    Color: Yellow / Appearance: very cloudy / S.020 / pH: x  Gluc: x / Ketone: Negative  / Bili: Small / Urobili: 4 mg/dL   Blood: x / Protein: 100 mg/dL / Nitrite: Positive   Leuk Esterase: Moderate / RBC: 6-10 /HPF / WBC >50   Sq Epi: x / Non Sq Epi: Moderate / Bacteria: Many      Culture - Blood (collected 14 Mar 2018 19:41)  Source: .Blood None  Gram Stain (15 Mar 2018 20:51):    Growth in aerobic bottle: Gram Negative Rods    Growth in anaerobic bottle: Gram Negative Rods  Preliminary Report (15 Mar 2018 20:51):    Growth in aerobic bottle: Gram Negative Rods    Growth in anaerobic bottle: Gram Negative Rods     Organism: Blood Culture PCR (15 Mar 2018 18:32)      -  Escherichia coli: Detec      Method Type: PCR    Culture - Blood (collected 14 Mar 2018 19:40)  Source: .Blood None  Gram Stain (15 Mar 2018 20:53):    Growth in anaerobic bottle: Gram Negative Rods  Preliminary Report (15 Mar 2018 20:53):    Growth in anaerobic bottle: Gram Negative Rods          Radiology:    < from: CT Abdomen and Pelvis No Cont (18 @ 19:12) >  mild LEFT hydronephrosis secondary to an obstructing 6 mm   calculus at the LEFT ureteropelvic junction. There is no RIGHT   nephrolithiasis or hydronephrosis. Small adrenal adenomas.   Cholelithiasis. 3.1 cm duodenal diverticulum. Moderate sigmoid   diverticulosis. Small alveolar infiltrate in the LEFT lower lobe.     < end of copied text >    < from: Xray Chest 1 View AP/PA. (18 @ 17:39) >   No active pulmonary disease.    < end of copied text >        Advanced directives addressed: full resuscitation
Patient is a 72y old  Female who presents with a chief complaint of came for pre op testing (15 Mar 2018 00:51) and found to have incidental rapid afib and WBC 19   - found to have UTI with E.coli and Left hydronpehrosis due to obstructed stone - s.p PCN on 3/15 which revealed purulent drainage, pt subsequently become SOb and given IVF for sepsis in IR . Pt required Bipap and given IV lasix for new bilalteral infiltrates on Cxr c.w pulm edema. pt was then transferred to CCU for repsiratory failure and required intuabtion on 3/17.   On chronc home dose lasix for LE edema and on chronic Advil and oxycontin at home.    Cr was 1.5 --> then 1.36 --> then continued rise to Cr 3 today - renal eval called for STACIE       Today   pt is intubated , sedated   on Fio2 - 60% vent     OUT:while on Bumex gtt    Indwelling Catheter - Urethral: 2260 mL  - whi    Nephrostomy Tube: 410 mL    Voided: 200 mL  Total OUT: 2870 mL  Total NET: -1515 mL      PAST MEDICAL & SURGICAL HISTORY:  Diverticulitis  Kidney stones  Psoriasis  Edema  HLD (hyperlipidemia)  HTN (hypertension)  S/P   H/O bladder repair surgery  H/O arthroscopy of shoulder: left   H/O: hysterectomy: due to bleeding     MEDICATIONS  (STANDING):  ALBUTerol    90 MICROgram(s) HFA Inhaler 2 Puff(s) Inhalation every 6 hours  aspirin 325 milliGRAM(s) Oral daily  buMETAnide Infusion 1 mG/Hr (10 mL/Hr) IV Continuous <Continuous>  cefepime  IVPB      cefepime  IVPB 1000 milliGRAM(s) IV Intermittent every 12 hours  chlorhexidine 0.12% Liquid 15 milliLiter(s) Swish and Spit two times a day  diltiazem    Tablet 60 milliGRAM(s) Oral every 4 hours  heparin  Infusion.  Unit(s)/Hr (18 mL/Hr) IV Continuous <Continuous>  methylPREDNISolone sodium succinate Injectable 40 milliGRAM(s) IV Push every 12 hours  propofol Infusion 10 MICROgram(s)/kG/Min (5.988 mL/Hr) IV Continuous <Continuous>  simvastatin 20 milliGRAM(s) Oral at bedtime      Allergies    Macrobid (Other)    Intolerances      SOCIAL HISTORY:  Denies ETOh,Smoking,     FAMILY HISTORY:      REVIEW OF SYSTEMS:  sedated - UTO     All other review of systems is negative unless indicated above.    Vital Signs Last 24 Hrs  T(C): 36.4 (18 Mar 2018 08:51), Max: 36.9 (17 Mar 2018 21:22)  T(F): 97.5 (18 Mar 2018 08:51), Max: 98.4 (17 Mar 2018 21:22)  HR: 89 (18 Mar 2018 11:00) (86 - 106)  BP: 108/64 (18 Mar 2018 11:00) (85/49 - 137/1  BP(mean): 75 (18 Mar 2018 11:00) (58 - 115)  RR: 26 (18 Mar 2018 11:00) (23 - 34)  SpO2: 100% (18 Mar 2018 11:00) (94% - 100%))  Wt(kg): --    I and O's:    17 @ 07:01  -  -18 @ 07:00  --------------------------------------------------------  IN: 1355 mL / OUT: 2870 mL / NET: -1515 mL        PHYSICAL EXAM:    Constitutional: NAD  HEENT:  EOMI,  MMM  Neck: No LAD, No JVD  Respiratory: coarse breath sounds  Cardiovascular: S1 and S2  Gastrointestinal: BS+, soft, NT/ND  Extremities: ++ peripheral edema  Neurological: sedated on vent  Psychiatric: sedated   : ++ rosado drining clear urine and left PCN draining dark blood tinged fluid  Skin: No rashes  Access: Not applicable    LABS:                                           11.6   15.79 )-----------( 160      ( 18 Mar 2018 05:16 )             35.9                         11.7   25.13 )-----------( 168      ( 17 Mar 2018 06:09 )             35.7     140    |  105    |  78     ----------------------------<  178       18 Mar 2018 05:16  3.7     |  22     |  3.04     143    |  110    |  64     ----------------------------<  204       17 Mar 2018 11:37  3.8     |  20     |  2.85     141    |  110    |  58     ----------------------------<  177       17 Mar 2018 06:09  4.3     |  20     |  2.75     Ca    8.3        18 Mar 2018 05:16  Ca    8.2        17 Mar 2018 11:37    Phos  4.9       17 Mar 2018 06:09    Mg     2.3       17 Mar 2018 06:09      Urine Studies:      Culture - Urine (03.15.18 @ 16:05)    Specimen Source: .Urine None    Culture Results:   <10,000 CFU/ml  Normal Urogenital radha present    Urine Microscopic-Add On (NC) (18 @ 15:06)    Red Blood Cell - Urine: 6-10 /HPF    White Blood Cell - Urine: >50    Epithelial Cells: Moderate    Bacteria: Many    Urinalysis (18 @ 15:06)    Glucose Qualitative, Urine: Negative mg/dL    Blood, Urine: Moderate    pH Urine: 5.0    Color: Yellow    Urine Appearance: very cloudy    Bilirubin: Small    Ketone - Urine: Negative    Specific Gravity: 1.020    Protein, Urine: 100 mg/dL    Urobilinogen: 4 mg/dL    Nitrite: Positive    Leukocyte Esterase Concentration: Moderate    Culture - Blood (18 @ 19:41)    Gram Stain:   Growth in aerobic bottle: Gram Negative Rods  Growth in anaerobic bottle: Gram Negative Rods    -  Amikacin: S <=8    -  Ampicillin: S <=2    -  Ampicillin/Sulbactam: S <=4/2    -  Aztreonam: S <=4    -  Cefazolin: S <=2    -  Cefepime: S <=2    -  Cefoxitin: S <=4    -  Ceftazidime: S <=1    -  Ceftriaxone: S <=1    -  Ciprofloxacin: S <=0.5    -  Ertapenem: S <=0.5    -  Gentamicin: S <=1    -  Imipenem: S <=1    -  Levofloxacin: S <=1    -  Meropenem: S <=1    -  Piperacillin/Tazobactam: S <=8    -  Tobramycin: S <=2    -  Trimethoprim/Sulfamethoxazole: S <=0.5/9.5    -  Escherichia coli: Detec    Specimen Source: .Blood None    Organism: Blood Culture PCR    Organism: Escherichia coli    Culture Results:   Growth in aerobic and anaerobic bottles: Escherichia coli  "Due to technical problems, Proteus sp. will Not be reported as part of  the BCID panel until further notice"    Organism Identification: Blood Culture PCR  Escherichia coli    Method Type: PCR    Method Type: NOEL        RADIOLOGY & ADDITIONAL STUDIES:    Abd Ct   IMPRESSION:  mild LEFT hydronephrosis secondary to an obstructing 6 mm   calculus at the LEFT ureteropelvic junction. There is no RIGHT   nephrolithiasis or hydronephrosis. Small adrenal adenomas.   Cholelithiasis. 3.1 cm duodenal diverticulum. Moderate sigmoid   diverticulosis. Small alveolar infiltrate in the LEFT lower lobe.

## 2018-03-28 NOTE — PROGRESS NOTE ADULT - ASSESSMENT
72/F downgraded from ICU service for:    1. New onset atrial fibrillation:      AC on hold secondary to hematuria, left rectus sheath hematoma; would restart in a few days once hematoma is stable/improving  2. Obstructing kidney stone, s/p Left sided nephrostomy tube which fell off on 3/25; ;  E. coli sepsis,  completed course of abx  A CT abdo was done on 3/27 and NO hydronephrosis or Obstructing stone was reported; 6 mm left renal stone;  no need for replacement of nephrostomy tube by IR at this time.  2a)  small rectus sheath hematoma reported on CT: monitor for now; d/c lovenox, H/H stable, monitor,  surgical consult awaited  3. ARDS, acute hypoxic respiratory failure +  pneumonia + CHF, acute diastolic EF 55-60%:    lasix increased to twice daily  Digoxin being loaded as per cardio   c/o dry cough with room mate found to have HMPV; RVP negative; CT chest showed congestion;   4. renal failure - improving; Cr normal   5. Physical therapy for ambulation  6. Hypokalemia of 3.3: replace and rechecked; 3.6  7. Anemia, Acute on chronic, sec to hematuria , monitor for now; stable  8. Leukocytosis; persisting  9. DVT PPX: venodynes b/l    poc discussed with pt, team

## 2018-03-28 NOTE — CHART NOTE - NSCHARTNOTEFT_GEN_A_CORE
Called by nurse @ ~2:15 AM for SOB after duoneb treatment. Pt previously intubated for acute respiratory failure, now with pulm edema. At time of interview SBP is in 140s, saturating in 90s w/ NC, HR in 130s. Pt reports feeling like her chest is "tight" and is also reporting lightheadedness. She is denying chest pain.    Vital Signs Last 24 Hrs  T(C): 36.6 (03-28-18 @ 02:06)  T(F): 97.9 (03-28-18 @ 02:06), Max: 98.1 (03-27-18 @ 10:13)  HR: 106 (03-28-18 @ 02:12) (80 - 131)  BP: 101/58 (03-28-18 @ 02:06)  BP(mean): --  RR: 22 (03-28-18 @ 02:06) (18 - 22)  SpO2: 97% (03-28-18 @ 02:12) (95% - 100%)  Wt(kg): --    Physical  Gen - AOx3, moderate respiratory distress  Card - IRRR, clear S1/S2  Resp - diffuse wheezing b/l, no crackles appreciated    A/P: 72F with respiratory distress after duoneb treatment.    f/u stat CXR  f/u ABG  Will consider bipap pending results of above  Will consider adding IV steroids  Will continue to monitor    Discussed w/ senior resident Called by nurse @ ~2:15 AM for SOB after duoneb treatment. Pt previously intubated for acute respiratory failure, now with pulm edema. At time of interview SBP is in 140s, saturating in 90s w/ NC, HR in 130s. Pt reports feeling like her chest is "tight" and is also reporting lightheadedness. She is denying chest pain.    Vital Signs Last 24 Hrs  T(C): 36.6 (03-28-18 @ 02:06)  T(F): 97.9 (03-28-18 @ 02:06), Max: 98.1 (03-27-18 @ 10:13)  HR: 106 (03-28-18 @ 02:12) (80 - 131)  BP: 101/58 (03-28-18 @ 02:06)  BP(mean): --  RR: 22 (03-28-18 @ 02:06) (18 - 22)  SpO2: 97% (03-28-18 @ 02:12) (95% - 100%)  Wt(kg): --    Physical  Gen - AOx3, moderate respiratory distress  Card - IRRR, clear S1/S2  Resp - diffuse wheezing b/l, no crackles appreciated    A/P: 72F with respiratory distress after duoneb treatment.    f/u stat CXR  f/u ABG  Will consider bipap pending results of above  Will consider adding IV steroids  Will continue to monitor    Discussed w/ senior resident & nocturnist    ADDENDUM: ABG showed alkalosis w/ pH 7.51, will not start Bipap. CXR performed showing worsened pulm edema (unofficial). Gave 1x 5mg IV cardizem for afib w/ RVR.

## 2018-03-28 NOTE — PROGRESS NOTE ADULT - SUBJECTIVE AND OBJECTIVE BOX
Subjective:  had increased sob last nite  slightly better now    MEDICATIONS  (STANDING):  ALBUTerol/ipratropium for Nebulization 3 milliLiter(s) Nebulizer every 6 hours  digoxin  Injectable 0.25 milliGRAM(s) IV Push once  digoxin  Injectable 0.25 milliGRAM(s) IV Push once  diltiazem    milliGRAM(s) Oral daily  diltiazem    milliGRAM(s) Oral at bedtime  furosemide   Injectable 40 milliGRAM(s) IV Push every 12 hours  simvastatin 20 milliGRAM(s) Oral at bedtime    MEDICATIONS  (PRN):  ALBUTerol    0.083% 2.5 milliGRAM(s) Nebulizer every 6 hours PRN Wheezing  docusate sodium 100 milliGRAM(s) Oral two times a day PRN Constipation  guaiFENesin    Syrup 100 milliGRAM(s) Oral every 6 hours PRN Cough  morphine  - Injectable 2 milliGRAM(s) IV Push every 4 hours PRN Moderate Pain (4 - 6)  ondansetron Injectable 4 milliGRAM(s) IV Push every 6 hours PRN Nausea and/or Vomiting      Allergies    Macrobid (Other)    Intolerances        REVIEW OF SYSTEMS:    CONSTITUTIONAL:  As per HPI.  HEENT:  Eyes:  No diplopia or blurred vision. ENT:  No earache, sore throat or runny nose.  CARDIOVASCULAR:  No pressure, squeezing, tightness, heaviness or aching about the chest, neck, axilla or epigastrium.  RESPIRATORY:  No cough, shortness of breath, PND or orthopnea.  GASTROINTESTINAL:  No nausea, vomiting or diarrhea.  GENITOURINARY:  No dysuria, frequency or urgency.  MUSCULOSKELETAL:  no joint pain, deformity, tenderness  EXTREMITIES: no clubbing cyanosis,edema  SKIN:  No change in skin, hair or nails.  NEUROLOGIC:  No paresthesias, fasciculations, seizures or weakness.  PSYCHIATRIC:  No disorder of thought or mood.  ENDOCRINE:  No heat or cold intolerance, polyuria or polydipsia.  HEMATOLOGICAL:  No easy bruising or bleedings:    Vital Signs Last 24 Hrs  T(C): 36.6 (28 Mar 2018 05:51), Max: 36.7 (27 Mar 2018 10:13)  T(F): 97.8 (28 Mar 2018 05:51), Max: 98.1 (27 Mar 2018 10:13)  HR: 108 (28 Mar 2018 08:42) (80 - 131)  BP: 106/55 (28 Mar 2018 08:42) (95/50 - 117/59)  BP(mean): 62 (28 Mar 2018 05:51) (62 - 62)  RR: 19 (28 Mar 2018 05:51) (18 - 22)  SpO2: 100% (28 Mar 2018 05:51) (97% - 100%)    PHYSICAL EXAMINATION:  SKIN: no rashes  HEAD: NC/AT  EYES: PERRLA, EOMI  EARS: TM's intact  NOSE: no abnormalities  NECK:  Supple. No lymphadenopathy. Jugular venous pressure not elevated. Carotids equal.   HEART:   The cardiac impulse has a normal quality. Reg., Nl S1 and S2.  There are no murmurs, rubs or gallops noted  CHEST: bilat ronchi w crackles  ABDOMEN:  Soft and nontender.   EXTREMITIES:  + edema  NEURO: AAO x 3, no focal deficts       LABS:                        8.5    17.85 )-----------( 335      ( 28 Mar 2018 06:23 )             25.8     03-27    142  |  107  |  16  ----------------------------<  101<H>  3.6   |  29  |  0.80    Ca    8.2<L>      27 Mar 2018 06:36            RADIOLOGY & ADDITIONAL TESTS:

## 2018-03-28 NOTE — CONSULT NOTE ADULT - CONSULT REQUESTED DATE/TIME
16-Mar-2018 18:14
14-Mar-2018 16:40
15-Mar-2018 09:58
16-Mar-2018 10:42
18-Mar-2018 11:33
28-Mar-2018

## 2018-03-28 NOTE — PROGRESS NOTE ADULT - SUBJECTIVE AND OBJECTIVE BOX
Patient is a 72y old  Female who presents with a chief complaint of came for pre op testing (15 Mar 2018 00:51)    HPI Pt transferred to ED from admitting d/t abnormal ECG demostrating AF with RVR. Office redocrds reviewed. This is new AF. States having on and off leg swelling but no orthopnea, angina, palp, PND.      3/15 - Not feeling better, had episode of sweating and SOB recently. Blood work revealed hydronephrosis, obstructive calculus, UTI, questionable pneumonia, low TSH, elevated WBC, elevated cr. Tele with AF and VR around 135 now.  CT reviewed, no signs of pleural effusions or lung edema in included lungs segments. ECHO reviewed too. Mild AS, MR, preserved LVEF.   Suggest starting IV cardizem drip titratable to HR < 100. Also start UFH IV for stroke ppx. Cont PO cardizem as well, this will assist in weaning her off IV cardizem ultimately. Cont statin too.   Obtain urology consult re: hydronephrosis. Also endocrinology re: low TSH.  Would defer stress MPI until her HR is more stable and normal.  Discussed with patient also re: CV. She would need 3 weeks strict OAC and therefore gigi also interfere should she need percutaneous hydronephrosis drainage, not to mention her future knee surgery as discussed yesterday. Should HR become an issue difficult to control with drugs, would then proceed to CV and deal with the rest later on as needed. For time being, and as long as she is hemodynamically stable, would cont rate control, AC and address first UTI and possible hyperthyroidism.  Case d/w hospitalist.  Will follow    3/16 - Pt on BiPAP, in no resp distress at moment. Tele AF with HR round 70-90. BP stable off pressors. Awake and alert. Had resp distress yesterday after PCN, possibly diastolic heart failure. Suggest continuing IV diuresis as needed, continue rate control with IV diltiazem, will start weaning her to PO once off BiPAP. Continue UFH, will transition to OAC once kidney fx is stable as well. Will continue to defer stress test and CV for time being.    3/17-  Hasn't responded to BIPAP and seemed obtunded today. Chest xray showed worsened heart failure. Critical care decided to intubate patient.   She remains in afib. On a cardizem drip. After sedation, blood pressure decreased to the high 90s.  overnight, her vital signs have been stable.   Received Lasix 40 mg IV at 5 am and has responded with urine output.     3/18-  Intubated yesterday.   Cardizem drip was discontinued and started on QID cardizem by OGT. She has tolerated it well with good heart rate control and adequate blood pressures.   Was started on a Bumex drip. Has diuresed over 2000 ml and there was been an increase in the serum creatinine.     3/19 - Examined today, on sedation and vent. CXR 3/17 and clinically without significant improvement despite aggressive IV diuresis and significant UOP, rather her Cr went up. I suspect her bilateral alveolar infiltrates and resp distress may not be cardiogenic at the moment ( ARDS in setting of sepsis?). Would suggest cont to hold diuretics and cont optimal vent support while addressing other acute clinical issues. Appreciated pulm and nephro input.  She remains in AF with appropriate rate control on CCB via OGT. Cont that as well.   Will follow up closely.     3/20 - Sedated, in no distress, FIO2 40% on vent and sats 99%, hemodynamically stable of pressors, rate controlled on CCB via OGT, on UFH, H/H stable, if needed can hold it.  CXR much improved yesterday off IV diuretics, keep holding that, Cr getting better too.   Abx per ID  Vent mgt per ICU/Pulm  Will follow up closely.     3/21 - Doing better on aerosol mask, good sats, thirsty. Hemodynamically stable off pressors, HR trending up in AF, would switch her to ER Cardizem PO and titrate accordingly or would resume IV drip if unable to tolerate PO route.  Can hold off heparin for the moment d/t bleeding issues, would resume it with OAC once cleared from this. Will defer stress test for later when she is clinically stable. Can use IV furosemide as needed.   Will follow up closely.   Case d/w ICU staff    3/22 - doing well on NC, CXR yesterday with minimal mostly left side infiltrates can use lasix prn. I added a QHS dose of cardizem CD with holding parameters for better average HR control. Cont rest of management as it is.  Will follow closely.   D/W nursing staff.       3/23 - Improving clinically, heart rate better controlled. Azotemia and leukocytosis improving too.  Will follow up.     3/24: transferred to telemtry. AF on monitor with rate , on PO Cardizem.     3/25: AF on monitor with -110. BP stable. Will increase Cardizem CD.    3/26 - Feels better but still with REGAN and orthopnea, CT chest done yesterday suggestive of pulmonary edema with bilateral pleural effusions, BMP yesterday with normal Cr. Will give her one dose of IV Lasix now and repeat CXR in AM to assess response. IF obvious response, will keep her on daily dose of Lasix. Also, her HR remains > 100 in AF, she is taking already a total dose of 360 mg PO Cardizem CD, if persistently RVR, will do digoxin loading tomorrow.  Her PCN catheter was dislodged yesterday, she feel fine so far, no back or abd pain, no fever/chills, will need urology/imaging to monitor for hydronephrosis recurrence.   Otherwise, cont rest of CV regimen as it is other than the above addition.   Will follow.     3/27 - breathing better, yet CXR yesterday shortly after Lasix dose was unchanged and today's physical exam still shows abundant rales all over left lung field. Will give her another Lasix IV dose today and repeat CXR tomorrow AM. HR is better controlled, around 100. Will keep current Cardizem dose and hold off dig for the moment. She is still awaiting urology follow up.        3/28 - CT done yesterday, hydro resolved, pleural effusions better but still with b/l ground glass infiltrates, CXR today with same parenchymal findings and she still has dry cough, REGAN and had an episode of orthopnea last night, ABG with mild hypoxia only and alkalosis, no hypercarbia. Suspect HF. Will increase her daily dose of IV lasix to 40 mg BID and monitor Cr q AM. Her HR has also been more erratic and > 100, cardizem PO at max dose and BP on low side, will do IV dig loading today and cont tomorrow with PO. Cont rest of regimen otherwise, consider resuming OAC when feasible.   Will also do limited echo today to follow up on EF and/or valvular issues.      Allergies    Macrobid (Other)    Intolerances        MEDICATIONS  (STANDING):  ALBUTerol/ipratropium for Nebulization 3 milliLiter(s) Nebulizer every 6 hours  aspirin 325 milliGRAM(s) Oral daily  azithromycin  IVPB 500 milliGRAM(s) IV Intermittent every 24 hours  buMETAnide Infusion 1 mG/Hr (10 mL/Hr) IV Continuous <Continuous>  cefepime  IVPB      cefepime  IVPB 1000 milliGRAM(s) IV Intermittent every 12 hours  chlorhexidine 0.12% Liquid 15 milliLiter(s) Swish and Spit two times a day  diltiazem    Tablet 60 milliGRAM(s) Oral every 4 hours  heparin  Infusion.  Unit(s)/Hr (18 mL/Hr) IV Continuous <Continuous>  methylPREDNISolone sodium succinate Injectable 40 milliGRAM(s) IV Push every 8 hours  propofol Infusion 10 MICROgram(s)/kG/Min (5.988 mL/Hr) IV Continuous <Continuous>  simvastatin 20 milliGRAM(s) Oral at bedtime    MEDICATIONS  (PRN):  docusate sodium 100 milliGRAM(s) Oral two times a day PRN Constipation  heparin  Injectable 8000 Unit(s) IV Push every 6 hours PRN For aPTT less than 40  heparin  Injectable 4000 Unit(s) IV Push every 6 hours PRN For aPTT between 40 - 57  LORazepam   Injectable 1 milliGRAM(s) IV Push every 4 hours PRN Anxiety  ondansetron Injectable 4 milliGRAM(s) IV Push every 6 hours PRN Nausea and/or Vomiting  oxyCODONE    5 mG/acetaminophen 325 mG 1 Tablet(s) Oral every 4 hours PRN Moderate Pain (4 - 6)    REVIEW OF SYSTEMS:    RESPIRATORY: No cough, wheezing, hemoptysis; No shortness of breath  CARDIOVASCULAR: No chest pain or palpitations  All other review of systems is negative unless indicated above      PHYSICAL EXAM:  Daily     Daily Weight in k.5 (18 Mar 2018 05:00)  Vital Signs Last 24 Hrs  T(C): 36.4 (18 Mar 2018 08:51), Max: 36.9 (17 Mar 2018 21:22)  T(F): 97.5 (18 Mar 2018 08:51), Max: 98.4 (17 Mar 2018 21:22)  HR: 91 (18 Mar 2018 08:00) (86 - 106)  BP: 107/64 (18 Mar 2018 08:00) (85/49 - 137/110)  BP(mean): 73 (18 Mar 2018 08:00) (56 - 115)  RR: 29 (18 Mar 2018 08:00) (26 - 34)  SpO2: 99% (18 Mar 2018 08:00) (94% - 100%)    Constitutional: NAD, awake and alert, well-developed  HEENT: PERR, EOMI, Normal Hearing, MMM  Neck: Soft and supple, No LAD, No JVD  Respiratory: Breath sounds are clear bilaterally, No wheezing, rales or rhonchi  Cardiovascular: S1 and S2, regular rate and rhythm, no Murmurs, gallops or rubs  Gastrointestinal: Bowel Sounds present, soft, nontender, nondistended, no guarding, no rebound  Extremities: No peripheral edema  Vascular: 2+ peripheral pulses  Neurological: A/O x 3, no focal deficits  Musculoskeletal: 5/5 strength b/l upper and lower extremities  Skin: No rashes    LABS: All Labs Reviewed:                        11.6   15.79 )-----------( 160      ( 18 Mar 2018 05:16 )             35.9     03-18    140  |  105  |  78<H>  ----------------------------<  178<H>  3.7   |  22  |  3.04<H>    Ca    8.3<L>      18 Mar 2018 05:16  Phos  4.9     03-17  Mg     2.3     03-17      PTT - ( 18 Mar 2018 05:16 )  PTT:43.9 sec          TELEMETRY/EKG: rate controlled Afib
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
## 2018-03-28 NOTE — PROVIDER CONTACT NOTE (OTHER) - SITUATION
Spoke with Teo
Spoke with Vinay
Called Dr Candelario regarding consult and spoke to his service
Dr. Guajardo aware of consult.
MEWS score of 8
SERVICE CALLED, MD AWARE OF CONSULT
called on call service, spoke with Zandra, they are aware of consult

## 2018-03-29 LAB
ANION GAP SERPL CALC-SCNC: 9 MMOL/L — SIGNIFICANT CHANGE UP (ref 5–17)
BUN SERPL-MCNC: 30 MG/DL — HIGH (ref 7–23)
CALCIUM SERPL-MCNC: 8.5 MG/DL — SIGNIFICANT CHANGE UP (ref 8.5–10.1)
CHLORIDE SERPL-SCNC: 104 MMOL/L — SIGNIFICANT CHANGE UP (ref 96–108)
CO2 SERPL-SCNC: 29 MMOL/L — SIGNIFICANT CHANGE UP (ref 22–31)
CREAT SERPL-MCNC: 1.08 MG/DL — SIGNIFICANT CHANGE UP (ref 0.5–1.3)
GLUCOSE SERPL-MCNC: 127 MG/DL — HIGH (ref 70–99)
HCT VFR BLD CALC: 25.1 % — LOW (ref 34.5–45)
HGB BLD-MCNC: 8 G/DL — LOW (ref 11.5–15.5)
MAGNESIUM SERPL-MCNC: 2.1 MG/DL — SIGNIFICANT CHANGE UP (ref 1.6–2.6)
MCHC RBC-ENTMCNC: 29.1 PG — SIGNIFICANT CHANGE UP (ref 27–34)
MCHC RBC-ENTMCNC: 31.9 GM/DL — LOW (ref 32–36)
MCV RBC AUTO: 91.3 FL — SIGNIFICANT CHANGE UP (ref 80–100)
NRBC # BLD: 0 /100 WBCS — SIGNIFICANT CHANGE UP (ref 0–0)
PHOSPHATE SERPL-MCNC: 3.6 MG/DL — SIGNIFICANT CHANGE UP (ref 2.5–4.5)
PLATELET # BLD AUTO: 436 K/UL — HIGH (ref 150–400)
POTASSIUM SERPL-MCNC: 3.5 MMOL/L — SIGNIFICANT CHANGE UP (ref 3.5–5.3)
POTASSIUM SERPL-SCNC: 3.5 MMOL/L — SIGNIFICANT CHANGE UP (ref 3.5–5.3)
RBC # BLD: 2.75 M/UL — LOW (ref 3.8–5.2)
RBC # FLD: 16.9 % — HIGH (ref 10.3–14.5)
SODIUM SERPL-SCNC: 142 MMOL/L — SIGNIFICANT CHANGE UP (ref 135–145)
WBC # BLD: 15 K/UL — HIGH (ref 3.8–10.5)
WBC # FLD AUTO: 15 K/UL — HIGH (ref 3.8–10.5)

## 2018-03-29 PROCEDURE — 99233 SBSQ HOSP IP/OBS HIGH 50: CPT

## 2018-03-29 RX ORDER — MORPHINE SULFATE 50 MG/1
2 CAPSULE, EXTENDED RELEASE ORAL ONCE
Qty: 0 | Refills: 0 | Status: DISCONTINUED | OUTPATIENT
Start: 2018-03-29 | End: 2018-03-29

## 2018-03-29 RX ADMIN — Medication 0.25 MILLIGRAM(S): at 06:11

## 2018-03-29 RX ADMIN — Medication 180 MILLIGRAM(S): at 06:11

## 2018-03-29 RX ADMIN — Medication 40 MILLIGRAM(S): at 06:11

## 2018-03-29 RX ADMIN — Medication 3 MILLILITER(S): at 20:08

## 2018-03-29 RX ADMIN — Medication 3 MILLILITER(S): at 01:44

## 2018-03-29 RX ADMIN — SIMVASTATIN 20 MILLIGRAM(S): 20 TABLET, FILM COATED ORAL at 21:35

## 2018-03-29 RX ADMIN — Medication 180 MILLIGRAM(S): at 21:35

## 2018-03-29 RX ADMIN — MORPHINE SULFATE 2 MILLIGRAM(S): 50 CAPSULE, EXTENDED RELEASE ORAL at 02:43

## 2018-03-29 RX ADMIN — Medication 40 MILLIGRAM(S): at 18:05

## 2018-03-29 RX ADMIN — MORPHINE SULFATE 2 MILLIGRAM(S): 50 CAPSULE, EXTENDED RELEASE ORAL at 02:28

## 2018-03-29 NOTE — PROGRESS NOTE ADULT - SUBJECTIVE AND OBJECTIVE BOX
HPI:     Mrs. Clancy is a 72-year-old female who initially presented to the hospital for preoperative evaluation for knee surgery. She is found to be in atrial fibrillation and was sent to emergency room.  Patient was complaining of flank pain. A CT scan of the abdomen and pelvis was performed which showed hydronephrosis with a kidney stone. There was also evidence of a left lower lobe infiltrate.  Patient  had nephrostomy tube  was than a code sepsis in PACU.  Blood cultures were positive for Escherichia coli. She developed sepsis with hypotension and was resuscitated with over 3 L of fluid. She developed significant shortness of breath which was treated with BiPAP . she was subseqauently intubated. cxr showed pneumonia, vs. failure, vs. ARDS,   acute renal failure, creatinine  back to baseline, still slightly sob, completed course of abx. weak  IV heparin held secondary to  hematuria.     3/24: urine clear  feels weak  Hb 8.3  K 3.3    3/25: c/o dry cough  room mate found to have HMPV  K 4  Hb 8.5    3/26: c/o dry cough  CT chest showed pulmonary edema  RVP negative  still on O2 via nc  Nephrostomy tube came out yesterday  passing a lot of urine  WBC down to 15.69 today    3/27: no new complaints  passing urine    3/28: became sob overnight, much better at this time  Left flank ecchymosis much softer today and did not increase from margins; pt has no pain  lasix increased  digoxin being loaded    3/29: left flank ecchymosis stable  Hb 8.0      PHYSICAL EXAM:    Daily     Daily     ICU Vital Signs Last 24 Hrs  T(C): 36.2 (29 Mar 2018 10:18), Max: 36.4 (29 Mar 2018 06:00)  T(F): 97.1 (29 Mar 2018 10:18), Max: 97.5 (29 Mar 2018 06:00)  HR: 94 (29 Mar 2018 10:18) (94 - 96)  BP: 108/59 (29 Mar 2018 10:18) (101/54 - 112/48)  BP(mean): --  ABP: --  ABP(mean): --  RR: 22 (29 Mar 2018 10:18) (22 - 22)  SpO2: 100% (29 Mar 2018 10:18) (90% - 100%)      Constitutional: Weak appearing using O2 via nc  HEENT: Atraumatic, VERNELL, Normal, No congestion  Respiratory: Breath Sounds decreased, no rhonchi/wheeze  Cardiovascular: N S1S2; АННА present  Gastrointestinal: Left flank ecchymosis same as before, Abdomen soft, non tender, Bowel Sounds present  Extremities: No edema, peripheral pulses present  Neurological: AAO x 3, no gross focal motor deficits                        8.0    15.00 )-----------( 436      ( 29 Mar 2018 06:36 )             25.1       CBC Full  -  ( 29 Mar 2018 06:36 )  WBC Count : 15.00 K/uL  Hemoglobin : 8.0 g/dL  Hematocrit : 25.1 %  Platelet Count - Automated : 436 K/uL  Mean Cell Volume : 91.3 fl  Mean Cell Hemoglobin : 29.1 pg  Mean Cell Hemoglobin Concentration : 31.9 gm/dL  Auto Neutrophil # : x  Auto Lymphocyte # : x  Auto Monocyte # : x  Auto Eosinophil # : x  Auto Basophil # : x  Auto Neutrophil % : x  Auto Lymphocyte % : x  Auto Monocyte % : x  Auto Eosinophil % : x  Auto Basophil % : x      03-29    142  |  104  |  30<H>  ----------------------------<  127<H>  3.5   |  29  |  1.08    Ca    8.5      29 Mar 2018 06:36  Phos  3.6     03-29  Mg     2.1     03-29                          ABG - ( 28 Mar 2018 02:24 )  pH: 7.51  /  pCO2: 34    /  pO2: 72    / HCO3: 27    / Base Excess: 4.2   /  SaO2: 95                  MEDICATIONS  (STANDING):  ALBUTerol/ipratropium for Nebulization 3 milliLiter(s) Nebulizer every 6 hours  digoxin     Tablet 0.25 milliGRAM(s) Oral daily  diltiazem    milliGRAM(s) Oral daily  diltiazem    milliGRAM(s) Oral at bedtime  furosemide   Injectable 40 milliGRAM(s) IV Push every 12 hours  simvastatin 20 milliGRAM(s) Oral at bedtime

## 2018-03-29 NOTE — PROGRESS NOTE ADULT - SUBJECTIVE AND OBJECTIVE BOX
Patient is a 72y old  Female who presents with a chief complaint of came for pre op testing (15 Mar 2018 00:51)    HPI Pt transferred to ED from admitting d/t abnormal ECG demostrating AF with RVR. Office redocrds reviewed. This is new AF. States having on and off leg swelling but no orthopnea, angina, palp, PND.      3/15 - Not feeling better, had episode of sweating and SOB recently. Blood work revealed hydronephrosis, obstructive calculus, UTI, questionable pneumonia, low TSH, elevated WBC, elevated cr. Tele with AF and VR around 135 now.  CT reviewed, no signs of pleural effusions or lung edema in included lungs segments. ECHO reviewed too. Mild AS, MR, preserved LVEF.   Suggest starting IV cardizem drip titratable to HR < 100. Also start UFH IV for stroke ppx. Cont PO cardizem as well, this will assist in weaning her off IV cardizem ultimately. Cont statin too.   Obtain urology consult re: hydronephrosis. Also endocrinology re: low TSH.  Would defer stress MPI until her HR is more stable and normal.  Discussed with patient also re: CV. She would need 3 weeks strict OAC and therefore gigi also interfere should she need percutaneous hydronephrosis drainage, not to mention her future knee surgery as discussed yesterday. Should HR become an issue difficult to control with drugs, would then proceed to CV and deal with the rest later on as needed. For time being, and as long as she is hemodynamically stable, would cont rate control, AC and address first UTI and possible hyperthyroidism.  Case d/w hospitalist.  Will follow    3/16 - Pt on BiPAP, in no resp distress at moment. Tele AF with HR round 70-90. BP stable off pressors. Awake and alert. Had resp distress yesterday after PCN, possibly diastolic heart failure. Suggest continuing IV diuresis as needed, continue rate control with IV diltiazem, will start weaning her to PO once off BiPAP. Continue UFH, will transition to OAC once kidney fx is stable as well. Will continue to defer stress test and CV for time being.    3/17-  Hasn't responded to BIPAP and seemed obtunded today. Chest xray showed worsened heart failure. Critical care decided to intubate patient.   She remains in afib. On a cardizem drip. After sedation, blood pressure decreased to the high 90s.  overnight, her vital signs have been stable.   Received Lasix 40 mg IV at 5 am and has responded with urine output.     3/18-  Intubated yesterday.   Cardizem drip was discontinued and started on QID cardizem by OGT. She has tolerated it well with good heart rate control and adequate blood pressures.   Was started on a Bumex drip. Has diuresed over 2000 ml and there was been an increase in the serum creatinine.     3/19 - Examined today, on sedation and vent. CXR 3/17 and clinically without significant improvement despite aggressive IV diuresis and significant UOP, rather her Cr went up. I suspect her bilateral alveolar infiltrates and resp distress may not be cardiogenic at the moment ( ARDS in setting of sepsis?). Would suggest cont to hold diuretics and cont optimal vent support while addressing other acute clinical issues. Appreciated pulm and nephro input.  She remains in AF with appropriate rate control on CCB via OGT. Cont that as well.   Will follow up closely.     3/20 - Sedated, in no distress, FIO2 40% on vent and sats 99%, hemodynamically stable of pressors, rate controlled on CCB via OGT, on UFH, H/H stable, if needed can hold it.  CXR much improved yesterday off IV diuretics, keep holding that, Cr getting better too.   Abx per ID  Vent mgt per ICU/Pulm  Will follow up closely.     3/21 - Doing better on aerosol mask, good sats, thirsty. Hemodynamically stable off pressors, HR trending up in AF, would switch her to ER Cardizem PO and titrate accordingly or would resume IV drip if unable to tolerate PO route.  Can hold off heparin for the moment d/t bleeding issues, would resume it with OAC once cleared from this. Will defer stress test for later when she is clinically stable. Can use IV furosemide as needed.   Will follow up closely.   Case d/w ICU staff    3/22 - doing well on NC, CXR yesterday with minimal mostly left side infiltrates can use lasix prn. I added a QHS dose of cardizem CD with holding parameters for better average HR control. Cont rest of management as it is.  Will follow closely.   D/W nursing staff.       3/23 - Improving clinically, heart rate better controlled. Azotemia and leukocytosis improving too.  Will follow up.     3/24: transferred to telemtry. AF on monitor with rate , on PO Cardizem.     3/25: AF on monitor with -110. BP stable. Will increase Cardizem CD.    3/26 - Feels better but still with REGAN and orthopnea, CT chest done yesterday suggestive of pulmonary edema with bilateral pleural effusions, BMP yesterday with normal Cr. Will give her one dose of IV Lasix now and repeat CXR in AM to assess response. IF obvious response, will keep her on daily dose of Lasix. Also, her HR remains > 100 in AF, she is taking already a total dose of 360 mg PO Cardizem CD, if persistently RVR, will do digoxin loading tomorrow.  Her PCN catheter was dislodged yesterday, she feel fine so far, no back or abd pain, no fever/chills, will need urology/imaging to monitor for hydronephrosis recurrence.   Otherwise, cont rest of CV regimen as it is other than the above addition.   Will follow.     3/27 - breathing better, yet CXR yesterday shortly after Lasix dose was unchanged and today's physical exam still shows abundant rales all over left lung field. Will give her another Lasix IV dose today and repeat CXR tomorrow AM. HR is better controlled, around 100. Will keep current Cardizem dose and hold off dig for the moment. She is still awaiting urology follow up.        3/28 - CT done yesterday, hydro resolved, pleural effusions better but still with b/l ground glass infiltrates, CXR today with same parenchymal findings and she still has dry cough, REGAN and had an episode of orthopnea last night, ABG with mild hypoxia only and alkalosis, no hypercarbia. Suspect HF. Will increase her daily dose of IV lasix to 40 mg BID and monitor Cr q AM. Her HR has also been more erratic and > 100, cardizem PO at max dose and BP on low side, will do IV dig loading today and cont tomorrow with PO. Cont rest of regimen otherwise, consider resuming OAC when feasible.   Will also do limited echo today to follow up on EF and/or valvular issues.        3/29 - Feels better, no more PND, CXR improved too but not clear completely. BUN up but Cr still within normal range. Will keep IV lasix today and tentatively switch her to oral tomorrow. HR controlled. Cont PO cardizem and dig.   Cont rest of regimen otherwise, consider resuming OAC when feasible.   Limited echo reviewed, unchanged findings.        Allergies    Macrobid (Other)    Intolerances        MEDICATIONS  (STANDING):  ALBUTerol/ipratropium for Nebulization 3 milliLiter(s) Nebulizer every 6 hours  aspirin 325 milliGRAM(s) Oral daily  azithromycin  IVPB 500 milliGRAM(s) IV Intermittent every 24 hours  buMETAnide Infusion 1 mG/Hr (10 mL/Hr) IV Continuous <Continuous>  cefepime  IVPB      cefepime  IVPB 1000 milliGRAM(s) IV Intermittent every 12 hours  chlorhexidine 0.12% Liquid 15 milliLiter(s) Swish and Spit two times a day  diltiazem    Tablet 60 milliGRAM(s) Oral every 4 hours  heparin  Infusion.  Unit(s)/Hr (18 mL/Hr) IV Continuous <Continuous>  methylPREDNISolone sodium succinate Injectable 40 milliGRAM(s) IV Push every 8 hours  propofol Infusion 10 MICROgram(s)/kG/Min (5.988 mL/Hr) IV Continuous <Continuous>  simvastatin 20 milliGRAM(s) Oral at bedtime    MEDICATIONS  (PRN):  docusate sodium 100 milliGRAM(s) Oral two times a day PRN Constipation  heparin  Injectable 8000 Unit(s) IV Push every 6 hours PRN For aPTT less than 40  heparin  Injectable 4000 Unit(s) IV Push every 6 hours PRN For aPTT between 40 - 57  LORazepam   Injectable 1 milliGRAM(s) IV Push every 4 hours PRN Anxiety  ondansetron Injectable 4 milliGRAM(s) IV Push every 6 hours PRN Nausea and/or Vomiting  oxyCODONE    5 mG/acetaminophen 325 mG 1 Tablet(s) Oral every 4 hours PRN Moderate Pain (4 - 6)    REVIEW OF SYSTEMS:    RESPIRATORY: No cough, wheezing, hemoptysis; No shortness of breath  CARDIOVASCULAR: No chest pain or palpitations  All other review of systems is negative unless indicated above      PHYSICAL EXAM:  Daily     Daily Weight in k.5 (18 Mar 2018 05:00)  Vital Signs Last 24 Hrs  T(C): 36.4 (18 Mar 2018 08:51), Max: 36.9 (17 Mar 2018 21:22)  T(F): 97.5 (18 Mar 2018 08:51), Max: 98.4 (17 Mar 2018 21:22)  HR: 91 (18 Mar 2018 08:00) (86 - 106)  BP: 107/64 (18 Mar 2018 08:00) (85/49 - 137/110)  BP(mean): 73 (18 Mar 2018 08:00) (56 - 115)  RR: 29 (18 Mar 2018 08:00) (26 - 34)  SpO2: 99% (18 Mar 2018 08:00) (94% - 100%)    Constitutional: NAD, awake and alert, well-developed  HEENT: PERR, EOMI, Normal Hearing, MMM  Neck: Soft and supple, No LAD, No JVD  Respiratory: Breath sounds are clear bilaterally, No wheezing, rales or rhonchi  Cardiovascular: S1 and S2, regular rate and rhythm, no Murmurs, gallops or rubs  Gastrointestinal: Bowel Sounds present, soft, nontender, nondistended, no guarding, no rebound  Extremities: No peripheral edema  Vascular: 2+ peripheral pulses  Neurological: A/O x 3, no focal deficits  Musculoskeletal: 5/5 strength b/l upper and lower extremities  Skin: No rashes    LABS: All Labs Reviewed:                        11.6   15.79 )-----------( 160      ( 18 Mar 2018 05:16 )             35.9     03-18    140  |  105  |  78<H>  ----------------------------<  178<H>  3.7   |  22  |  3.04<H>    Ca    8.3<L>      18 Mar 2018 05:16  Phos  4.9     03-17  Mg     2.3     03-17      PTT - ( 18 Mar 2018 05:16 )  PTT:43.9 sec          TELEMETRY/EKG: rate controlled Afib

## 2018-03-29 NOTE — PROGRESS NOTE ADULT - ASSESSMENT
cxr looks like pulmonary edema  nephrostomy is out  on iv lasix - BUN increased. Would change to PO daily  check O2 sat on room air  on bronchodilators.  dvt proph

## 2018-03-29 NOTE — PROGRESS NOTE ADULT - ASSESSMENT
72/F downgraded from ICU service for:    1. New onset atrial fibrillation:   AC on hold secondary to hematuria, left rectus sheath hematoma; would restart on 3/30 if hematoma and Hb is stable/improving  2. Obstructing kidney stone, s/p Left sided nephrostomy tube which fell off on 3/25; ;  E. coli sepsis,  completed course of abx  A CT abdo was done on 3/27 and NO hydronephrosis or Obstructing stone was reported; 6 mm left renal stone;  no need for replacement of nephrostomy tube by IR at this time.  2a)  small rectus sheath hematoma reported on CT: monitor for now; d/c lovenox, H/H stable, monitor,  surgical consult appreciated; no acute intervention, monitor.   3. ARDS, acute hypoxic respiratory failure +  pneumonia + CHF, acute diastolic EF 55-60%:    lasix increased to twice daily  Digoxin being loaded as per cardio   c/o dry cough with room mate found to have HMPV; RVP negative; CT chest showed congestion;   4. renal failure - improving; Cr normal   5. Physical therapy for ambulation  6. Hypokalemia of 3.3: replace and rechecked; 3.5  7. Anemia, Acute on chronic, sec to hematuria/left flank hematoma, monitor for now; stable  8. Leukocytosis; persisting  9. DVT PPX: venodynes b/l    poc discussed with pt, her son at bedside,  team

## 2018-03-29 NOTE — PROGRESS NOTE ADULT - SUBJECTIVE AND OBJECTIVE BOX
Subjective:  looking much better  sitting in chair    MEDICATIONS  (STANDING):  ALBUTerol/ipratropium for Nebulization 3 milliLiter(s) Nebulizer every 6 hours  digoxin     Tablet 0.25 milliGRAM(s) Oral daily  diltiazem    milliGRAM(s) Oral daily  diltiazem    milliGRAM(s) Oral at bedtime  furosemide   Injectable 40 milliGRAM(s) IV Push every 12 hours  simvastatin 20 milliGRAM(s) Oral at bedtime    MEDICATIONS  (PRN):  ALBUTerol    0.083% 2.5 milliGRAM(s) Nebulizer every 6 hours PRN Wheezing  docusate sodium 100 milliGRAM(s) Oral two times a day PRN Constipation  guaiFENesin    Syrup 100 milliGRAM(s) Oral every 6 hours PRN Cough  ondansetron Injectable 4 milliGRAM(s) IV Push every 6 hours PRN Nausea and/or Vomiting      Allergies    Macrobid (Other)    Intolerances        REVIEW OF SYSTEMS:    CONSTITUTIONAL:  As per HPI.  HEENT:  Eyes:  No diplopia or blurred vision. ENT:  No earache, sore throat or runny nose.  CARDIOVASCULAR:  No pressure, squeezing, tightness, heaviness or aching about the chest, neck, axilla or epigastrium.  RESPIRATORY:  No cough, shortness of breath, PND or orthopnea.  GASTROINTESTINAL:  No nausea, vomiting or diarrhea.  GENITOURINARY:  No dysuria, frequency or urgency.  MUSCULOSKELETAL:  no joint pain, deformity, tenderness  EXTREMITIES: no clubbing cyanosis,edema  SKIN:  No change in skin, hair or nails.  NEUROLOGIC:  No paresthesias, fasciculations, seizures or weakness.  PSYCHIATRIC:  No disorder of thought or mood.  ENDOCRINE:  No heat or cold intolerance, polyuria or polydipsia.  HEMATOLOGICAL:  No easy bruising or bleedings:    Vital Signs Last 24 Hrs  T(C): 36.4 (29 Mar 2018 06:00), Max: 36.4 (29 Mar 2018 06:00)  T(F): 97.5 (29 Mar 2018 06:00), Max: 97.5 (29 Mar 2018 06:00)  HR: 95 (29 Mar 2018 06:00) (92 - 108)  BP: 101/54 (29 Mar 2018 06:00) (101/54 - 112/48)  BP(mean): --  RR: 22 (29 Mar 2018 06:00) (22 - 22)  SpO2: 96% (29 Mar 2018 06:00) (90% - 96%)    PHYSICAL EXAMINATION:  SKIN: no rashes  HEAD: NC/AT  EYES: PERRLA, EOMI  EARS: TM's intact  NOSE: no abnormalities  NECK:  Supple. No lymphadenopathy. Jugular venous pressure not elevated. Carotids equal.   HEART:   The cardiac impulse has a normal quality. Reg., Nl S1 and S2.  There are no murmurs, rubs or gallops noted  CHEST:  decreased BS bases  ABDOMEN:  Soft and nontender.   EXTREMITIES:  no C/C/E  NEURO: AAO x 3, no focal deficts       LABS:                        8.0    15.00 )-----------( 436      ( 29 Mar 2018 06:36 )             25.1     03-29    142  |  104  |  30<H>  ----------------------------<  127<H>  3.5   |  29  |  1.08    Ca    8.5      29 Mar 2018 06:36  Phos  3.6     03-29  Mg     2.1     03-29            RADIOLOGY & ADDITIONAL TESTS:

## 2018-03-30 ENCOUNTER — TRANSCRIPTION ENCOUNTER (OUTPATIENT)
Age: 73
End: 2018-03-30

## 2018-03-30 VITALS — WEIGHT: 228.84 LBS

## 2018-03-30 LAB
ANION GAP SERPL CALC-SCNC: 7 MMOL/L — SIGNIFICANT CHANGE UP (ref 5–17)
BUN SERPL-MCNC: 29 MG/DL — HIGH (ref 7–23)
CALCIUM SERPL-MCNC: 8.4 MG/DL — LOW (ref 8.5–10.1)
CHLORIDE SERPL-SCNC: 103 MMOL/L — SIGNIFICANT CHANGE UP (ref 96–108)
CO2 SERPL-SCNC: 30 MMOL/L — SIGNIFICANT CHANGE UP (ref 22–31)
CREAT SERPL-MCNC: 1.13 MG/DL — SIGNIFICANT CHANGE UP (ref 0.5–1.3)
GLUCOSE SERPL-MCNC: 101 MG/DL — HIGH (ref 70–99)
HCT VFR BLD CALC: 27.2 % — LOW (ref 34.5–45)
HGB BLD-MCNC: 8.6 G/DL — LOW (ref 11.5–15.5)
MCHC RBC-ENTMCNC: 29.1 PG — SIGNIFICANT CHANGE UP (ref 27–34)
MCHC RBC-ENTMCNC: 31.6 GM/DL — LOW (ref 32–36)
MCV RBC AUTO: 91.9 FL — SIGNIFICANT CHANGE UP (ref 80–100)
NRBC # BLD: 0 /100 WBCS — SIGNIFICANT CHANGE UP (ref 0–0)
PLATELET # BLD AUTO: 399 K/UL — SIGNIFICANT CHANGE UP (ref 150–400)
POTASSIUM SERPL-MCNC: 3.6 MMOL/L — SIGNIFICANT CHANGE UP (ref 3.5–5.3)
POTASSIUM SERPL-SCNC: 3.6 MMOL/L — SIGNIFICANT CHANGE UP (ref 3.5–5.3)
RBC # BLD: 2.96 M/UL — LOW (ref 3.8–5.2)
RBC # FLD: 16.9 % — HIGH (ref 10.3–14.5)
SODIUM SERPL-SCNC: 140 MMOL/L — SIGNIFICANT CHANGE UP (ref 135–145)
WBC # BLD: 12.38 K/UL — HIGH (ref 3.8–10.5)
WBC # FLD AUTO: 12.38 K/UL — HIGH (ref 3.8–10.5)

## 2018-03-30 PROCEDURE — 99233 SBSQ HOSP IP/OBS HIGH 50: CPT

## 2018-03-30 RX ORDER — APIXABAN 2.5 MG/1
1 TABLET, FILM COATED ORAL
Qty: 0 | Refills: 0 | DISCHARGE
Start: 2018-03-30

## 2018-03-30 RX ORDER — APIXABAN 2.5 MG/1
5 TABLET, FILM COATED ORAL EVERY 12 HOURS
Qty: 0 | Refills: 0 | Status: DISCONTINUED | OUTPATIENT
Start: 2018-03-30 | End: 2018-03-30

## 2018-03-30 RX ORDER — AMLODIPINE BESYLATE 2.5 MG/1
1 TABLET ORAL
Qty: 0 | Refills: 0 | COMMUNITY

## 2018-03-30 RX ORDER — DIGOXIN 250 MCG
1 TABLET ORAL
Qty: 0 | Refills: 0 | DISCHARGE
Start: 2018-03-30

## 2018-03-30 RX ORDER — IPRATROPIUM/ALBUTEROL SULFATE 18-103MCG
3 AEROSOL WITH ADAPTER (GRAM) INHALATION
Qty: 0 | Refills: 0 | DISCHARGE
Start: 2018-03-30

## 2018-03-30 RX ORDER — DILTIAZEM HCL 120 MG
1 CAPSULE, EXT RELEASE 24 HR ORAL
Qty: 0 | Refills: 0 | DISCHARGE
Start: 2018-03-30

## 2018-03-30 RX ORDER — ASPIRIN/CALCIUM CARB/MAGNESIUM 324 MG
1 TABLET ORAL
Qty: 0 | Refills: 0 | COMMUNITY

## 2018-03-30 RX ORDER — FUROSEMIDE 40 MG
40 TABLET ORAL DAILY
Qty: 0 | Refills: 0 | Status: DISCONTINUED | OUTPATIENT
Start: 2018-03-30 | End: 2018-03-30

## 2018-03-30 RX ORDER — DOCUSATE SODIUM 100 MG
1 CAPSULE ORAL
Qty: 0 | Refills: 0 | DISCHARGE
Start: 2018-03-30

## 2018-03-30 RX ORDER — FUROSEMIDE 40 MG
1 TABLET ORAL
Qty: 0 | Refills: 0 | DISCHARGE
Start: 2018-03-30

## 2018-03-30 RX ORDER — FUROSEMIDE 40 MG
1 TABLET ORAL
Qty: 0 | Refills: 0 | COMMUNITY

## 2018-03-30 RX ADMIN — Medication 3 MILLILITER(S): at 13:33

## 2018-03-30 RX ADMIN — Medication 3 MILLILITER(S): at 07:58

## 2018-03-30 RX ADMIN — Medication 0.25 MILLIGRAM(S): at 05:34

## 2018-03-30 RX ADMIN — Medication 180 MILLIGRAM(S): at 05:34

## 2018-03-30 RX ADMIN — Medication 40 MILLIGRAM(S): at 05:34

## 2018-03-30 NOTE — DISCHARGE NOTE ADULT - CARE PROVIDERS DIRECT ADDRESSES
[email or web address]_[email or web address]

## 2018-03-30 NOTE — DISCHARGE NOTE ADULT - PATIENT PORTAL LINK FT
You can access the [facility] Patient Portal, offered by [facility], by registering with the following website: [email or web address]

## 2018-03-30 NOTE — PROGRESS NOTE ADULT - PROBLEM SELECTOR PROBLEM 6
ARDS (adult respiratory distress syndrome)
Aortic stenosis
Aortic stenosis

## 2018-03-30 NOTE — DISCHARGE NOTE ADULT - HOSPITAL COURSE
72-year-old female who initially presented to the hospital for preoperative evaluation for knee surgery. She is found to be in atrial fibrillation and was sent to emergency room.  Patient was complaining of flank pain. A CT scan of the abdomen and pelvis was performed which showed hydronephrosis with a kidney stone. There was also evidence of a left lower lobe infiltrate.  Patient  had nephrostomy tube  was than a code sepsis in PACU.  Blood cultures were positive for Escherichia coli. She developed sepsis with hypotension and was resuscitated with over 3 L of fluid. She developed significant shortness of breath which was treated with BiPAP . she was subseqauently intubated. cxr showed pneumonia, vs. failure, vs. ARDS,   acute renal failure, creatinine  back to baseline, still slightly sob, completed course of abx. weak  IV heparin held secondary to  hematuria.     72/F downgraded from ICU service for:    1. New onset atrial fibrillation:   AC was on hold secondary to hematuria, left rectus sheath hematoma;   restart on 3/30; hematoma and Hb is stable and improving    2. Obstructing kidney stone, s/p Left sided nephrostomy tube which fell off on 3/25; ;  E. coli sepsis,  completed course of abx  A CT abdo was done on 3/27 and NO hydronephrosis or Obstructing stone was reported; 6 mm left renal stone;  no need for replacement of nephrostomy tube by IR at this time.    2a)  small rectus sheath hematoma reported on CT:, H/H stable, monitor,  surgical consult appreciated; no acute intervention, monitor. check CBC in 3 days on 4/2; today Hb 8.6    3. ARDS, acute hypoxic respiratory failure +  pneumonia + CHF, acute diastolic EF 55-60%:    lasix decreased to po once daily  continue dig, check dig levels in 3 days   c/o dry cough with room mate found to have HMPV; RVP negative; CT chest showed congestion;     4. renal failure - improving; Cr normal   5. Physical therapy for ambulation; being sent to [facility]  6. Hypokalemia of 3.3: replace and rechecked; 3.5  7. Anemia, Acute on chronic, sec to hematuria/left flank hematoma, monitor for now; stable; check CBC in 3 days  8. Leukocytosis; persisting    dispo: d/c to [facility] today    poc discussed with pt, team    total time spent 40 min 72-year-old female who initially presented to the hospital for preoperative evaluation for knee surgery. She is found to be in atrial fibrillation and was sent to emergency room.  Patient was complaining of flank pain. A CT scan of the abdomen and pelvis was performed which showed hydronephrosis with a kidney stone. There was also evidence of a left lower lobe infiltrate.  Patient  had nephrostomy tube  was than a code sepsis in PACU.  Blood cultures were positive for Escherichia coli. She developed sepsis with hypotension and was resuscitated with over 3 L of fluid. She developed significant shortness of breath which was treated with BiPAP . she was subseqauently intubated. cxr showed pneumonia, vs. failure, vs. ARDS,   acute renal failure, creatinine  back to baseline, still slightly sob, completed course of abx. weak  IV heparin held secondary to  hematuria.     72/F downgraded from ICU service for:    1. New onset atrial fibrillation:   AC was on hold secondary to hematuria, left rectus sheath hematoma;   restart on 3/30; hematoma and Hb is stable and improving    2. Obstructing kidney stone, s/p Left sided nephrostomy tube which fell off on 3/25; ;  E. coli sepsis,  completed course of abx  A CT abdo was done on 3/27 and NO hydronephrosis or Obstructing stone was reported; 6 mm left renal stone;  no need for replacement of nephrostomy tube by IR at this time. f/u with Dr. Kim in 1-2 weeks    2a)  small rectus sheath hematoma reported on CT:, H/H stable, monitor,  surgical consult appreciated; no acute intervention, monitor. check CBC in 3 days on 4/2; today Hb 8.6    3. ARDS, acute hypoxic respiratory failure +  pneumonia + CHF, acute diastolic EF 55-60%:    lasix decreased to po once daily  continue dig, check dig levels in 3 days   c/o dry cough with room mate found to have HMPV; RVP negative; CT chest showed congestion;     4. renal failure - improving; Cr normal   5. Physical therapy for ambulation; being sent to [facility]  6. Hypokalemia of 3.3: replace and rechecked; 3.5  7. Anemia, Acute on chronic, sec to hematuria/left flank hematoma, monitor for now; stable; check CBC in 3 days  8. Leukocytosis; persisting    dispo: d/c to [facility] today    poc discussed with pt, team    total time spent 40 min

## 2018-03-30 NOTE — DISCHARGE NOTE ADULT - PROVIDER TOKENS
TOKBENY:'[id number]',TOKBENY:'[id number]' TOKEN:'[id number]:'[id number]:'[id number]'

## 2018-03-30 NOTE — PROGRESS NOTE ADULT - PROBLEM SELECTOR PROBLEM 3
Pneumonia, bacterial
Acute diastolic congestive heart failure
Acute diastolic congestive heart failure
Pneumonia, bacterial

## 2018-03-30 NOTE — PROGRESS NOTE ADULT - PROVIDER SPECIALTY LIST ADULT
Cardiology
Critical Care
Heart Failure
Hospitalist
Infectious Disease
Nephrology
Pulmonology
Urology
Critical Care
Critical Care
Cardiology
Cardiology
Hospitalist
Hospitalist
Surgery
Pulmonology

## 2018-03-30 NOTE — PROGRESS NOTE ADULT - PROBLEM SELECTOR PROBLEM 5
UTI (urinary tract infection)
Atrial fibrillation
Atrial fibrillation
UTI (urinary tract infection)

## 2018-03-30 NOTE — DISCHARGE NOTE ADULT - MEDICATION SUMMARY - MEDICATIONS TO STOP TAKING
I will STOP taking the medications listed below when I get home from the hospital:    aspirin 325 mg oral tablet  -- 1 tab(s) by mouth once a day    amLODIPine 10 mg oral tablet  -- 1 tab(s) by mouth once a day    naproxen 500 mg oral tablet  -- 1 tab(s) by mouth 2 times a day, As Needed for pain

## 2018-03-30 NOTE — PROGRESS NOTE ADULT - ASSESSMENT
A/P:  Left rectus hematoma  Medical comorbidities of new onset afib, obstructing left ARDS, acute hypoxic respiratory failure, pneumonia, CHF, acute diastolic EF 55-60%  Medical management per primary service   GI/DVT prophylaxis  Pain control  Serial abd exams  F/U labs  Pt stable from surgical standpoint  No acute surgical intervention at this time  Pt aware of and agrees with all of the above

## 2018-03-30 NOTE — PROGRESS NOTE ADULT - PROBLEM SELECTOR PROBLEM 4
Sepsis due to Escherichia coli [e. coli]

## 2018-03-30 NOTE — PROGRESS NOTE ADULT - SUBJECTIVE AND OBJECTIVE BOX
Patient is a 72y old  Female who presents with a chief complaint of came for pre op testing (15 Mar 2018 00:51)    HPI Pt transferred to ED from admitting d/t abnormal ECG demostrating AF with RVR. Office redocrds reviewed. This is new AF. States having on and off leg swelling but no orthopnea, angina, palp, PND.      3/15 - Not feeling better, had episode of sweating and SOB recently. Blood work revealed hydronephrosis, obstructive calculus, UTI, questionable pneumonia, low TSH, elevated WBC, elevated cr. Tele with AF and VR around 135 now.  CT reviewed, no signs of pleural effusions or lung edema in included lungs segments. ECHO reviewed too. Mild AS, MR, preserved LVEF.   Suggest starting IV cardizem drip titratable to HR < 100. Also start UFH IV for stroke ppx. Cont PO cardizem as well, this will assist in weaning her off IV cardizem ultimately. Cont statin too.   Obtain urology consult re: hydronephrosis. Also endocrinology re: low TSH.  Would defer stress MPI until her HR is more stable and normal.  Discussed with patient also re: CV. She would need 3 weeks strict OAC and therefore gigi also interfere should she need percutaneous hydronephrosis drainage, not to mention her future knee surgery as discussed yesterday. Should HR become an issue difficult to control with drugs, would then proceed to CV and deal with the rest later on as needed. For time being, and as long as she is hemodynamically stable, would cont rate control, AC and address first UTI and possible hyperthyroidism.  Case d/w hospitalist.  Will follow    3/16 - Pt on BiPAP, in no resp distress at moment. Tele AF with HR round 70-90. BP stable off pressors. Awake and alert. Had resp distress yesterday after PCN, possibly diastolic heart failure. Suggest continuing IV diuresis as needed, continue rate control with IV diltiazem, will start weaning her to PO once off BiPAP. Continue UFH, will transition to OAC once kidney fx is stable as well. Will continue to defer stress test and CV for time being.    3/17-  Hasn't responded to BIPAP and seemed obtunded today. Chest xray showed worsened heart failure. Critical care decided to intubate patient.   She remains in afib. On a cardizem drip. After sedation, blood pressure decreased to the high 90s.  overnight, her vital signs have been stable.   Received Lasix 40 mg IV at 5 am and has responded with urine output.     3/18-  Intubated yesterday.   Cardizem drip was discontinued and started on QID cardizem by OGT. She has tolerated it well with good heart rate control and adequate blood pressures.   Was started on a Bumex drip. Has diuresed over 2000 ml and there was been an increase in the serum creatinine.     3/19 - Examined today, on sedation and vent. CXR 3/17 and clinically without significant improvement despite aggressive IV diuresis and significant UOP, rather her Cr went up. I suspect her bilateral alveolar infiltrates and resp distress may not be cardiogenic at the moment ( ARDS in setting of sepsis?). Would suggest cont to hold diuretics and cont optimal vent support while addressing other acute clinical issues. Appreciated pulm and nephro input.  She remains in AF with appropriate rate control on CCB via OGT. Cont that as well.   Will follow up closely.     3/20 - Sedated, in no distress, FIO2 40% on vent and sats 99%, hemodynamically stable of pressors, rate controlled on CCB via OGT, on UFH, H/H stable, if needed can hold it.  CXR much improved yesterday off IV diuretics, keep holding that, Cr getting better too.   Abx per ID  Vent mgt per ICU/Pulm  Will follow up closely.     3/21 - Doing better on aerosol mask, good sats, thirsty. Hemodynamically stable off pressors, HR trending up in AF, would switch her to ER Cardizem PO and titrate accordingly or would resume IV drip if unable to tolerate PO route.  Can hold off heparin for the moment d/t bleeding issues, would resume it with OAC once cleared from this. Will defer stress test for later when she is clinically stable. Can use IV furosemide as needed.   Will follow up closely.   Case d/w ICU staff    3/22 - doing well on NC, CXR yesterday with minimal mostly left side infiltrates can use lasix prn. I added a QHS dose of cardizem CD with holding parameters for better average HR control. Cont rest of management as it is.  Will follow closely.   D/W nursing staff.       3/23 - Improving clinically, heart rate better controlled. Azotemia and leukocytosis improving too.  Will follow up.     3/24: transferred to telemtry. AF on monitor with rate , on PO Cardizem.     3/25: AF on monitor with -110. BP stable. Will increase Cardizem CD.    3/26 - Feels better but still with REGAN and orthopnea, CT chest done yesterday suggestive of pulmonary edema with bilateral pleural effusions, BMP yesterday with normal Cr. Will give her one dose of IV Lasix now and repeat CXR in AM to assess response. IF obvious response, will keep her on daily dose of Lasix. Also, her HR remains > 100 in AF, she is taking already a total dose of 360 mg PO Cardizem CD, if persistently RVR, will do digoxin loading tomorrow.  Her PCN catheter was dislodged yesterday, she feel fine so far, no back or abd pain, no fever/chills, will need urology/imaging to monitor for hydronephrosis recurrence.   Otherwise, cont rest of CV regimen as it is other than the above addition.   Will follow.     3/27 - breathing better, yet CXR yesterday shortly after Lasix dose was unchanged and today's physical exam still shows abundant rales all over left lung field. Will give her another Lasix IV dose today and repeat CXR tomorrow AM. HR is better controlled, around 100. Will keep current Cardizem dose and hold off dig for the moment. She is still awaiting urology follow up.        3/28 - CT done yesterday, hydro resolved, pleural effusions better but still with b/l ground glass infiltrates, CXR today with same parenchymal findings and she still has dry cough, REGAN and had an episode of orthopnea last night, ABG with mild hypoxia only and alkalosis, no hypercarbia. Suspect HF. Will increase her daily dose of IV lasix to 40 mg BID and monitor Cr q AM. Her HR has also been more erratic and > 100, cardizem PO at max dose and BP on low side, will do IV dig loading today and cont tomorrow with PO. Cont rest of regimen otherwise, consider resuming OAC when feasible.   Will also do limited echo today to follow up on EF and/or valvular issues.        3/29 - Feels better, no more PND, CXR improved too but not clear completely. BUN up but Cr still within normal range. Will keep IV lasix today and tentatively switch her to oral tomorrow. HR controlled. Cont PO cardizem and dig.   Cont rest of regimen otherwise, consider resuming OAC when feasible.   Limited echo reviewed, unchanged findings.      3/30 - Stable from CV stand point, switched her to PO lasix today. Cont dig and cardizem. She will benefit from OAC once tolerated, or at least ASA.  Can be dc from CV stand point. Will follow while in-house.     Allergies    Macrobid (Other)    Intolerances        MEDICATIONS  (STANDING):  ALBUTerol/ipratropium for Nebulization 3 milliLiter(s) Nebulizer every 6 hours  aspirin 325 milliGRAM(s) Oral daily  azithromycin  IVPB 500 milliGRAM(s) IV Intermittent every 24 hours  buMETAnide Infusion 1 mG/Hr (10 mL/Hr) IV Continuous <Continuous>  cefepime  IVPB      cefepime  IVPB 1000 milliGRAM(s) IV Intermittent every 12 hours  chlorhexidine 0.12% Liquid 15 milliLiter(s) Swish and Spit two times a day  diltiazem    Tablet 60 milliGRAM(s) Oral every 4 hours  heparin  Infusion.  Unit(s)/Hr (18 mL/Hr) IV Continuous <Continuous>  methylPREDNISolone sodium succinate Injectable 40 milliGRAM(s) IV Push every 8 hours  propofol Infusion 10 MICROgram(s)/kG/Min (5.988 mL/Hr) IV Continuous <Continuous>  simvastatin 20 milliGRAM(s) Oral at bedtime    MEDICATIONS  (PRN):  docusate sodium 100 milliGRAM(s) Oral two times a day PRN Constipation  heparin  Injectable 8000 Unit(s) IV Push every 6 hours PRN For aPTT less than 40  heparin  Injectable 4000 Unit(s) IV Push every 6 hours PRN For aPTT between 40 - 57  LORazepam   Injectable 1 milliGRAM(s) IV Push every 4 hours PRN Anxiety  ondansetron Injectable 4 milliGRAM(s) IV Push every 6 hours PRN Nausea and/or Vomiting  oxyCODONE    5 mG/acetaminophen 325 mG 1 Tablet(s) Oral every 4 hours PRN Moderate Pain (4 - 6)    REVIEW OF SYSTEMS:    RESPIRATORY: No cough, wheezing, hemoptysis; No shortness of breath  CARDIOVASCULAR: No chest pain or palpitations  All other review of systems is negative unless indicated above      PHYSICAL EXAM:  Daily     Daily Weight in k.5 (18 Mar 2018 05:00)  Vital Signs Last 24 Hrs  T(C): 36.4 (18 Mar 2018 08:51), Max: 36.9 (17 Mar 2018 21:22)  T(F): 97.5 (18 Mar 2018 08:51), Max: 98.4 (17 Mar 2018 21:22)  HR: 91 (18 Mar 2018 08:00) (86 - 106)  BP: 107/64 (18 Mar 2018 08:00) (85/49 - 137/110)  BP(mean): 73 (18 Mar 2018 08:00) (56 - 115)  RR: 29 (18 Mar 2018 08:00) (26 - 34)  SpO2: 99% (18 Mar 2018 08:00) (94% - 100%)    Constitutional: NAD, awake and alert, well-developed  HEENT: PERR, EOMI, Normal Hearing, MMM  Neck: Soft and supple, No LAD, No JVD  Respiratory: Breath sounds are clear bilaterally, No wheezing, rales or rhonchi  Cardiovascular: S1 and S2, regular rate and rhythm, no Murmurs, gallops or rubs  Gastrointestinal: Bowel Sounds present, soft, nontender, nondistended, no guarding, no rebound  Extremities: No peripheral edema  Vascular: 2+ peripheral pulses  Neurological: A/O x 3, no focal deficits  Musculoskeletal: 5/5 strength b/l upper and lower extremities  Skin: No rashes    LABS: All Labs Reviewed:                        11.6   15.79 )-----------( 160      ( 18 Mar 2018 05:16 )             35.9     03-18    140  |  105  |  78<H>  ----------------------------<  178<H>  3.7   |  22  |  3.04<H>    Ca    8.3<L>      18 Mar 2018 05:16  Phos  4.9     03-17  Mg     2.3     03-17      PTT - ( 18 Mar 2018 05:16 )  PTT:43.9 sec          TELEMETRY/EKG: rate controlled Afib

## 2018-03-30 NOTE — DISCHARGE NOTE ADULT - MEDICATION SUMMARY - MEDICATIONS TO TAKE
I will START or STAY ON the medications listed below when I get home from the hospital:    dilTIAZem 180 mg/24 hours oral capsule, extended release  -- 1 cap(s) by mouth once a day  -- Indication: For A fib    dilTIAZem 180 mg/24 hours oral capsule, extended release  -- 1 cap(s) by mouth once a day (at bedtime)  -- Indication: For A fib    digoxin 250 mcg (0.25 mg) oral tablet  -- 1 tab(s) by mouth once a day  -- Indication: For A fib    apixaban 5 mg oral tablet  -- 1 tab(s) by mouth every 12 hours  -- Indication: For A fib    simvastatin 20 mg oral tablet  -- 1 tab(s) by mouth once a day   -- Indication: For hld    ipratropium-albuterol 0.5 mg-2.5 mg/3 mLinhalation solution  -- 3 milliliter(s) inhaled every 6 hours  -- Indication: For Sob    furosemide 40 mg oral tablet  -- 1 tab(s) by mouth once a day  -- Indication: For chf    guaiFENesin 100 mg/5 mL oral liquid  -- 5 milliliter(s) by mouth every 6 hours, As needed, Cough  -- Indication: For cough    docusate sodium 100 mg oral capsule  -- 1 cap(s) by mouth 2 times a day, As needed, Constipation  -- Indication: For constipation

## 2018-03-30 NOTE — DISCHARGE NOTE ADULT - CARE PLAN
Principal Discharge DX:	Atrial fibrillation, unspecified type  Goal:	continue dig,  Assessment and plan of treatment:	continue dig, eliquis, cardiazem  f/u with Dr. Calderón in 1 week  check dig level in 3 days  check CBC in 3 days  monitor left flank hematoma

## 2018-03-30 NOTE — PROGRESS NOTE ADULT - ASSESSMENT
cxr looks like pulmonary edema  nephrostomy is out  on iv lasix - BUN increased. Would change to PO daily  check O2 sat on room air- not done yesterday  on bronchodilators.  dvt proph

## 2018-03-30 NOTE — PROGRESS NOTE ADULT - PROBLEM SELECTOR PROBLEM 2
Pulmonary edema, acute

## 2018-03-30 NOTE — DISCHARGE NOTE ADULT - PLAN OF CARE
continue dig, continue dig, eliquis, cardiazem  f/u with Dr. Calderón in 1 week  check dig level in 3 days  check CBC in 3 days  monitor left flank hematoma

## 2018-03-30 NOTE — PROGRESS NOTE ADULT - SUBJECTIVE AND OBJECTIVE BOX
CC:Patient is a 72y old  Female who presents with a chief complaint of new A Fib (30 Mar 2018 12:25)      Subjective:  Pt seen and examined at bedside with chaperone. Pt is AAOx3, pt in no acute distress. Pt denied c/o fever, chills, chest pain, SOB, abd pain, N/V/D, extremity pain or dysfunction, hemoptysis, hematemesis, hematuria, hematochexia, headache, diplopia, vertigo, dizzyness.     ROS:  as abovementioned otherwise negative ROS    Vital Signs Last 24 Hrs  T(C): 36.8 (30 Mar 2018 09:58), Max: 36.8 (30 Mar 2018 09:58)  T(F): 98.2 (30 Mar 2018 09:58), Max: 98.2 (30 Mar 2018 09:58)  HR: 96 (30 Mar 2018 11:45) (82 - 99)  BP: 102/51 (30 Mar 2018 09:58) (102/51 - 121/55)  BP(mean): --  RR: 20 (30 Mar 2018 09:58) (17 - 20)  SpO2: 95% (30 Mar 2018 11:45) (95% - 100%)    Labs:                     8.6    12.38 )-----------( 399      ( 30 Mar 2018 06:47 )             27.2     CBC Full  -  ( 30 Mar 2018 06:47 )  WBC Count : 12.38 K/uL  Hemoglobin : 8.6 g/dL  Hematocrit : 27.2 %  Platelet Count - Automated : 399 K/uL  Mean Cell Volume : 91.9 fl  Mean Cell Hemoglobin : 29.1 pg  Mean Cell Hemoglobin Concentration : 31.6 gm/dL  Auto Neutrophil # : x  Auto Lymphocyte # : x  Auto Monocyte # : x  Auto Eosinophil # : x  Auto Basophil # : x  Auto Neutrophil % : x  Auto Lymphocyte % : x  Auto Monocyte % : x  Auto Eosinophil % : x  Auto Basophil % : x    03-30    140  |  103  |  29<H>  ----------------------------<  101<H>  3.6   |  30  |  1.13    Ca    8.4<L>      30 Mar 2018 06:47  Phos  3.6     03-29  Mg     2.1     03-29              Meds:  ALBUTerol    0.083% 2.5 milliGRAM(s) Nebulizer every 6 hours PRN  ALBUTerol/ipratropium for Nebulization 3 milliLiter(s) Nebulizer every 6 hours  apixaban 5 milliGRAM(s) Oral every 12 hours  digoxin     Tablet 0.25 milliGRAM(s) Oral daily  diltiazem    milliGRAM(s) Oral daily  diltiazem    milliGRAM(s) Oral at bedtime  docusate sodium 100 milliGRAM(s) Oral two times a day PRN  furosemide    Tablet 40 milliGRAM(s) Oral daily  guaiFENesin    Syrup 100 milliGRAM(s) Oral every 6 hours PRN  ondansetron Injectable 4 milliGRAM(s) IV Push every 6 hours PRN  simvastatin 20 milliGRAM(s) Oral at bedtime      Radiology:      Physical exam:  Pt is aaox3  Pt in no acute distress  Resp: CTAB  CVS: S1S2(+)  ABD: bowel sounds (+), soft, non distended, no rebound, no guarding, no rigidity, (+) left lateral abd wall ecchymosis, stable from prior markings. No gross abd tenderness to exam  EXT: no calf tenderness or edema to exam b/l, on VTE prophylaxis  Skin: no adverse skin changes to exam otherwise

## 2018-03-30 NOTE — PROGRESS NOTE ADULT - SUBJECTIVE AND OBJECTIVE BOX
Subjective:  mild dyspnea  had some confusion last nite    MEDICATIONS  (STANDING):  ALBUTerol/ipratropium for Nebulization 3 milliLiter(s) Nebulizer every 6 hours  digoxin     Tablet 0.25 milliGRAM(s) Oral daily  diltiazem    milliGRAM(s) Oral daily  diltiazem    milliGRAM(s) Oral at bedtime  furosemide    Tablet 40 milliGRAM(s) Oral daily  simvastatin 20 milliGRAM(s) Oral at bedtime    MEDICATIONS  (PRN):  ALBUTerol    0.083% 2.5 milliGRAM(s) Nebulizer every 6 hours PRN Wheezing  docusate sodium 100 milliGRAM(s) Oral two times a day PRN Constipation  guaiFENesin    Syrup 100 milliGRAM(s) Oral every 6 hours PRN Cough  ondansetron Injectable 4 milliGRAM(s) IV Push every 6 hours PRN Nausea and/or Vomiting      Allergies    Macrobid (Other)    Intolerances        REVIEW OF SYSTEMS:    CONSTITUTIONAL:  As per HPI.  HEENT:  Eyes:  No diplopia or blurred vision. ENT:  No earache, sore throat or runny nose.  CARDIOVASCULAR:  No pressure, squeezing, tightness, heaviness or aching about the chest, neck, axilla or epigastrium.  RESPIRATORY:  No cough, shortness of breath, PND or orthopnea.  GASTROINTESTINAL:  No nausea, vomiting or diarrhea.  GENITOURINARY:  No dysuria, frequency or urgency.  MUSCULOSKELETAL:  no joint pain, deformity, tenderness  EXTREMITIES: no clubbing cyanosis,edema  SKIN:  No change in skin, hair or nails.  NEUROLOGIC:  No paresthesias, fasciculations, seizures or weakness.  PSYCHIATRIC:  No disorder of thought or mood.  ENDOCRINE:  No heat or cold intolerance, polyuria or polydipsia.  HEMATOLOGICAL:  No easy bruising or bleedings:    Vital Signs Last 24 Hrs  T(C): 36.6 (30 Mar 2018 05:25), Max: 36.6 (29 Mar 2018 16:50)  T(F): 97.9 (30 Mar 2018 05:25), Max: 97.9 (30 Mar 2018 05:25)  HR: 90 (30 Mar 2018 05:25) (84 - 99)  BP: 104/56 (30 Mar 2018 05:25) (104/56 - 121/55)  BP(mean): --  RR: 20 (30 Mar 2018 05:25) (17 - 22)  SpO2: 98% (30 Mar 2018 05:25) (97% - 100%)    PHYSICAL EXAMINATION:  SKIN: no rashes  HEAD: NC/AT  EYES: PERRLA, EOMI  EARS: TM's intact  NOSE: no abnormalities  NECK:  Supple. No lymphadenopathy. Jugular venous pressure not elevated. Carotids equal.   HEART:   The cardiac impulse has a normal quality. Reg., Nl S1 and S2.  There are no murmurs, rubs or gallops noted  CHEST: bilat ronchi w basilar crackles  ABDOMEN:  Soft and nontender.   EXTREMITIES:  no C/C/E  NEURO: AAO x 3, no focal deficts       LABS:                        8.6    12.38 )-----------( 399      ( 30 Mar 2018 06:47 )             27.2     03-30    140  |  103  |  29<H>  ----------------------------<  101<H>  3.6   |  30  |  1.13    Ca    8.4<L>      30 Mar 2018 06:47  Phos  3.6     03-29  Mg     2.1     03-29            RADIOLOGY & ADDITIONAL TESTS:

## 2018-03-30 NOTE — PROGRESS NOTE ADULT - PROBLEM SELECTOR PROBLEM 1
Acute respiratory failure with hypoxia

## 2018-03-30 NOTE — DISCHARGE NOTE ADULT - CARE PROVIDER_API CALL
Eric Collazo), Internal Medicine; Pulmonary Disease  [address] [place] [address]  Phone: [phone]  Fax: [phone]    Corby Calderón), Internal Medicine  [address] [place] [address]  Phone: [phone]  Fax: [phone] Eric Collazo), Internal Medicine; Pulmonary Disease  [address] [place] [address]  Phone: [phone]  Fax: [phone]    Corby Calderón), Internal Medicine  [address] [place] [address]  Phone: [phone]  Fax: [phone]    Corby Kim), Urology  [address] [place] [address]  Phone: [phone]  Fax: [phone]

## 2018-04-19 ENCOUNTER — APPOINTMENT (OUTPATIENT)
Dept: INTERNAL MEDICINE | Facility: CLINIC | Age: 73
End: 2018-04-19

## 2018-04-26 ENCOUNTER — APPOINTMENT (OUTPATIENT)
Dept: INTERNAL MEDICINE | Facility: CLINIC | Age: 73
End: 2018-04-26
Payer: COMMERCIAL

## 2018-04-26 VITALS
WEIGHT: 232 LBS | SYSTOLIC BLOOD PRESSURE: 134 MMHG | RESPIRATION RATE: 18 BRPM | HEIGHT: 65 IN | HEART RATE: 94 BPM | DIASTOLIC BLOOD PRESSURE: 88 MMHG | OXYGEN SATURATION: 96 % | BODY MASS INDEX: 38.65 KG/M2 | TEMPERATURE: 97.8 F

## 2018-04-26 DIAGNOSIS — Z63.4 DISAPPEARANCE AND DEATH OF FAMILY MEMBER: ICD-10-CM

## 2018-04-26 DIAGNOSIS — Z87.442 PERSONAL HISTORY OF URINARY CALCULI: ICD-10-CM

## 2018-04-26 DIAGNOSIS — R60.9 EDEMA, UNSPECIFIED: ICD-10-CM

## 2018-04-26 DIAGNOSIS — I51.7 CARDIOMEGALY: ICD-10-CM

## 2018-04-26 DIAGNOSIS — Z80.9 FAMILY HISTORY OF MALIGNANT NEOPLASM, UNSPECIFIED: ICD-10-CM

## 2018-04-26 DIAGNOSIS — I38 ENDOCARDITIS, VALVE UNSPECIFIED: ICD-10-CM

## 2018-04-26 DIAGNOSIS — M79.81 NONTRAUMATIC HEMATOMA OF SOFT TISSUE: ICD-10-CM

## 2018-04-26 DIAGNOSIS — K57.90 DIVERTICULOSIS OF INTESTINE, PART UNSPECIFIED, W/OUT PERFORATION OR ABSCESS W/OUT BLEEDING: ICD-10-CM

## 2018-04-26 DIAGNOSIS — E65 LOCALIZED ADIPOSITY: ICD-10-CM

## 2018-04-26 DIAGNOSIS — D64.9 ANEMIA, UNSPECIFIED: ICD-10-CM

## 2018-04-26 DIAGNOSIS — K59.00 CONSTIPATION, UNSPECIFIED: ICD-10-CM

## 2018-04-26 DIAGNOSIS — Z87.42 PERSONAL HISTORY OF OTHER DISEASES OF THE FEMALE GENITAL TRACT: ICD-10-CM

## 2018-04-26 DIAGNOSIS — M17.11 UNILATERAL PRIMARY OSTEOARTHRITIS, RIGHT KNEE: ICD-10-CM

## 2018-04-26 DIAGNOSIS — Z82.61 FAMILY HISTORY OF ARTHRITIS: ICD-10-CM

## 2018-04-26 DIAGNOSIS — I50.31 ACUTE DIASTOLIC (CONGESTIVE) HEART FAILURE: ICD-10-CM

## 2018-04-26 DIAGNOSIS — L40.9 PSORIASIS, UNSPECIFIED: ICD-10-CM

## 2018-04-26 DIAGNOSIS — K57.92 DIVERTICULITIS OF INTESTINE, PART UNSPECIFIED, W/OUT PERFORATION OR ABSCESS W/OUT BLEEDING: ICD-10-CM

## 2018-04-26 DIAGNOSIS — N13.2 HYDRONEPHROSIS WITH RENAL AND URETERAL CALCULOUS OBSTRUCTION: ICD-10-CM

## 2018-04-26 PROCEDURE — 99205 OFFICE O/P NEW HI 60 MIN: CPT | Mod: 25

## 2018-04-26 PROCEDURE — 94729 DIFFUSING CAPACITY: CPT

## 2018-04-26 PROCEDURE — 94727 GAS DIL/WSHOT DETER LNG VOL: CPT

## 2018-04-26 PROCEDURE — ZZZZZ: CPT

## 2018-04-26 PROCEDURE — 94060 EVALUATION OF WHEEZING: CPT

## 2018-04-26 RX ORDER — SIMVASTATIN 20 MG/1
20 TABLET, FILM COATED ORAL
Qty: 30 | Refills: 5 | Status: ACTIVE | COMMUNITY

## 2018-04-26 RX ORDER — IPRATROPIUM BROMIDE AND ALBUTEROL SULFATE 2.5; .5 MG/3ML; MG/3ML
0.5-2.5 (3) SOLUTION RESPIRATORY (INHALATION)
Refills: 0 | Status: DISCONTINUED | COMMUNITY
End: 2018-04-26

## 2018-04-26 RX ORDER — FUROSEMIDE 40 MG/1
40 TABLET ORAL
Qty: 90 | Refills: 1 | Status: ACTIVE | COMMUNITY

## 2018-04-26 SDOH — SOCIAL STABILITY - SOCIAL INSECURITY: DISSAPEARANCE AND DEATH OF FAMILY MEMBER: Z63.4

## 2018-05-08 ENCOUNTER — FORM ENCOUNTER (OUTPATIENT)
Age: 73
End: 2018-05-08

## 2018-05-09 ENCOUNTER — APPOINTMENT (OUTPATIENT)
Dept: CT IMAGING | Facility: CLINIC | Age: 73
End: 2018-05-09
Payer: MEDICARE

## 2018-05-09 ENCOUNTER — OUTPATIENT (OUTPATIENT)
Dept: OUTPATIENT SERVICES | Facility: HOSPITAL | Age: 73
LOS: 1 days | End: 2018-05-09
Payer: MEDICARE

## 2018-05-09 DIAGNOSIS — Z98.890 OTHER SPECIFIED POSTPROCEDURAL STATES: Chronic | ICD-10-CM

## 2018-05-09 DIAGNOSIS — Z98.891 HISTORY OF UTERINE SCAR FROM PREVIOUS SURGERY: Chronic | ICD-10-CM

## 2018-05-09 DIAGNOSIS — Z00.8 ENCOUNTER FOR OTHER GENERAL EXAMINATION: ICD-10-CM

## 2018-05-09 PROCEDURE — 71250 CT THORAX DX C-: CPT | Mod: 26

## 2018-05-09 PROCEDURE — 71250 CT THORAX DX C-: CPT

## 2018-06-03 ENCOUNTER — EMERGENCY (EMERGENCY)
Facility: HOSPITAL | Age: 73
LOS: 0 days | Discharge: HOME | End: 2018-06-03
Attending: EMERGENCY MEDICINE | Admitting: EMERGENCY MEDICINE
Payer: MEDICARE

## 2018-06-03 VITALS
OXYGEN SATURATION: 98 % | HEART RATE: 94 BPM | RESPIRATION RATE: 19 BRPM | SYSTOLIC BLOOD PRESSURE: 151 MMHG | DIASTOLIC BLOOD PRESSURE: 78 MMHG | TEMPERATURE: 98 F

## 2018-06-03 VITALS — WEIGHT: 220.9 LBS | HEIGHT: 66 IN

## 2018-06-03 DIAGNOSIS — Z98.890 OTHER SPECIFIED POSTPROCEDURAL STATES: Chronic | ICD-10-CM

## 2018-06-03 DIAGNOSIS — I48.91 UNSPECIFIED ATRIAL FIBRILLATION: ICD-10-CM

## 2018-06-03 DIAGNOSIS — Z79.899 OTHER LONG TERM (CURRENT) DRUG THERAPY: ICD-10-CM

## 2018-06-03 DIAGNOSIS — N39.0 URINARY TRACT INFECTION, SITE NOT SPECIFIED: ICD-10-CM

## 2018-06-03 DIAGNOSIS — Z87.19 PERSONAL HISTORY OF OTHER DISEASES OF THE DIGESTIVE SYSTEM: ICD-10-CM

## 2018-06-03 DIAGNOSIS — Z98.891 HISTORY OF UTERINE SCAR FROM PREVIOUS SURGERY: Chronic | ICD-10-CM

## 2018-06-03 DIAGNOSIS — Z87.442 PERSONAL HISTORY OF URINARY CALCULI: ICD-10-CM

## 2018-06-03 DIAGNOSIS — E78.5 HYPERLIPIDEMIA, UNSPECIFIED: ICD-10-CM

## 2018-06-03 DIAGNOSIS — R58 HEMORRHAGE, NOT ELSEWHERE CLASSIFIED: ICD-10-CM

## 2018-06-03 DIAGNOSIS — R79.1 ABNORMAL COAGULATION PROFILE: ICD-10-CM

## 2018-06-03 DIAGNOSIS — I10 ESSENTIAL (PRIMARY) HYPERTENSION: ICD-10-CM

## 2018-06-03 DIAGNOSIS — Z98.890 OTHER SPECIFIED POSTPROCEDURAL STATES: ICD-10-CM

## 2018-06-03 DIAGNOSIS — L40.9 PSORIASIS, UNSPECIFIED: ICD-10-CM

## 2018-06-03 DIAGNOSIS — Z90.710 ACQUIRED ABSENCE OF BOTH CERVIX AND UTERUS: ICD-10-CM

## 2018-06-03 DIAGNOSIS — Z79.01 LONG TERM (CURRENT) USE OF ANTICOAGULANTS: ICD-10-CM

## 2018-06-03 LAB
ANION GAP SERPL CALC-SCNC: 7 MMOL/L — SIGNIFICANT CHANGE UP (ref 5–17)
ANISOCYTOSIS BLD QL: SLIGHT — SIGNIFICANT CHANGE UP
APTT BLD: 28.3 SEC — SIGNIFICANT CHANGE UP (ref 27.5–37.4)
BASOPHILS # BLD AUTO: 0.04 K/UL — SIGNIFICANT CHANGE UP (ref 0–0.2)
BASOPHILS NFR BLD AUTO: 0.3 % — SIGNIFICANT CHANGE UP (ref 0–2)
BUN SERPL-MCNC: 17 MG/DL — SIGNIFICANT CHANGE UP (ref 7–23)
CALCIUM SERPL-MCNC: 8.9 MG/DL — SIGNIFICANT CHANGE UP (ref 8.5–10.1)
CHLORIDE SERPL-SCNC: 110 MMOL/L — HIGH (ref 96–108)
CO2 SERPL-SCNC: 27 MMOL/L — SIGNIFICANT CHANGE UP (ref 22–31)
CREAT SERPL-MCNC: 0.9 MG/DL — SIGNIFICANT CHANGE UP (ref 0.5–1.3)
EOSINOPHIL # BLD AUTO: 0.08 K/UL — SIGNIFICANT CHANGE UP (ref 0–0.5)
EOSINOPHIL NFR BLD AUTO: 0.6 % — SIGNIFICANT CHANGE UP (ref 0–6)
GLUCOSE SERPL-MCNC: 106 MG/DL — HIGH (ref 70–99)
HCT VFR BLD CALC: 37.8 % — SIGNIFICANT CHANGE UP (ref 34.5–45)
HGB BLD-MCNC: 11.9 G/DL — SIGNIFICANT CHANGE UP (ref 11.5–15.5)
IMM GRANULOCYTES NFR BLD AUTO: 3 % — HIGH (ref 0–1.5)
INR BLD: 1.09 RATIO — SIGNIFICANT CHANGE UP (ref 0.88–1.16)
LYMPHOCYTES # BLD AUTO: 1.47 K/UL — SIGNIFICANT CHANGE UP (ref 1–3.3)
LYMPHOCYTES # BLD AUTO: 10.3 % — LOW (ref 13–44)
MANUAL SMEAR VERIFICATION: SIGNIFICANT CHANGE UP
MCHC RBC-ENTMCNC: 29.2 PG — SIGNIFICANT CHANGE UP (ref 27–34)
MCHC RBC-ENTMCNC: 31.5 GM/DL — LOW (ref 32–36)
MCV RBC AUTO: 92.9 FL — SIGNIFICANT CHANGE UP (ref 80–100)
MONOCYTES # BLD AUTO: 0.97 K/UL — HIGH (ref 0–0.9)
MONOCYTES NFR BLD AUTO: 6.8 % — SIGNIFICANT CHANGE UP (ref 2–14)
NEUTROPHILS # BLD AUTO: 11.25 K/UL — HIGH (ref 1.8–7.4)
NEUTROPHILS NFR BLD AUTO: 79 % — HIGH (ref 43–77)
NRBC # BLD: 0 /100 WBCS — SIGNIFICANT CHANGE UP (ref 0–0)
PLAT MORPH BLD: NORMAL — SIGNIFICANT CHANGE UP
PLATELET # BLD AUTO: 271 K/UL — SIGNIFICANT CHANGE UP (ref 150–400)
PLATELET COUNT - ESTIMATE: NORMAL — SIGNIFICANT CHANGE UP
POLYCHROMASIA BLD QL SMEAR: SLIGHT — SIGNIFICANT CHANGE UP
POTASSIUM SERPL-MCNC: 3.8 MMOL/L — SIGNIFICANT CHANGE UP (ref 3.5–5.3)
POTASSIUM SERPL-SCNC: 3.8 MMOL/L — SIGNIFICANT CHANGE UP (ref 3.5–5.3)
PROTHROM AB SERPL-ACNC: 11.8 SEC — SIGNIFICANT CHANGE UP (ref 9.8–12.7)
RBC # BLD: 4.07 M/UL — SIGNIFICANT CHANGE UP (ref 3.8–5.2)
RBC # FLD: 17.5 % — HIGH (ref 10.3–14.5)
RBC BLD AUTO: ABNORMAL
SODIUM SERPL-SCNC: 144 MMOL/L — SIGNIFICANT CHANGE UP (ref 135–145)
WBC # BLD: 14.23 K/UL — HIGH (ref 3.8–10.5)
WBC # FLD AUTO: 14.23 K/UL — HIGH (ref 3.8–10.5)

## 2018-06-03 PROCEDURE — 99284 EMERGENCY DEPT VISIT MOD MDM: CPT

## 2018-06-03 NOTE — ED PROVIDER NOTE - MEDICAL DECISION MAKING DETAILS
73 y/o F AFib on Eliquis ambulatory with family regarding ecchymotic discoloration B/L upper arms. No known injury. No CP, SOB. 2 days ago pt took aleve for mild headache. Plan labs, discuss with Cardio.

## 2018-06-03 NOTE — ED STATDOCS - PROGRESS NOTE DETAILS
Camden Torres for attending Dr. Gomes: 71 y/o f with PMHx of Diverticulitis, HLD, HTN on Eliquis, Kidney stones, Psoriasis presenting to the ED c/o extensive bruising since waking up this morning on Eliquis. PCP Dr. Francisco contacted x4 days ago with no response yet.  Pt admitted to  ED 4/14/18 for afib, rapid heart beat. Seen by Dr. Howell, prescribed 10mg Prednisone daily. Denies recent fall. Denies CP, SOB, dizziness, abd pain, HA, n/v/d, bloody stool. Will send pt to main ED for further evaluation.

## 2018-06-03 NOTE — ED PROVIDER NOTE - PMH
Diverticulitis    Edema    HLD (hyperlipidemia)    HTN (hypertension)    Kidney stones    Psoriasis Afib    Diverticulitis    Edema    HLD (hyperlipidemia)    HTN (hypertension)    Kidney stones    Psoriasis    UTI (urinary tract infection)

## 2018-06-03 NOTE — ED PROVIDER NOTE - OBJECTIVE STATEMENT
73 y/o F with PMHx of presents to ED c/o red/purple ecchymosis on both arms. Denies any arm pain, CP, SOB, dyspnea, fever. Denies any recent falls. Pt states she was at home and when she tried to pull herself up to get in wheelchair and heard a pop in her left arm. The ecchymosis on her right arm was already there since Wednesday 3 days after changing from Tylenol intake to Aleve. PCP was contacted on Wednesday about the right arm ecchymosis when she was seen by home aid to get her vitals taken. Pt was hospitalized 04/14/18 for rapid AFib when she originally came to the hospital with the intention of getting a knee replacement. LOD BID Pulmonologist:  Abhay (Euharleeona) PCP: Dr. Salas on [facility]    . Cardiac care unit for 5 days on a respirator. was on step down unit then to [facility] for recovery unity 3-4 weeks. went to son's house for 3 weeks. came home on her own on sunday.  casme to the house to get her blood pressure. She rolled up her sleev and she wsaw red/purple ecchymosis on her arms was what she saw. It was not as severe as it is now. pop no bruising on right arm 71 y/o F with PMHx on eloquid presents to ED c/o red/purple ecchymosis on both arms. Denies any arm pain, CP, SOB, dyspnea, fever. Denies any recent falls. Pt states she was at home and when she tried to pull herself up to get in wheelchair and heard a pop in her left arm. The ecchymosis on her right arm was already there since Wednesday 3 days after changing from Tylenol intake to Aleve. PCP was contacted on Wednesday about the right arm ecchymosis when she was seen by home aid to get her vitals taken. Pt was hospitalized 04/14/18 for rapid AFib when she originally came to the hospital with the intention of getting a knee replacement. She was also found to have pneumonia, kidney stones, sepsis. Pt was in Cardiac care unit for 5 days on a respirator. Pt was then on the step down unit and then transferred to [facility] for recovery unit for 3-4 weeks. Pt went to son's house for 3 weeks after release from [facility]. She then came home to live on her own on Sunday. Pt reports that  came to her house to get her blood pressure on Wednesday after hearing the pop and ignoring, and when she rolled up her sleeve and she saw the red/purple ecchymosis on left arm. Left arm ecchymosis was not as severe as it is now. Pumonologist: Abhay (Plumonary) PCP: Dr. Salas 71 y/o F with PMHx of AFib on Eliquis, kidney stones, UTI on Macrobid, HTN, HLD presents to ED c/o red/purple ecchymosis on both arms. Denies any arm pain, CP, SOB, dyspnea, fever. Denies any recent falls. Pt states she was at home and when she tried to pull herself up to get in wheelchair and heard a pop in her left arm. The ecchymosis on her right arm was already there since 05/30/18 3 days after changing from Tylenol intake to Aleve. PCP was contacted on Wednesday about the right arm ecchymosis when she was seen by home aid to get her vitals taken. Pt was hospitalized 04/14/18 for rapid AFib when she originally came to the hospital with the intention of getting a knee replacement. She was also found to have pneumonia, kidney stones, sepsis. Pt was in Cardiac care unit for 5 days on a respirator. Pt was then on the step down unit and then transferred to [facility] for recovery unit for 3-4 weeks. Pt went to son's house for 3 weeks after release from [facility]. She then came home to live on her own on Sunday. Pt reports that  came to her house to get her blood pressure on 5/30/18 after having heard and ignored the pop sound, and when she rolled up her sleeve and she saw the red/purple ecchymosis on left arm. Left arm ecchymosis was not as severe as it currently is. Pulmonologist: Abhay (Plumonary) PCP: Dr. Salas

## 2018-06-03 NOTE — ED PROVIDER NOTE - SKIN, MLM
Small area firmness anterior left upper arm distal 3rd. Ecchymotic discoloration RUE just below shoulder extending to mid-forearm. Mid LUE ecchymotic discoloration. No TTP. Soft tissues soft. Mild soreness with left elbow flexion.

## 2018-06-03 NOTE — ED PROVIDER NOTE - PROGRESS NOTE DETAILS
Diptiibdenise DV for ED attending, Doctor Contino: Attending paging MDM covering Dr. Salas to discuss management Camden HOPKINS for ED attending, Doctor Contino: Attending paging MDM covering Dr. Pat to discuss management Dr. Deleon:  case d/w covering cardiaologist: agreed no indication for admission & probably should hold Eliquis tonight, office f/u tomorrow, call in AM.

## 2018-06-28 ENCOUNTER — APPOINTMENT (OUTPATIENT)
Dept: INTERNAL MEDICINE | Facility: CLINIC | Age: 73
End: 2018-06-28
Payer: MEDICARE

## 2018-06-28 VITALS
BODY MASS INDEX: 37.15 KG/M2 | HEART RATE: 90 BPM | HEIGHT: 65 IN | RESPIRATION RATE: 16 BRPM | OXYGEN SATURATION: 96 % | DIASTOLIC BLOOD PRESSURE: 80 MMHG | WEIGHT: 223 LBS | SYSTOLIC BLOOD PRESSURE: 128 MMHG | TEMPERATURE: 98.2 F

## 2018-06-28 DIAGNOSIS — J18.9 PNEUMONIA, UNSPECIFIED ORGANISM: ICD-10-CM

## 2018-06-28 DIAGNOSIS — N17.9 ACUTE KIDNEY FAILURE, UNSPECIFIED: ICD-10-CM

## 2018-06-28 DIAGNOSIS — J96.01 ACUTE RESPIRATORY FAILURE WITH HYPOXIA: ICD-10-CM

## 2018-06-28 DIAGNOSIS — J90 PLEURAL EFFUSION, NOT ELSEWHERE CLASSIFIED: ICD-10-CM

## 2018-06-28 DIAGNOSIS — J80 ACUTE RESPIRATORY DISTRESS SYNDROME: ICD-10-CM

## 2018-06-28 DIAGNOSIS — I48.91 UNSPECIFIED ATRIAL FIBRILLATION: ICD-10-CM

## 2018-06-28 DIAGNOSIS — Z87.09 PERSONAL HISTORY OF OTHER DISEASES OF THE RESPIRATORY SYSTEM: ICD-10-CM

## 2018-06-28 PROCEDURE — 99214 OFFICE O/P EST MOD 30 MIN: CPT | Mod: 25

## 2018-06-28 PROCEDURE — 94729 DIFFUSING CAPACITY: CPT

## 2018-06-28 PROCEDURE — 94060 EVALUATION OF WHEEZING: CPT

## 2018-06-28 RX ORDER — DIGOXIN 250 UG/1
250 TABLET ORAL
Qty: 90 | Refills: 1 | Status: DISCONTINUED | COMMUNITY
End: 2018-06-28

## 2018-06-28 RX ORDER — PREDNISONE 10 MG/1
10 TABLET ORAL
Qty: 2 | Refills: 0 | Status: COMPLETED | COMMUNITY
Start: 2018-05-03 | End: 2018-06-28

## 2018-06-28 RX ORDER — POTASSIUM CHLORIDE 1.5 G/1.58G
20 POWDER, FOR SOLUTION ORAL
Qty: 90 | Refills: 0 | Status: COMPLETED | COMMUNITY
Start: 2018-05-30 | End: 2018-06-28

## 2018-06-28 RX ORDER — NAPROXEN 500 MG/1
500 TABLET ORAL
Qty: 60 | Refills: 0 | Status: COMPLETED | COMMUNITY
Start: 2018-02-25 | End: 2018-06-28

## 2018-06-28 RX ORDER — DILTIAZEM HYDROCHLORIDE 180 MG/1
180 CAPSULE, EXTENDED RELEASE ORAL TWICE DAILY
Refills: 0 | Status: COMPLETED | COMMUNITY
End: 2018-06-28

## 2018-06-28 RX ORDER — NYSTATIN 100000 1/G
100000 POWDER TOPICAL
Qty: 60 | Refills: 0 | Status: COMPLETED | COMMUNITY
Start: 2018-05-03 | End: 2018-06-28

## 2018-06-28 RX ORDER — APIXABAN 5 MG/1
5 TABLET, FILM COATED ORAL
Qty: 60 | Refills: 5 | Status: DISCONTINUED | COMMUNITY
End: 2018-06-28

## 2018-06-28 RX ORDER — TRAMADOL HYDROCHLORIDE 50 MG/1
50 TABLET, COATED ORAL
Refills: 0 | Status: DISCONTINUED | COMMUNITY
End: 2018-06-28

## 2018-06-28 NOTE — HISTORY OF PRESENT ILLNESS
[Family Member] : family member [FreeTextEntry1] : COPD, pulmonary nodules, AF and new DX RA. [de-identified] : The patient has not smoked since hospitalization and has been losing weight. She has less cough and less REGAN but remains sedentary due to knee pain. She is compliant with nebulized medication and denies chest pain, hemoptysis or significant sputum production. There is no hemoptysis. Repeat chest CT in May returned with improvement but persistence of ground glass pulmonary nodules. \par \par Dr DONNA Howell has diagnosed rheumatoid arthritis and started MTX with Prednisone and folic acid as she presented with acute painful swelling of the hands. Most joint sxs have resolved but bilat knee pain persists. Anticoagulation has been stopped after she developed  large spontaneous hematoma of both upper arms. Dr Pat offered Warfarin but the patient will only take ASA. She states CHADs score was "4." She is due urology f/u in July.

## 2018-06-28 NOTE — COUNSELING
[Weight management counseling provided] : Weight management [Healthy eating counseling provided] : healthy eating [Smoking cessation counseling provided] : smoking cessation [Low Fat Diet] : Low fat diet [Low Salt Diet] : Low salt diet [Walking] : Walking [Quit Smoking] : Quit smoking [Good understanding] : Patient has a good understanding of lifestyle changes and the steps needed to achieve self management goals

## 2018-06-28 NOTE — REVIEW OF SYSTEMS
[Fatigue] : fatigue [Recent Change In Weight] : ~T recent weight change [Vision Problems] : vision problems [Chest Pain] : chest pain [Lower Ext Edema] : lower extremity edema [Cough] : cough [Dyspnea on Exertion] : dyspnea on exertion [Joint Pain] : joint pain [Joint Stiffness] : joint stiffness [Skin Rash] : skin rash [Easy Bleeding] : easy bleeding [Easy Bruising] : easy bruising [Negative] : Heme/Lymph [Fever] : no fever [Chills] : no chills [Hot Flashes] : no hot flashes [Night Sweats] : no night sweats [Pain] : no pain [Hearing Loss] : no hearing loss [Nosebleed] : no nosebleeds [Hoarseness] : no hoarseness [Nasal Discharge] : no nasal discharge [Sore Throat] : no sore throat [Palpitations] : no palpitations [Leg Claudication] : no leg claudication [Orthopnea] : no orthopnea [Paroysmal Nocturnal Dyspnea] : no paroysmal nocturnal dyspnea [Wheezing] : no wheezing [Abdominal Pain] : no abdominal pain [Nausea] : no nausea [Constipation] : no constipation [Diarrhea] : diarrhea [Heartburn] : no heartburn [Melena] : no melena [Hematuria] : no hematuria [Joint Swelling] : no joint swelling [Muscle Pain] : no muscle pain [Back Pain] : no back pain [Itching] : no itching [Headache] : no headache [Dizziness] : no dizziness [Fainting] : no fainting [Confusion] : no confusion [Memory Loss] : no memory loss [Insomnia] : no insomnia [Anxiety] : no anxiety [Depression] : no depression [FreeTextEntry2] : 60 pound weight loss [FreeTextEntry3] : wears glasses [de-identified] : ecchymoses

## 2018-06-28 NOTE — PLAN
[FreeTextEntry1] : Hold ICS but continue neb meds with routine medications as listed.\par Continue healthy diet and weight loss.\par She will not return to smoking.\par Dr DONNA Howell and Dr PHYLLIS Clancy for treatment of knee pain.\par CT chest NC will be repeated in 3 mos.\par To call with any acute decomp.\par F/U 4 mos with PP sat or sooner PRN.

## 2018-06-28 NOTE — DATA REVIEWED
[FreeTextEntry1] : A pre-and post spirogram with diffusion was performed today. This reveals mild restriction with moderate airway reactivity. Raw diffusion is reduced at 515 predicted but improves to 99% based on alveolar volumes. All parameters are improved in comparison to prior PFT.\par

## 2018-06-28 NOTE — PHYSICAL EXAM
[No Acute Distress] : no acute distress [Well Developed] : well developed [Normal Sclera/Conjunctiva] : normal sclera/conjunctiva [PERRL] : pupils equal round and reactive to light [EOMI] : extraocular movements intact [Normal Outer Ear/Nose] : the outer ears and nose were normal in appearance [Normal Nasal Mucosa] : the nasal mucosa was normal [No JVD] : no jugular venous distention [Supple] : supple [No Lymphadenopathy] : no lymphadenopathy [No Respiratory Distress] : no respiratory distress  [No Accessory Muscle Use] : no accessory muscle use [Normal Rate] : normal rate  [Normal S1, S2] : normal S1 and S2 [No Varicosities] : no varicosities [No Extremity Clubbing/Cyanosis] : no extremity clubbing/cyanosis [Soft] : abdomen soft [Non Tender] : non-tender [Normal Supraclavicular Nodes] : no supraclavicular lymphadenopathy [Normal Anterior Cervical Nodes] : no anterior cervical lymphadenopathy [No Joint Swelling] : no joint swelling [No Rash] : no rash [Coordination Grossly Intact] : coordination grossly intact [No Focal Deficits] : no focal deficits [Speech Grossly Normal] : speech grossly normal [Memory Grossly Normal] : memory grossly normal [Alert and Oriented x3] : oriented to person, place, and time [Normal Mood] : the mood was normal [Normal Insight/Judgement] : insight and judgment were intact [Normal Voice/Communication] : normal voice/communication [No Carotid Bruits] : no carotid bruits [No CVA Tenderness] : no CVA  tenderness [de-identified] : obese and appears chronically ill [de-identified] : wearing glasses [de-identified] : redundant soft palate. [de-identified] : insp crackles at bases and slightly imp[roved with cough. No wheeze. [de-identified] : irreg irreg rhythm with 1/6 АННА at  LLSB [de-identified] : 2+ pedal edema with component of lymphedema [de-identified] : obese [de-identified] : multiple small ecchymoses [de-identified] : uses wheelchair.

## 2018-08-06 ENCOUNTER — APPOINTMENT (OUTPATIENT)
Dept: CT IMAGING | Facility: CLINIC | Age: 73
End: 2018-08-06

## 2018-10-22 PROBLEM — E78.5 HYPERLIPIDEMIA, UNSPECIFIED: Chronic | Status: ACTIVE | Noted: 2018-03-14

## 2018-10-22 PROBLEM — K57.92 DIVERTICULITIS OF INTESTINE, PART UNSPECIFIED, WITHOUT PERFORATION OR ABSCESS WITHOUT BLEEDING: Chronic | Status: ACTIVE | Noted: 2018-03-14

## 2018-10-22 PROBLEM — L40.9 PSORIASIS, UNSPECIFIED: Chronic | Status: ACTIVE | Noted: 2018-03-14

## 2018-10-22 PROBLEM — I10 ESSENTIAL (PRIMARY) HYPERTENSION: Chronic | Status: ACTIVE | Noted: 2018-03-14

## 2018-10-22 PROBLEM — I48.91 UNSPECIFIED ATRIAL FIBRILLATION: Chronic | Status: ACTIVE | Noted: 2018-06-03

## 2018-10-22 PROBLEM — R60.9 EDEMA, UNSPECIFIED: Chronic | Status: ACTIVE | Noted: 2018-03-14

## 2018-10-22 PROBLEM — N20.0 CALCULUS OF KIDNEY: Chronic | Status: ACTIVE | Noted: 2018-03-14

## 2018-10-22 PROBLEM — N39.0 URINARY TRACT INFECTION, SITE NOT SPECIFIED: Chronic | Status: ACTIVE | Noted: 2018-06-03

## 2018-11-06 ENCOUNTER — FORM ENCOUNTER (OUTPATIENT)
Age: 73
End: 2018-11-06

## 2018-11-07 ENCOUNTER — OUTPATIENT (OUTPATIENT)
Dept: OUTPATIENT SERVICES | Facility: HOSPITAL | Age: 73
LOS: 1 days | End: 2018-11-07
Payer: MEDICARE

## 2018-11-07 ENCOUNTER — APPOINTMENT (OUTPATIENT)
Dept: CT IMAGING | Facility: CLINIC | Age: 73
End: 2018-11-07
Payer: MEDICARE

## 2018-11-07 DIAGNOSIS — Z98.890 OTHER SPECIFIED POSTPROCEDURAL STATES: Chronic | ICD-10-CM

## 2018-11-07 DIAGNOSIS — Z00.8 ENCOUNTER FOR OTHER GENERAL EXAMINATION: ICD-10-CM

## 2018-11-07 DIAGNOSIS — Z98.891 HISTORY OF UTERINE SCAR FROM PREVIOUS SURGERY: Chronic | ICD-10-CM

## 2018-11-07 PROCEDURE — 82565 ASSAY OF CREATININE: CPT

## 2018-11-07 PROCEDURE — 71260 CT THORAX DX C+: CPT | Mod: 26

## 2018-11-07 PROCEDURE — 71260 CT THORAX DX C+: CPT

## 2018-11-26 ENCOUNTER — APPOINTMENT (OUTPATIENT)
Dept: INTERNAL MEDICINE | Facility: CLINIC | Age: 73
End: 2018-11-26
Payer: MEDICARE

## 2018-11-26 ENCOUNTER — NON-APPOINTMENT (OUTPATIENT)
Age: 73
End: 2018-11-26

## 2018-11-26 VITALS
OXYGEN SATURATION: 97 % | HEART RATE: 122 BPM | DIASTOLIC BLOOD PRESSURE: 80 MMHG | RESPIRATION RATE: 18 BRPM | HEIGHT: 65 IN | BODY MASS INDEX: 37.65 KG/M2 | SYSTOLIC BLOOD PRESSURE: 120 MMHG | TEMPERATURE: 97.7 F | WEIGHT: 226 LBS

## 2018-11-26 PROCEDURE — 99214 OFFICE O/P EST MOD 30 MIN: CPT | Mod: 25

## 2018-11-26 PROCEDURE — 94060 EVALUATION OF WHEEZING: CPT

## 2018-11-26 RX ORDER — PREDNISONE 5 MG/1
5 TABLET ORAL
Qty: 90 | Refills: 0 | Status: DISCONTINUED | COMMUNITY
Start: 2018-06-11 | End: 2018-11-26

## 2018-11-26 NOTE — PHYSICAL EXAM
[No Acute Distress] : no acute distress [Well Developed] : well developed [Normal Voice/Communication] : normal voice/communication [Normal Sclera/Conjunctiva] : normal sclera/conjunctiva [PERRL] : pupils equal round and reactive to light [EOMI] : extraocular movements intact [Normal Outer Ear/Nose] : the outer ears and nose were normal in appearance [Normal Nasal Mucosa] : the nasal mucosa was normal [No JVD] : no jugular venous distention [Supple] : supple [No Lymphadenopathy] : no lymphadenopathy [No Respiratory Distress] : no respiratory distress  [Clear to Auscultation] : lungs were clear to auscultation bilaterally [No Accessory Muscle Use] : no accessory muscle use [Normal Rate] : normal rate  [Normal S1, S2] : normal S1 and S2 [No Carotid Bruits] : no carotid bruits [No Varicosities] : no varicosities [No Extremity Clubbing/Cyanosis] : no extremity clubbing/cyanosis [Soft] : abdomen soft [Non Tender] : non-tender [Normal Supraclavicular Nodes] : no supraclavicular lymphadenopathy [Normal Anterior Cervical Nodes] : no anterior cervical lymphadenopathy [No CVA Tenderness] : no CVA  tenderness [No Joint Swelling] : no joint swelling [No Rash] : no rash [Coordination Grossly Intact] : coordination grossly intact [No Focal Deficits] : no focal deficits [Speech Grossly Normal] : speech grossly normal [Memory Grossly Normal] : memory grossly normal [Alert and Oriented x3] : oriented to person, place, and time [Normal Mood] : the mood was normal [Normal Insight/Judgement] : insight and judgment were intact [de-identified] : obese and appears chronically ill [de-identified] : wearing glasses [de-identified] : redundant soft palate. [de-identified] :  No wheeze. [de-identified] : irreg irreg rhythm with 1/6 АННА at  LLSB [de-identified] : trace pitting pedal  edema to  [de-identified] : obese [de-identified] : multiple small ecchymoses [de-identified] : uses wheelchair.

## 2018-11-26 NOTE — REVIEW OF SYSTEMS
[Joint Pain] : joint pain [Negative] : Genitourinary [Fever] : no fever [Chest Pain] : no chest pain [Wheezing] : no wheezing [Cough] : no cough

## 2018-11-26 NOTE — ASSESSMENT
[FreeTextEntry1] : \par \par Will continue with surveillance Chest CT to follow pulmonary nodules.  Repeat in 6 months.  FU visit with  one week after scan.

## 2018-11-26 NOTE — HISTORY OF PRESENT ILLNESS
[Family Member] : family member [FreeTextEntry1] : FU pulm nodules [de-identified] : She is followed by  for RA and remains on methotrexate.   gave her 2 gel shots in her knees  which is helping.  Sx is on hold.  Her pulmonary status is stable.  She is using duoneb tid. There is no cough or wheeze.  Using duo neb tid.

## 2018-11-26 NOTE — COUNSELING
[Transportation Issues] : Transportation issues [Good understanding] : Patient has a good understanding of lifestyle changes and the steps needed to achieve self management goals

## 2018-11-26 NOTE — DATA REVIEWED
[FreeTextEntry1] :  CAT scan of the chest has been reviewed. There are no new findings noted. It still shows a very slight degree of scarring. There are several nodules which are small and unchanged. Continued followup will be necessary.\par Will  Repeat in 6 months.  \par \par \par Pre/post spirometry shows mild restriction without significant airway reactivity.

## 2019-02-27 ENCOUNTER — RX RENEWAL (OUTPATIENT)
Age: 74
End: 2019-02-27

## 2019-04-01 ENCOUNTER — RX RENEWAL (OUTPATIENT)
Age: 74
End: 2019-04-01

## 2019-04-15 ENCOUNTER — FORM ENCOUNTER (OUTPATIENT)
Age: 74
End: 2019-04-15

## 2019-04-16 ENCOUNTER — OUTPATIENT (OUTPATIENT)
Dept: OUTPATIENT SERVICES | Facility: HOSPITAL | Age: 74
LOS: 1 days | End: 2019-04-16
Payer: MEDICARE

## 2019-04-16 ENCOUNTER — APPOINTMENT (OUTPATIENT)
Dept: CT IMAGING | Facility: CLINIC | Age: 74
End: 2019-04-16
Payer: MEDICARE

## 2019-04-16 DIAGNOSIS — Z98.890 OTHER SPECIFIED POSTPROCEDURAL STATES: Chronic | ICD-10-CM

## 2019-04-16 DIAGNOSIS — Z00.8 ENCOUNTER FOR OTHER GENERAL EXAMINATION: ICD-10-CM

## 2019-04-16 DIAGNOSIS — Z98.891 HISTORY OF UTERINE SCAR FROM PREVIOUS SURGERY: Chronic | ICD-10-CM

## 2019-04-16 PROCEDURE — 71250 CT THORAX DX C-: CPT | Mod: 26

## 2019-04-16 PROCEDURE — 71250 CT THORAX DX C-: CPT

## 2019-05-06 ENCOUNTER — RX RENEWAL (OUTPATIENT)
Age: 74
End: 2019-05-06

## 2019-05-28 ENCOUNTER — APPOINTMENT (OUTPATIENT)
Dept: INTERNAL MEDICINE | Facility: CLINIC | Age: 74
End: 2019-05-28
Payer: MEDICARE

## 2019-05-28 VITALS
SYSTOLIC BLOOD PRESSURE: 122 MMHG | TEMPERATURE: 98.2 F | BODY MASS INDEX: 36.65 KG/M2 | HEART RATE: 130 BPM | WEIGHT: 220 LBS | OXYGEN SATURATION: 97 % | DIASTOLIC BLOOD PRESSURE: 76 MMHG | RESPIRATION RATE: 18 BRPM | HEIGHT: 65 IN

## 2019-05-28 DIAGNOSIS — J44.9 CHRONIC OBSTRUCTIVE PULMONARY DISEASE, UNSPECIFIED: ICD-10-CM

## 2019-05-28 DIAGNOSIS — R91.8 OTHER NONSPECIFIC ABNORMAL FINDING OF LUNG FIELD: ICD-10-CM

## 2019-05-28 PROCEDURE — G0009: CPT

## 2019-05-28 PROCEDURE — 94729 DIFFUSING CAPACITY: CPT

## 2019-05-28 PROCEDURE — 94010 BREATHING CAPACITY TEST: CPT

## 2019-05-28 PROCEDURE — ZZZZZ: CPT

## 2019-05-28 PROCEDURE — 90670 PCV13 VACCINE IM: CPT

## 2019-05-28 PROCEDURE — 94727 GAS DIL/WSHOT DETER LNG VOL: CPT

## 2019-05-28 PROCEDURE — 99215 OFFICE O/P EST HI 40 MIN: CPT | Mod: 25

## 2019-05-28 RX ORDER — IPRATROPIUM BROMIDE AND ALBUTEROL SULFATE 2.5; .5 MG/3ML; MG/3ML
0.5-2.5 (3) SOLUTION RESPIRATORY (INHALATION) 3 TIMES DAILY
Qty: 270 | Refills: 0 | Status: DISCONTINUED | COMMUNITY
Start: 2019-02-27 | End: 2019-05-28

## 2019-05-28 NOTE — PHYSICAL EXAM
[No Acute Distress] : no acute distress [Normal Sclera/Conjunctiva] : normal sclera/conjunctiva [Well Developed] : well developed [EOMI] : extraocular movements intact [PERRL] : pupils equal round and reactive to light [No JVD] : no jugular venous distention [Normal Nasal Mucosa] : the nasal mucosa was normal [Normal Outer Ear/Nose] : the outer ears and nose were normal in appearance [No Lymphadenopathy] : no lymphadenopathy [Supple] : supple [Clear to Auscultation] : lungs were clear to auscultation bilaterally [No Respiratory Distress] : no respiratory distress  [No Accessory Muscle Use] : no accessory muscle use [Normal S1, S2] : normal S1 and S2 [No Extremity Clubbing/Cyanosis] : no extremity clubbing/cyanosis [No Varicosities] : no varicosities [Soft] : abdomen soft [Non Tender] : non-tender [Normal Posterior Cervical Nodes] : no posterior cervical lymphadenopathy [Normal Supraclavicular Nodes] : no supraclavicular lymphadenopathy [No Spinal Tenderness] : no spinal tenderness [Normal Anterior Cervical Nodes] : no anterior cervical lymphadenopathy [No Joint Swelling] : no joint swelling [Grossly Normal Strength/Tone] : grossly normal strength/tone [No Rash] : no rash [No Focal Deficits] : no focal deficits [Coordination Grossly Intact] : coordination grossly intact [Speech Grossly Normal] : speech grossly normal [Memory Grossly Normal] : memory grossly normal [Normal Mood] : the mood was normal [Alert and Oriented x3] : oriented to person, place, and time [Normal Affect] : the affect was normal [de-identified] : obese and appears chronically ill [de-identified] : wearing glasses [Normal Insight/Judgement] : insight and judgment were intact [de-identified] : Chronic venous stasis changes are noted with superficial varicosities present bilaterally [de-identified] : irreg irreg rhythm with 1/6 АННА at low LSB [de-identified] : redundant soft palate. No epistaxis [de-identified] : obese with left flank ecchymosis [de-identified] : multiple small ecchymoses [de-identified] : uses wheelchair. Gait is slow and wide base

## 2019-05-28 NOTE — REVIEW OF SYSTEMS
[Joint Pain] : joint pain [Negative] : Heme/Lymph [FreeTextEntry9] : see history of present illness [FreeTextEntry6] : see history of present illness

## 2019-05-28 NOTE — DATA REVIEWED
[FreeTextEntry1] : CAT scan of the chest, performed in April is reviewed. The previously documented subcentimeter pulmonary nodules are unchanged. There is chronic stable bronchiectasis as well.\par \par A pulmonary function test is performed. Lung volumes are mildly reduced in a symmetric fashion. Lung mechanics reveal a mild decrease in the FEV1. Bronchodilator reactivity was not assessed due to her resting pulse of approximately 130 which was irregularly irregular. The DLCO is significantly reduced, however when correct for alveolar volume it is normal. The saturation is maintained. This represents a mild degree of restriction. Mild obstruction cannot be entirely ruled out. A diffusion abnormality appears to be present however when corrected is normal.

## 2019-05-28 NOTE — HISTORY OF PRESENT ILLNESS
[de-identified] : The patient comes in today for a followup evaluation, and a yearly reassessment of her pulmonary status.\par \par Overall, from a pulmonary standpoint, the patient states that she is doing well. She uses a nebulizer twice a day with DuoNeb. She states that she has quit smoking. She denies any cough sputum production, or hemoptysis. She is limited with regards to exertion and ambulation due to the fact that she has severe degenerative arthritis of the hip. He is in need of a total hip replacement.\par \par The patient was recently seen by her primary care physician, and cardiologist, Dr. Pat. She is noted to have chronic atrial fibrillation. She is not on any anticoagulation due to the fact that she had a severe GI bleed approximately 6-7 months ago. She is only on aspirin.\par \par The patient notes that she is in need of a left total hip replacement. She will undergo a nuclear stress test early next month to see whether not there any cardiac issues. If not, she will then consider having the surgery done shortly thereafter. Of note is the fact that she remains on 5 mg of prednisone for chronic arthritis. She now comes in for this assessment.

## 2019-05-28 NOTE — PLAN
[FreeTextEntry1] : 1. At this time, we'll now discontinue the DuoNeb given the fact that it contains albuterol which may be contributing to her tachycardia. To this end, we will now switch to ipratropium unit dose b.i.d., plus budesonide unit dose b.i.d. via the nebulizer as maintenance.\par \par 2. Of note is the fact that the patient is currently on aspirin alone, and no full anticoagulation due to her recent GI bleed. Further recommendations will be made by cardiology.\par \par 3. Prevnar 13 has been administered\par \par 4. The patient is currently on 5 mg of prednisone daily. Should she decide on left total hip replacement, she would obtain stress doses of steroids prior to going to the operating room. I would also recommend spinal anesthesia if possible.\par \par 5. Followup in 6 months with spirometry, and flu shot.

## 2019-06-10 ENCOUNTER — FORM ENCOUNTER (OUTPATIENT)
Age: 74
End: 2019-06-10

## 2019-06-10 NOTE — H&P ADULT - NSICDXPASTMEDICALHX_GEN_ALL_CORE_FT
PAST MEDICAL HISTORY:  Afib     Diverticulitis     Edema     HLD (hyperlipidemia)     HTN (hypertension)     Kidney stones     Psoriasis     UTI (urinary tract infection)

## 2019-06-10 NOTE — H&P ADULT - NSICDXPASTSURGICALHX_GEN_ALL_CORE_FT
PAST SURGICAL HISTORY:  H/O arthroscopy of shoulder left     H/O bladder repair surgery     H/O: hysterectomy due to bleeding     S/P

## 2019-06-10 NOTE — H&P ADULT - NSHPLABSRESULTS_GEN_ALL_CORE
stress test (6/4/19) global stress LV function was severely reduced, (+) multi vessel ischemia  echo (3/28/18) EF 55-60%

## 2019-06-10 NOTE — H&P ADULT - ASSESSMENT
74 yo F with PMHx of HTN, angioedema, smoker, Afib on ASA d/t h/o bleeding on eliquis, has been c/o Lt hip pain. Pt underwent stress test as pre-op cardiac evaluation, which showed (+) multi vessel ischemia.  Pt referred for LHC with possible intervention. 72 yo F with PMHx of HTN, angioedema, smoker, Afib on ASA d/t h/o bleeding on eliquis, has been c/o Lt hip pain. Pt. was due to have hip surgery and required cardiac clearance. Pt underwent stress test as pre-op cardiac evaluation, which showed (+) multi vessel ischemia. Pt referred for LHC with possible intervention.   LHC risks, benefits and alternatives discussed with patient. Risk discussed included, but not limited to MI, stroke, mortality, major bleeding, arrythmia, or infection. Consent obtained and signed educational material provided. Pt. verbalizes and understands pre-procedural instructions.  Bleeding Risk Score

## 2019-06-10 NOTE — H&P ADULT - NSHPPHYSICALEXAM_GEN_ALL_CORE
Vital Signs : BP        HR       RR                 Constitutional: well developed, well nourished, no deformities and no acute distress    Neurological: Alert & Oriented x 3, CEDILLO, no focal deficits    HEENT: NC/AT, PERRLA, EOMI,  Neck supple.    Respiratory: CTA B/L, No wheezing/crackles/rhonchi    Cardiovascular: (+) S1 & S2, RRR, No m/r/g    Gastrointestinal: soft, NT, nondistended, (+) BS    Genitourinary: non distended bladder, voiding freely    Extremities: No pedal edema, No clubbing, No cyanosis    Skin:  normal skin color and pigmentation, no skin lesions Constitutional: well developed, well nourished, no deformities and no acute distress    Neurological: Alert & Oriented x 3, CEDILLO, no focal deficits    HEENT: NC/AT, PERRLA, EOMI,  Neck supple.    Respiratory: CTA B/L, No wheezing/crackles/rhonchi    Cardiovascular: (+) S1 & S2, RRR, No m/r/g    Gastrointestinal: soft, NT, nondistended, (+) BS    Genitourinary: non distended bladder, voiding freely    Extremities: No pedal edema, No clubbing, No cyanosis    Skin:  normal skin color and pigmentation, no skin lesions

## 2019-06-10 NOTE — H&P ADULT - PROBLEM SELECTOR PLAN 1
Pt is referred for Lt heart cath/possible PCI. Labs & medications are reviewed. Informed consent obtained after discussion of [facility] risks, benefits and alternatives  with patient. Risk discussed included, but not limited to MI, stroke, mortality, major bleeding, arrhythmia, or infection.  An educational material provided. Pt. verbalizes understandings of pre-procedural instructions. Pt is referred for Lt heart cath/possible PCI. Labs & medications are reviewed. Informed consent obtained after discussion of [facility] risks, benefits and alternatives  with patient. Risk discussed included, but not limited to MI, stroke, mortality, major bleeding, arrhythmia, or infection.  An educational material provided. Pt. verbalizes understandings of pre-procedural instructions.  - Bleeding risk: 2.5%

## 2019-06-10 NOTE — H&P ADULT - HISTORY OF PRESENT ILLNESS
74 yo F with PMHx of HTN, angioedema, smoker, Afib on ASA d/t h/o bleeding on eliquis, has been c/o Lt hip pain. Pt underwent stress test as pre-op cardiac evaluation, which showed (+) multi vessel ischemia.  Pt referred for LHC with possible intervention. 72 yo F with PMHx of HTN, angioedema, smoker, Afib on ASA d/t h/o bleeding on eliquis, has been c/o Lt hip pain. Pt. was due to have hip surgery and required cardiac clearance. Pt underwent stress test as pre-op cardiac evaluation, which showed (+) multi vessel ischemia. Pt referred for LHC with possible intervention. Pt. denies chest pain, SOB, palpitations, lightheadedness, dizziness, chills, fever.

## 2019-06-11 ENCOUNTER — OUTPATIENT (OUTPATIENT)
Dept: OUTPATIENT SERVICES | Facility: HOSPITAL | Age: 74
LOS: 1 days | Discharge: HOME | End: 2019-06-11
Payer: MEDICARE

## 2019-06-11 VITALS
SYSTOLIC BLOOD PRESSURE: 116 MMHG | WEIGHT: 225.09 LBS | HEART RATE: 125 BPM | OXYGEN SATURATION: 98 % | RESPIRATION RATE: 17 BRPM | DIASTOLIC BLOOD PRESSURE: 66 MMHG | TEMPERATURE: 97 F

## 2019-06-11 DIAGNOSIS — Z98.890 OTHER SPECIFIED POSTPROCEDURAL STATES: Chronic | ICD-10-CM

## 2019-06-11 DIAGNOSIS — R94.39 ABNORMAL RESULT OF OTHER CARDIOVASCULAR FUNCTION STUDY: ICD-10-CM

## 2019-06-11 DIAGNOSIS — Z98.891 HISTORY OF UTERINE SCAR FROM PREVIOUS SURGERY: Chronic | ICD-10-CM

## 2019-06-11 LAB
ANION GAP SERPL CALC-SCNC: 6 MMOL/L — SIGNIFICANT CHANGE UP (ref 5–17)
BLD GP AB SCN SERPL QL: SIGNIFICANT CHANGE UP
BUN SERPL-MCNC: 23 MG/DL — SIGNIFICANT CHANGE UP (ref 7–23)
CALCIUM SERPL-MCNC: 9.7 MG/DL — SIGNIFICANT CHANGE UP (ref 8.5–10.1)
CHLORIDE SERPL-SCNC: 105 MMOL/L — SIGNIFICANT CHANGE UP (ref 96–108)
CO2 SERPL-SCNC: 29 MMOL/L — SIGNIFICANT CHANGE UP (ref 22–31)
CREAT SERPL-MCNC: 1.18 MG/DL — SIGNIFICANT CHANGE UP (ref 0.5–1.3)
GLUCOSE SERPL-MCNC: 131 MG/DL — HIGH (ref 70–99)
HCT VFR BLD CALC: 41.6 % — SIGNIFICANT CHANGE UP (ref 34.5–45)
HGB BLD-MCNC: 13.6 G/DL — SIGNIFICANT CHANGE UP (ref 11.5–15.5)
MCHC RBC-ENTMCNC: 32.7 GM/DL — SIGNIFICANT CHANGE UP (ref 32–36)
MCHC RBC-ENTMCNC: 32.9 PG — SIGNIFICANT CHANGE UP (ref 27–34)
MCV RBC AUTO: 100.7 FL — HIGH (ref 80–100)
PLATELET # BLD AUTO: 305 K/UL — SIGNIFICANT CHANGE UP (ref 150–400)
POTASSIUM SERPL-MCNC: 3.2 MMOL/L — LOW (ref 3.5–5.3)
POTASSIUM SERPL-SCNC: 3.2 MMOL/L — LOW (ref 3.5–5.3)
RBC # BLD: 4.13 M/UL — SIGNIFICANT CHANGE UP (ref 3.8–5.2)
RBC # FLD: 16.7 % — HIGH (ref 10.3–14.5)
SODIUM SERPL-SCNC: 140 MMOL/L — SIGNIFICANT CHANGE UP (ref 135–145)
TYPE + AB SCN PNL BLD: SIGNIFICANT CHANGE UP
WBC # BLD: 11.64 K/UL — HIGH (ref 3.8–10.5)
WBC # FLD AUTO: 11.64 K/UL — HIGH (ref 3.8–10.5)

## 2019-06-11 PROCEDURE — 92928 PRQ TCAT PLMT NTRAC ST 1 LES: CPT | Mod: RC

## 2019-06-11 PROCEDURE — 93571 IV DOP VEL&/PRESS C FLO 1ST: CPT | Mod: 26,LD

## 2019-06-11 PROCEDURE — 93458 L HRT ARTERY/VENTRICLE ANGIO: CPT | Mod: 26,59

## 2019-06-11 PROCEDURE — 93010 ELECTROCARDIOGRAM REPORT: CPT

## 2019-06-11 RX ORDER — BUMETANIDE 0.25 MG/ML
1 INJECTION INTRAMUSCULAR; INTRAVENOUS DAILY
Refills: 0 | Status: DISCONTINUED | OUTPATIENT
Start: 2019-06-11 | End: 2019-06-12

## 2019-06-11 RX ORDER — FOLIC ACID 0.8 MG
1 TABLET ORAL DAILY
Refills: 0 | Status: DISCONTINUED | OUTPATIENT
Start: 2019-06-11 | End: 2019-06-12

## 2019-06-11 RX ORDER — ASPIRIN/CALCIUM CARB/MAGNESIUM 324 MG
81 TABLET ORAL DAILY
Refills: 0 | Status: DISCONTINUED | OUTPATIENT
Start: 2019-06-11 | End: 2019-06-12

## 2019-06-11 RX ORDER — ASPIRIN/CALCIUM CARB/MAGNESIUM 324 MG
1 TABLET ORAL
Qty: 0 | Refills: 0 | DISCHARGE
Start: 2019-06-11

## 2019-06-11 RX ORDER — POTASSIUM CHLORIDE 20 MEQ
40 PACKET (EA) ORAL ONCE
Refills: 0 | Status: COMPLETED | OUTPATIENT
Start: 2019-06-11 | End: 2019-06-11

## 2019-06-11 RX ORDER — METHOTREXATE 2.5 MG/1
7 TABLET ORAL
Qty: 0 | Refills: 0 | DISCHARGE

## 2019-06-11 RX ORDER — CLOPIDOGREL BISULFATE 75 MG/1
75 TABLET, FILM COATED ORAL DAILY
Refills: 0 | Status: DISCONTINUED | OUTPATIENT
Start: 2019-06-11 | End: 2019-06-12

## 2019-06-11 RX ORDER — CARVEDILOL PHOSPHATE 80 MG/1
6.25 CAPSULE, EXTENDED RELEASE ORAL EVERY 12 HOURS
Refills: 0 | Status: DISCONTINUED | OUTPATIENT
Start: 2019-06-11 | End: 2019-06-12

## 2019-06-11 RX ORDER — SIMVASTATIN 20 MG/1
20 TABLET, FILM COATED ORAL AT BEDTIME
Refills: 0 | Status: DISCONTINUED | OUTPATIENT
Start: 2019-06-11 | End: 2019-06-12

## 2019-06-11 RX ORDER — POTASSIUM CHLORIDE 20 MEQ
20 PACKET (EA) ORAL DAILY
Refills: 0 | Status: DISCONTINUED | OUTPATIENT
Start: 2019-06-11 | End: 2019-06-12

## 2019-06-11 RX ORDER — FUROSEMIDE 40 MG
40 TABLET ORAL DAILY
Refills: 0 | Status: DISCONTINUED | OUTPATIENT
Start: 2019-06-11 | End: 2019-06-12

## 2019-06-11 RX ORDER — SODIUM CHLORIDE 9 MG/ML
1000 INJECTION INTRAMUSCULAR; INTRAVENOUS; SUBCUTANEOUS
Refills: 0 | Status: DISCONTINUED | OUTPATIENT
Start: 2019-06-11 | End: 2019-06-12

## 2019-06-11 RX ORDER — ASPIRIN/CALCIUM CARB/MAGNESIUM 324 MG
0.5 TABLET ORAL
Qty: 0 | Refills: 0 | DISCHARGE

## 2019-06-11 RX ADMIN — SODIUM CHLORIDE 75 MILLILITER(S): 9 INJECTION INTRAMUSCULAR; INTRAVENOUS; SUBCUTANEOUS at 10:43

## 2019-06-11 RX ADMIN — CARVEDILOL PHOSPHATE 6.25 MILLIGRAM(S): 80 CAPSULE, EXTENDED RELEASE ORAL at 17:35

## 2019-06-11 RX ADMIN — BUMETANIDE 1 MILLIGRAM(S): 0.25 INJECTION INTRAMUSCULAR; INTRAVENOUS at 15:15

## 2019-06-11 RX ADMIN — Medication 40 MILLIEQUIVALENT(S): at 10:38

## 2019-06-11 RX ADMIN — Medication 5 MILLIGRAM(S): at 15:15

## 2019-06-11 RX ADMIN — SIMVASTATIN 20 MILLIGRAM(S): 20 TABLET, FILM COATED ORAL at 21:54

## 2019-06-11 RX ADMIN — Medication 40 MILLIGRAM(S): at 15:14

## 2019-06-11 RX ADMIN — Medication 1 MILLIGRAM(S): at 15:15

## 2019-06-11 NOTE — CHART NOTE - NSCHARTNOTEFT_GEN_A_CORE
Nurse Practitioner Progress note:     HPI:  74 yo F with PMHx of HTN, angioedema, smoker, Afib on ASA d/t h/o bleeding on eliquis, has been c/o Lt hip pain. Pt. was due to have hip surgery and required cardiac clearance. Pt underwent stress test as pre-op cardiac evaluation, which showed (+) multi vessel ischemia. Pt referred for [facility] with possible intervention. Pt. denies chest pain, SOB, palpitations, lightheadedness, dizziness, chills, fever. (10 Black 2019 13:53)    T(C): 36.1 (06-11-19 @ 08:08), Max: 36.1 (06-11-19 @ 08:03)  HR: 101 (06-11-19 @ 10:40) (101 - 125)  BP: 123/98 (06-11-19 @ 10:40) (110/73 - 123/98)  RR: 17 (06-11-19 @ 10:40) (17 - 17)  SpO2: 98% (06-11-19 @ 10:40) (98% - 100%)  Wt(kg): --    PHYSICAL EXAM:  Neurologic: Non-focal, AxOx3.  No neuro deficits  Vascular: Peripheral pulses palpable 2+ bilaterally  Procedure Site: Rt. groin angioseal site benign soft no bleeding no hematoma +1PP    	    LABS:	 	                        13.6   11.64 )-----------( 305      ( 11 Jun 2019 08:00 )             41.6   06-11    140  |  105  |  23  ----------------------------<  131<H>  3.2<L>   |  29  |  1.18    Ca    9.7      11 Jun 2019 08:00          PROCEDURE RESULTS:  < from: Cardiac Cath Lab - Adult (06.11.19 @ 09:02) >     Impression     Diagnostic Conclusions   Multivessel disease c/w nuclear     Interventional Conclusions     Successful bare metal stent placement     Recommendations     ASA and Plavix for at least 8 weeks              ASSESSMENT/PLAN:   74 yo F with PMHx of HTN, angioedema, smoker, Afib on ASA d/t h/o bleeding on eliquis, has been c/o Lt hip pain. Pt. was due to have hip surgery and required cardiac clearance. Pt underwent stress test as pre-op cardiac evaluation, which showed (+) multi vessel ischemia. S/P LHC     -Admit to CICU  -VS, labs, diet, activity as per PCI orders  -IV hydration  -Encourage PO fluids  -ASA 81mg  -Plavix 75mg  -Coreg 6.25mg BID   -Zocor 20mg   -Plan of care D/W pt. and MD  -Discussed therapeutic lifestyle changes to reduce risk factors such as following a cardiac diet, weight loss, maintaining a healthy weight, exercise, smoking cessation, medication compliance, and regular follow-up  with MD to know our numbers (BP, cholesterol, weight, and glucose  -If pt. remains stable overnight possible D/C in AM  - Follow-up AM labs/EKG/site check  -Follow-up with attending

## 2019-06-11 NOTE — PACU DISCHARGE NOTE - COMMENTS
patient discharged to CICU. Right Groin site benign, soft nontender. dressing clean/dry/intact. IVL site benign. NS a 75cc/hour. Patient without any complaints. Vital signs stable. post procedure instructions given and understood by patient and son via teach back. Will continue to monitor patient status. Report given to KATHLEEN Christiansen.

## 2019-06-12 ENCOUNTER — TRANSCRIPTION ENCOUNTER (OUTPATIENT)
Age: 74
End: 2019-06-12

## 2019-06-12 VITALS
HEART RATE: 106 BPM | RESPIRATION RATE: 17 BRPM | OXYGEN SATURATION: 96 % | SYSTOLIC BLOOD PRESSURE: 101 MMHG | DIASTOLIC BLOOD PRESSURE: 63 MMHG

## 2019-06-12 LAB
ANION GAP SERPL CALC-SCNC: 8 MMOL/L — SIGNIFICANT CHANGE UP (ref 5–17)
BASOPHILS # BLD AUTO: 0.02 K/UL — SIGNIFICANT CHANGE UP (ref 0–0.2)
BASOPHILS NFR BLD AUTO: 0.2 % — SIGNIFICANT CHANGE UP (ref 0–2)
BUN SERPL-MCNC: 24 MG/DL — HIGH (ref 7–23)
CALCIUM SERPL-MCNC: 8.7 MG/DL — SIGNIFICANT CHANGE UP (ref 8.5–10.1)
CHLORIDE SERPL-SCNC: 109 MMOL/L — HIGH (ref 96–108)
CO2 SERPL-SCNC: 29 MMOL/L — SIGNIFICANT CHANGE UP (ref 22–31)
CREAT SERPL-MCNC: 1.01 MG/DL — SIGNIFICANT CHANGE UP (ref 0.5–1.3)
EOSINOPHIL # BLD AUTO: 0.09 K/UL — SIGNIFICANT CHANGE UP (ref 0–0.5)
EOSINOPHIL NFR BLD AUTO: 1.1 % — SIGNIFICANT CHANGE UP (ref 0–6)
GLUCOSE SERPL-MCNC: 106 MG/DL — HIGH (ref 70–99)
HCT VFR BLD CALC: 36.8 % — SIGNIFICANT CHANGE UP (ref 34.5–45)
HGB BLD-MCNC: 11.9 G/DL — SIGNIFICANT CHANGE UP (ref 11.5–15.5)
IMM GRANULOCYTES NFR BLD AUTO: 0.8 % — SIGNIFICANT CHANGE UP (ref 0–1.5)
LYMPHOCYTES # BLD AUTO: 1.7 K/UL — SIGNIFICANT CHANGE UP (ref 1–3.3)
LYMPHOCYTES # BLD AUTO: 20.2 % — SIGNIFICANT CHANGE UP (ref 13–44)
MCHC RBC-ENTMCNC: 32.3 GM/DL — SIGNIFICANT CHANGE UP (ref 32–36)
MCHC RBC-ENTMCNC: 32.8 PG — SIGNIFICANT CHANGE UP (ref 27–34)
MCV RBC AUTO: 101.4 FL — HIGH (ref 80–100)
MONOCYTES # BLD AUTO: 0.74 K/UL — SIGNIFICANT CHANGE UP (ref 0–0.9)
MONOCYTES NFR BLD AUTO: 8.8 % — SIGNIFICANT CHANGE UP (ref 2–14)
NEUTROPHILS # BLD AUTO: 5.81 K/UL — SIGNIFICANT CHANGE UP (ref 1.8–7.4)
NEUTROPHILS NFR BLD AUTO: 68.9 % — SIGNIFICANT CHANGE UP (ref 43–77)
PLATELET # BLD AUTO: 232 K/UL — SIGNIFICANT CHANGE UP (ref 150–400)
POTASSIUM SERPL-MCNC: 3.2 MMOL/L — LOW (ref 3.5–5.3)
POTASSIUM SERPL-SCNC: 3.2 MMOL/L — LOW (ref 3.5–5.3)
RBC # BLD: 3.63 M/UL — LOW (ref 3.8–5.2)
RBC # FLD: 17 % — HIGH (ref 10.3–14.5)
SODIUM SERPL-SCNC: 146 MMOL/L — HIGH (ref 135–145)
WBC # BLD: 8.43 K/UL — SIGNIFICANT CHANGE UP (ref 3.8–10.5)
WBC # FLD AUTO: 8.43 K/UL — SIGNIFICANT CHANGE UP (ref 3.8–10.5)

## 2019-06-12 PROCEDURE — 93010 ELECTROCARDIOGRAM REPORT: CPT

## 2019-06-12 RX ORDER — CLOPIDOGREL BISULFATE 75 MG/1
1 TABLET, FILM COATED ORAL
Qty: 30 | Refills: 10
Start: 2019-06-12 | End: 2020-01-01

## 2019-06-12 RX ORDER — POTASSIUM CHLORIDE 20 MEQ
40 PACKET (EA) ORAL ONCE
Refills: 0 | Status: COMPLETED | OUTPATIENT
Start: 2019-06-12 | End: 2019-06-12

## 2019-06-12 RX ORDER — POTASSIUM CHLORIDE 20 MEQ
20 PACKET (EA) ORAL ONCE
Refills: 0 | Status: COMPLETED | OUTPATIENT
Start: 2019-06-12 | End: 2019-06-12

## 2019-06-12 RX ORDER — POTASSIUM CHLORIDE 20 MEQ
40 PACKET (EA) ORAL ONCE
Refills: 0 | Status: DISCONTINUED | OUTPATIENT
Start: 2019-06-12 | End: 2019-06-12

## 2019-06-12 RX ADMIN — Medication 20 MILLIEQUIVALENT(S): at 09:45

## 2019-06-12 RX ADMIN — Medication 5 MILLIGRAM(S): at 05:57

## 2019-06-12 RX ADMIN — Medication 40 MILLIEQUIVALENT(S): at 07:21

## 2019-06-12 RX ADMIN — Medication 81 MILLIGRAM(S): at 09:45

## 2019-06-12 RX ADMIN — CARVEDILOL PHOSPHATE 6.25 MILLIGRAM(S): 80 CAPSULE, EXTENDED RELEASE ORAL at 05:57

## 2019-06-12 RX ADMIN — CLOPIDOGREL BISULFATE 75 MILLIGRAM(S): 75 TABLET, FILM COATED ORAL at 09:46

## 2019-06-12 RX ADMIN — BUMETANIDE 1 MILLIGRAM(S): 0.25 INJECTION INTRAMUSCULAR; INTRAVENOUS at 05:57

## 2019-06-12 NOTE — DISCHARGE NOTE PROVIDER - HOSPITAL COURSE
72 yo F with PMHx of HTN, angioedema, smoker, Afib on ASA d/t h/o bleeding on eliquis, has been c/o Lt hip pain. Pt. was due to have hip surgery and required cardiac clearance. Pt underwent stress test as pre-op cardiac evaluation, which showed (+) multi vessel ischemia. S/P PCI and BMS to LAD and RPDA on 6/11/19    Hypokalemia--> received additional dose of KCL. Will F/U lab with PCP 74 yo F with PMHx of HTN, angioedema, smoker, Afib on ASA d/t h/o bleeding on eliquis, has been c/o Lt hip pain. Pt. was due to have hip surgery and required cardiac clearance. Pt underwent stress test as pre-op cardiac evaluation, which showed (+) multi vessel ischemia. S/P PCI and BMS to LAD and RPDA on 6/11/19    Hypokalemia--> received additional dose of KCL 40 mEq. Will F/U lab with PCP

## 2019-06-12 NOTE — DISCHARGE NOTE NURSING/CASE MANAGEMENT/SOCIAL WORK - NSDCDPATPORTLINK_GEN_ALL_CORE
You can access the [facility] Patient Portal, offered by [facility], by registering with the following website: [email or web address]

## 2019-06-12 NOTE — DISCHARGE NOTE PROVIDER - NSDCCPTREATMENT_GEN_ALL_CORE_FT
PRINCIPAL PROCEDURE  Procedure: Percutaneous coronary intervention, with stent insertion  Findings and Treatment: BMS of LAD and RPDA

## 2019-06-12 NOTE — DISCHARGE NOTE PROVIDER - CARE PROVIDER_API CALL
Nancy Pat)  Cardiovascular Disease; Internal Medicine  [address] [place] [address]  Phone: [phone]  Fax: [phone]  Follow Up Time: 1 week    Carl Rios)  Internal Medicine; Pulmonary Disease  [address] [place] [address]  Phone: [phone]  Fax: [phone]  Follow Up Time:

## 2019-06-12 NOTE — DISCHARGE NOTE PROVIDER - NSDCCPCAREPLAN_GEN_ALL_CORE_FT
PRINCIPAL DISCHARGE DIAGNOSIS  Diagnosis: CAD (coronary artery disease)  Assessment and Plan of Treatment: S/P PCI

## 2019-06-12 NOTE — PROGRESS NOTE ADULT - SUBJECTIVE AND OBJECTIVE BOX
Nurse Practitioner Progress note:   s/p PCI.  Denies chest pain, shortness of breath, dizziness or palpitations at this time    PHYSICAL EXAM:    Constitutional: NAD  Neuro: Alert and oriented x 3 Speech clear No focal deficits  Respiratory: CTAB  Cardiovascular: S1 and S2, irregular   Gastrointestinal: BS+, soft, NT/ND  Extremities: No clubbing cyanosis or edema No varicosities  Vascular: 2+ peripheral pulses  Psychiatric: Normal mood, normal affect  Musculoskeletal: 5/5 strength b/l upper and lower extremities  Right groin procedure site CDI.  no bleeding, no hematoma, site soft, non tender, positive pedal pulses bilaterally    T(C): 35.9 (06-12-19 @ 04:52), Max: 36.4 (06-11-19 @ 22:02)  HR: 99 (06-12-19 @ 07:00) (91 - 117)  BP: 98/66 (06-12-19 @ 07:00) (81/42 - 127/82)  RR: 25 (06-12-19 @ 07:00) (16 - 28)  SpO2: 97% (06-12-19 @ 07:00) (93% - 100%)  Wt(kg): --  CBC Full  -  ( 12 Jun 2019 05:04 )  WBC Count : 8.43 K/uL  RBC Count : 3.63 M/uL  Hemoglobin : 11.9 g/dL  Hematocrit : 36.8 %  Platelet Count - Automated : 232 K/uL  Mean Cell Volume : 101.4 fl  Mean Cell Hemoglobin : 32.8 pg  Mean Cell Hemoglobin Concentration : 32.3 gm/dL  Auto Neutrophil # : 5.81 K/uL  Auto Lymphocyte # : 1.70 K/uL  Auto Monocyte # : 0.74 K/uL  Auto Eosinophil # : 0.09 K/uL  Auto Basophil # : 0.02 K/uL  Auto Neutrophil % : 68.9 %  Auto Lymphocyte % : 20.2 %  Auto Monocyte % : 8.8 %  Auto Eosinophil % : 1.1 %  Auto Basophil % : 0.2 %    06-12    146<H>  |  109<H>  |  24<H>  ----------------------------<  106<H>  3.2<L>   |  29  |  1.01    Ca    8.7      12 Jun 2019 05:04              MEDICATIONS  (STANDING):  aspirin  chewable 81 milliGRAM(s) Oral daily  buMETAnide 1 milliGRAM(s) Oral daily  carvedilol 6.25 milliGRAM(s) Oral every 12 hours  clopidogrel Tablet 75 milliGRAM(s) Oral daily  folic acid 1 milliGRAM(s) Oral daily  furosemide    Tablet 40 milliGRAM(s) Oral daily  potassium chloride    Tablet ER 20 milliEquivalent(s) Oral once  predniSONE   Tablet 5 milliGRAM(s) Oral daily  simvastatin 20 milliGRAM(s) Oral at bedtime  sodium chloride 0.9%. 1000 milliLiter(s) (75 mL/Hr) IV Continuous <Continuous>    MEDICATIONS  (PRN):        HPI:  74 yo F with PMHx of HTN, angioedema, smoker, Afib on ASA d/t h/o bleeding on eliquis, has been c/o Lt hip pain. Pt. was due to have hip surgery and required cardiac clearance. Pt underwent stress test as pre-op cardiac evaluation, which showed (+) multi vessel ischemia. Pt referred for C with possible intervention. Pt. denies chest pain, SOB, palpitations, lightheadedness, dizziness, chills, fever. (10 Black 2019 13:53)    now s/p PCI: BMS to LAD and RPDA    ASSESSMENT/PLAN:    Coronary artery disease  -Admit to CICU  -VS, labs, diet, activity as per PCI orders  -IV hydration  -Encourage PO fluids  -Aspirin mg PO daily  -Plavix 75mg PO daily  --atorvastatinPO QHS   -Plan of care discussed with patient, family and MD  -likely d/c in AM if patient remains stable overnight  -NP to see in AM to evaluate labs, EKG and procedure site check  -Follow-up with attending MD   within 1 week  -Discussed therapeutic lifestyle changes to reduce risk factors such as following a cardiac diet, weight loss, maintaining a healthy weight, exercise, smoking cessation, medication compliance, and regular follow-up  with MD to know your numbers (BP, cholesterol, weight, and glucose) Nurse Practitioner Progress note:   s/p PCI.  Denies chest pain, shortness of breath, dizziness or palpitations at this time    PHYSICAL EXAM:    Constitutional: NAD  Neuro: Alert and oriented x 3 Speech clear No focal deficits  Respiratory: CTAB  Cardiovascular: S1 and S2, irregular   Gastrointestinal: BS+, soft, NT/ND  Extremities: No clubbing cyanosis or edema No varicosities  Vascular: 2+ peripheral pulses  Psychiatric: Normal mood, normal affect  Musculoskeletal: 5/5 strength b/l upper and lower extremities  Right groin procedure site CDI.  no bleeding, no hematoma, site soft, non tender, positive pedal pulses bilaterally    T(C): 35.9 (06-12-19 @ 04:52), Max: 36.4 (06-11-19 @ 22:02)  HR: 99 (06-12-19 @ 07:00) (91 - 117)  BP: 98/66 (06-12-19 @ 07:00) (81/42 - 127/82)  RR: 25 (06-12-19 @ 07:00) (16 - 28)  SpO2: 97% (06-12-19 @ 07:00) (93% - 100%)  Wt(kg): --  CBC Full  -  ( 12 Jun 2019 05:04 )  WBC Count : 8.43 K/uL  RBC Count : 3.63 M/uL  Hemoglobin : 11.9 g/dL  Hematocrit : 36.8 %  Platelet Count - Automated : 232 K/uL  Mean Cell Volume : 101.4 fl  Mean Cell Hemoglobin : 32.8 pg  Mean Cell Hemoglobin Concentration : 32.3 gm/dL  Auto Neutrophil # : 5.81 K/uL  Auto Lymphocyte # : 1.70 K/uL  Auto Monocyte # : 0.74 K/uL  Auto Eosinophil # : 0.09 K/uL  Auto Basophil # : 0.02 K/uL  Auto Neutrophil % : 68.9 %  Auto Lymphocyte % : 20.2 %  Auto Monocyte % : 8.8 %  Auto Eosinophil % : 1.1 %  Auto Basophil % : 0.2 %    06-12    146<H>  |  109<H>  |  24<H>  ----------------------------<  106<H>  3.2<L>   |  29  |  1.01    Ca    8.7      12 Jun 2019 05:04              MEDICATIONS  (STANDING):  aspirin  chewable 81 milliGRAM(s) Oral daily  buMETAnide 1 milliGRAM(s) Oral daily  carvedilol 6.25 milliGRAM(s) Oral every 12 hours  clopidogrel Tablet 75 milliGRAM(s) Oral daily  folic acid 1 milliGRAM(s) Oral daily  furosemide    Tablet 40 milliGRAM(s) Oral daily  potassium chloride    Tablet ER 20 milliEquivalent(s) Oral once  predniSONE   Tablet 5 milliGRAM(s) Oral daily  simvastatin 20 milliGRAM(s) Oral at bedtime  sodium chloride 0.9%. 1000 milliLiter(s) (75 mL/Hr) IV Continuous <Continuous>    MEDICATIONS  (PRN):        HPI:  74 yo F with PMHx of HTN, angioedema, smoker, Afib on ASA d/t h/o bleeding on eliquis, has been c/o Lt hip pain. Pt. was due to have hip surgery and required cardiac clearance. Pt underwent stress test as pre-op cardiac evaluation, which showed (+) multi vessel ischemia. Pt referred for C with possible intervention. Pt. denies chest pain, SOB, palpitations, lightheadedness, dizziness, chills, fever. (10 Black 2019 13:53)    now s/p PCI: BMS to LAD and RPDA    ASSESSMENT/PLAN:    Coronary artery disease  -Admit to CICU  -VS, labs, diet, activity as per PCI orders  -IV hydration  -Encourage PO fluids  -Aspirin 81mg PO daily  -Plavix 75mg PO daily  --atorvastatin 20mg PO QHS   -Plan of care discussed with patient, family and MD  -likely d/c in AM if patient remains stable overnight  -Follow-up with attending MD   within 1 week  -Outpatient K+ level  -Discussed therapeutic lifestyle changes to reduce risk factors such as following a cardiac diet, weight loss, maintaining a healthy weight, exercise, smoking cessation, medication compliance, and regular follow-up  with MD Nurse Practitioner Progress note:   s/p PCI.  Denies chest pain, shortness of breath, dizziness or palpitations at this time    PHYSICAL EXAM:    Constitutional: NAD  Neuro: Alert and oriented x 3 Speech clear No focal deficits  Respiratory: CTAB  Cardiovascular: S1 and S2, irregular   Gastrointestinal: BS+, soft, NT/ND  Extremities: No clubbing cyanosis or edema No varicosities  Vascular: 2+ peripheral pulses  Psychiatric: Normal mood, normal affect  Musculoskeletal: 5/5 strength b/l upper and lower extremities  Right groin procedure site CDI.  no bleeding, no hematoma, site soft, non tender, positive pedal pulses bilaterally    T(C): 35.9 (06-12-19 @ 04:52), Max: 36.4 (06-11-19 @ 22:02)  HR: 99 (06-12-19 @ 07:00) (91 - 117)  BP: 98/66 (06-12-19 @ 07:00) (81/42 - 127/82)  RR: 25 (06-12-19 @ 07:00) (16 - 28)  SpO2: 97% (06-12-19 @ 07:00) (93% - 100%)  Wt(kg): --  CBC Full  -  ( 12 Jun 2019 05:04 )  WBC Count : 8.43 K/uL  RBC Count : 3.63 M/uL  Hemoglobin : 11.9 g/dL  Hematocrit : 36.8 %  Platelet Count - Automated : 232 K/uL  Mean Cell Volume : 101.4 fl  Mean Cell Hemoglobin : 32.8 pg  Mean Cell Hemoglobin Concentration : 32.3 gm/dL  Auto Neutrophil # : 5.81 K/uL  Auto Lymphocyte # : 1.70 K/uL  Auto Monocyte # : 0.74 K/uL  Auto Eosinophil # : 0.09 K/uL  Auto Basophil # : 0.02 K/uL  Auto Neutrophil % : 68.9 %  Auto Lymphocyte % : 20.2 %  Auto Monocyte % : 8.8 %  Auto Eosinophil % : 1.1 %  Auto Basophil % : 0.2 %    06-12    146<H>  |  109<H>  |  24<H>  ----------------------------<  106<H>  3.2<L>   |  29  |  1.01    Ca    8.7      12 Jun 2019 05:04              MEDICATIONS  (STANDING):  aspirin  chewable 81 milliGRAM(s) Oral daily  buMETAnide 1 milliGRAM(s) Oral daily  carvedilol 6.25 milliGRAM(s) Oral every 12 hours  clopidogrel Tablet 75 milliGRAM(s) Oral daily  folic acid 1 milliGRAM(s) Oral daily  furosemide    Tablet 40 milliGRAM(s) Oral daily  potassium chloride    Tablet ER 20 milliEquivalent(s) Oral once  predniSONE   Tablet 5 milliGRAM(s) Oral daily  simvastatin 20 milliGRAM(s) Oral at bedtime  sodium chloride 0.9%. 1000 milliLiter(s) (75 mL/Hr) IV Continuous <Continuous>    MEDICATIONS  (PRN):        HPI:  72 yo F with PMHx of HTN, angioedema, smoker, Afib on ASA d/t h/o bleeding on eliquis, has been c/o Lt hip pain. Pt. was due to have hip surgery and required cardiac clearance. Pt underwent stress test as pre-op cardiac evaluation, which showed (+) multi vessel ischemia. Pt referred for C with possible intervention. Pt. denies chest pain, SOB, palpitations, lightheadedness, dizziness, chills, fever. (10 Black 2019 13:53)    now s/p PCI: BMS to LAD and RPDA    ASSESSMENT/PLAN:    Coronary artery disease  -Discharge home today  -Encourage PO fluids  -Aspirin 81mg PO daily  -Plavix 75mg PO daily-Rx  --atorvastatin 20mg PO QHS   -Plan of care discussed with patient, family and MD  -Follow-up with attending MD   within 1 week  -Outpatient K+ level  -Discussed therapeutic lifestyle changes to reduce risk factors such as following a cardiac diet, weight loss, maintaining a healthy weight, exercise, smoking cessation, medication compliance, and regular follow-up  with MD Nurse Practitioner Progress note:   s/p PCI.  Denies chest pain, shortness of breath, dizziness or palpitations at this time    PHYSICAL EXAM:    Constitutional: NAD  Neuro: Alert and oriented x 3 Speech clear No focal deficits  Respiratory: CTAB  Cardiovascular: S1 and S2, irregular   Gastrointestinal: BS+, soft, NT/ND  Extremities: No clubbing cyanosis or edema No varicosities  Vascular: 2+ peripheral pulses  Psychiatric: Normal mood, normal affect  Musculoskeletal: 5/5 strength b/l upper and lower extremities  Right groin procedure site CDI.  no bleeding, no hematoma, site soft, non tender, positive pedal pulses bilaterally    EKG: Afib with controlled rate  Tele: AFib with PVCs    T(C): 35.9 (06-12-19 @ 04:52), Max: 36.4 (06-11-19 @ 22:02)  HR: 99 (06-12-19 @ 07:00) (91 - 117)  BP: 98/66 (06-12-19 @ 07:00) (81/42 - 127/82)  RR: 25 (06-12-19 @ 07:00) (16 - 28)  SpO2: 97% (06-12-19 @ 07:00) (93% - 100%)  Wt(kg): --  CBC Full  -  ( 12 Jun 2019 05:04 )  WBC Count : 8.43 K/uL  RBC Count : 3.63 M/uL  Hemoglobin : 11.9 g/dL  Hematocrit : 36.8 %  Platelet Count - Automated : 232 K/uL  Mean Cell Volume : 101.4 fl  Mean Cell Hemoglobin : 32.8 pg  Mean Cell Hemoglobin Concentration : 32.3 gm/dL  Auto Neutrophil # : 5.81 K/uL  Auto Lymphocyte # : 1.70 K/uL  Auto Monocyte # : 0.74 K/uL  Auto Eosinophil # : 0.09 K/uL  Auto Basophil # : 0.02 K/uL  Auto Neutrophil % : 68.9 %  Auto Lymphocyte % : 20.2 %  Auto Monocyte % : 8.8 %  Auto Eosinophil % : 1.1 %  Auto Basophil % : 0.2 %    06-12    146<H>  |  109<H>  |  24<H>  ----------------------------<  106<H>  3.2<L>   |  29  |  1.01    Ca    8.7      12 Jun 2019 05:04              MEDICATIONS  (STANDING):  aspirin  chewable 81 milliGRAM(s) Oral daily  buMETAnide 1 milliGRAM(s) Oral daily  carvedilol 6.25 milliGRAM(s) Oral every 12 hours  clopidogrel Tablet 75 milliGRAM(s) Oral daily  folic acid 1 milliGRAM(s) Oral daily  furosemide    Tablet 40 milliGRAM(s) Oral daily  potassium chloride    Tablet ER 20 milliEquivalent(s) Oral once  predniSONE   Tablet 5 milliGRAM(s) Oral daily  simvastatin 20 milliGRAM(s) Oral at bedtime  sodium chloride 0.9%. 1000 milliLiter(s) (75 mL/Hr) IV Continuous <Continuous>    MEDICATIONS  (PRN):        HPI:  72 yo F with PMHx of HTN, angioedema, smoker, Afib on ASA d/t h/o bleeding on eliquis, has been c/o Lt hip pain. Pt. was due to have hip surgery and required cardiac clearance. Pt underwent stress test as pre-op cardiac evaluation, which showed (+) multi vessel ischemia. Pt referred for [facility] with possible intervention. Pt. denies chest pain, SOB, palpitations, lightheadedness, dizziness, chills, fever. (10 Black 2019 13:53)    now s/p PCI: BMS to LAD and RPDA    ASSESSMENT/PLAN:    Coronary artery disease  -Discharge home today  -Encourage PO fluids  -Aspirin 81mg PO daily  -Plavix 75mg PO daily-Rx  --atorvastatin 20mg PO QHS   -Plan of care discussed with patient, family and MD  -Follow-up with attending MD   within 1 week  -Outpatient K+ level  -Discussed therapeutic lifestyle changes to reduce risk factors such as following a cardiac diet, weight loss, maintaining a healthy weight, exercise, smoking cessation, medication compliance, and regular follow-up  with MD

## 2019-06-13 DIAGNOSIS — I10 ESSENTIAL (PRIMARY) HYPERTENSION: ICD-10-CM

## 2019-06-13 DIAGNOSIS — E78.5 HYPERLIPIDEMIA, UNSPECIFIED: ICD-10-CM

## 2019-06-13 DIAGNOSIS — Z79.52 LONG TERM (CURRENT) USE OF SYSTEMIC STEROIDS: ICD-10-CM

## 2019-06-13 DIAGNOSIS — R94.39 ABNORMAL RESULT OF OTHER CARDIOVASCULAR FUNCTION STUDY: ICD-10-CM

## 2019-06-13 DIAGNOSIS — I48.91 UNSPECIFIED ATRIAL FIBRILLATION: ICD-10-CM

## 2019-06-13 DIAGNOSIS — F17.210 NICOTINE DEPENDENCE, CIGARETTES, UNCOMPLICATED: ICD-10-CM

## 2019-06-13 DIAGNOSIS — I20.8 OTHER FORMS OF ANGINA PECTORIS: ICD-10-CM

## 2019-06-13 DIAGNOSIS — I25.118 ATHEROSCLEROTIC HEART DISEASE OF NATIVE CORONARY ARTERY WITH OTHER FORMS OF ANGINA PECTORIS: ICD-10-CM

## 2019-06-13 DIAGNOSIS — Z79.02 LONG TERM (CURRENT) USE OF ANTITHROMBOTICS/ANTIPLATELETS: ICD-10-CM

## 2019-06-13 DIAGNOSIS — Z79.82 LONG TERM (CURRENT) USE OF ASPIRIN: ICD-10-CM

## 2019-06-13 DIAGNOSIS — E87.6 HYPOKALEMIA: ICD-10-CM

## 2019-12-02 RX ORDER — CARVEDILOL PHOSPHATE 80 MG/1
1 CAPSULE, EXTENDED RELEASE ORAL
Qty: 0 | Refills: 0 | DISCHARGE

## 2019-12-02 RX ORDER — METHOTREXATE 2.5 MG/1
7 TABLET ORAL
Qty: 0 | Refills: 0 | DISCHARGE

## 2019-12-02 RX ORDER — POTASSIUM CHLORIDE 20 MEQ
1 PACKET (EA) ORAL
Qty: 0 | Refills: 0 | DISCHARGE

## 2019-12-02 RX ORDER — BUMETANIDE 0.25 MG/ML
1 INJECTION INTRAMUSCULAR; INTRAVENOUS
Qty: 0 | Refills: 0 | DISCHARGE

## 2019-12-03 ENCOUNTER — OUTPATIENT (OUTPATIENT)
Dept: OUTPATIENT SERVICES | Facility: HOSPITAL | Age: 74
LOS: 1 days | Discharge: HOME | End: 2019-12-03
Payer: MEDICARE

## 2019-12-03 VITALS
WEIGHT: 197.98 LBS | RESPIRATION RATE: 16 BRPM | HEIGHT: 65 IN | TEMPERATURE: 97 F | HEART RATE: 118 BPM | SYSTOLIC BLOOD PRESSURE: 106 MMHG | OXYGEN SATURATION: 99 % | DIASTOLIC BLOOD PRESSURE: 90 MMHG

## 2019-12-03 VITALS
RESPIRATION RATE: 16 BRPM | HEART RATE: 87 BPM | DIASTOLIC BLOOD PRESSURE: 66 MMHG | OXYGEN SATURATION: 99 % | SYSTOLIC BLOOD PRESSURE: 126 MMHG

## 2019-12-03 DIAGNOSIS — Z98.890 OTHER SPECIFIED POSTPROCEDURAL STATES: Chronic | ICD-10-CM

## 2019-12-03 DIAGNOSIS — I25.10 ATHEROSCLEROTIC HEART DISEASE OF NATIVE CORONARY ARTERY WITHOUT ANGINA PECTORIS: ICD-10-CM

## 2019-12-03 DIAGNOSIS — Z98.891 HISTORY OF UTERINE SCAR FROM PREVIOUS SURGERY: Chronic | ICD-10-CM

## 2019-12-03 DIAGNOSIS — I10 ESSENTIAL (PRIMARY) HYPERTENSION: ICD-10-CM

## 2019-12-03 LAB
ALBUMIN SERPL ELPH-MCNC: 2.8 G/DL — LOW (ref 3.3–5)
ALP SERPL-CCNC: 96 U/L — SIGNIFICANT CHANGE UP (ref 40–120)
ALT FLD-CCNC: 21 U/L — SIGNIFICANT CHANGE UP (ref 12–78)
ANION GAP SERPL CALC-SCNC: 8 MMOL/L — SIGNIFICANT CHANGE UP (ref 5–17)
APTT BLD: 47.2 SEC — HIGH (ref 27.5–36.3)
AST SERPL-CCNC: 15 U/L — SIGNIFICANT CHANGE UP (ref 15–37)
BILIRUB SERPL-MCNC: 0.3 MG/DL — SIGNIFICANT CHANGE UP (ref 0.2–1.2)
BUN SERPL-MCNC: 17 MG/DL — SIGNIFICANT CHANGE UP (ref 7–23)
CALCIUM SERPL-MCNC: 8.8 MG/DL — SIGNIFICANT CHANGE UP (ref 8.5–10.1)
CHLORIDE SERPL-SCNC: 114 MMOL/L — HIGH (ref 96–108)
CO2 SERPL-SCNC: 23 MMOL/L — SIGNIFICANT CHANGE UP (ref 22–31)
CREAT SERPL-MCNC: 0.97 MG/DL — SIGNIFICANT CHANGE UP (ref 0.5–1.3)
GLUCOSE SERPL-MCNC: 104 MG/DL — HIGH (ref 70–99)
HCT VFR BLD CALC: 37.5 % — SIGNIFICANT CHANGE UP (ref 34.5–45)
HGB BLD-MCNC: 11.9 G/DL — SIGNIFICANT CHANGE UP (ref 11.5–15.5)
INR BLD: 4.66 RATIO — HIGH (ref 0.88–1.16)
MCHC RBC-ENTMCNC: 31.7 GM/DL — LOW (ref 32–36)
MCHC RBC-ENTMCNC: 32.5 PG — SIGNIFICANT CHANGE UP (ref 27–34)
MCV RBC AUTO: 102.5 FL — HIGH (ref 80–100)
PLATELET # BLD AUTO: 283 K/UL — SIGNIFICANT CHANGE UP (ref 150–400)
POTASSIUM SERPL-MCNC: 3.9 MMOL/L — SIGNIFICANT CHANGE UP (ref 3.5–5.3)
POTASSIUM SERPL-SCNC: 3.9 MMOL/L — SIGNIFICANT CHANGE UP (ref 3.5–5.3)
PROT SERPL-MCNC: 6.5 GM/DL — SIGNIFICANT CHANGE UP (ref 6–8.3)
PROTHROM AB SERPL-ACNC: 54.2 SEC — HIGH (ref 10–12.9)
RBC # BLD: 3.66 M/UL — LOW (ref 3.8–5.2)
RBC # FLD: 15.5 % — HIGH (ref 10.3–14.5)
SODIUM SERPL-SCNC: 145 MMOL/L — SIGNIFICANT CHANGE UP (ref 135–145)
WBC # BLD: 8.72 K/UL — SIGNIFICANT CHANGE UP (ref 3.8–10.5)
WBC # FLD AUTO: 8.72 K/UL — SIGNIFICANT CHANGE UP (ref 3.8–10.5)

## 2019-12-03 PROCEDURE — 85027 COMPLETE CBC AUTOMATED: CPT

## 2019-12-03 PROCEDURE — 36415 COLL VENOUS BLD VENIPUNCTURE: CPT

## 2019-12-03 PROCEDURE — 93010 ELECTROCARDIOGRAM REPORT: CPT | Mod: 76

## 2019-12-03 PROCEDURE — 80053 COMPREHEN METABOLIC PANEL: CPT

## 2019-12-03 PROCEDURE — 93005 ELECTROCARDIOGRAM TRACING: CPT | Mod: XU

## 2019-12-03 PROCEDURE — 92960 CARDIOVERSION ELECTRIC EXT: CPT

## 2019-12-03 PROCEDURE — 85610 PROTHROMBIN TIME: CPT

## 2019-12-03 PROCEDURE — 85730 THROMBOPLASTIN TIME PARTIAL: CPT

## 2019-12-03 RX ORDER — AMIODARONE HYDROCHLORIDE 400 MG/1
400 TABLET ORAL
Refills: 0 | Status: DISCONTINUED | OUTPATIENT
Start: 2019-12-03 | End: 2019-12-03

## 2019-12-03 RX ADMIN — AMIODARONE HYDROCHLORIDE 400 MILLIGRAM(S): 400 TABLET ORAL at 12:03

## 2019-12-03 NOTE — PROCEDURE NOTE - NSPERFORMEDBY_GEN_A_CORE
Triage Chief Complaint:   Other (Right lower pubic swelling on and off close to a month. Denies pain. Pt explains that when he touches it \"feels like when you shake water, sometimes it's hot\". No redness. Denies dysuria.)    [place]:  Coco Traylor is a 46 y.o. male that presents with history as above. No weight gain or weight loss. No chronic cough. No BPH symptoms. No constipation. He has not been evaluated prior to this. Swelling in his right groin comes and goes. Currently gone. Presents for evaluation with no pain. ROS:  General:  No fevers, no chills, no weakness  Eyes:  No recent vison changes, no discharge  ENT:  No sore throat, no nasal congestion, no hearing changes  Cardiovascular:  No chest pain, no palpitations  Respiratory:  No shortness of breath, no cough, no wheezing  Gastrointestinal:  No pain, no nausea, no vomiting, no diarrhea  Musculoskeletal:  No muscle pain, no joint pain  Skin:  No rash, no pruritis, no easy bruising  Neurologic:  No speech problems, no headache, no extremity numbness, no extremity tingling, no extremity weakness  Psychiatric:  No anxiety, no hallucinations or delusions, no suicidal or homicidal ideation  Genitourinary:  No dysuria, no hematuria  Endocrine:  No unexpected weight gain, no unexpected weight loss  Extremities:  no edema, no pain    History reviewed. No pertinent past medical history. Past Surgical History:   Procedure Laterality Date    APPENDECTOMY       History reviewed. No pertinent family history. Social History     Social History    Marital status:      Spouse name: N/A    Number of children: N/A    Years of education: N/A     Occupational History    Not on file.      Social History Main Topics    Smoking status: Never Smoker    Smokeless tobacco: Never Used    Alcohol use No    Drug use: No    Sexual activity: Not on file     Other Topics Concern    Not on file     Social History Narrative    No narrative on file     No current facility-administered medications for this encounter. No current outpatient prescriptions on file. No Known Allergies    Nursing Notes Reviewed    Physical Exam:  ED Triage Vitals [09/23/18 2200]   Enc Vitals Group      BP (!) 164/97      Pulse 92      Resp 14      Temp 97.6 °F (36.4 °C)      Temp Source Oral      SpO2 100 %      Weight 190 lb 4.1 oz (86.3 kg)      Height 6' (1.829 m)      Head Circumference       Peak Flow       Pain Score       Pain Loc       Pain Edu? Excl. in [address]? My pulse ox interpretation is - normal    General appearance:  No acute distress. Skin:  Warm. Dry. No petechiae or purpura. Eye:  Extraocular movements intact. PERRLA  Ears, nose, mouth and throat:  Oral mucosa moist, no trismus. Tympanic membranes bilaterally normal.  Oropharynx with no exudate or erythema. Neck:  Trachea midline. Supple. No cervical lymphadenopathy  Extremity:  No swelling. Normal ROM. No calf pain or asymmetric swelling. No lower extremity edema  Heart:  Regular rate and rhythm, normal S1 & S2, no extra heart sounds. Perfusion:  Intact, capillary refill less than 2 seconds  Respiratory:  Lungs clear to auscultation bilaterally. Respirations nonlabored. Abdominal:  Normal bowel sounds. Soft. No peritoneal signs. No hepatosplenomegaly. : Bilateral normal testicular scrotal exam.  No left inguinal hernia. Right inguinal hernia defect is noted. Currently no evidence of incarceration or strangulation. Back:  No CVA tenderness to palpation. No bruising. No CTL tenderness to palpation or step-off  Neurological:  Alert and oriented times 3. No focal neuro deficits. Cranial nerves II through XII are grossly intact. Psychiatric:  Appropriate    I have reviewed and interpreted all of the currently available lab results from this visit (if applicable):  No results found for this visit on 09/23/18.    Radiographs (if obtained):  [] The following radiograph was interpreted by myself in the absence of a radiologist:   [] Radiologist's Report Reviewed:  No orders to display         EKG (if obtained): (All EKG's are interpreted by myself in the absence of a cardiologist)    Chart review shows recent radiographs:  No results found. MDM:  64-year-old male who is eating and drinking normally. He is having normal stools. He has no history of chronic cough or BPH. He has no concerning symptoms for underlying malignancy to include night sweats, weight loss, melena, or blood in his stool. On physical exam he has a non-strangulated nonincarcerated right inguinal hernia. I discussed follow-up, return to the emergency department instructions. I placed a surgical consultation and asked him to call on Monday to arrange evaluation for potential elective surgery. Return to the emergency department instructions discussed. Clinical Impression:  1. Right inguinal hernia      Disposition referral (if applicable): Soha Peter MD  [address] [place] [address]. [facility]. [address]  [address] [place] [id number]  [phone]    Schedule an appointment as soon as possible for a visit   to discuss surgery for your hernia    Disposition medications (if applicable): There are no discharge medications for this patient. Comment: Please note this report has been produced using speech recognition software and may contain errors related to that system including errors in grammar, punctuation, and spelling, as well as words and phrases that may be inappropriate. If there are any questions or concerns please feel free to contact the dictating provider for clarification.       Bladimir Good MD  09/24/18 1058 Myself

## 2019-12-03 NOTE — PROCEDURAL SAFETY CHECKLIST WITH OR WITHOUT SEDATION - NSTIMEOUTDATE_GEN_ALL_CORE
----- Message from Jackie Craig sent at 3/23/2018  3:09 PM CDT -----  Contact: Pt can be reached at [phone]  Pt is calling to speak with the nurse concerning her medication.    Please contact pt      Thank you!  
I spoke with pharmacy and they state that the yuvafem is on back order. Mrs. Alonso does not agree to that and state that someone at the pharmacy said it was on recall not back order. Pt would like Rx transferred to another location that has it. Medication has been sent to [facility] pharmacy in [place]. DS  
03-Dec-2019 10:58

## 2019-12-03 NOTE — PACU DISCHARGE NOTE - COMMENTS
patient discharged to home. IVL D/C'd site benign. Patient without any complaints. Vital signs stable. Discharge instructions given and understood by patient and son. 400mg of amiodarone given to patient, and will continue prescribed dose at home. s/p RUSTY/CV remains in NSR.  Will continue to monitor patient status.

## 2019-12-03 NOTE — PROCEDURAL SAFETY CHECKLIST WITH OR WITHOUT SEDATION - NSPOSTCOMMENTFT_GEN_ALL_CORE
Outpatient bedside RUSTY/CV performed. All safety equipment in place and ready at bedside. Brief/Time out performed at bedside with all team members present @1058, anesthesia start time @1059, probe in @1104, bubbles administered @1109, probe out @1110. CVV performed at @1111, (200J) administered, (1)shock given, pt now in (sinus rhythm with PVCs). Anesthesia stop time @1126.  Dr. Calderón discussed findings with patient and family. Will continue to monitor until discharge.

## 2019-12-04 DIAGNOSIS — N20.0 CALCULUS OF KIDNEY: ICD-10-CM

## 2019-12-04 DIAGNOSIS — E78.5 HYPERLIPIDEMIA, UNSPECIFIED: ICD-10-CM

## 2019-12-04 DIAGNOSIS — I10 ESSENTIAL (PRIMARY) HYPERTENSION: ICD-10-CM

## 2019-12-04 DIAGNOSIS — L40.9 PSORIASIS, UNSPECIFIED: ICD-10-CM

## 2019-12-04 DIAGNOSIS — I48.91 UNSPECIFIED ATRIAL FIBRILLATION: ICD-10-CM

## 2020-01-01 ENCOUNTER — APPOINTMENT (OUTPATIENT)
Dept: INTERNAL MEDICINE | Facility: CLINIC | Age: 75
End: 2020-01-01

## 2020-01-01 ENCOUNTER — APPOINTMENT (OUTPATIENT)
Dept: INTERNAL MEDICINE | Facility: CLINIC | Age: 75
End: 2020-01-01
Payer: MEDICARE

## 2020-01-01 ENCOUNTER — APPOINTMENT (OUTPATIENT)
Dept: ELECTROPHYSIOLOGY | Facility: CLINIC | Age: 75
End: 2020-01-01
Payer: MEDICARE

## 2020-01-01 ENCOUNTER — RESULT REVIEW (OUTPATIENT)
Age: 75
End: 2020-01-01

## 2020-01-01 ENCOUNTER — OUTPATIENT (OUTPATIENT)
Dept: OUTPATIENT SERVICES | Facility: HOSPITAL | Age: 75
LOS: 1 days | End: 2020-01-01
Payer: MEDICARE

## 2020-01-01 ENCOUNTER — RX RENEWAL (OUTPATIENT)
Age: 75
End: 2020-01-01

## 2020-01-01 ENCOUNTER — APPOINTMENT (OUTPATIENT)
Dept: CT IMAGING | Facility: CLINIC | Age: 75
End: 2020-01-01
Payer: MEDICARE

## 2020-01-01 ENCOUNTER — APPOINTMENT (OUTPATIENT)
Dept: ELECTROPHYSIOLOGY | Facility: CLINIC | Age: 75
End: 2020-01-01

## 2020-01-01 VITALS
SYSTOLIC BLOOD PRESSURE: 104 MMHG | HEIGHT: 66 IN | TEMPERATURE: 98.1 F | BODY MASS INDEX: 35.36 KG/M2 | DIASTOLIC BLOOD PRESSURE: 60 MMHG | WEIGHT: 220 LBS | RESPIRATION RATE: 18 BRPM | OXYGEN SATURATION: 97 % | HEART RATE: 49 BPM

## 2020-01-01 VITALS
DIASTOLIC BLOOD PRESSURE: 54 MMHG | HEIGHT: 66 IN | SYSTOLIC BLOOD PRESSURE: 122 MMHG | WEIGHT: 220 LBS | BODY MASS INDEX: 35.36 KG/M2 | OXYGEN SATURATION: 99 %

## 2020-01-01 VITALS
OXYGEN SATURATION: 99 % | HEART RATE: 94 BPM | DIASTOLIC BLOOD PRESSURE: 83 MMHG | TEMPERATURE: 97.5 F | BODY MASS INDEX: 35.2 KG/M2 | HEIGHT: 66 IN | SYSTOLIC BLOOD PRESSURE: 141 MMHG | WEIGHT: 219 LBS

## 2020-01-01 VITALS
OXYGEN SATURATION: 97 % | DIASTOLIC BLOOD PRESSURE: 60 MMHG | TEMPERATURE: 98.1 F | BODY MASS INDEX: 35.36 KG/M2 | WEIGHT: 220 LBS | RESPIRATION RATE: 18 BRPM | HEIGHT: 66 IN | SYSTOLIC BLOOD PRESSURE: 104 MMHG | HEART RATE: 49 BPM

## 2020-01-01 DIAGNOSIS — Z87.891 PERSONAL HISTORY OF NICOTINE DEPENDENCE: ICD-10-CM

## 2020-01-01 DIAGNOSIS — I48.20 CHRONIC ATRIAL FIBRILLATION, UNSP: ICD-10-CM

## 2020-01-01 DIAGNOSIS — R19.5 OTHER FECAL ABNORMALITIES: ICD-10-CM

## 2020-01-01 DIAGNOSIS — Z98.890 OTHER SPECIFIED POSTPROCEDURAL STATES: Chronic | ICD-10-CM

## 2020-01-01 DIAGNOSIS — J47.9 BRONCHIECTASIS, UNCOMPLICATED: ICD-10-CM

## 2020-01-01 DIAGNOSIS — J43.9 EMPHYSEMA, UNSPECIFIED: ICD-10-CM

## 2020-01-01 DIAGNOSIS — Z00.8 ENCOUNTER FOR OTHER GENERAL EXAMINATION: ICD-10-CM

## 2020-01-01 DIAGNOSIS — R91.8 OTHER NONSPECIFIC ABNORMAL FINDING OF LUNG FIELD: ICD-10-CM

## 2020-01-01 DIAGNOSIS — I10 ESSENTIAL (PRIMARY) HYPERTENSION: ICD-10-CM

## 2020-01-01 DIAGNOSIS — Z11.59 ENCOUNTER FOR SCREENING FOR OTHER VIRAL DISEASES: ICD-10-CM

## 2020-01-01 DIAGNOSIS — I42.9 CARDIOMYOPATHY, UNSPECIFIED: ICD-10-CM

## 2020-01-01 DIAGNOSIS — Z95.810 PRESENCE OF AUTOMATIC (IMPLANTABLE) CARDIAC DEFIBRILLATOR: ICD-10-CM

## 2020-01-01 DIAGNOSIS — I25.10 ATHEROSCLEROTIC HEART DISEASE OF NATIVE CORONARY ARTERY W/OUT ANGINA PECTORIS: ICD-10-CM

## 2020-01-01 DIAGNOSIS — Z98.891 HISTORY OF UTERINE SCAR FROM PREVIOUS SURGERY: Chronic | ICD-10-CM

## 2020-01-01 DIAGNOSIS — E66.01 MORBID (SEVERE) OBESITY DUE TO EXCESS CALORIES: ICD-10-CM

## 2020-01-01 DIAGNOSIS — Z00.00 ENCOUNTER FOR GENERAL ADULT MEDICAL EXAMINATION W/OUT ABNORMAL FINDINGS: ICD-10-CM

## 2020-01-01 LAB
HEMOCCULT STL QL IA: POSITIVE
SARS-COV-2 N GENE NPH QL NAA+PROBE: NOT DETECTED

## 2020-01-01 PROCEDURE — 94729 DIFFUSING CAPACITY: CPT

## 2020-01-01 PROCEDURE — 99024 POSTOP FOLLOW-UP VISIT: CPT

## 2020-01-01 PROCEDURE — 93296 REM INTERROG EVL PM/IDS: CPT

## 2020-01-01 PROCEDURE — 94727 GAS DIL/WSHOT DETER LNG VOL: CPT

## 2020-01-01 PROCEDURE — 94010 BREATHING CAPACITY TEST: CPT

## 2020-01-01 PROCEDURE — 93282 PRGRMG EVAL IMPLANTABLE DFB: CPT

## 2020-01-01 PROCEDURE — 93295 DEV INTERROG REMOTE 1/2/MLT: CPT

## 2020-01-01 PROCEDURE — 99215 OFFICE O/P EST HI 40 MIN: CPT | Mod: 25

## 2020-01-01 PROCEDURE — 71250 CT THORAX DX C-: CPT | Mod: 26

## 2020-01-01 PROCEDURE — 71250 CT THORAX DX C-: CPT

## 2020-01-01 RX ORDER — AMIODARONE HYDROCHLORIDE 200 MG/1
200 TABLET ORAL
Qty: 90 | Refills: 0 | Status: ACTIVE | COMMUNITY
Start: 2020-01-01

## 2020-01-01 RX ORDER — PREDNISONE 20 MG/1
20 TABLET ORAL
Qty: 2 | Refills: 0 | Status: DISCONTINUED | COMMUNITY
Start: 2020-03-02 | End: 2020-01-01

## 2020-01-01 RX ORDER — BUDESONIDE 0.5 MG/2ML
0.5 INHALANT ORAL TWICE DAILY
Qty: 60 | Refills: 11 | Status: ACTIVE | COMMUNITY
Start: 2019-05-28 | End: 1900-01-01

## 2020-01-01 RX ORDER — AMIODARONE HYDROCHLORIDE 200 MG/1
200 TABLET ORAL DAILY
Refills: 0 | Status: ACTIVE | COMMUNITY

## 2020-01-01 RX ORDER — AMIODARONE HYDROCHLORIDE 200 MG/1
200 TABLET ORAL
Qty: 90 | Refills: 0 | Status: DISCONTINUED | COMMUNITY
Start: 2020-01-02 | End: 2020-01-01

## 2020-01-01 RX ORDER — PREDNISONE 5 MG/1
5 TABLET ORAL DAILY
Refills: 0 | Status: DISCONTINUED | COMMUNITY
End: 2020-01-01

## 2020-01-01 RX ORDER — CLOPIDOGREL 75 MG/1
75 TABLET, FILM COATED ORAL DAILY
Qty: 90 | Refills: 2 | Status: ACTIVE | COMMUNITY

## 2020-01-01 RX ORDER — CARVEDILOL 12.5 MG/1
12.5 TABLET, FILM COATED ORAL
Qty: 90 | Refills: 1 | Status: DISCONTINUED | COMMUNITY
Start: 2020-01-02 | End: 2020-01-01

## 2020-01-01 RX ORDER — WARFARIN 2 MG/1
2 TABLET ORAL DAILY
Refills: 0 | Status: ACTIVE | COMMUNITY

## 2020-01-01 RX ORDER — CHLORHEXIDINE GLUCONATE 4 %
325 (65 FE) LIQUID (ML) TOPICAL DAILY
Refills: 0 | Status: ACTIVE | COMMUNITY
Start: 2020-01-01

## 2020-01-01 RX ORDER — CALCIUM CARB/VIT D3/MINERALS 600 MG-200
500 TABLET,CHEWABLE ORAL DAILY
Refills: 0 | Status: ACTIVE | COMMUNITY
Start: 2020-01-01

## 2020-01-01 RX ORDER — CARVEDILOL 12.5 MG/1
12.5 TABLET, FILM COATED ORAL
Refills: 0 | Status: ACTIVE | COMMUNITY

## 2020-01-01 RX ORDER — WARFARIN 4 MG/1
TABLET ORAL
Refills: 0 | Status: DISCONTINUED | COMMUNITY
End: 2020-01-01

## 2020-01-01 RX ORDER — POTASSIUM CHLORIDE 1500 MG/1
20 TABLET, FILM COATED, EXTENDED RELEASE ORAL
Qty: 2 | Refills: 0 | Status: DISCONTINUED | COMMUNITY
Start: 2020-03-02 | End: 2020-01-01

## 2020-01-02 ENCOUNTER — APPOINTMENT (OUTPATIENT)
Dept: INTERNAL MEDICINE | Facility: CLINIC | Age: 75
End: 2020-01-02
Payer: MEDICARE

## 2020-01-02 ENCOUNTER — NON-APPOINTMENT (OUTPATIENT)
Age: 75
End: 2020-01-02

## 2020-01-02 VITALS
OXYGEN SATURATION: 97 % | HEIGHT: 65 IN | HEART RATE: 91 BPM | SYSTOLIC BLOOD PRESSURE: 124 MMHG | WEIGHT: 207.98 LBS | RESPIRATION RATE: 18 BRPM | DIASTOLIC BLOOD PRESSURE: 62 MMHG | BODY MASS INDEX: 34.65 KG/M2 | TEMPERATURE: 97.8 F

## 2020-01-02 DIAGNOSIS — E78.5 HYPERLIPIDEMIA, UNSPECIFIED: ICD-10-CM

## 2020-01-02 DIAGNOSIS — M06.9 RHEUMATOID ARTHRITIS, UNSPECIFIED: ICD-10-CM

## 2020-01-02 PROCEDURE — 99214 OFFICE O/P EST MOD 30 MIN: CPT | Mod: 25

## 2020-01-02 PROCEDURE — 94060 EVALUATION OF WHEEZING: CPT

## 2020-01-02 RX ORDER — AMLODIPINE BESYLATE 10 MG/1
10 TABLET ORAL
Qty: 90 | Refills: 0 | Status: DISCONTINUED | COMMUNITY
Start: 2018-03-09 | End: 2020-01-02

## 2020-01-02 RX ORDER — IPRATROPIUM BROMIDE 0.5 MG/2.5ML
0.02 SOLUTION RESPIRATORY (INHALATION) TWICE DAILY
Qty: 2 | Refills: 11 | Status: DISCONTINUED | COMMUNITY
Start: 2019-05-28 | End: 2020-01-02

## 2020-01-02 RX ORDER — ENEMA 19; 7 G/133ML; G/133ML
7-19 ENEMA RECTAL
Refills: 0 | Status: DISCONTINUED | COMMUNITY
End: 2020-01-02

## 2020-01-02 RX ORDER — POTASSIUM CHLORIDE 1500 MG/1
20 TABLET, EXTENDED RELEASE ORAL
Qty: 60 | Refills: 0 | Status: DISCONTINUED | COMMUNITY
Start: 2018-05-03 | End: 2020-01-02

## 2020-01-02 RX ORDER — DILTIAZEM HYDROCHLORIDE 180 MG/1
180 CAPSULE, EXTENDED RELEASE ORAL
Qty: 60 | Refills: 0 | Status: DISCONTINUED | COMMUNITY
Start: 2018-05-03 | End: 2020-01-02

## 2020-01-02 RX ORDER — METHOTREXATE 2.5 MG/1
2.5 TABLET ORAL
Refills: 0 | Status: ACTIVE | COMMUNITY
Start: 2018-06-12

## 2020-01-02 RX ORDER — SACUBITRIL AND VALSARTAN 24; 26 MG/1; MG/1
24-26 TABLET, FILM COATED ORAL TWICE DAILY
Qty: 60 | Refills: 0 | Status: ACTIVE | COMMUNITY
Start: 2020-01-02

## 2020-01-02 RX ORDER — FACIAL-BODY WIPES
10 EACH TOPICAL
Refills: 0 | Status: DISCONTINUED | COMMUNITY
End: 2020-01-02

## 2020-01-02 RX ORDER — MAGNESIUM HYDROXIDE 400 MG/5ML
400 SUSPENSION, ORAL (FINAL DOSE FORM) ORAL
Refills: 0 | Status: DISCONTINUED | COMMUNITY
End: 2020-01-02

## 2020-01-02 RX ORDER — DOCUSATE SODIUM 100 MG/1
100 CAPSULE ORAL
Refills: 0 | Status: DISCONTINUED | COMMUNITY
End: 2020-01-02

## 2020-01-02 NOTE — PHYSICAL EXAM
[No Acute Distress] : no acute distress [Well Developed] : well developed [Normal Sclera/Conjunctiva] : normal sclera/conjunctiva [PERRL] : pupils equal round and reactive to light [Normal Nasal Mucosa] : the nasal mucosa was normal [Normal Outer Ear/Nose] : the outer ears and nose were normal in appearance [Supple] : supple [No JVD] : no jugular venous distention [No Respiratory Distress] : no respiratory distress  [No Accessory Muscle Use] : no accessory muscle use [Clear to Auscultation] : lungs were clear to auscultation bilaterally [No Extremity Clubbing/Cyanosis] : no extremity clubbing/cyanosis [No Varicosities] : no varicosities [Normal S1, S2] : normal S1 and S2 [Non Tender] : non-tender [Normal Supraclavicular Nodes] : no supraclavicular lymphadenopathy [Soft] : abdomen soft [Normal Posterior Cervical Nodes] : no posterior cervical lymphadenopathy [Normal Anterior Cervical Nodes] : no anterior cervical lymphadenopathy [No Rash] : no rash [Coordination Grossly Intact] : coordination grossly intact [No Focal Deficits] : no focal deficits [Normal Affect] : the affect was normal [Alert and Oriented x3] : oriented to person, place, and time [Normal Mood] : the mood was normal [Normal Insight/Judgement] : insight and judgment were intact [No CVA Tenderness] : no CVA  tenderness [de-identified] : wearing glasses [de-identified] : obese and appears chronically ill [de-identified] : redundant soft palate. No epistaxis [de-identified] : irreg irreg rhythm with 1/6 АННА at low LSB [de-identified] : Chronic venous stasis changes are noted with superficial varicosities present bilaterally [de-identified] : multiple small ecchymoses [de-identified] : uses wheelchair. Gait is slow and wide base [de-identified] : obese with left flank ecchymosis

## 2020-01-02 NOTE — PLAN
[FreeTextEntry1] : 1. Continue with medication outlined above.\par \par 2. Follow up with Dr. Calderón for Coronary artery disease and treatment of her cardiomyopathy.\par \par 3. Follow up with NP Tato in 3 months for a pre-post DLCO,due to the fact that she is now on amiodarone.we will need to assess for potential amiodarone toxicity\par \par 4. Follow up with myself in 6 months for a PFT.\par \par 5. The patient's left total hip replacement is currently on hold. She will undergo the procedure if her pain or symptoms worsen.\par \par 6. The patient will followup with her electrophysiologist, Dr. Cyr, for treatment of her atrial arrhythmias.

## 2020-01-02 NOTE — DATA REVIEWED
[FreeTextEntry1] : Spirometric Analysis is within normal limits. Bronchodilator reactivity is not demonstrated.

## 2020-01-02 NOTE — ADDENDUM
[FreeTextEntry1] : I, Keanu Yip, acted solely as a scribe for Dr. Carl Rios on this date 01/02/2020.

## 2020-01-02 NOTE — END OF VISIT
[FreeTextEntry3] : All medical record entries made by the scribe were at my, Dr. Carl Rios, direction and personally dictated by me on 01/02/2020. I have reviewed the chart and agree that the record accurately reflects my personal performance of the history, physical exam, assessment and plan. I have also personally directed, reviewed, and agreed with the chart.

## 2020-01-02 NOTE — HISTORY OF PRESENT ILLNESS
[de-identified] : This patient comes in today for a follow up evaluation and reassessment of her pulmonary status. She is accompanied by her son.\par \par The patient underwent a cardiac catheterization this past June after an abnormal nuclear stress test. She was noted to have significant stenoses in the LAD and in the right coronary artery. A bare metal stent was placed in each of those arteries. Due to the abnormal findings, she was unable to undergo left hip replacement surgery. She has had afib in the past, and she is currently experiencing an atrial flutter. She is currently on amiodarone for atrial arrhythmia and entresto for treatment of heart failure.  She has a history of HLD and HTN. She remains compliant with her maintenance regimen for management. She does not follow a formal exercise regimen. She denies CP, tightness, and palpitations. She is followed by Lawrence Storm and Stephane.\par \par Additionally, she has a history of COPD with asthma. When she blows her nose in the morning, she notes that she occasionally sees pink mucous. She uses her nebulizer prn. She doesn't use her rescue inhaler. She denies asthmatic exacerbations, coughing, wheezing, SOB, and sputum production.\par \par She denies fevers, chills, night sweats, and any other constitutional symptoms. She now comes in for this assessment.

## 2020-02-05 ENCOUNTER — NON-APPOINTMENT (OUTPATIENT)
Age: 75
End: 2020-02-05

## 2020-02-05 ENCOUNTER — APPOINTMENT (OUTPATIENT)
Dept: ELECTROPHYSIOLOGY | Facility: CLINIC | Age: 75
End: 2020-02-05
Payer: MEDICARE

## 2020-02-05 VITALS
HEIGHT: 65 IN | OXYGEN SATURATION: 98 % | SYSTOLIC BLOOD PRESSURE: 118 MMHG | HEART RATE: 70 BPM | BODY MASS INDEX: 34.66 KG/M2 | DIASTOLIC BLOOD PRESSURE: 64 MMHG | WEIGHT: 208 LBS

## 2020-02-05 PROCEDURE — 93000 ELECTROCARDIOGRAM COMPLETE: CPT

## 2020-02-05 PROCEDURE — 99205 OFFICE O/P NEW HI 60 MIN: CPT

## 2020-02-05 RX ORDER — ACETAMINOPHEN 325 MG/1
325 TABLET, FILM COATED ORAL
Refills: 0 | Status: DISCONTINUED | COMMUNITY
End: 2020-02-05

## 2020-02-05 RX ORDER — WARFARIN 1 MG/1
1 TABLET ORAL DAILY
Qty: 100 | Refills: 0 | Status: DISCONTINUED | COMMUNITY
Start: 2020-01-02 | End: 2020-02-05

## 2020-02-19 NOTE — REVIEW OF SYSTEMS
[Feeling Fatigued] : feeling fatigued [Eyeglasses] : currently wearing eyeglasses [Joint Swelling] : joint swelling [Joint Pain] : joint pain [Joint Stiffness] : joint stiffness [Skin: A Rash] : rash: [Easy Bruising] : a tendency for easy bruising [Easy Bleeding] : a tendency for easy bleeding [Negative] : Heme/Lymph

## 2020-02-19 NOTE — ASSESSMENT
[FreeTextEntry1] : This is a 74 year old woman with an ischemic dilated cardiomyopathy with pertinent severe LV dysfunction.\par \par A thorough discussion was had with the patient   concerning all aspects of ICD therapy. We reviewed the data supporting ICD therapy and how it applies individually to this patient. We discussed the procedure, risks and outcomes of the ICD implantation and living with an ICD. We discussed management of the ICD  throughout life, including potential to deactivate ICD therapies if goals of care change. After all questions were answered, it was a shared decision to proceed with ICD therapy.\par \par

## 2020-02-19 NOTE — CARDIOLOGY SUMMARY
[___] : [unfilled] [LVEF ___%] : LVEF [unfilled]% [Severe] : severe LV dysfunction [None] : no pulmonary hypertension [Enlarged] : enlarged LA size [Mild] : mild mitral regurgitation [___] : [unfilled]

## 2020-02-19 NOTE — PHYSICAL EXAM
[General Appearance - Well Developed] : well developed [Normal Appearance] : normal appearance [General Appearance - Well Nourished] : well nourished [Well Groomed] : well groomed [General Appearance - In No Acute Distress] : no acute distress [No Deformities] : no deformities [Eyelids - No Xanthelasma] : the eyelids demonstrated no xanthelasmas [Normal Conjunctiva] : the conjunctiva exhibited no abnormalities [No Oral Pallor] : no oral pallor [Normal Oral Mucosa] : normal oral mucosa [No Oral Cyanosis] : no oral cyanosis [Normal Jugular Venous A Waves Present] : normal jugular venous A waves present [Normal Jugular Venous V Waves Present] : normal jugular venous V waves present [No Jugular Venous Schmitt A Waves] : no jugular venous schmitt A waves [Heart Rate And Rhythm] : heart rate and rhythm were normal [Heart Sounds] : normal S1 and S2 [Murmurs] : no murmurs present [Exaggerated Use Of Accessory Muscles For Inspiration] : no accessory muscle use [Respiration, Rhythm And Depth] : normal respiratory rhythm and effort [Auscultation Breath Sounds / Voice Sounds] : lungs were clear to auscultation bilaterally [Abdomen Tenderness] : non-tender [Abdomen Soft] : soft [Abdomen Mass (___ Cm)] : no abdominal mass palpated [Abnormal Walk] : normal gait [Gait - Sufficient For Exercise Testing] : the gait was sufficient for exercise testing [Nail Clubbing] : no clubbing of the fingernails [Petechial Hemorrhages (___cm)] : no petechial hemorrhages [Cyanosis, Localized] : no localized cyanosis [Skin Color & Pigmentation] : normal skin color and pigmentation [No Venous Stasis] : no venous stasis [] : no rash [Skin Lesions] : no skin lesions [No Skin Ulcers] : no skin ulcer [No Xanthoma] : no  xanthoma was observed [Oriented To Time, Place, And Person] : oriented to person, place, and time [Affect] : the affect was normal [Mood] : the mood was normal [No Anxiety] : not feeling anxious

## 2020-02-19 NOTE — HISTORY OF PRESENT ILLNESS
[FreeTextEntry1] : This is a 74 yo F with PMHx of HTN, angioedema, smoker, Afib on ASA d/t h/o bleeding on \par eliquis, has been c/o Lt hip pain. Pt. was due to have hip surgery and required \par cardiac clearance. Pt underwent stress test as pre-op cardiac evaluation, which \par showed (+) multi vessel ischemia. S/P PCI and BMS to LAD and RPDA on 6/11/19 \par \par \par RUSTY 12/3/19 :  Normal appearing mitral valve structure and function. trace MR\par  The aortic valve is well visualized, appears mildly sclerotic. Valve opening seems to be normal.\par  Normal appearing tricuspid valve structure and function. Trace TR\par  Estimated left ventricular ejection fraction is 25 %.\par  Severely reduced left ventricular systolic function.\par  The left atrium is mildly dilated.\par  No thrombus noted in the LA or IZABELLA\par  Smoke is noted.

## 2020-02-28 ENCOUNTER — OUTPATIENT (OUTPATIENT)
Dept: OUTPATIENT SERVICES | Facility: HOSPITAL | Age: 75
LOS: 1 days | End: 2020-02-28
Payer: MEDICARE

## 2020-02-28 VITALS
TEMPERATURE: 97 F | RESPIRATION RATE: 16 BRPM | HEIGHT: 65 IN | WEIGHT: 218.92 LBS | SYSTOLIC BLOOD PRESSURE: 124 MMHG | HEART RATE: 95 BPM | DIASTOLIC BLOOD PRESSURE: 75 MMHG | OXYGEN SATURATION: 99 %

## 2020-02-28 DIAGNOSIS — Z98.890 OTHER SPECIFIED POSTPROCEDURAL STATES: Chronic | ICD-10-CM

## 2020-02-28 DIAGNOSIS — I48.20 CHRONIC ATRIAL FIBRILLATION, UNSPECIFIED: ICD-10-CM

## 2020-02-28 DIAGNOSIS — I42.9 CARDIOMYOPATHY, UNSPECIFIED: ICD-10-CM

## 2020-02-28 DIAGNOSIS — Z98.891 HISTORY OF UTERINE SCAR FROM PREVIOUS SURGERY: Chronic | ICD-10-CM

## 2020-02-28 LAB
ANION GAP SERPL CALC-SCNC: 4 MMOL/L — LOW (ref 5–17)
APTT BLD: 22.5 SEC — LOW (ref 27.5–36.3)
BASOPHILS # BLD AUTO: 0.02 K/UL — SIGNIFICANT CHANGE UP (ref 0–0.2)
BASOPHILS NFR BLD AUTO: 0.3 % — SIGNIFICANT CHANGE UP (ref 0–2)
BUN SERPL-MCNC: 14 MG/DL — SIGNIFICANT CHANGE UP (ref 7–23)
CALCIUM SERPL-MCNC: 8.4 MG/DL — LOW (ref 8.5–10.1)
CHLORIDE SERPL-SCNC: 110 MMOL/L — HIGH (ref 96–108)
CO2 SERPL-SCNC: 27 MMOL/L — SIGNIFICANT CHANGE UP (ref 22–31)
CREAT SERPL-MCNC: 1.08 MG/DL — SIGNIFICANT CHANGE UP (ref 0.5–1.3)
EOSINOPHIL # BLD AUTO: 0.02 K/UL — SIGNIFICANT CHANGE UP (ref 0–0.5)
EOSINOPHIL NFR BLD AUTO: 0.3 % — SIGNIFICANT CHANGE UP (ref 0–6)
GLUCOSE SERPL-MCNC: 112 MG/DL — HIGH (ref 70–99)
HCT VFR BLD CALC: 38.8 % — SIGNIFICANT CHANGE UP (ref 34.5–45)
HGB BLD-MCNC: 12 G/DL — SIGNIFICANT CHANGE UP (ref 11.5–15.5)
IMM GRANULOCYTES NFR BLD AUTO: 1.3 % — SIGNIFICANT CHANGE UP (ref 0–1.5)
INR BLD: 1.16 RATIO — SIGNIFICANT CHANGE UP (ref 0.88–1.16)
LYMPHOCYTES # BLD AUTO: 0.4 K/UL — LOW (ref 1–3.3)
LYMPHOCYTES # BLD AUTO: 5.8 % — LOW (ref 13–44)
MCHC RBC-ENTMCNC: 30.9 GM/DL — LOW (ref 32–36)
MCHC RBC-ENTMCNC: 30.9 PG — SIGNIFICANT CHANGE UP (ref 27–34)
MCV RBC AUTO: 100 FL — SIGNIFICANT CHANGE UP (ref 80–100)
MONOCYTES # BLD AUTO: 0.31 K/UL — SIGNIFICANT CHANGE UP (ref 0–0.9)
MONOCYTES NFR BLD AUTO: 4.5 % — SIGNIFICANT CHANGE UP (ref 2–14)
NEUTROPHILS # BLD AUTO: 6.1 K/UL — SIGNIFICANT CHANGE UP (ref 1.8–7.4)
NEUTROPHILS NFR BLD AUTO: 87.8 % — HIGH (ref 43–77)
PLATELET # BLD AUTO: 275 K/UL — SIGNIFICANT CHANGE UP (ref 150–400)
POTASSIUM SERPL-MCNC: 3.3 MMOL/L — LOW (ref 3.5–5.3)
POTASSIUM SERPL-SCNC: 3.3 MMOL/L — LOW (ref 3.5–5.3)
PROTHROM AB SERPL-ACNC: 12.9 SEC — SIGNIFICANT CHANGE UP (ref 10–12.9)
RBC # BLD: 3.88 M/UL — SIGNIFICANT CHANGE UP (ref 3.8–5.2)
RBC # FLD: 16.2 % — HIGH (ref 10.3–14.5)
SODIUM SERPL-SCNC: 141 MMOL/L — SIGNIFICANT CHANGE UP (ref 135–145)
WBC # BLD: 6.94 K/UL — SIGNIFICANT CHANGE UP (ref 3.8–10.5)
WBC # FLD AUTO: 6.94 K/UL — SIGNIFICANT CHANGE UP (ref 3.8–10.5)

## 2020-02-28 PROCEDURE — G0463: CPT | Mod: 25

## 2020-02-28 PROCEDURE — 85025 COMPLETE CBC W/AUTO DIFF WBC: CPT

## 2020-02-28 PROCEDURE — 36415 COLL VENOUS BLD VENIPUNCTURE: CPT

## 2020-02-28 PROCEDURE — 86850 RBC ANTIBODY SCREEN: CPT

## 2020-02-28 PROCEDURE — 93005 ELECTROCARDIOGRAM TRACING: CPT

## 2020-02-28 PROCEDURE — 80048 BASIC METABOLIC PNL TOTAL CA: CPT

## 2020-02-28 PROCEDURE — 87641 MR-STAPH DNA AMP PROBE: CPT

## 2020-02-28 PROCEDURE — 86900 BLOOD TYPING SEROLOGIC ABO: CPT

## 2020-02-28 PROCEDURE — 93010 ELECTROCARDIOGRAM REPORT: CPT

## 2020-02-28 PROCEDURE — 85610 PROTHROMBIN TIME: CPT

## 2020-02-28 PROCEDURE — 87640 STAPH A DNA AMP PROBE: CPT

## 2020-02-28 PROCEDURE — 85730 THROMBOPLASTIN TIME PARTIAL: CPT

## 2020-02-28 PROCEDURE — 86901 BLOOD TYPING SEROLOGIC RH(D): CPT

## 2020-02-28 RX ORDER — LISINOPRIL 2.5 MG/1
1 TABLET ORAL
Qty: 0 | Refills: 0 | DISCHARGE

## 2020-02-28 RX ORDER — ACETAMINOPHEN 500 MG
2 TABLET ORAL
Qty: 0 | Refills: 0 | DISCHARGE

## 2020-02-28 RX ORDER — POTASSIUM CHLORIDE 20 MEQ
1 PACKET (EA) ORAL
Qty: 0 | Refills: 0 | DISCHARGE

## 2020-02-28 RX ORDER — METHOTREXATE 2.5 MG/1
8 TABLET ORAL
Qty: 0 | Refills: 0 | DISCHARGE

## 2020-02-28 RX ORDER — WARFARIN SODIUM 2.5 MG/1
1.5 TABLET ORAL
Qty: 0 | Refills: 0 | DISCHARGE

## 2020-02-28 NOTE — H&P PST ADULT - HISTORY OF PRESENT ILLNESS
74 year old female with afib cardiomyopathy EF 25% denies SOB chest pain palpitation ; she presents to PST for planned ICD implant with anesthesia

## 2020-02-28 NOTE — H&P PST ADULT - ASSESSMENT
74 year old female presents to [facility] for planned ICD placement under anesthesia   1.  Dr Hobson  office  will instruct patient regarding  medication regimen on day of procedure.  2. EPS booking will call patient the day before surgery for arrival instructions   3. NPO post midnight  4. CBC BMP EKG MRSA CXR  as per Dr Gomez

## 2020-02-28 NOTE — H&P PST ADULT - NSANTHSNORERD_ENT_A_CORE
Call to patient. Let patient know we don't have any openings until Wednesday and would recommend UC. Patient will go to UC.   No

## 2020-02-28 NOTE — H&P PST ADULT - NSICDXPASTSURGICALHX_GEN_ALL_CORE_FT
PAST SURGICAL HISTORY:  H/O arthroscopy of shoulder left     H/O bladder repair surgery     H/O: hysterectomy due to bleeding     S/P  x2

## 2020-02-28 NOTE — H&P PST ADULT - NSANTHOSAYNRD_GEN_A_CORE
No. CHLOÉ screening performed.  STOP BANG Legend: 0-2 = LOW Risk; 3-4 = INTERMEDIATE Risk; 5-8 = HIGH Risk

## 2020-02-29 DIAGNOSIS — I48.20 CHRONIC ATRIAL FIBRILLATION, UNSPECIFIED: ICD-10-CM

## 2020-02-29 DIAGNOSIS — I42.9 CARDIOMYOPATHY, UNSPECIFIED: ICD-10-CM

## 2020-02-29 LAB
MRSA PCR RESULT.: SIGNIFICANT CHANGE UP
S AUREUS DNA NOSE QL NAA+PROBE: SIGNIFICANT CHANGE UP

## 2020-03-02 ENCOUNTER — APPOINTMENT (OUTPATIENT)
Dept: INTERNAL MEDICINE | Facility: CLINIC | Age: 75
End: 2020-03-02
Payer: MEDICARE

## 2020-03-02 VITALS
TEMPERATURE: 97.4 F | RESPIRATION RATE: 18 BRPM | HEART RATE: 92 BPM | BODY MASS INDEX: 35.36 KG/M2 | HEIGHT: 66 IN | WEIGHT: 220 LBS | SYSTOLIC BLOOD PRESSURE: 108 MMHG | OXYGEN SATURATION: 98 % | DIASTOLIC BLOOD PRESSURE: 68 MMHG

## 2020-03-02 DIAGNOSIS — Z23 ENCOUNTER FOR IMMUNIZATION: ICD-10-CM

## 2020-03-02 DIAGNOSIS — Z87.898 PERSONAL HISTORY OF OTHER SPECIFIED CONDITIONS: ICD-10-CM

## 2020-03-02 DIAGNOSIS — I48.92 UNSPECIFIED ATRIAL FLUTTER: ICD-10-CM

## 2020-03-02 DIAGNOSIS — Z87.891 PERSONAL HISTORY OF NICOTINE DEPENDENCE: ICD-10-CM

## 2020-03-02 DIAGNOSIS — E87.6 HYPOKALEMIA: ICD-10-CM

## 2020-03-02 PROCEDURE — 99215 OFFICE O/P EST HI 40 MIN: CPT

## 2020-03-02 NOTE — PHYSICAL EXAM
[Well Developed] : well developed [No Acute Distress] : no acute distress [Well Nourished] : well nourished [PERRL] : pupils equal round and reactive to light [EOMI] : extraocular movements intact [Normal Oropharynx] : the oropharynx was normal [Normal Outer Ear/Nose] : the outer ears and nose were normal in appearance [No JVD] : no jugular venous distention [Normal TMs] : both tympanic membranes were normal [No Lymphadenopathy] : no lymphadenopathy [Supple] : supple [No Respiratory Distress] : no respiratory distress  [Normal Rate] : normal rate  [No Accessory Muscle Use] : no accessory muscle use [Clear to Auscultation] : lungs were clear to auscultation bilaterally [Regular Rhythm] : with a regular rhythm [Normal S1, S2] : normal S1 and S2 [Soft] : abdomen soft [Pedal Pulses Present] : the pedal pulses are present [Non-distended] : non-distended [Non Tender] : non-tender [Normal Posterior Cervical Nodes] : no posterior cervical lymphadenopathy [Normal Bowel Sounds] : normal bowel sounds [Normal Anterior Cervical Nodes] : no anterior cervical lymphadenopathy [Coordination Grossly Intact] : coordination grossly intact [No Focal Deficits] : no focal deficits [Normal Gait] : normal gait [Normal Affect] : the affect was normal [Normal Insight/Judgement] : insight and judgment were intact [Alert and Oriented x3] : oriented to person, place, and time

## 2020-03-03 LAB
ANION GAP SERPL CALC-SCNC: 13 MMOL/L
BUN SERPL-MCNC: 14 MG/DL
CALCIUM SERPL-MCNC: 8.4 MG/DL
CHLORIDE SERPL-SCNC: 110 MMOL/L
CO2 SERPL-SCNC: 22 MMOL/L
CREAT SERPL-MCNC: 1.06 MG/DL
GLUCOSE SERPL-MCNC: 128 MG/DL
POTASSIUM SERPL-SCNC: 3.6 MMOL/L
SODIUM SERPL-SCNC: 145 MMOL/L

## 2020-03-03 NOTE — RESULTS/DATA
[] : results reviewed [de-identified] : INR- 1.16, PT-12.9, PTT 22 [de-identified] : K=3.6, on 3/3/20   [de-identified] : 2/28/20 Aflutter , 3:1 AV conduction, 94 ventricular rate per PST  [de-identified] : MRSA not detected

## 2020-03-03 NOTE — ASSESSMENT
[As per surgery] : as per surgery [Patient Optimized for Surgery] : Patient optimized for surgery [FreeTextEntry2] : adequate oxygen monitoring .  [FreeTextEntry4] : K= 3.3 . Potassium Chloride ER 40 meq x 1 dose, pt to take today 3/2/20. Stat BMP to be drawn in am . repeat K+ 3/3/20 resulted 3.6 \par Prednisone 40 mg po x1 dose in am tomorrow . Discussed with DR. Collazo \par Pt stopped Coumadin on her own, last dose Thursday 2/27/20 ,States called cardiologist , Dr. Calderón as well as Dr. Storm, left message , did not hear back and stopped her Coumadin \par .Called Dr. Storm office,, spoke to Connie, NP , pt to resume her Coumadin today and contact Dr. Calderón , Stat INR will be drawn am of procedure 3/4 /20 .\par Per Connie, pt to hold Methotrexate, take all meds in am of procedure with small sip of water

## 2020-03-03 NOTE — DISCHARGE NOTE ADULT - HAS THE PATIENT RECEIVED THE INFLUENZA VACCINE DURING THIS VISIT
AVSS and denies pain.  Patient up in chair for majority of day, ambulated with therapy, tan patent with good output, continues to NPO (patient wanting to start clears - team yet to clarify), NGT dc'd, reports flatus, no bowel movement today, and EMMA with minimal output (team might discontinue today).  Pressure injury noted on left nare from NGT.  WOCRN consulted.  Q4hr cares completed per order.  Med card and lab book updated.  Scheduled meds given as ordered.  Continue to monitor, treat as ordered, notify team with changes.    No

## 2020-03-03 NOTE — HISTORY OF PRESENT ILLNESS
[Atrial Fibrillation] : atrial fibrillation [Coronary Artery Disease] : coronary artery disease [Asthma] : asthma [COPD] : COPD [Smoker] : smoker [No Adverse Anesthesia Reaction] : no adverse anesthesia reaction in self or family member [Chronic Anticoagulation] : chronic anticoagulation [(Patient denies any chest pain, claudication, dyspnea on exertion, orthopnea, palpitations or syncope)] : Patient denies any chest pain, claudication, dyspnea on exertion, orthopnea, palpitations or syncope [Aortic Stenosis] : no aortic stenosis [Recent Myocardial Infarction] : no recent myocardial infarction [Implantable Device/Pacemaker] : no implantable device/pacemaker [Sleep Apnea] : no sleep apnea [Family Member] : no family member with adverse anesthesia reaction/sudden death [Self] : no previous adverse anesthesia reaction [Chronic Kidney Disease] : no chronic kidney disease [Diabetes] : no diabetes [FreeTextEntry1] : AICD placement  [FreeTextEntry2] : 3/4/20 [FreeTextEntry3] : Dr. Storm [FreeTextEntry7] : ECHO 9/9/19- severe V dysfunction, EF 25-30%\par \par Pt able to walk short distances without difficulty, \par   [FreeTextEntry5] : Pt smokes 1/2 PPD x 60 yrs. ,  [FreeTextEntry4] : Pt is s a 74 yr. old female history of Afib, CHF, cardiomyopathy , active smoker, HTN, COPD with asthma,  s/p PCI and BMS to LAD and RPDA   6/19 , rheumatoid arthritis ( on Methotrexate and Prednisone daily)  . She is currently on Entresto for heart failure and Amiodarone for atrial arrhythmia.ECHO revealed severe LV dysfunction, EF 25-30%. \par  She is scheduled for planned ICD implant with Dr. Storm.

## 2020-03-04 ENCOUNTER — OUTPATIENT (OUTPATIENT)
Dept: OUTPATIENT SERVICES | Facility: HOSPITAL | Age: 75
LOS: 1 days | Discharge: HOME | End: 2020-03-04
Payer: MEDICARE

## 2020-03-04 VITALS
HEART RATE: 83 BPM | WEIGHT: 218.92 LBS | SYSTOLIC BLOOD PRESSURE: 128 MMHG | HEIGHT: 65 IN | DIASTOLIC BLOOD PRESSURE: 82 MMHG | OXYGEN SATURATION: 99 % | TEMPERATURE: 98 F | RESPIRATION RATE: 20 BRPM

## 2020-03-04 DIAGNOSIS — I42.9 CARDIOMYOPATHY, UNSPECIFIED: ICD-10-CM

## 2020-03-04 DIAGNOSIS — Z98.890 OTHER SPECIFIED POSTPROCEDURAL STATES: Chronic | ICD-10-CM

## 2020-03-04 DIAGNOSIS — I48.20 CHRONIC ATRIAL FIBRILLATION, UNSPECIFIED: ICD-10-CM

## 2020-03-04 DIAGNOSIS — Z98.891 HISTORY OF UTERINE SCAR FROM PREVIOUS SURGERY: Chronic | ICD-10-CM

## 2020-03-04 LAB
ANION GAP SERPL CALC-SCNC: 8 MMOL/L — SIGNIFICANT CHANGE UP (ref 5–17)
APPEARANCE UR: ABNORMAL
BILIRUB UR-MCNC: NEGATIVE — SIGNIFICANT CHANGE UP
BUN SERPL-MCNC: 15 MG/DL — SIGNIFICANT CHANGE UP (ref 7–23)
CALCIUM SERPL-MCNC: 8.5 MG/DL — SIGNIFICANT CHANGE UP (ref 8.5–10.1)
CHLORIDE SERPL-SCNC: 113 MMOL/L — HIGH (ref 96–108)
CO2 SERPL-SCNC: 24 MMOL/L — SIGNIFICANT CHANGE UP (ref 22–31)
COLOR SPEC: YELLOW — SIGNIFICANT CHANGE UP
CREAT SERPL-MCNC: 1.14 MG/DL — SIGNIFICANT CHANGE UP (ref 0.5–1.3)
DIFF PNL FLD: ABNORMAL
GLUCOSE SERPL-MCNC: 113 MG/DL — HIGH (ref 70–99)
GLUCOSE UR QL: NEGATIVE MG/DL — SIGNIFICANT CHANGE UP
INR BLD: 1.13 RATIO — SIGNIFICANT CHANGE UP (ref 0.88–1.16)
KETONES UR-MCNC: NEGATIVE — SIGNIFICANT CHANGE UP
LEUKOCYTE ESTERASE UR-ACNC: ABNORMAL
NITRITE UR-MCNC: POSITIVE
PH UR: 6 — SIGNIFICANT CHANGE UP (ref 5–8)
POTASSIUM SERPL-MCNC: 4 MMOL/L — SIGNIFICANT CHANGE UP (ref 3.5–5.3)
POTASSIUM SERPL-SCNC: 4 MMOL/L — SIGNIFICANT CHANGE UP (ref 3.5–5.3)
PROT UR-MCNC: 30 MG/DL
PROTHROM AB SERPL-ACNC: 12.6 SEC — SIGNIFICANT CHANGE UP (ref 10–12.9)
SODIUM SERPL-SCNC: 145 MMOL/L — SIGNIFICANT CHANGE UP (ref 135–145)
SP GR SPEC: 1.02 — SIGNIFICANT CHANGE UP (ref 1.01–1.02)
UROBILINOGEN FLD QL: NEGATIVE MG/DL — SIGNIFICANT CHANGE UP

## 2020-03-04 PROCEDURE — C1894: CPT

## 2020-03-04 PROCEDURE — 71045 X-RAY EXAM CHEST 1 VIEW: CPT | Mod: 26

## 2020-03-04 PROCEDURE — 85025 COMPLETE CBC W/AUTO DIFF WBC: CPT

## 2020-03-04 PROCEDURE — 71045 X-RAY EXAM CHEST 1 VIEW: CPT

## 2020-03-04 PROCEDURE — 36415 COLL VENOUS BLD VENIPUNCTURE: CPT

## 2020-03-04 PROCEDURE — 93005 ELECTROCARDIOGRAM TRACING: CPT | Mod: XU

## 2020-03-04 PROCEDURE — 93010 ELECTROCARDIOGRAM REPORT: CPT

## 2020-03-04 PROCEDURE — 87086 URINE CULTURE/COLONY COUNT: CPT

## 2020-03-04 PROCEDURE — C1722: CPT

## 2020-03-04 PROCEDURE — C1777: CPT

## 2020-03-04 PROCEDURE — 93308 TTE F-UP OR LMTD: CPT | Mod: 26

## 2020-03-04 PROCEDURE — 93308 TTE F-UP OR LMTD: CPT

## 2020-03-04 PROCEDURE — 33249 INSJ/RPLCMT DEFIB W/LEAD(S): CPT

## 2020-03-04 PROCEDURE — 80048 BASIC METABOLIC PNL TOTAL CA: CPT

## 2020-03-04 PROCEDURE — 81001 URINALYSIS AUTO W/SCOPE: CPT

## 2020-03-04 PROCEDURE — 33270 INS/REP SUBQ DEFIBRILLATOR: CPT

## 2020-03-04 PROCEDURE — 85610 PROTHROMBIN TIME: CPT

## 2020-03-04 RX ORDER — SACUBITRIL AND VALSARTAN 24; 26 MG/1; MG/1
1 TABLET, FILM COATED ORAL
Refills: 0 | Status: DISCONTINUED | OUTPATIENT
Start: 2020-03-04 | End: 2020-03-05

## 2020-03-04 RX ORDER — WARFARIN SODIUM 2.5 MG/1
4 TABLET ORAL ONCE
Refills: 0 | Status: DISCONTINUED | OUTPATIENT
Start: 2020-03-04 | End: 2020-03-04

## 2020-03-04 RX ORDER — CARVEDILOL PHOSPHATE 80 MG/1
12.5 CAPSULE, EXTENDED RELEASE ORAL
Refills: 0 | Status: DISCONTINUED | OUTPATIENT
Start: 2020-03-04 | End: 2020-03-05

## 2020-03-04 RX ORDER — AMIODARONE HYDROCHLORIDE 400 MG/1
200 TABLET ORAL DAILY
Refills: 0 | Status: DISCONTINUED | OUTPATIENT
Start: 2020-03-04 | End: 2020-03-05

## 2020-03-04 RX ORDER — FUROSEMIDE 40 MG
40 TABLET ORAL DAILY
Refills: 0 | Status: DISCONTINUED | OUTPATIENT
Start: 2020-03-05 | End: 2020-03-05

## 2020-03-04 RX ORDER — ASPIRIN/CALCIUM CARB/MAGNESIUM 324 MG
81 TABLET ORAL DAILY
Refills: 0 | Status: DISCONTINUED | OUTPATIENT
Start: 2020-03-04 | End: 2020-03-05

## 2020-03-04 RX ORDER — CEFAZOLIN SODIUM 1 G
2000 VIAL (EA) INJECTION ONCE
Refills: 0 | Status: COMPLETED | OUTPATIENT
Start: 2020-03-04 | End: 2020-03-04

## 2020-03-04 RX ORDER — ACETAMINOPHEN 500 MG
650 TABLET ORAL ONCE
Refills: 0 | Status: COMPLETED | OUTPATIENT
Start: 2020-03-04 | End: 2020-03-04

## 2020-03-04 RX ORDER — SIMVASTATIN 20 MG/1
20 TABLET, FILM COATED ORAL AT BEDTIME
Refills: 0 | Status: DISCONTINUED | OUTPATIENT
Start: 2020-03-04 | End: 2020-03-05

## 2020-03-04 RX ORDER — ACETAMINOPHEN 500 MG
650 TABLET ORAL EVERY 6 HOURS
Refills: 0 | Status: DISCONTINUED | OUTPATIENT
Start: 2020-03-04 | End: 2020-03-05

## 2020-03-04 RX ORDER — CEFAZOLIN SODIUM 1 G
1000 VIAL (EA) INJECTION EVERY 8 HOURS
Refills: 0 | Status: COMPLETED | OUTPATIENT
Start: 2020-03-04 | End: 2020-03-05

## 2020-03-04 RX ORDER — TRAMADOL HYDROCHLORIDE 50 MG/1
25 TABLET ORAL EVERY 8 HOURS
Refills: 0 | Status: COMPLETED | OUTPATIENT
Start: 2020-03-04 | End: 2020-03-05

## 2020-03-04 RX ORDER — ALBUTEROL 90 UG/1
2 AEROSOL, METERED ORAL
Refills: 0 | Status: DISCONTINUED | OUTPATIENT
Start: 2020-03-04 | End: 2020-03-05

## 2020-03-04 RX ORDER — WARFARIN SODIUM 2.5 MG/1
4 TABLET ORAL ONCE
Refills: 0 | Status: COMPLETED | OUTPATIENT
Start: 2020-03-04 | End: 2020-03-04

## 2020-03-04 RX ADMIN — TRAMADOL HYDROCHLORIDE 25 MILLIGRAM(S): 50 TABLET ORAL at 21:53

## 2020-03-04 RX ADMIN — Medication 100 MILLIGRAM(S): at 09:16

## 2020-03-04 RX ADMIN — WARFARIN SODIUM 4 MILLIGRAM(S): 2.5 TABLET ORAL at 21:54

## 2020-03-04 RX ADMIN — Medication 1000 MILLIGRAM(S): at 17:22

## 2020-03-04 RX ADMIN — Medication 650 MILLIGRAM(S): at 11:00

## 2020-03-04 RX ADMIN — TRAMADOL HYDROCHLORIDE 25 MILLIGRAM(S): 50 TABLET ORAL at 16:53

## 2020-03-04 RX ADMIN — SIMVASTATIN 20 MILLIGRAM(S): 20 TABLET, FILM COATED ORAL at 21:54

## 2020-03-04 RX ADMIN — SACUBITRIL AND VALSARTAN 1 TABLET(S): 24; 26 TABLET, FILM COATED ORAL at 17:21

## 2020-03-04 RX ADMIN — Medication 5 MILLIGRAM(S): at 17:21

## 2020-03-04 RX ADMIN — CARVEDILOL PHOSPHATE 12.5 MILLIGRAM(S): 80 CAPSULE, EXTENDED RELEASE ORAL at 17:21

## 2020-03-04 RX ADMIN — AMIODARONE HYDROCHLORIDE 200 MILLIGRAM(S): 400 TABLET ORAL at 14:36

## 2020-03-04 RX ADMIN — Medication 81 MILLIGRAM(S): at 14:36

## 2020-03-04 RX ADMIN — TRAMADOL HYDROCHLORIDE 25 MILLIGRAM(S): 50 TABLET ORAL at 16:15

## 2020-03-04 NOTE — ASU PATIENT PROFILE, ADULT - PSH
H/O arthroscopy of shoulder  left   H/O bladder repair surgery    H/O: hysterectomy  due to bleeding   S/P   x2

## 2020-03-04 NOTE — CHART NOTE - NSCHARTNOTEFT_GEN_A_CORE
RN called EP notifiying EP that pts BP was 80/40 and heart rate WNR.  Pt had Single Chamber ICD implanted today.  Heart rate WNR.  No complaints of pain presently.  Denies SOB, feeling anxious or CP.  Her examine was unremarkable with the exception of fine crackles heard at left base.  Wound intact.  Alert and orientated.  250 cc of Dr 1/2 NS ordered and echo.  Echo examine was WNL.   Will continue to observe and monitor this pt. RN called EP notifiying EP that pts BP was 80/40 and heart rate WNR.  Pt had Single Chamber ICD implanted today.  Heart rate WNR.  No complaints of pain presently.  Denies SOB, feeling anxious or CP.  Her examine was unremarkable with the exception of fine crackles heard at left base.  Wound intact.  Alert and orientated.  250 cc of Dr 1/2 NS ordered and echo.  Echo examine was WNL.   Will continue to observe and monitor this pt.  [facility]-90 BPM

## 2020-03-04 NOTE — ASU PATIENT PROFILE, ADULT - PMH
Afib    CAD (coronary artery disease), native coronary artery    CHF with cardiomyopathy  12/2019 EF 25%  Diverticulitis    Edema    HLD (hyperlipidemia)    HTN (hypertension)    Kidney stones    Psoriasis    Rheumatoid arthritis    Stented coronary artery  BMS x3  june 2019  Urine incontinence    UTI (urinary tract infection)

## 2020-03-04 NOTE — PACU DISCHARGE NOTE - COMMENTS
Report given to Preeti Conte RN 3E; placed on telemetry monitor with rhythm verified by Report given to Preeti Conte RN 3E; placed on telemetry monitor with rhythm verified by Minda monitor tech 3E; pt transported to 3E via stretcher  by transporter Report given to Marita WANG 3E; placed on telemetry monitor with rhythm verified by Minda monitor tech 3E; pt transported to 3E via stretcher  by transporter

## 2020-03-04 NOTE — PROCEDURE NOTE - PRACTITIONER PERFORMING THE TIME OUT
Dr. Storm Mastoid Interpolation Flap Text: A decision was made to reconstruct the defect utilizing an interpolation axial flap and a staged reconstruction.  A telfa template was made of the defect.  This telfa template was then used to outline the mastoid interpolation flap.  The donor area for the pedicle flap was then injected with anesthesia.  The flap was excised through the skin and subcutaneous tissue down to the layer of the underlying musculature.  The pedicle flap was carefully excised within this deep plane to maintain its blood supply.  The edges of the donor site were undermined.   The donor site was closed in a primary fashion.  The pedicle was then rotated into position and sutured.  Once the tube was sutured into place, adequate blood supply was confirmed with blanching and refill.  The pedicle was then wrapped with xeroform gauze and dressed appropriately with a telfa and gauze bandage to ensure continued blood supply and protect the attached pedicle.

## 2020-03-05 ENCOUNTER — TRANSCRIPTION ENCOUNTER (OUTPATIENT)
Age: 75
End: 2020-03-05

## 2020-03-05 VITALS
SYSTOLIC BLOOD PRESSURE: 106 MMHG | HEART RATE: 97 BPM | TEMPERATURE: 98 F | DIASTOLIC BLOOD PRESSURE: 93 MMHG | OXYGEN SATURATION: 96 % | RESPIRATION RATE: 18 BRPM

## 2020-03-05 LAB
ANION GAP SERPL CALC-SCNC: 6 MMOL/L — SIGNIFICANT CHANGE UP (ref 5–17)
BASOPHILS # BLD AUTO: 0.02 K/UL — SIGNIFICANT CHANGE UP (ref 0–0.2)
BASOPHILS NFR BLD AUTO: 0.2 % — SIGNIFICANT CHANGE UP (ref 0–2)
BUN SERPL-MCNC: 19 MG/DL — SIGNIFICANT CHANGE UP (ref 7–23)
CALCIUM SERPL-MCNC: 8.1 MG/DL — LOW (ref 8.5–10.1)
CHLORIDE SERPL-SCNC: 114 MMOL/L — HIGH (ref 96–108)
CO2 SERPL-SCNC: 26 MMOL/L — SIGNIFICANT CHANGE UP (ref 22–31)
CREAT SERPL-MCNC: 1.11 MG/DL — SIGNIFICANT CHANGE UP (ref 0.5–1.3)
CULTURE RESULTS: SIGNIFICANT CHANGE UP
EOSINOPHIL # BLD AUTO: 0.05 K/UL — SIGNIFICANT CHANGE UP (ref 0–0.5)
EOSINOPHIL NFR BLD AUTO: 0.6 % — SIGNIFICANT CHANGE UP (ref 0–6)
GLUCOSE SERPL-MCNC: 102 MG/DL — HIGH (ref 70–99)
HCT VFR BLD CALC: 34.2 % — LOW (ref 34.5–45)
HGB BLD-MCNC: 10.7 G/DL — LOW (ref 11.5–15.5)
IMM GRANULOCYTES NFR BLD AUTO: 2 % — HIGH (ref 0–1.5)
INR BLD: 1.1 RATIO — SIGNIFICANT CHANGE UP (ref 0.88–1.16)
LYMPHOCYTES # BLD AUTO: 1.02 K/UL — SIGNIFICANT CHANGE UP (ref 1–3.3)
LYMPHOCYTES # BLD AUTO: 12.1 % — LOW (ref 13–44)
MCHC RBC-ENTMCNC: 31.3 GM/DL — LOW (ref 32–36)
MCHC RBC-ENTMCNC: 31.7 PG — SIGNIFICANT CHANGE UP (ref 27–34)
MCV RBC AUTO: 101.2 FL — HIGH (ref 80–100)
MONOCYTES # BLD AUTO: 0.35 K/UL — SIGNIFICANT CHANGE UP (ref 0–0.9)
MONOCYTES NFR BLD AUTO: 4.2 % — SIGNIFICANT CHANGE UP (ref 2–14)
NEUTROPHILS # BLD AUTO: 6.8 K/UL — SIGNIFICANT CHANGE UP (ref 1.8–7.4)
NEUTROPHILS NFR BLD AUTO: 80.9 % — HIGH (ref 43–77)
PLATELET # BLD AUTO: 289 K/UL — SIGNIFICANT CHANGE UP (ref 150–400)
POTASSIUM SERPL-MCNC: 4.1 MMOL/L — SIGNIFICANT CHANGE UP (ref 3.5–5.3)
POTASSIUM SERPL-SCNC: 4.1 MMOL/L — SIGNIFICANT CHANGE UP (ref 3.5–5.3)
PROTHROM AB SERPL-ACNC: 12.3 SEC — SIGNIFICANT CHANGE UP (ref 10–12.9)
RBC # BLD: 3.38 M/UL — LOW (ref 3.8–5.2)
RBC # FLD: 17.1 % — HIGH (ref 10.3–14.5)
SODIUM SERPL-SCNC: 146 MMOL/L — HIGH (ref 135–145)
SPECIMEN SOURCE: SIGNIFICANT CHANGE UP
WBC # BLD: 8.41 K/UL — SIGNIFICANT CHANGE UP (ref 3.8–10.5)
WBC # FLD AUTO: 8.41 K/UL — SIGNIFICANT CHANGE UP (ref 3.8–10.5)

## 2020-03-05 PROCEDURE — 93010 ELECTROCARDIOGRAM REPORT: CPT

## 2020-03-05 RX ORDER — CEFUROXIME AXETIL 250 MG
250 TABLET ORAL EVERY 12 HOURS
Refills: 0 | Status: DISCONTINUED | OUTPATIENT
Start: 2020-03-05 | End: 2020-03-05

## 2020-03-05 RX ORDER — CEFUROXIME AXETIL 250 MG
1 TABLET ORAL
Qty: 0 | Refills: 0 | DISCHARGE
Start: 2020-03-05

## 2020-03-05 RX ORDER — CEFUROXIME AXETIL 250 MG
1 TABLET ORAL
Qty: 10 | Refills: 0
Start: 2020-03-05 | End: 2020-03-09

## 2020-03-05 RX ORDER — METHOTREXATE 2.5 MG/1
0 TABLET ORAL
Qty: 0 | Refills: 0 | DISCHARGE

## 2020-03-05 RX ORDER — CEFTRIAXONE 500 MG/1
INJECTION, POWDER, FOR SOLUTION INTRAMUSCULAR; INTRAVENOUS
Refills: 0 | Status: DISCONTINUED | OUTPATIENT
Start: 2020-03-05 | End: 2020-03-05

## 2020-03-05 RX ORDER — CEFTRIAXONE 500 MG/1
1000 INJECTION, POWDER, FOR SOLUTION INTRAMUSCULAR; INTRAVENOUS EVERY 24 HOURS
Refills: 0 | Status: DISCONTINUED | OUTPATIENT
Start: 2020-03-06 | End: 2020-03-05

## 2020-03-05 RX ORDER — CEFTRIAXONE 500 MG/1
1000 INJECTION, POWDER, FOR SOLUTION INTRAMUSCULAR; INTRAVENOUS ONCE
Refills: 0 | Status: COMPLETED | OUTPATIENT
Start: 2020-03-05 | End: 2020-03-05

## 2020-03-05 RX ADMIN — AMIODARONE HYDROCHLORIDE 200 MILLIGRAM(S): 400 TABLET ORAL at 05:43

## 2020-03-05 RX ADMIN — Medication 1000 MILLIGRAM(S): at 01:50

## 2020-03-05 RX ADMIN — Medication 5 MILLIGRAM(S): at 05:43

## 2020-03-05 RX ADMIN — Medication 81 MILLIGRAM(S): at 12:43

## 2020-03-05 RX ADMIN — SACUBITRIL AND VALSARTAN 1 TABLET(S): 24; 26 TABLET, FILM COATED ORAL at 05:43

## 2020-03-05 RX ADMIN — CEFTRIAXONE 1000 MILLIGRAM(S): 500 INJECTION, POWDER, FOR SOLUTION INTRAMUSCULAR; INTRAVENOUS at 10:16

## 2020-03-05 RX ADMIN — CARVEDILOL PHOSPHATE 12.5 MILLIGRAM(S): 80 CAPSULE, EXTENDED RELEASE ORAL at 05:43

## 2020-03-05 NOTE — CHART NOTE - NSCHARTNOTEFT_GEN_A_CORE
HPI:  73 y/o female with history of At. Flutter and CM, admitted to have Single Chamber ICD. Pt is stable.  VSS    Vital Signs Last 24 Hrs  T(C): 36.6 (05 Mar 2020 05:31), Max: 36.6 (05 Mar 2020 05:31)  T(F): 97.9 (05 Mar 2020 05:31), Max: 97.9 (05 Mar 2020 05:31)  HR: 89 (05 Mar 2020 05:31) (64 - 89)  BP: 124/79 (05 Mar 2020 05:31) (85/45 - 126/92)  BP(mean): 91 (05 Mar 2020 05:31) (91 - 91)  RR: 18 (05 Mar 2020 05:31) (16 - 20)  SpO2: 95% (05 Mar 2020 05:31) (95% - 100%)    PHYSICAL EXAMINATION:  GENERAL: In no apparent distress, well nourished, and hydrated.  HEAD:  Atraumatic, Normocephalic  EYES: EOMI, PERRLA, conjunctiva and sclera clear  ENMT: No tonsillar erythema, exudates, or enlargement; Moist mucous membranes, Good dentition, No lesions  NECK: Supple and normal thyroid.  No JVD or carotid bruit.  Carotid pulse is 2+ bilaterally.  HEART: Regular rate and rhythm; No murmurs, rubs, or gallops, Preneo dressing  dressing in place.  PULMONARY: Clear to auscultation and perfusion.  No rales, wheezing, or rhonchi bilaterally.  ABDOMEN: Soft, Nontender, Nondistended; Bowel sounds present  EXTREMITIES:  2+ Peripheral Pulses, No clubbing, cyanosis, or edema  LYMPH: No lymphadenopathy noted  NEUROLOGICAL: Grossly nonfocal    Telemetry:  At. Flutter with VR 90 BPM  EKG:  At Flutter with VR 87 BPM       < from: Xray Chest 1 View- PORTABLE-Urgent (03.04.20 @ 11:08) >    EXAM:  XR CHEST PORTABLE URGENT 1V                            PROCEDURE DATE:  03/04/2020          INTERPRETATION:  History: Status post ICD placement    Portable radiograph of the chest is performed. comparison is made to 3/28/2018.    Left-sided single lead AICD is now present. The lungs are clear. There is no pneumothorax. The heart is not enlarged. The trachea is midline. There is osteopenia of the bony structures.    Impression: Unremarkable status post placement of left-sided ICD.    < end of copied text >        ASSESSMENT AND PLAN  74yFemale POD#1, Status Post Single Chamber ICD, POD #1.  Pt complaining of burning on urination.  Urine Culture is positive for UTI.  Ceftin will be started.      Patient denies any complaints of chest pain, SOB, dizziness, palpitations or any significant discomfort.  Stable for Discharge from EP standpoint.     Device interrogated, normal function.  Postop instructions provided and patient understood.  Patient to follow up in EP clinic in 2 weeks, or sooner with questions and concerns.    Pt will continue with home draws for coumadin.  Next draw is in 4 days.  She has been instructed to double coumadin to 4 mgs for 2 more days and then return to 2 mgs.  Pt will avoid green leafy vegetables   Pt has appointment to return to ICD clinic in 2 weeks  She will resume all pre-procedure medications. HPI:  73 y/o female with history of At. Flutter and CM, admitted to have Single Chamber ICD. Pt is stable.  VSS.     Past Medical, Past Surgical, and Family History:  PAST MEDICAL HISTORY:  Afib     CAD (coronary artery disease), native coronary artery     CHF with cardiomyopathy 2019 EF 25%    Diverticulitis     Edema     HLD (hyperlipidemia)     HTN (hypertension)     Kidney stones     Psoriasis     Rheumatoid arthritis     Stented coronary artery BMS x3  2019    Urine incontinence     UTI (urinary tract infection).     PAST SURGICAL HISTORY:  H/O arthroscopy of shoulder left     H/O bladder repair surgery     H/O: hysterectomy due to bleeding 2016    S/P  x2.     FAMILY HISTORY:  FH: breast cancer, mother  FH: colon cancer, mother.     Social History:  · Marital Status	  · Lives With	alone      Vital Signs Last 24 Hrs  T(C): 36.6 (05 Mar 2020 05:31), Max: 36.6 (05 Mar 2020 05:31)  T(F): 97.9 (05 Mar 2020 05:31), Max: 97.9 (05 Mar 2020 05:31)  HR: 89 (05 Mar 2020 05:31) (64 - 89)  BP: 124/79 (05 Mar 2020 05:31) (85/45 - 126/92)  BP(mean): 91 (05 Mar 2020 05:31) (91 - 91)  RR: 18 (05 Mar 2020 05:31) (16 - 20)  SpO2: 95% (05 Mar 2020 05:31) (95% - 100%)    PHYSICAL EXAMINATION:  GENERAL: In no apparent distress, well nourished, and hydrated.  HEAD:  Atraumatic, Normocephalic  EYES: EOMI, PERRLA, conjunctiva and sclera clear  ENMT: No tonsillar erythema, exudates, or enlargement; Moist mucous membranes, Good dentition, No lesions  NECK: Supple and normal thyroid.  No JVD or carotid bruit.  Carotid pulse is 2+ bilaterally.  HEART: Regular rate and rhythm; No murmurs, rubs, or gallops, Preneo dressing  dressing in place.  PULMONARY: Clear to auscultation and perfusion.  No rales, wheezing, or rhonchi bilaterally.  ABDOMEN: Soft, Nontender, Nondistended; Bowel sounds present  EXTREMITIES:  2+ Peripheral Pulses, No clubbing, cyanosis, or edema  LYMPH: No lymphadenopathy noted  NEUROLOGICAL: Grossly nonfocal    Telemetry:  At. Flutter with VR 90 BPM  EKG:  At Flutter with VR 87 BPM       < from: Xray Chest 1 View- PORTABLE-Urgent (20 @ 11:08) >    EXAM:  XR CHEST PORTABLE URGENT 1V                            PROCEDURE DATE:  2020          INTERPRETATION:  History: Status post ICD placement    Portable radiograph of the chest is performed. comparison is made to 3/28/2018.    Left-sided single lead AICD is now present. The lungs are clear. There is no pneumothorax. The heart is not enlarged. The trachea is midline. There is osteopenia of the bony structures.    Impression: Unremarkable status post placement of left-sided ICD.    < end of copied text >        ASSESSMENT AND PLAN  74yFemale POD#1, Status Post Single Chamber ICD, POD #1.  Pt complaining of burning on urination.  Urine Culture is positive for UTI.  Ceftin will be started.      Patient denies any complaints of chest pain, SOB, dizziness, palpitations or any significant discomfort.  Stable for Discharge from EP standpoint.     Device interrogated, normal function.  Postop instructions provided and patient understood.  Patient to follow up in EP clinic in 2 weeks, or sooner with questions and concerns.      Pt will continue with home draws for coumadin.  Next draw is in 4 days.  .  Pt will avoid green leafy vegetables   Pt has appointment to return to ICD clinic in 2 weeks  She will resume all pre-procedure medications. HPI:  73 y/o female with history of At. Flutter and CM, admitted to have Single Chamber ICD. Pt is stable.  VSS.    PAST MEDICAL HISTORY:    Afib   CAD (coronary artery disease), native coronary artery   CHF with cardiomyopathy 2019 EF 25%  Diverticulitis   Edema   HLD (hyperlipidemia)   HTN (hypertension)   Kidney stones   Psoriasis   Rheumatoid arthritis   Stented coronary artery BMS x3  2019  Urine incontinence   UTI (urinary tract infection).     PAST SURGICAL HISTORY:  H/O arthroscopy of shoulder left   H/O bladder repair surgery   H/O: hysterectomy due to bleeding 2016  S/P  x2.     FAMILY HISTORY:  FH: breast cancer, mother  FH: colon cancer, mother     Social History:   Marital Status	   Lives With	alone      Vital Signs Last 24 Hrs  T(C): 36.6 (05 Mar 2020 05:31), Max: 36.6 (05 Mar 2020 05:31)  T(F): 97.9 (05 Mar 2020 05:31), Max: 97.9 (05 Mar 2020 05:31)  HR: 89 (05 Mar 2020 05:31) (64 - 89)  BP: 124/79 (05 Mar 2020 05:31) (85/45 - 126/92)  BP(mean): 91 (05 Mar 2020 05:31) (91 - 91)  RR: 18 (05 Mar 2020 05:31) (16 - 20)  SpO2: 95% (05 Mar 2020 05:31) (95% - 100%)    PHYSICAL EXAMINATION:  GENERAL: In no apparent distress, well nourished, and hydrated.  HEAD:  Atraumatic, Normocephalic  EYES: EOMI, PERRLA, conjunctiva and sclera clear  ENMT: No tonsillar erythema, exudates, or enlargement; Moist mucous membranes, Good dentition, No lesions  NECK: Supple and normal thyroid.  No JVD or carotid bruit.  Carotid pulse is 2+ bilaterally.  HEART: Regular rate and rhythm; No murmurs, rubs, or gallops, Preneo dressing  dressing in place.  PULMONARY: Clear to auscultation and perfusion.  No rales, wheezing, or rhonchi bilaterally.  ABDOMEN: Soft, Nontender, Nondistended; Bowel sounds present  EXTREMITIES:  2+ Peripheral Pulses, No clubbing, cyanosis, or edema  LYMPH: No lymphadenopathy noted  NEUROLOGICAL: Grossly nonfocal    Telemetry:  At. Flutter with VR 90 BPM  EKG:  At Flutter with VR 87 BPM       < from: Xray Chest 1 View- PORTABLE-Urgent (20 @ 11:08) >    EXAM:  XR CHEST PORTABLE URGENT 1V                            PROCEDURE DATE:  2020          INTERPRETATION:  History: Status post ICD placement    Portable radiograph of the chest is performed. comparison is made to 3/28/2018.    Left-sided single lead AICD is now present. The lungs are clear. There is no pneumothorax. The heart is not enlarged. The trachea is midline. There is osteopenia of the bony structures.    Impression: Unremarkable status post placement of left-sided ICD.            ASSESSMENT AND PLAN  74yFemale POD#1, Status Post Single Chamber ICD, POD #1.  Pt complaining of burning on urination.  Urine Culture is positive for UTI.  Ceftin will be started.      Patient denies any complaints of chest pain, SOB, dizziness, palpitations or any significant discomfort.  Stable for Discharge from EP standpoint.     Device interrogated, normal function.  Postop instructions provided and patient understood.  Patient to follow up in EP clinic in 2 weeks, or sooner with questions and concerns.  [employer] ICD;   R waves are 10, Vent Lead impedance 350 Ohms, Vent threshold .4v at .4 ms  Set VVI 40, VF detections on at 200 BPM    Pt will continue with home draws for coumadin.  Next draw is in 4 days.  . Pt will avoid green leafy vegetables   Pt has appointment to return to ICD clinic in 2 weeks  She will resume all pre-procedure medications.  Discharge instructions given to pt, all questions answered.  Copy of instructions given to pt.

## 2020-03-05 NOTE — DISCHARGE NOTE PROVIDER - NSDCFUSCHEDAPPT_GEN_ALL_CORE_FT
ZEKE CHAUDHARY ; 03/17/2020 ; NPP Card[facility]Elect[facility] [address] ; 04/09/2020 ; NPP [facility] [address] [facility]  ZEKE CHAUDHARY ; 04/09/2020 ; NPP IntMed [address] [facility]

## 2020-03-05 NOTE — DISCHARGE NOTE PROVIDER - CARE PROVIDER_API CALL
Mariano Storm (MD)  Cardiac Electrophysiology; Cardiovascular Disease  [address] [place] [address]  Phone: [phone]  Fax: [phone]  Follow Up Time:

## 2020-03-05 NOTE — DISCHARGE NOTE PROVIDER - NSDCMRMEDTOKEN_GEN_ALL_CORE_FT
aspirin 81 mg oral tablet: 1 tab(s) orally once a day  carvedilol 12.5 mg oral tablet: 1 tab(s) orally 2 times a day  cefuroxime 250 mg oral tablet: 1 tab(s) orally every 12 hours  Entresto 24 mg-26 mg oral tablet: 1 tab(s) orally 2 times a day  folic acid 1 mg oral tablet: 1 tab(s) orally once a day  furosemide 40 mg oral tablet: 1 tab(s) orally once a day  ipratropium-albuterol 0.5 mg-2.5 mg/3 mLinhalation solution: 3 milliliter(s) inhaled , As Needed  methotrexate 2.5 mg oral tablet: 20 milligram(s) orally once a week  Pacerone 200 mg oral tablet: 1 tab(s) orally once a day  predniSONE 5 mg oral tablet: 1 tab(s) orally once a day  simvastatin 20 mg oral tablet: 1 tab(s) orally once a day   warfarin 2 mg oral tablet: 1 tab(s) orally once a day

## 2020-03-05 NOTE — DISCHARGE NOTE NURSING/CASE MANAGEMENT/SOCIAL WORK - PATIENT PORTAL LINK FT
You can access the FollowMyHealth Patient Portal offered by [facility] by registering at the following website: [email or web address]. By joining [employer]’s FollowMyHealth portal, you will also be able to view your health information using other applications (apps) compatible with our system.

## 2020-03-05 NOTE — DISCHARGE NOTE PROVIDER - NSDCCPCAREPLAN_GEN_ALL_CORE_FT
PRINCIPAL DISCHARGE DIAGNOSIS  Diagnosis: Chronic systolic heart failure  Assessment and Plan of Treatment: Daily wieght  Encourage to be compliant with medications  Limit fluid intake to 150 ms daily

## 2020-03-05 NOTE — DISCHARGE NOTE PROVIDER - HOSPITAL COURSE
Pt is S/p Single Chamber ICD, she has and ICD from BS that is found to have normal function this am.  Plese see progree note. she is stable for discharge.

## 2020-03-09 DIAGNOSIS — I48.91 UNSPECIFIED ATRIAL FIBRILLATION: ICD-10-CM

## 2020-03-09 DIAGNOSIS — I25.10 ATHEROSCLEROTIC HEART DISEASE OF NATIVE CORONARY ARTERY WITHOUT ANGINA PECTORIS: ICD-10-CM

## 2020-03-09 DIAGNOSIS — L40.9 PSORIASIS, UNSPECIFIED: ICD-10-CM

## 2020-03-09 DIAGNOSIS — Z88.1 ALLERGY STATUS TO OTHER ANTIBIOTIC AGENTS STATUS: ICD-10-CM

## 2020-03-09 DIAGNOSIS — M06.9 RHEUMATOID ARTHRITIS, UNSPECIFIED: ICD-10-CM

## 2020-03-09 DIAGNOSIS — I10 ESSENTIAL (PRIMARY) HYPERTENSION: ICD-10-CM

## 2020-03-09 DIAGNOSIS — E78.5 HYPERLIPIDEMIA, UNSPECIFIED: ICD-10-CM

## 2020-03-09 DIAGNOSIS — Z95.5 PRESENCE OF CORONARY ANGIOPLASTY IMPLANT AND GRAFT: ICD-10-CM

## 2020-03-09 DIAGNOSIS — F17.210 NICOTINE DEPENDENCE, CIGARETTES, UNCOMPLICATED: ICD-10-CM

## 2020-03-25 PROBLEM — I25.10 ATHEROSCLEROTIC HEART DISEASE OF NATIVE CORONARY ARTERY WITHOUT ANGINA PECTORIS: Chronic | Status: ACTIVE | Noted: 2020-02-28

## 2020-03-25 PROBLEM — I50.9 HEART FAILURE, UNSPECIFIED: Chronic | Status: ACTIVE | Noted: 2020-02-28

## 2020-03-25 PROBLEM — R32 UNSPECIFIED URINARY INCONTINENCE: Chronic | Status: ACTIVE | Noted: 2020-02-28

## 2020-03-25 PROBLEM — Z95.5 PRESENCE OF CORONARY ANGIOPLASTY IMPLANT AND GRAFT: Chronic | Status: ACTIVE | Noted: 2020-02-28

## 2020-03-25 PROBLEM — M06.9 RHEUMATOID ARTHRITIS, UNSPECIFIED: Chronic | Status: ACTIVE | Noted: 2020-02-28

## 2020-04-27 PROBLEM — R19.5 OCCULT BLOOD IN STOOLS: Status: ACTIVE | Noted: 2020-01-01

## 2020-07-10 PROBLEM — Z11.59 ENCOUNTER FOR LABORATORY TESTING FOR COVID-19 VIRUS: Status: ACTIVE | Noted: 2020-01-01

## 2020-07-16 PROBLEM — I10 HYPERTENSION, ESSENTIAL: Status: ACTIVE | Noted: 2018-04-26

## 2020-07-16 PROBLEM — J47.9 BRONCHIECTASIS: Status: ACTIVE | Noted: 2018-04-26

## 2020-07-16 PROBLEM — Z87.891 FORMER SMOKER: Status: ACTIVE | Noted: 2019-05-28

## 2020-07-16 PROBLEM — I48.20 CHRONIC ATRIAL FIBRILLATION: Status: ACTIVE | Noted: 2018-06-28

## 2020-07-16 PROBLEM — E66.01 OBESITY, MORBID: Status: ACTIVE | Noted: 2018-04-26

## 2020-07-16 PROBLEM — I25.10 CORONARY ARTERY DISEASE: Status: ACTIVE | Noted: 2019-06-11

## 2020-07-16 PROBLEM — R91.8 PULMONARY NODULES: Status: ACTIVE | Noted: 2018-11-26

## 2020-07-16 PROBLEM — J43.9 BULLOUS EMPHYSEMA: Status: ACTIVE | Noted: 2018-04-26

## 2020-07-16 NOTE — HISTORY OF PRESENT ILLNESS
[de-identified] : The patient comes in today for a followup evaluation and reassessment of her pulmonary status.\par \par At this time, the patient states that she is doing fairly well from a pulmonary standpoint. She is not using any metered dose inhaler is on nebulizer treatments. She denies any significant cough, or sputum production. Her exercise capacity is somewhat limited primarily due to her cardiac issues. She denies any chest pain or hemoptysis.\par \par The patient states that she does have issues with very humid weather. At times she feels somewhat congested. She has not had that much as long she stays in an air-conditioned environment. She does complain of generalized fatigue. She continues to be followed carefully by cardiology, due to her atrial arrhythmias including atrial fibrillation and atrial flutter. She is on amiodarone. She now comes in for this assessment.

## 2020-07-16 NOTE — PHYSICAL EXAM
[No Acute Distress] : no acute distress [Well Developed] : well developed [Normal Sclera/Conjunctiva] : normal sclera/conjunctiva [EOMI] : extraocular movements intact [PERRL] : pupils equal round and reactive to light [Normal Nasal Mucosa] : the nasal mucosa was normal [Normal Outer Ear/Nose] : the outer ears and nose were normal in appearance [No JVD] : no jugular venous distention [Supple] : supple [No Lymphadenopathy] : no lymphadenopathy [No Respiratory Distress] : no respiratory distress  [Clear to Auscultation] : lungs were clear to auscultation bilaterally [No Accessory Muscle Use] : no accessory muscle use [Normal S1, S2] : normal S1 and S2 [No Extremity Clubbing/Cyanosis] : no extremity clubbing/cyanosis [No Varicosities] : no varicosities [Non Tender] : non-tender [Soft] : abdomen soft [Normal Supraclavicular Nodes] : no supraclavicular lymphadenopathy [Normal Posterior Cervical Nodes] : no posterior cervical lymphadenopathy [No Spinal Tenderness] : no spinal tenderness [Normal Anterior Cervical Nodes] : no anterior cervical lymphadenopathy [Grossly Normal Strength/Tone] : grossly normal strength/tone [No Joint Swelling] : no joint swelling [Coordination Grossly Intact] : coordination grossly intact [No Rash] : no rash [No Focal Deficits] : no focal deficits [Memory Grossly Normal] : memory grossly normal [Speech Grossly Normal] : speech grossly normal [Normal Affect] : the affect was normal [Alert and Oriented x3] : oriented to person, place, and time [Normal Mood] : the mood was normal [Normal Insight/Judgement] : insight and judgment were intact [de-identified] : obese and appears chronically ill [de-identified] : wearing glasses [de-identified] : redundant soft palate. No epistaxis [de-identified] : irreg irreg rhythm with 1/6 АННА at low LSB [de-identified] : Chronic venous stasis changes are noted with superficial varicosities present bilaterally [de-identified] : obese with left flank ecchymosis [de-identified] : multiple small ecchymoses [de-identified] : uses wheelchair. Gait is slow and wide base

## 2020-07-16 NOTE — DATA REVIEWED
[FreeTextEntry1] : A pulmonary function test is performed. Lung volumes and flow rates are within normal limits. The DLCO is significantly reduced, however, when corrected for alveolar volume, it is normal. This represents fairly normal flow rates and lung volumes.

## 2020-08-25 PROBLEM — I42.9 CARDIOMYOPATHY: Status: ACTIVE | Noted: 2019-06-06

## 2020-08-25 PROBLEM — Z95.810 AICD (AUTOMATIC CARDIOVERTER/DEFIBRILLATOR) PRESENT: Status: ACTIVE | Noted: 2020-01-01

## 2020-11-04 NOTE — H&P PST ADULT - NSICDXPASTMEDICALHX_GEN_ALL_CORE_FT
[age]
PAST MEDICAL HISTORY:  Afib     CAD (coronary artery disease), native coronary artery     CHF with cardiomyopathy 12/2019 EF 25%    Diverticulitis     Edema     HLD (hyperlipidemia)     HTN (hypertension)     Kidney stones     Psoriasis     Rheumatoid arthritis     Stented coronary artery BMS x3  june 2019    Urine incontinence     UTI (urinary tract infection)

## 2021-01-01 ENCOUNTER — APPOINTMENT (OUTPATIENT)
Dept: ELECTROPHYSIOLOGY | Facility: CLINIC | Age: 76
End: 2021-01-01
Payer: MEDICARE

## 2021-01-01 ENCOUNTER — TRANSCRIPTION ENCOUNTER (OUTPATIENT)
Age: 76
End: 2021-01-01

## 2021-01-01 ENCOUNTER — INPATIENT (INPATIENT)
Facility: HOSPITAL | Age: 76
LOS: 16 days | Discharge: HOSPICE | DRG: 542 | End: 2021-03-15
Attending: HOSPITALIST | Admitting: FAMILY MEDICINE
Payer: MEDICARE

## 2021-01-01 VITALS — OXYGEN SATURATION: 97 %

## 2021-01-01 VITALS — HEIGHT: 65 IN | WEIGHT: 210.1 LBS

## 2021-01-01 DIAGNOSIS — Z98.890 OTHER SPECIFIED POSTPROCEDURAL STATES: Chronic | ICD-10-CM

## 2021-01-01 DIAGNOSIS — M80.08XA AGE-RELATED OSTEOPOROSIS WITH CURRENT PATHOLOGICAL FRACTURE, VERTEBRA(E), INITIAL ENCOUNTER FOR FRACTURE: ICD-10-CM

## 2021-01-01 DIAGNOSIS — J43.9 EMPHYSEMA, UNSPECIFIED: ICD-10-CM

## 2021-01-01 DIAGNOSIS — I50.9 HEART FAILURE, UNSPECIFIED: ICD-10-CM

## 2021-01-01 DIAGNOSIS — R60.9 EDEMA, UNSPECIFIED: ICD-10-CM

## 2021-01-01 DIAGNOSIS — Z98.891 HISTORY OF UTERINE SCAR FROM PREVIOUS SURGERY: Chronic | ICD-10-CM

## 2021-01-01 DIAGNOSIS — I25.10 ATHEROSCLEROTIC HEART DISEASE OF NATIVE CORONARY ARTERY WITHOUT ANGINA PECTORIS: ICD-10-CM

## 2021-01-01 DIAGNOSIS — D68.9 COAGULATION DEFECT, UNSPECIFIED: ICD-10-CM

## 2021-01-01 DIAGNOSIS — I48.91 UNSPECIFIED ATRIAL FIBRILLATION: ICD-10-CM

## 2021-01-01 DIAGNOSIS — D64.9 ANEMIA, UNSPECIFIED: ICD-10-CM

## 2021-01-01 DIAGNOSIS — M06.9 RHEUMATOID ARTHRITIS, UNSPECIFIED: ICD-10-CM

## 2021-01-01 DIAGNOSIS — J96.01 ACUTE RESPIRATORY FAILURE WITH HYPOXIA: ICD-10-CM

## 2021-01-01 DIAGNOSIS — J18.9 PNEUMONIA, UNSPECIFIED ORGANISM: ICD-10-CM

## 2021-01-01 DIAGNOSIS — J96.91 RESPIRATORY FAILURE, UNSPECIFIED WITH HYPOXIA: ICD-10-CM

## 2021-01-01 LAB
A1C WITH ESTIMATED AVERAGE GLUCOSE RESULT: 6 % — HIGH (ref 4–5.6)
ADD ON TEST-SPECIMEN IN LAB: SIGNIFICANT CHANGE UP
ALBUMIN SERPL ELPH-MCNC: 1.9 G/DL — LOW (ref 3.3–5)
ALBUMIN SERPL ELPH-MCNC: 2 G/DL — LOW (ref 3.3–5)
ALBUMIN SERPL ELPH-MCNC: 2 G/DL — LOW (ref 3.3–5)
ALBUMIN SERPL ELPH-MCNC: 2.1 G/DL — LOW (ref 3.3–5)
ALBUMIN SERPL ELPH-MCNC: 2.2 G/DL — LOW (ref 3.3–5)
ALBUMIN SERPL ELPH-MCNC: 2.3 G/DL — LOW (ref 3.3–5)
ALP SERPL-CCNC: 124 U/L — HIGH (ref 40–120)
ALP SERPL-CCNC: 145 U/L — HIGH (ref 40–120)
ALP SERPL-CCNC: 146 U/L — HIGH (ref 40–120)
ALP SERPL-CCNC: 147 U/L — HIGH (ref 40–120)
ALP SERPL-CCNC: 150 U/L — HIGH (ref 40–120)
ALP SERPL-CCNC: 151 U/L — HIGH (ref 40–120)
ALP SERPL-CCNC: 153 U/L — HIGH (ref 40–120)
ALP SERPL-CCNC: 165 U/L — HIGH (ref 40–120)
ALP SERPL-CCNC: 167 U/L — HIGH (ref 40–120)
ALP SERPL-CCNC: 168 U/L — HIGH (ref 40–120)
ALP SERPL-CCNC: 180 U/L — HIGH (ref 40–120)
ALT FLD-CCNC: 103 U/L — HIGH (ref 12–78)
ALT FLD-CCNC: 124 U/L — HIGH (ref 12–78)
ALT FLD-CCNC: 133 U/L — HIGH (ref 12–78)
ALT FLD-CCNC: 17 U/L — SIGNIFICANT CHANGE UP (ref 12–78)
ALT FLD-CCNC: 17 U/L — SIGNIFICANT CHANGE UP (ref 12–78)
ALT FLD-CCNC: 43 U/L — SIGNIFICANT CHANGE UP (ref 12–78)
ALT FLD-CCNC: 51 U/L — SIGNIFICANT CHANGE UP (ref 12–78)
ALT FLD-CCNC: 63 U/L — SIGNIFICANT CHANGE UP (ref 12–78)
ALT FLD-CCNC: 63 U/L — SIGNIFICANT CHANGE UP (ref 12–78)
ALT FLD-CCNC: 73 U/L — SIGNIFICANT CHANGE UP (ref 12–78)
ALT FLD-CCNC: 84 U/L — HIGH (ref 12–78)
ANA PAT FLD IF-IMP: ABNORMAL
ANA TITR SER: ABNORMAL
ANION GAP SERPL CALC-SCNC: 4 MMOL/L — LOW (ref 5–17)
ANION GAP SERPL CALC-SCNC: 4 MMOL/L — LOW (ref 5–17)
ANION GAP SERPL CALC-SCNC: 5 MMOL/L — SIGNIFICANT CHANGE UP (ref 5–17)
ANION GAP SERPL CALC-SCNC: 6 MMOL/L — SIGNIFICANT CHANGE UP (ref 5–17)
ANION GAP SERPL CALC-SCNC: 7 MMOL/L — SIGNIFICANT CHANGE UP (ref 5–17)
ANION GAP SERPL CALC-SCNC: 7 MMOL/L — SIGNIFICANT CHANGE UP (ref 5–17)
ANION GAP SERPL CALC-SCNC: 8 MMOL/L — SIGNIFICANT CHANGE UP (ref 5–17)
ANION GAP SERPL CALC-SCNC: 9 MMOL/L — SIGNIFICANT CHANGE UP (ref 5–17)
ANION GAP SERPL CALC-SCNC: 9 MMOL/L — SIGNIFICANT CHANGE UP (ref 5–17)
APPEARANCE UR: ABNORMAL
APTT BLD: 26.4 SEC — LOW (ref 27.5–35.5)
APTT BLD: 28.9 SEC — SIGNIFICANT CHANGE UP (ref 27.5–35.5)
APTT BLD: 29.2 SEC — SIGNIFICANT CHANGE UP (ref 27.5–35.5)
APTT BLD: 30.4 SEC — SIGNIFICANT CHANGE UP (ref 27.5–35.5)
APTT BLD: 31.7 SEC — SIGNIFICANT CHANGE UP (ref 27.5–35.5)
APTT BLD: 32.3 SEC — SIGNIFICANT CHANGE UP (ref 27.5–35.5)
APTT BLD: 37.6 SEC — HIGH (ref 27.5–35.5)
AST SERPL-CCNC: 134 U/L — HIGH (ref 15–37)
AST SERPL-CCNC: 14 U/L — LOW (ref 15–37)
AST SERPL-CCNC: 16 U/L — SIGNIFICANT CHANGE UP (ref 15–37)
AST SERPL-CCNC: 17 U/L — SIGNIFICANT CHANGE UP (ref 15–37)
AST SERPL-CCNC: 18 U/L — SIGNIFICANT CHANGE UP (ref 15–37)
AST SERPL-CCNC: 21 U/L — SIGNIFICANT CHANGE UP (ref 15–37)
AST SERPL-CCNC: 25 U/L — SIGNIFICANT CHANGE UP (ref 15–37)
AST SERPL-CCNC: 29 U/L — SIGNIFICANT CHANGE UP (ref 15–37)
AST SERPL-CCNC: 49 U/L — HIGH (ref 15–37)
BASE EXCESS BLDA CALC-SCNC: -0.1 MMOL/L — SIGNIFICANT CHANGE UP (ref -2–2)
BASE EXCESS BLDA CALC-SCNC: -0.4 MMOL/L — SIGNIFICANT CHANGE UP (ref -2–2)
BASE EXCESS BLDA CALC-SCNC: 5.1 MMOL/L — HIGH (ref -2–2)
BASOPHILS # BLD AUTO: 0 K/UL — SIGNIFICANT CHANGE UP (ref 0–0.2)
BASOPHILS # BLD AUTO: 0.01 K/UL — SIGNIFICANT CHANGE UP (ref 0–0.2)
BASOPHILS # BLD AUTO: 0.01 K/UL — SIGNIFICANT CHANGE UP (ref 0–0.2)
BASOPHILS # BLD AUTO: 0.05 K/UL — SIGNIFICANT CHANGE UP (ref 0–0.2)
BASOPHILS # BLD AUTO: 0.13 K/UL — SIGNIFICANT CHANGE UP (ref 0–0.2)
BASOPHILS NFR BLD AUTO: 0 % — SIGNIFICANT CHANGE UP (ref 0–2)
BASOPHILS NFR BLD AUTO: 0.1 % — SIGNIFICANT CHANGE UP (ref 0–2)
BASOPHILS NFR BLD AUTO: 0.1 % — SIGNIFICANT CHANGE UP (ref 0–2)
BASOPHILS NFR BLD AUTO: 0.3 % — SIGNIFICANT CHANGE UP (ref 0–2)
BASOPHILS NFR BLD AUTO: 0.6 % — SIGNIFICANT CHANGE UP (ref 0–2)
BILIRUB SERPL-MCNC: 0.3 MG/DL — SIGNIFICANT CHANGE UP (ref 0.2–1.2)
BILIRUB SERPL-MCNC: 0.3 MG/DL — SIGNIFICANT CHANGE UP (ref 0.2–1.2)
BILIRUB SERPL-MCNC: 0.4 MG/DL — SIGNIFICANT CHANGE UP (ref 0.2–1.2)
BILIRUB SERPL-MCNC: 0.6 MG/DL — SIGNIFICANT CHANGE UP (ref 0.2–1.2)
BILIRUB SERPL-MCNC: 0.6 MG/DL — SIGNIFICANT CHANGE UP (ref 0.2–1.2)
BILIRUB SERPL-MCNC: 0.7 MG/DL — SIGNIFICANT CHANGE UP (ref 0.2–1.2)
BILIRUB SERPL-MCNC: 0.7 MG/DL — SIGNIFICANT CHANGE UP (ref 0.2–1.2)
BILIRUB UR-MCNC: NEGATIVE — SIGNIFICANT CHANGE UP
BLOOD GAS COMMENTS ARTERIAL: SIGNIFICANT CHANGE UP
BUN SERPL-MCNC: 21 MG/DL — SIGNIFICANT CHANGE UP (ref 7–23)
BUN SERPL-MCNC: 23 MG/DL — SIGNIFICANT CHANGE UP (ref 7–23)
BUN SERPL-MCNC: 30 MG/DL — HIGH (ref 7–23)
BUN SERPL-MCNC: 39 MG/DL — HIGH (ref 7–23)
BUN SERPL-MCNC: 46 MG/DL — HIGH (ref 7–23)
BUN SERPL-MCNC: 46 MG/DL — HIGH (ref 7–23)
BUN SERPL-MCNC: 59 MG/DL — HIGH (ref 7–23)
BUN SERPL-MCNC: 67 MG/DL — HIGH (ref 7–23)
BUN SERPL-MCNC: 69 MG/DL — HIGH (ref 7–23)
BUN SERPL-MCNC: 70 MG/DL — HIGH (ref 7–23)
BUN SERPL-MCNC: 71 MG/DL — HIGH (ref 7–23)
BUN SERPL-MCNC: 72 MG/DL — HIGH (ref 7–23)
BUN SERPL-MCNC: 75 MG/DL — HIGH (ref 7–23)
C3 SERPL-MCNC: 94 MG/DL — SIGNIFICANT CHANGE UP (ref 81–157)
C4 SERPL-MCNC: 16 MG/DL — SIGNIFICANT CHANGE UP (ref 13–39)
CALCIUM SERPL-MCNC: 8.4 MG/DL — LOW (ref 8.5–10.1)
CALCIUM SERPL-MCNC: 8.4 MG/DL — LOW (ref 8.5–10.1)
CALCIUM SERPL-MCNC: 8.5 MG/DL — SIGNIFICANT CHANGE UP (ref 8.5–10.1)
CALCIUM SERPL-MCNC: 8.6 MG/DL — SIGNIFICANT CHANGE UP (ref 8.5–10.1)
CALCIUM SERPL-MCNC: 8.7 MG/DL — SIGNIFICANT CHANGE UP (ref 8.5–10.1)
CALCIUM SERPL-MCNC: 8.7 MG/DL — SIGNIFICANT CHANGE UP (ref 8.5–10.1)
CALCIUM SERPL-MCNC: 8.8 MG/DL — SIGNIFICANT CHANGE UP (ref 8.5–10.1)
CALCIUM SERPL-MCNC: 8.9 MG/DL — SIGNIFICANT CHANGE UP (ref 8.5–10.1)
CALCIUM SERPL-MCNC: 8.9 MG/DL — SIGNIFICANT CHANGE UP (ref 8.5–10.1)
CALCIUM SERPL-MCNC: 9.1 MG/DL — SIGNIFICANT CHANGE UP (ref 8.5–10.1)
CALCIUM SERPL-MCNC: 9.2 MG/DL — SIGNIFICANT CHANGE UP (ref 8.5–10.1)
CHLORIDE SERPL-SCNC: 101 MMOL/L — SIGNIFICANT CHANGE UP (ref 96–108)
CHLORIDE SERPL-SCNC: 104 MMOL/L — SIGNIFICANT CHANGE UP (ref 96–108)
CHLORIDE SERPL-SCNC: 105 MMOL/L — SIGNIFICANT CHANGE UP (ref 96–108)
CHLORIDE SERPL-SCNC: 106 MMOL/L — SIGNIFICANT CHANGE UP (ref 96–108)
CHLORIDE SERPL-SCNC: 107 MMOL/L — SIGNIFICANT CHANGE UP (ref 96–108)
CHLORIDE SERPL-SCNC: 108 MMOL/L — SIGNIFICANT CHANGE UP (ref 96–108)
CHLORIDE SERPL-SCNC: 109 MMOL/L — HIGH (ref 96–108)
CHLORIDE SERPL-SCNC: 110 MMOL/L — HIGH (ref 96–108)
CHLORIDE SERPL-SCNC: 112 MMOL/L — HIGH (ref 96–108)
CHLORIDE SERPL-SCNC: 113 MMOL/L — HIGH (ref 96–108)
CHLORIDE SERPL-SCNC: 114 MMOL/L — HIGH (ref 96–108)
CHLORIDE SERPL-SCNC: 114 MMOL/L — HIGH (ref 96–108)
CHLORIDE SERPL-SCNC: 115 MMOL/L — HIGH (ref 96–108)
CO2 SERPL-SCNC: 22 MMOL/L — SIGNIFICANT CHANGE UP (ref 22–31)
CO2 SERPL-SCNC: 23 MMOL/L — SIGNIFICANT CHANGE UP (ref 22–31)
CO2 SERPL-SCNC: 23 MMOL/L — SIGNIFICANT CHANGE UP (ref 22–31)
CO2 SERPL-SCNC: 24 MMOL/L — SIGNIFICANT CHANGE UP (ref 22–31)
CO2 SERPL-SCNC: 24 MMOL/L — SIGNIFICANT CHANGE UP (ref 22–31)
CO2 SERPL-SCNC: 25 MMOL/L — SIGNIFICANT CHANGE UP (ref 22–31)
CO2 SERPL-SCNC: 27 MMOL/L — SIGNIFICANT CHANGE UP (ref 22–31)
CO2 SERPL-SCNC: 27 MMOL/L — SIGNIFICANT CHANGE UP (ref 22–31)
CO2 SERPL-SCNC: 28 MMOL/L — SIGNIFICANT CHANGE UP (ref 22–31)
CO2 SERPL-SCNC: 28 MMOL/L — SIGNIFICANT CHANGE UP (ref 22–31)
CO2 SERPL-SCNC: 29 MMOL/L — SIGNIFICANT CHANGE UP (ref 22–31)
CO2 SERPL-SCNC: 30 MMOL/L — SIGNIFICANT CHANGE UP (ref 22–31)
CO2 SERPL-SCNC: 32 MMOL/L — HIGH (ref 22–31)
COLOR SPEC: YELLOW — SIGNIFICANT CHANGE UP
CREAT ?TM UR-MCNC: 67 MG/DL — SIGNIFICANT CHANGE UP
CREAT ?TM UR-MCNC: 68 MG/DL — SIGNIFICANT CHANGE UP
CREAT SERPL-MCNC: 0.92 MG/DL — SIGNIFICANT CHANGE UP (ref 0.5–1.3)
CREAT SERPL-MCNC: 0.95 MG/DL — SIGNIFICANT CHANGE UP (ref 0.5–1.3)
CREAT SERPL-MCNC: 1.04 MG/DL — SIGNIFICANT CHANGE UP (ref 0.5–1.3)
CREAT SERPL-MCNC: 1.05 MG/DL — SIGNIFICANT CHANGE UP (ref 0.5–1.3)
CREAT SERPL-MCNC: 1.08 MG/DL — SIGNIFICANT CHANGE UP (ref 0.5–1.3)
CREAT SERPL-MCNC: 1.16 MG/DL — SIGNIFICANT CHANGE UP (ref 0.5–1.3)
CREAT SERPL-MCNC: 1.25 MG/DL — SIGNIFICANT CHANGE UP (ref 0.5–1.3)
CREAT SERPL-MCNC: 1.25 MG/DL — SIGNIFICANT CHANGE UP (ref 0.5–1.3)
CREAT SERPL-MCNC: 1.31 MG/DL — HIGH (ref 0.5–1.3)
CREAT SERPL-MCNC: 1.32 MG/DL — HIGH (ref 0.5–1.3)
CREAT SERPL-MCNC: 1.43 MG/DL — HIGH (ref 0.5–1.3)
CREAT SERPL-MCNC: 1.5 MG/DL — HIGH (ref 0.5–1.3)
CREAT SERPL-MCNC: 1.63 MG/DL — HIGH (ref 0.5–1.3)
CREAT SERPL-MCNC: 1.85 MG/DL — HIGH (ref 0.5–1.3)
CREAT SERPL-MCNC: 1.86 MG/DL — HIGH (ref 0.5–1.3)
CRP SERPL-MCNC: 17 MG/L — HIGH
D DIMER BLD IA.RAPID-MCNC: 256 NG/ML DDU — HIGH
DIFF PNL FLD: ABNORMAL
EOSINOPHIL # BLD AUTO: 0 K/UL — SIGNIFICANT CHANGE UP (ref 0–0.5)
EOSINOPHIL # BLD AUTO: 0.01 K/UL — SIGNIFICANT CHANGE UP (ref 0–0.5)
EOSINOPHIL # BLD AUTO: 0.02 K/UL — SIGNIFICANT CHANGE UP (ref 0–0.5)
EOSINOPHIL NFR BLD AUTO: 0 % — SIGNIFICANT CHANGE UP (ref 0–6)
EOSINOPHIL NFR BLD AUTO: 0.1 % — SIGNIFICANT CHANGE UP (ref 0–6)
EOSINOPHIL NFR BLD AUTO: 0.2 % — SIGNIFICANT CHANGE UP (ref 0–6)
EOSINOPHIL NFR URNS MANUAL: NEGATIVE — SIGNIFICANT CHANGE UP
ESTIMATED AVERAGE GLUCOSE: 126 MG/DL — HIGH (ref 68–114)
FERRITIN SERPL-MCNC: 301 NG/ML — HIGH (ref 15–150)
GAS PNL BLDA: SIGNIFICANT CHANGE UP
GAS PNL BLDA: SIGNIFICANT CHANGE UP
GBM IGG SER-ACNC: 3 — SIGNIFICANT CHANGE UP (ref 0–20)
GLUCOSE SERPL-MCNC: 107 MG/DL — HIGH (ref 70–99)
GLUCOSE SERPL-MCNC: 118 MG/DL — HIGH (ref 70–99)
GLUCOSE SERPL-MCNC: 131 MG/DL — HIGH (ref 70–99)
GLUCOSE SERPL-MCNC: 134 MG/DL — HIGH (ref 70–99)
GLUCOSE SERPL-MCNC: 134 MG/DL — HIGH (ref 70–99)
GLUCOSE SERPL-MCNC: 140 MG/DL — HIGH (ref 70–99)
GLUCOSE SERPL-MCNC: 147 MG/DL — HIGH (ref 70–99)
GLUCOSE SERPL-MCNC: 154 MG/DL — HIGH (ref 70–99)
GLUCOSE SERPL-MCNC: 155 MG/DL — HIGH (ref 70–99)
GLUCOSE SERPL-MCNC: 161 MG/DL — HIGH (ref 70–99)
GLUCOSE SERPL-MCNC: 173 MG/DL — HIGH (ref 70–99)
GLUCOSE SERPL-MCNC: 181 MG/DL — HIGH (ref 70–99)
GLUCOSE SERPL-MCNC: 182 MG/DL — HIGH (ref 70–99)
GLUCOSE SERPL-MCNC: 200 MG/DL — HIGH (ref 70–99)
GLUCOSE SERPL-MCNC: 242 MG/DL — HIGH (ref 70–99)
GLUCOSE UR QL: NEGATIVE MG/DL — SIGNIFICANT CHANGE UP
HCO3 BLDA-SCNC: 23 MMOL/L — SIGNIFICANT CHANGE UP (ref 21–29)
HCO3 BLDA-SCNC: 23 MMOL/L — SIGNIFICANT CHANGE UP (ref 21–29)
HCO3 BLDA-SCNC: 29 MMOL/L — SIGNIFICANT CHANGE UP (ref 21–29)
HCT VFR BLD CALC: 30.8 % — LOW (ref 34.5–45)
HCT VFR BLD CALC: 33.6 % — LOW (ref 34.5–45)
HCT VFR BLD CALC: 34.6 % — SIGNIFICANT CHANGE UP (ref 34.5–45)
HCT VFR BLD CALC: 35.8 % — SIGNIFICANT CHANGE UP (ref 34.5–45)
HCT VFR BLD CALC: 36 % — SIGNIFICANT CHANGE UP (ref 34.5–45)
HCT VFR BLD CALC: 36.1 % — SIGNIFICANT CHANGE UP (ref 34.5–45)
HCT VFR BLD CALC: 36.1 % — SIGNIFICANT CHANGE UP (ref 34.5–45)
HCT VFR BLD CALC: 36.4 % — SIGNIFICANT CHANGE UP (ref 34.5–45)
HCT VFR BLD CALC: 37.8 % — SIGNIFICANT CHANGE UP (ref 34.5–45)
HCT VFR BLD CALC: 37.9 % — SIGNIFICANT CHANGE UP (ref 34.5–45)
HCT VFR BLD CALC: 38.1 % — SIGNIFICANT CHANGE UP (ref 34.5–45)
HCT VFR BLD CALC: 39.6 % — SIGNIFICANT CHANGE UP (ref 34.5–45)
HCT VFR BLD CALC: 40 % — SIGNIFICANT CHANGE UP (ref 34.5–45)
HCT VFR BLD CALC: 40 % — SIGNIFICANT CHANGE UP (ref 34.5–45)
HGB BLD-MCNC: 10.5 G/DL — LOW (ref 11.5–15.5)
HGB BLD-MCNC: 10.7 G/DL — LOW (ref 11.5–15.5)
HGB BLD-MCNC: 11.2 G/DL — LOW (ref 11.5–15.5)
HGB BLD-MCNC: 11.3 G/DL — LOW (ref 11.5–15.5)
HGB BLD-MCNC: 11.3 G/DL — LOW (ref 11.5–15.5)
HGB BLD-MCNC: 11.4 G/DL — LOW (ref 11.5–15.5)
HGB BLD-MCNC: 11.5 G/DL — SIGNIFICANT CHANGE UP (ref 11.5–15.5)
HGB BLD-MCNC: 11.7 G/DL — SIGNIFICANT CHANGE UP (ref 11.5–15.5)
HGB BLD-MCNC: 11.9 G/DL — SIGNIFICANT CHANGE UP (ref 11.5–15.5)
HGB BLD-MCNC: 11.9 G/DL — SIGNIFICANT CHANGE UP (ref 11.5–15.5)
HGB BLD-MCNC: 12.3 G/DL — SIGNIFICANT CHANGE UP (ref 11.5–15.5)
HGB BLD-MCNC: 12.3 G/DL — SIGNIFICANT CHANGE UP (ref 11.5–15.5)
HGB BLD-MCNC: 12.6 G/DL — SIGNIFICANT CHANGE UP (ref 11.5–15.5)
HGB BLD-MCNC: 9.5 G/DL — LOW (ref 11.5–15.5)
IMM GRANULOCYTES NFR BLD AUTO: 1.2 % — SIGNIFICANT CHANGE UP (ref 0–1.5)
IMM GRANULOCYTES NFR BLD AUTO: 1.3 % — SIGNIFICANT CHANGE UP (ref 0–1.5)
IMM GRANULOCYTES NFR BLD AUTO: 1.9 % — HIGH (ref 0–1.5)
IMM GRANULOCYTES NFR BLD AUTO: 5.2 % — HIGH (ref 0–1.5)
IMM GRANULOCYTES NFR BLD AUTO: 5.5 % — HIGH (ref 0–1.5)
INR BLD: 1.75 RATIO — HIGH (ref 0.88–1.16)
INR BLD: 1.77 RATIO — HIGH (ref 0.88–1.16)
INR BLD: 1.99 RATIO — HIGH (ref 0.88–1.16)
INR BLD: 2.03 RATIO — HIGH (ref 0.88–1.16)
INR BLD: 2.07 RATIO — HIGH (ref 0.88–1.16)
INR BLD: 2.25 RATIO — HIGH (ref 0.88–1.16)
INR BLD: 2.47 RATIO — HIGH (ref 0.88–1.16)
INR BLD: 2.6 RATIO — HIGH (ref 0.88–1.16)
INR BLD: 2.7 RATIO — HIGH (ref 0.88–1.16)
INR BLD: 3.01 RATIO — HIGH (ref 0.88–1.16)
INR BLD: 3.12 RATIO — HIGH (ref 0.88–1.16)
INR BLD: 3.28 RATIO — HIGH (ref 0.88–1.16)
INR BLD: 4.26 RATIO — HIGH (ref 0.88–1.16)
INR BLD: 4.45 RATIO — HIGH (ref 0.88–1.16)
INR BLD: 5.64 RATIO — CRITICAL HIGH (ref 0.88–1.16)
INR BLD: 6.2 RATIO — CRITICAL HIGH (ref 0.88–1.16)
KETONES UR-MCNC: NEGATIVE — SIGNIFICANT CHANGE UP
LDH SERPL L TO P-CCNC: 443 U/L — HIGH (ref 84–241)
LEUKOCYTE ESTERASE UR-ACNC: ABNORMAL
LYMPHOCYTES # BLD AUTO: 0 % — LOW (ref 13–44)
LYMPHOCYTES # BLD AUTO: 0 K/UL — LOW (ref 1–3.3)
LYMPHOCYTES # BLD AUTO: 0.12 K/UL — LOW (ref 1–3.3)
LYMPHOCYTES # BLD AUTO: 0.13 K/UL — LOW (ref 1–3.3)
LYMPHOCYTES # BLD AUTO: 0.14 K/UL — LOW (ref 1–3.3)
LYMPHOCYTES # BLD AUTO: 0.21 K/UL — LOW (ref 1–3.3)
LYMPHOCYTES # BLD AUTO: 0.21 K/UL — LOW (ref 1–3.3)
LYMPHOCYTES # BLD AUTO: 0.31 K/UL — LOW (ref 1–3.3)
LYMPHOCYTES # BLD AUTO: 0.37 K/UL — LOW (ref 1–3.3)
LYMPHOCYTES # BLD AUTO: 1 % — LOW (ref 13–44)
LYMPHOCYTES # BLD AUTO: 1.1 % — LOW (ref 13–44)
LYMPHOCYTES # BLD AUTO: 1.4 % — LOW (ref 13–44)
LYMPHOCYTES # BLD AUTO: 1.5 % — LOW (ref 13–44)
LYMPHOCYTES # BLD AUTO: 1.6 % — LOW (ref 13–44)
LYMPHOCYTES # BLD AUTO: 1.6 % — LOW (ref 13–44)
LYMPHOCYTES # BLD AUTO: 4 % — LOW (ref 13–44)
MAGNESIUM SERPL-MCNC: 1.9 MG/DL — SIGNIFICANT CHANGE UP (ref 1.6–2.6)
MAGNESIUM SERPL-MCNC: 2.1 MG/DL — SIGNIFICANT CHANGE UP (ref 1.6–2.6)
MAGNESIUM SERPL-MCNC: 2.1 MG/DL — SIGNIFICANT CHANGE UP (ref 1.6–2.6)
MCHC RBC-ENTMCNC: 30.7 PG — SIGNIFICANT CHANGE UP (ref 27–34)
MCHC RBC-ENTMCNC: 30.7 PG — SIGNIFICANT CHANGE UP (ref 27–34)
MCHC RBC-ENTMCNC: 30.8 GM/DL — LOW (ref 32–36)
MCHC RBC-ENTMCNC: 30.8 GM/DL — LOW (ref 32–36)
MCHC RBC-ENTMCNC: 30.8 PG — SIGNIFICANT CHANGE UP (ref 27–34)
MCHC RBC-ENTMCNC: 30.9 GM/DL — LOW (ref 32–36)
MCHC RBC-ENTMCNC: 30.9 PG — SIGNIFICANT CHANGE UP (ref 27–34)
MCHC RBC-ENTMCNC: 30.9 PG — SIGNIFICANT CHANGE UP (ref 27–34)
MCHC RBC-ENTMCNC: 31 GM/DL — LOW (ref 32–36)
MCHC RBC-ENTMCNC: 31.1 GM/DL — LOW (ref 32–36)
MCHC RBC-ENTMCNC: 31.1 PG — SIGNIFICANT CHANGE UP (ref 27–34)
MCHC RBC-ENTMCNC: 31.1 PG — SIGNIFICANT CHANGE UP (ref 27–34)
MCHC RBC-ENTMCNC: 31.2 GM/DL — LOW (ref 32–36)
MCHC RBC-ENTMCNC: 31.2 PG — SIGNIFICANT CHANGE UP (ref 27–34)
MCHC RBC-ENTMCNC: 31.3 GM/DL — LOW (ref 32–36)
MCHC RBC-ENTMCNC: 31.3 GM/DL — LOW (ref 32–36)
MCHC RBC-ENTMCNC: 31.3 PG — SIGNIFICANT CHANGE UP (ref 27–34)
MCHC RBC-ENTMCNC: 31.3 PG — SIGNIFICANT CHANGE UP (ref 27–34)
MCHC RBC-ENTMCNC: 31.4 GM/DL — LOW (ref 32–36)
MCHC RBC-ENTMCNC: 31.5 GM/DL — LOW (ref 32–36)
MCHC RBC-ENTMCNC: 31.8 GM/DL — LOW (ref 32–36)
MCHC RBC-ENTMCNC: 31.9 GM/DL — LOW (ref 32–36)
MCV RBC AUTO: 100 FL — SIGNIFICANT CHANGE UP (ref 80–100)
MCV RBC AUTO: 100.6 FL — HIGH (ref 80–100)
MCV RBC AUTO: 101.8 FL — HIGH (ref 80–100)
MCV RBC AUTO: 97.8 FL — SIGNIFICANT CHANGE UP (ref 80–100)
MCV RBC AUTO: 98.1 FL — SIGNIFICANT CHANGE UP (ref 80–100)
MCV RBC AUTO: 98.4 FL — SIGNIFICANT CHANGE UP (ref 80–100)
MCV RBC AUTO: 98.4 FL — SIGNIFICANT CHANGE UP (ref 80–100)
MCV RBC AUTO: 98.8 FL — SIGNIFICANT CHANGE UP (ref 80–100)
MCV RBC AUTO: 99 FL — SIGNIFICANT CHANGE UP (ref 80–100)
MCV RBC AUTO: 99.2 FL — SIGNIFICANT CHANGE UP (ref 80–100)
MCV RBC AUTO: 99.4 FL — SIGNIFICANT CHANGE UP (ref 80–100)
MCV RBC AUTO: 99.7 FL — SIGNIFICANT CHANGE UP (ref 80–100)
MONOCYTES # BLD AUTO: 0.12 K/UL — SIGNIFICANT CHANGE UP (ref 0–0.9)
MONOCYTES # BLD AUTO: 0.21 K/UL — SIGNIFICANT CHANGE UP (ref 0–0.9)
MONOCYTES # BLD AUTO: 0.21 K/UL — SIGNIFICANT CHANGE UP (ref 0–0.9)
MONOCYTES # BLD AUTO: 0.44 K/UL — SIGNIFICANT CHANGE UP (ref 0–0.9)
MONOCYTES # BLD AUTO: 0.59 K/UL — SIGNIFICANT CHANGE UP (ref 0–0.9)
MONOCYTES # BLD AUTO: 0.61 K/UL — SIGNIFICANT CHANGE UP (ref 0–0.9)
MONOCYTES # BLD AUTO: 0.67 K/UL — SIGNIFICANT CHANGE UP (ref 0–0.9)
MONOCYTES # BLD AUTO: 0.69 K/UL — SIGNIFICANT CHANGE UP (ref 0–0.9)
MONOCYTES NFR BLD AUTO: 1 % — LOW (ref 2–14)
MONOCYTES NFR BLD AUTO: 1.4 % — LOW (ref 2–14)
MONOCYTES NFR BLD AUTO: 2.6 % — SIGNIFICANT CHANGE UP (ref 2–14)
MONOCYTES NFR BLD AUTO: 3 % — SIGNIFICANT CHANGE UP (ref 2–14)
MONOCYTES NFR BLD AUTO: 3.4 % — SIGNIFICANT CHANGE UP (ref 2–14)
MONOCYTES NFR BLD AUTO: 4 % — SIGNIFICANT CHANGE UP (ref 2–14)
MONOCYTES NFR BLD AUTO: 4.3 % — SIGNIFICANT CHANGE UP (ref 2–14)
MONOCYTES NFR BLD AUTO: 5.2 % — SIGNIFICANT CHANGE UP (ref 2–14)
MPO AB + PR3 PNL SER: SIGNIFICANT CHANGE UP
NEUTROPHILS # BLD AUTO: 10.4 K/UL — HIGH (ref 1.8–7.4)
NEUTROPHILS # BLD AUTO: 17.41 K/UL — HIGH (ref 1.8–7.4)
NEUTROPHILS # BLD AUTO: 20.38 K/UL — HIGH (ref 1.8–7.4)
NEUTROPHILS # BLD AUTO: 21.03 K/UL — HIGH (ref 1.8–7.4)
NEUTROPHILS # BLD AUTO: 21.58 K/UL — HIGH (ref 1.8–7.4)
NEUTROPHILS # BLD AUTO: 4.89 K/UL — SIGNIFICANT CHANGE UP (ref 1.8–7.4)
NEUTROPHILS # BLD AUTO: 8.19 K/UL — HIGH (ref 1.8–7.4)
NEUTROPHILS # BLD AUTO: 9.45 K/UL — HIGH (ref 1.8–7.4)
NEUTROPHILS NFR BLD AUTO: 89.5 % — HIGH (ref 43–77)
NEUTROPHILS NFR BLD AUTO: 89.7 % — HIGH (ref 43–77)
NEUTROPHILS NFR BLD AUTO: 91.6 % — HIGH (ref 43–77)
NEUTROPHILS NFR BLD AUTO: 92 % — HIGH (ref 43–77)
NEUTROPHILS NFR BLD AUTO: 93 % — HIGH (ref 43–77)
NEUTROPHILS NFR BLD AUTO: 94 % — HIGH (ref 43–77)
NEUTROPHILS NFR BLD AUTO: 95.6 % — HIGH (ref 43–77)
NEUTROPHILS NFR BLD AUTO: 97 % — HIGH (ref 43–77)
NITRITE UR-MCNC: NEGATIVE — SIGNIFICANT CHANGE UP
NRBC # BLD: SIGNIFICANT CHANGE UP /100 WBCS (ref 0–0)
PCO2 BLDA: 35 MMHG — SIGNIFICANT CHANGE UP (ref 32–46)
PCO2 BLDA: 37 MMHG — SIGNIFICANT CHANGE UP (ref 32–46)
PCO2 BLDA: 39 MMHG — SIGNIFICANT CHANGE UP (ref 32–46)
PH BLDA: 7.42 — SIGNIFICANT CHANGE UP (ref 7.35–7.45)
PH BLDA: 7.44 — SIGNIFICANT CHANGE UP (ref 7.35–7.45)
PH BLDA: 7.48 — HIGH (ref 7.35–7.45)
PH UR: 5 — SIGNIFICANT CHANGE UP (ref 5–8)
PHOSPHATE SERPL-MCNC: 2.5 MG/DL — SIGNIFICANT CHANGE UP (ref 2.5–4.5)
PHOSPHATE SERPL-MCNC: 2.7 MG/DL — SIGNIFICANT CHANGE UP (ref 2.5–4.5)
PHOSPHATE SERPL-MCNC: 2.8 MG/DL — SIGNIFICANT CHANGE UP (ref 2.5–4.5)
PLATELET # BLD AUTO: 165 K/UL — SIGNIFICANT CHANGE UP (ref 150–400)
PLATELET # BLD AUTO: 165 K/UL — SIGNIFICANT CHANGE UP (ref 150–400)
PLATELET # BLD AUTO: 169 K/UL — SIGNIFICANT CHANGE UP (ref 150–400)
PLATELET # BLD AUTO: 179 K/UL — SIGNIFICANT CHANGE UP (ref 150–400)
PLATELET # BLD AUTO: 192 K/UL — SIGNIFICANT CHANGE UP (ref 150–400)
PLATELET # BLD AUTO: 195 K/UL — SIGNIFICANT CHANGE UP (ref 150–400)
PLATELET # BLD AUTO: 213 K/UL — SIGNIFICANT CHANGE UP (ref 150–400)
PLATELET # BLD AUTO: 224 K/UL — SIGNIFICANT CHANGE UP (ref 150–400)
PLATELET # BLD AUTO: 224 K/UL — SIGNIFICANT CHANGE UP (ref 150–400)
PLATELET # BLD AUTO: 229 K/UL — SIGNIFICANT CHANGE UP (ref 150–400)
PLATELET # BLD AUTO: 238 K/UL — SIGNIFICANT CHANGE UP (ref 150–400)
PLATELET # BLD AUTO: 241 K/UL — SIGNIFICANT CHANGE UP (ref 150–400)
PLATELET # BLD AUTO: 269 K/UL — SIGNIFICANT CHANGE UP (ref 150–400)
PLATELET # BLD AUTO: 270 K/UL — SIGNIFICANT CHANGE UP (ref 150–400)
PO2 BLDA: 113 MMHG — HIGH (ref 74–108)
PO2 BLDA: 54 MMHG — LOW (ref 74–108)
PO2 BLDA: 74 MMHG — SIGNIFICANT CHANGE UP (ref 74–108)
POTASSIUM SERPL-MCNC: 3.5 MMOL/L — SIGNIFICANT CHANGE UP (ref 3.5–5.3)
POTASSIUM SERPL-MCNC: 3.6 MMOL/L — SIGNIFICANT CHANGE UP (ref 3.5–5.3)
POTASSIUM SERPL-MCNC: 3.7 MMOL/L — SIGNIFICANT CHANGE UP (ref 3.5–5.3)
POTASSIUM SERPL-MCNC: 3.8 MMOL/L — SIGNIFICANT CHANGE UP (ref 3.5–5.3)
POTASSIUM SERPL-MCNC: 4.1 MMOL/L — SIGNIFICANT CHANGE UP (ref 3.5–5.3)
POTASSIUM SERPL-MCNC: 4.5 MMOL/L — SIGNIFICANT CHANGE UP (ref 3.5–5.3)
POTASSIUM SERPL-MCNC: 4.6 MMOL/L — SIGNIFICANT CHANGE UP (ref 3.5–5.3)
POTASSIUM SERPL-MCNC: 4.7 MMOL/L — SIGNIFICANT CHANGE UP (ref 3.5–5.3)
POTASSIUM SERPL-MCNC: 4.9 MMOL/L — SIGNIFICANT CHANGE UP (ref 3.5–5.3)
POTASSIUM SERPL-MCNC: 5 MMOL/L — SIGNIFICANT CHANGE UP (ref 3.5–5.3)
POTASSIUM SERPL-MCNC: 5.5 MMOL/L — HIGH (ref 3.5–5.3)
POTASSIUM SERPL-MCNC: 5.7 MMOL/L — HIGH (ref 3.5–5.3)
POTASSIUM SERPL-MCNC: 5.8 MMOL/L — HIGH (ref 3.5–5.3)
POTASSIUM SERPL-MCNC: 5.9 MMOL/L — HIGH (ref 3.5–5.3)
POTASSIUM SERPL-SCNC: 3.5 MMOL/L — SIGNIFICANT CHANGE UP (ref 3.5–5.3)
POTASSIUM SERPL-SCNC: 3.6 MMOL/L — SIGNIFICANT CHANGE UP (ref 3.5–5.3)
POTASSIUM SERPL-SCNC: 3.7 MMOL/L — SIGNIFICANT CHANGE UP (ref 3.5–5.3)
POTASSIUM SERPL-SCNC: 3.8 MMOL/L — SIGNIFICANT CHANGE UP (ref 3.5–5.3)
POTASSIUM SERPL-SCNC: 4.1 MMOL/L — SIGNIFICANT CHANGE UP (ref 3.5–5.3)
POTASSIUM SERPL-SCNC: 4.5 MMOL/L — SIGNIFICANT CHANGE UP (ref 3.5–5.3)
POTASSIUM SERPL-SCNC: 4.6 MMOL/L — SIGNIFICANT CHANGE UP (ref 3.5–5.3)
POTASSIUM SERPL-SCNC: 4.7 MMOL/L — SIGNIFICANT CHANGE UP (ref 3.5–5.3)
POTASSIUM SERPL-SCNC: 4.9 MMOL/L — SIGNIFICANT CHANGE UP (ref 3.5–5.3)
POTASSIUM SERPL-SCNC: 5 MMOL/L — SIGNIFICANT CHANGE UP (ref 3.5–5.3)
POTASSIUM SERPL-SCNC: 5.5 MMOL/L — HIGH (ref 3.5–5.3)
POTASSIUM SERPL-SCNC: 5.7 MMOL/L — HIGH (ref 3.5–5.3)
POTASSIUM SERPL-SCNC: 5.8 MMOL/L — HIGH (ref 3.5–5.3)
POTASSIUM SERPL-SCNC: 5.9 MMOL/L — HIGH (ref 3.5–5.3)
POTASSIUM UR-SCNC: 53 MMOL/L — SIGNIFICANT CHANGE UP
PROT ?TM UR-MCNC: 84 MG/DL — HIGH (ref 0–12)
PROT SERPL-MCNC: 5.6 GM/DL — LOW (ref 6–8.3)
PROT SERPL-MCNC: 5.7 GM/DL — LOW (ref 6–8.3)
PROT SERPL-MCNC: 5.8 GM/DL — LOW (ref 6–8.3)
PROT SERPL-MCNC: 5.8 GM/DL — LOW (ref 6–8.3)
PROT SERPL-MCNC: 5.9 GM/DL — LOW (ref 6–8.3)
PROT SERPL-MCNC: 5.9 GM/DL — LOW (ref 6–8.3)
PROT SERPL-MCNC: 6 GM/DL — SIGNIFICANT CHANGE UP (ref 6–8.3)
PROT SERPL-MCNC: 6.1 GM/DL — SIGNIFICANT CHANGE UP (ref 6–8.3)
PROT SERPL-MCNC: 6.2 GM/DL — SIGNIFICANT CHANGE UP (ref 6–8.3)
PROT UR-MCNC: 30 MG/DL
PROT/CREAT UR-RTO: 1.3 RATIO — HIGH (ref 0–0.2)
PROTHROM AB SERPL-ACNC: 19.8 SEC — HIGH (ref 10.6–13.6)
PROTHROM AB SERPL-ACNC: 20.1 SEC — HIGH (ref 10.6–13.6)
PROTHROM AB SERPL-ACNC: 22.5 SEC — HIGH (ref 10.6–13.6)
PROTHROM AB SERPL-ACNC: 22.8 SEC — HIGH (ref 10.6–13.6)
PROTHROM AB SERPL-ACNC: 23.4 SEC — HIGH (ref 10.6–13.6)
PROTHROM AB SERPL-ACNC: 25.1 SEC — HIGH (ref 10.6–13.6)
PROTHROM AB SERPL-ACNC: 27.4 SEC — HIGH (ref 10.6–13.6)
PROTHROM AB SERPL-ACNC: 28.8 SEC — HIGH (ref 10.6–13.6)
PROTHROM AB SERPL-ACNC: 30.1 SEC — HIGH (ref 10.6–13.6)
PROTHROM AB SERPL-ACNC: 33.1 SEC — HIGH (ref 10.6–13.6)
PROTHROM AB SERPL-ACNC: 34.6 SEC — HIGH (ref 10.6–13.6)
PROTHROM AB SERPL-ACNC: 36.3 SEC — HIGH (ref 10.6–13.6)
PROTHROM AB SERPL-ACNC: 46.1 SEC — HIGH (ref 10.6–13.6)
PROTHROM AB SERPL-ACNC: 48.5 SEC — HIGH (ref 10.6–13.6)
PROTHROM AB SERPL-ACNC: 60.8 SEC — HIGH (ref 10.6–13.6)
PROTHROM AB SERPL-ACNC: 65.9 SEC — HIGH (ref 10.6–13.6)
RBC # BLD: 3.08 M/UL — LOW (ref 3.8–5.2)
RBC # BLD: 3.38 M/UL — LOW (ref 3.8–5.2)
RBC # BLD: 3.47 M/UL — LOW (ref 3.8–5.2)
RBC # BLD: 3.59 M/UL — LOW (ref 3.8–5.2)
RBC # BLD: 3.62 M/UL — LOW (ref 3.8–5.2)
RBC # BLD: 3.66 M/UL — LOW (ref 3.8–5.2)
RBC # BLD: 3.67 M/UL — LOW (ref 3.8–5.2)
RBC # BLD: 3.67 M/UL — LOW (ref 3.8–5.2)
RBC # BLD: 3.79 M/UL — LOW (ref 3.8–5.2)
RBC # BLD: 3.85 M/UL — SIGNIFICANT CHANGE UP (ref 3.8–5.2)
RBC # BLD: 3.87 M/UL — SIGNIFICANT CHANGE UP (ref 3.8–5.2)
RBC # BLD: 3.93 M/UL — SIGNIFICANT CHANGE UP (ref 3.8–5.2)
RBC # BLD: 4.01 M/UL — SIGNIFICANT CHANGE UP (ref 3.8–5.2)
RBC # BLD: 4.04 M/UL — SIGNIFICANT CHANGE UP (ref 3.8–5.2)
RBC # FLD: 18.9 % — HIGH (ref 10.3–14.5)
RBC # FLD: 19.1 % — HIGH (ref 10.3–14.5)
RBC # FLD: 19.2 % — HIGH (ref 10.3–14.5)
RBC # FLD: 19.4 % — HIGH (ref 10.3–14.5)
RBC # FLD: 19.7 % — HIGH (ref 10.3–14.5)
RBC # FLD: 19.7 % — HIGH (ref 10.3–14.5)
RBC # FLD: 19.9 % — HIGH (ref 10.3–14.5)
RBC # FLD: 20 % — HIGH (ref 10.3–14.5)
RBC # FLD: 20.1 % — HIGH (ref 10.3–14.5)
RBC # FLD: 20.3 % — HIGH (ref 10.3–14.5)
RBC # FLD: 20.4 % — HIGH (ref 10.3–14.5)
SAO2 % BLDA: 88 % — LOW (ref 92–96)
SAO2 % BLDA: 94 % — SIGNIFICANT CHANGE UP (ref 92–96)
SAO2 % BLDA: 98 % — HIGH (ref 92–96)
SARS-COV-2 IGG SERPL IA-ACNC: 0.4 RATIO — SIGNIFICANT CHANGE UP
SARS-COV-2 IGG SERPL QL IA: NEGATIVE — SIGNIFICANT CHANGE UP
SARS-COV-2 IGM SERPL IA-ACNC: 0.31 RATIO — SIGNIFICANT CHANGE UP
SARS-COV-2 IGM SERPL IA-ACNC: <0.1 INDEX — SIGNIFICANT CHANGE UP
SARS-COV-2 RNA SPEC QL NAA+PROBE: SIGNIFICANT CHANGE UP
SODIUM SERPL-SCNC: 134 MMOL/L — LOW (ref 135–145)
SODIUM SERPL-SCNC: 136 MMOL/L — SIGNIFICANT CHANGE UP (ref 135–145)
SODIUM SERPL-SCNC: 138 MMOL/L — SIGNIFICANT CHANGE UP (ref 135–145)
SODIUM SERPL-SCNC: 139 MMOL/L — SIGNIFICANT CHANGE UP (ref 135–145)
SODIUM SERPL-SCNC: 140 MMOL/L — SIGNIFICANT CHANGE UP (ref 135–145)
SODIUM SERPL-SCNC: 141 MMOL/L — SIGNIFICANT CHANGE UP (ref 135–145)
SODIUM SERPL-SCNC: 142 MMOL/L — SIGNIFICANT CHANGE UP (ref 135–145)
SODIUM SERPL-SCNC: 143 MMOL/L — SIGNIFICANT CHANGE UP (ref 135–145)
SODIUM SERPL-SCNC: 143 MMOL/L — SIGNIFICANT CHANGE UP (ref 135–145)
SODIUM SERPL-SCNC: 144 MMOL/L — SIGNIFICANT CHANGE UP (ref 135–145)
SODIUM SERPL-SCNC: 146 MMOL/L — HIGH (ref 135–145)
SODIUM UR-SCNC: 65 MMOL/L — SIGNIFICANT CHANGE UP
SP GR SPEC: 1.02 — SIGNIFICANT CHANGE UP (ref 1.01–1.02)
UROBILINOGEN FLD QL: NEGATIVE MG/DL — SIGNIFICANT CHANGE UP
WBC # BLD: 10.16 K/UL — SIGNIFICANT CHANGE UP (ref 3.8–10.5)
WBC # BLD: 11.05 K/UL — HIGH (ref 3.8–10.5)
WBC # BLD: 11.35 K/UL — HIGH (ref 3.8–10.5)
WBC # BLD: 14.92 K/UL — HIGH (ref 3.8–10.5)
WBC # BLD: 17.16 K/UL — HIGH (ref 3.8–10.5)
WBC # BLD: 17.2 K/UL — HIGH (ref 3.8–10.5)
WBC # BLD: 17.61 K/UL — HIGH (ref 3.8–10.5)
WBC # BLD: 19.46 K/UL — HIGH (ref 3.8–10.5)
WBC # BLD: 19.63 K/UL — HIGH (ref 3.8–10.5)
WBC # BLD: 21.01 K/UL — HIGH (ref 3.8–10.5)
WBC # BLD: 22.96 K/UL — HIGH (ref 3.8–10.5)
WBC # BLD: 23.44 K/UL — HIGH (ref 3.8–10.5)
WBC # BLD: 5.32 K/UL — SIGNIFICANT CHANGE UP (ref 3.8–10.5)
WBC # BLD: 8.57 K/UL — SIGNIFICANT CHANGE UP (ref 3.8–10.5)
WBC # FLD AUTO: 10.16 K/UL — SIGNIFICANT CHANGE UP (ref 3.8–10.5)
WBC # FLD AUTO: 11.05 K/UL — HIGH (ref 3.8–10.5)
WBC # FLD AUTO: 11.35 K/UL — HIGH (ref 3.8–10.5)
WBC # FLD AUTO: 14.92 K/UL — HIGH (ref 3.8–10.5)
WBC # FLD AUTO: 17.16 K/UL — HIGH (ref 3.8–10.5)
WBC # FLD AUTO: 17.2 K/UL — HIGH (ref 3.8–10.5)
WBC # FLD AUTO: 17.61 K/UL — HIGH (ref 3.8–10.5)
WBC # FLD AUTO: 19.46 K/UL — HIGH (ref 3.8–10.5)
WBC # FLD AUTO: 19.63 K/UL — HIGH (ref 3.8–10.5)
WBC # FLD AUTO: 21.01 K/UL — HIGH (ref 3.8–10.5)
WBC # FLD AUTO: 22.96 K/UL — HIGH (ref 3.8–10.5)
WBC # FLD AUTO: 23.44 K/UL — HIGH (ref 3.8–10.5)
WBC # FLD AUTO: 5.32 K/UL — SIGNIFICANT CHANGE UP (ref 3.8–10.5)
WBC # FLD AUTO: 8.57 K/UL — SIGNIFICANT CHANGE UP (ref 3.8–10.5)

## 2021-01-01 PROCEDURE — 94640 AIRWAY INHALATION TREATMENT: CPT

## 2021-01-01 PROCEDURE — 94660 CPAP INITIATION&MGMT: CPT

## 2021-01-01 PROCEDURE — 81001 URINALYSIS AUTO W/SCOPE: CPT

## 2021-01-01 PROCEDURE — 99233 SBSQ HOSP IP/OBS HIGH 50: CPT

## 2021-01-01 PROCEDURE — 72100 X-RAY EXAM L-S SPINE 2/3 VWS: CPT

## 2021-01-01 PROCEDURE — 87205 SMEAR GRAM STAIN: CPT

## 2021-01-01 PROCEDURE — 83036 HEMOGLOBIN GLYCOSYLATED A1C: CPT

## 2021-01-01 PROCEDURE — 99232 SBSQ HOSP IP/OBS MODERATE 35: CPT

## 2021-01-01 PROCEDURE — 85610 PROTHROMBIN TIME: CPT

## 2021-01-01 PROCEDURE — 36600 WITHDRAWAL OF ARTERIAL BLOOD: CPT

## 2021-01-01 PROCEDURE — 93010 ELECTROCARDIOGRAM REPORT: CPT

## 2021-01-01 PROCEDURE — 72131 CT LUMBAR SPINE W/O DYE: CPT | Mod: 26

## 2021-01-01 PROCEDURE — 36415 COLL VENOUS BLD VENIPUNCTURE: CPT

## 2021-01-01 PROCEDURE — 85379 FIBRIN DEGRADATION QUANT: CPT

## 2021-01-01 PROCEDURE — 86769 SARS-COV-2 COVID-19 ANTIBODY: CPT

## 2021-01-01 PROCEDURE — 72192 CT PELVIS W/O DYE: CPT | Mod: 26

## 2021-01-01 PROCEDURE — 93296 REM INTERROG EVL PM/IDS: CPT

## 2021-01-01 PROCEDURE — 97116 GAIT TRAINING THERAPY: CPT | Mod: GP

## 2021-01-01 PROCEDURE — 99223 1ST HOSP IP/OBS HIGH 75: CPT

## 2021-01-01 PROCEDURE — U0003: CPT

## 2021-01-01 PROCEDURE — 71045 X-RAY EXAM CHEST 1 VIEW: CPT | Mod: 26

## 2021-01-01 PROCEDURE — 86140 C-REACTIVE PROTEIN: CPT

## 2021-01-01 PROCEDURE — 83615 LACTATE (LD) (LDH) ENZYME: CPT

## 2021-01-01 PROCEDURE — 84156 ASSAY OF PROTEIN URINE: CPT

## 2021-01-01 PROCEDURE — 80076 HEPATIC FUNCTION PANEL: CPT

## 2021-01-01 PROCEDURE — 71250 CT THORAX DX C-: CPT | Mod: 26

## 2021-01-01 PROCEDURE — U0005: CPT

## 2021-01-01 PROCEDURE — 82803 BLOOD GASES ANY COMBINATION: CPT

## 2021-01-01 PROCEDURE — 84145 PROCALCITONIN (PCT): CPT

## 2021-01-01 PROCEDURE — 72148 MRI LUMBAR SPINE W/O DYE: CPT

## 2021-01-01 PROCEDURE — 82728 ASSAY OF FERRITIN: CPT

## 2021-01-01 PROCEDURE — 85027 COMPLETE CBC AUTOMATED: CPT

## 2021-01-01 PROCEDURE — 76770 US EXAM ABDO BACK WALL COMP: CPT | Mod: 26

## 2021-01-01 PROCEDURE — 76376 3D RENDER W/INTRP POSTPROCES: CPT | Mod: 26

## 2021-01-01 PROCEDURE — 71250 CT THORAX DX C-: CPT

## 2021-01-01 PROCEDURE — 86039 ANTINUCLEAR ANTIBODIES (ANA): CPT

## 2021-01-01 PROCEDURE — 83735 ASSAY OF MAGNESIUM: CPT

## 2021-01-01 PROCEDURE — 85730 THROMBOPLASTIN TIME PARTIAL: CPT

## 2021-01-01 PROCEDURE — 80053 COMPREHEN METABOLIC PANEL: CPT

## 2021-01-01 PROCEDURE — 84300 ASSAY OF URINE SODIUM: CPT

## 2021-01-01 PROCEDURE — 71045 X-RAY EXAM CHEST 1 VIEW: CPT

## 2021-01-01 PROCEDURE — 80048 BASIC METABOLIC PNL TOTAL CA: CPT

## 2021-01-01 PROCEDURE — 86255 FLUORESCENT ANTIBODY SCREEN: CPT

## 2021-01-01 PROCEDURE — 99239 HOSP IP/OBS DSCHRG MGMT >30: CPT

## 2021-01-01 PROCEDURE — 84100 ASSAY OF PHOSPHORUS: CPT

## 2021-01-01 PROCEDURE — 86160 COMPLEMENT ANTIGEN: CPT

## 2021-01-01 PROCEDURE — 84132 ASSAY OF SERUM POTASSIUM: CPT

## 2021-01-01 PROCEDURE — 84133 ASSAY OF URINE POTASSIUM: CPT

## 2021-01-01 PROCEDURE — 82570 ASSAY OF URINE CREATININE: CPT

## 2021-01-01 PROCEDURE — 76770 US EXAM ABDO BACK WALL COMP: CPT

## 2021-01-01 PROCEDURE — 99222 1ST HOSP IP/OBS MODERATE 55: CPT

## 2021-01-01 PROCEDURE — 93295 DEV INTERROG REMOTE 1/2/MLT: CPT

## 2021-01-01 PROCEDURE — 72148 MRI LUMBAR SPINE W/O DYE: CPT | Mod: 26

## 2021-01-01 PROCEDURE — 97162 PT EVAL MOD COMPLEX 30 MIN: CPT | Mod: GP

## 2021-01-01 PROCEDURE — 82962 GLUCOSE BLOOD TEST: CPT

## 2021-01-01 PROCEDURE — 83520 IMMUNOASSAY QUANT NOS NONAB: CPT

## 2021-01-01 PROCEDURE — 93287 PERI-PX DEVICE EVAL & PRGR: CPT | Mod: 26

## 2021-01-01 PROCEDURE — 73523 X-RAY EXAM HIPS BI 5/> VIEWS: CPT | Mod: 26

## 2021-01-01 PROCEDURE — 73552 X-RAY EXAM OF FEMUR 2/>: CPT | Mod: 26,50

## 2021-01-01 PROCEDURE — 85652 RBC SED RATE AUTOMATED: CPT

## 2021-01-01 PROCEDURE — C9399: CPT

## 2021-01-01 PROCEDURE — 85025 COMPLETE CBC W/AUTO DIFF WBC: CPT

## 2021-01-01 PROCEDURE — 83880 ASSAY OF NATRIURETIC PEPTIDE: CPT

## 2021-01-01 PROCEDURE — 93005 ELECTROCARDIOGRAM TRACING: CPT

## 2021-01-01 PROCEDURE — 72100 X-RAY EXAM L-S SPINE 2/3 VWS: CPT | Mod: 26

## 2021-01-01 PROCEDURE — 97530 THERAPEUTIC ACTIVITIES: CPT | Mod: GP

## 2021-01-01 RX ORDER — SODIUM POLYSTYRENE SULFONATE 4.1 MEQ/G
30 POWDER, FOR SUSPENSION ORAL ONCE
Refills: 0 | Status: COMPLETED | OUTPATIENT
Start: 2021-01-01 | End: 2021-01-01

## 2021-01-01 RX ORDER — SACUBITRIL AND VALSARTAN 24; 26 MG/1; MG/1
1 TABLET, FILM COATED ORAL
Qty: 0 | Refills: 0 | DISCHARGE

## 2021-01-01 RX ORDER — ACETAMINOPHEN 500 MG
1000 TABLET ORAL ONCE
Refills: 0 | Status: DISCONTINUED | OUTPATIENT
Start: 2021-01-01 | End: 2021-01-01

## 2021-01-01 RX ORDER — WARFARIN SODIUM 2.5 MG/1
1 TABLET ORAL ONCE
Refills: 0 | Status: COMPLETED | OUTPATIENT
Start: 2021-01-01 | End: 2021-01-01

## 2021-01-01 RX ORDER — OXYCODONE HYDROCHLORIDE 5 MG/1
10 TABLET ORAL EVERY 4 HOURS
Refills: 0 | Status: DISCONTINUED | OUTPATIENT
Start: 2021-01-01 | End: 2021-01-01

## 2021-01-01 RX ORDER — CYCLOBENZAPRINE HYDROCHLORIDE 10 MG/1
5 TABLET, FILM COATED ORAL AT BEDTIME
Refills: 0 | Status: DISCONTINUED | OUTPATIENT
Start: 2021-01-01 | End: 2021-01-01

## 2021-01-01 RX ORDER — HYDROMORPHONE HYDROCHLORIDE 2 MG/ML
0.5 INJECTION INTRAMUSCULAR; INTRAVENOUS; SUBCUTANEOUS
Refills: 0 | Status: DISCONTINUED | OUTPATIENT
Start: 2021-01-01 | End: 2021-01-01

## 2021-01-01 RX ORDER — HYDROMORPHONE HYDROCHLORIDE 2 MG/ML
0.25 INJECTION INTRAMUSCULAR; INTRAVENOUS; SUBCUTANEOUS EVERY 4 HOURS
Refills: 0 | Status: DISCONTINUED | OUTPATIENT
Start: 2021-01-01 | End: 2021-01-01

## 2021-01-01 RX ORDER — LIDOCAINE 4 G/100G
1 CREAM TOPICAL DAILY
Refills: 0 | Status: DISCONTINUED | OUTPATIENT
Start: 2021-01-01 | End: 2021-01-01

## 2021-01-01 RX ORDER — CYCLOBENZAPRINE HYDROCHLORIDE 10 MG/1
5 TABLET, FILM COATED ORAL THREE TIMES A DAY
Refills: 0 | Status: DISCONTINUED | OUTPATIENT
Start: 2021-01-01 | End: 2021-01-01

## 2021-01-01 RX ORDER — DEXTROSE 50 % IN WATER 50 %
12.5 SYRINGE (ML) INTRAVENOUS ONCE
Refills: 0 | Status: DISCONTINUED | OUTPATIENT
Start: 2021-01-01 | End: 2021-01-01

## 2021-01-01 RX ORDER — CEFEPIME 1 G/1
1000 INJECTION, POWDER, FOR SOLUTION INTRAMUSCULAR; INTRAVENOUS EVERY 12 HOURS
Refills: 0 | Status: DISCONTINUED | OUTPATIENT
Start: 2021-01-01 | End: 2021-01-01

## 2021-01-01 RX ORDER — MEROPENEM 1 G/30ML
1000 INJECTION INTRAVENOUS EVERY 12 HOURS
Refills: 0 | Status: DISCONTINUED | OUTPATIENT
Start: 2021-01-01 | End: 2021-01-01

## 2021-01-01 RX ORDER — HYDROMORPHONE HYDROCHLORIDE 2 MG/ML
0.5 INJECTION INTRAMUSCULAR; INTRAVENOUS; SUBCUTANEOUS ONCE
Refills: 0 | Status: DISCONTINUED | OUTPATIENT
Start: 2021-01-01 | End: 2021-01-01

## 2021-01-01 RX ORDER — ASPIRIN/CALCIUM CARB/MAGNESIUM 324 MG
81 TABLET ORAL DAILY
Refills: 0 | Status: DISCONTINUED | OUTPATIENT
Start: 2021-01-01 | End: 2021-01-01

## 2021-01-01 RX ORDER — FUROSEMIDE 40 MG
40 TABLET ORAL DAILY
Refills: 0 | Status: DISCONTINUED | OUTPATIENT
Start: 2021-01-01 | End: 2021-01-01

## 2021-01-01 RX ORDER — IPRATROPIUM/ALBUTEROL SULFATE 18-103MCG
3 AEROSOL WITH ADAPTER (GRAM) INHALATION EVERY 6 HOURS
Refills: 0 | Status: DISCONTINUED | OUTPATIENT
Start: 2021-01-01 | End: 2021-01-01

## 2021-01-01 RX ORDER — SODIUM CHLORIDE 9 MG/ML
1000 INJECTION, SOLUTION INTRAVENOUS
Refills: 0 | Status: DISCONTINUED | OUTPATIENT
Start: 2021-01-01 | End: 2021-01-01

## 2021-01-01 RX ORDER — ACETAMINOPHEN 500 MG
650 TABLET ORAL EVERY 6 HOURS
Refills: 0 | Status: DISCONTINUED | OUTPATIENT
Start: 2021-01-01 | End: 2021-01-01

## 2021-01-01 RX ORDER — OXYCODONE HYDROCHLORIDE 5 MG/1
5 TABLET ORAL EVERY 4 HOURS
Refills: 0 | Status: DISCONTINUED | OUTPATIENT
Start: 2021-01-01 | End: 2021-01-01

## 2021-01-01 RX ORDER — MOMETASONE FUROATE 220 UG/1
1 INHALANT RESPIRATORY (INHALATION) DAILY
Refills: 0 | Status: DISCONTINUED | OUTPATIENT
Start: 2021-01-01 | End: 2021-01-01

## 2021-01-01 RX ORDER — INSULIN LISPRO 100/ML
VIAL (ML) SUBCUTANEOUS
Refills: 0 | Status: DISCONTINUED | OUTPATIENT
Start: 2021-01-01 | End: 2021-01-01

## 2021-01-01 RX ORDER — IPRATROPIUM/ALBUTEROL SULFATE 18-103MCG
3 AEROSOL WITH ADAPTER (GRAM) INHALATION
Qty: 0 | Refills: 0 | DISCHARGE

## 2021-01-01 RX ORDER — WARFARIN SODIUM 2.5 MG/1
2 TABLET ORAL DAILY
Refills: 0 | Status: DISCONTINUED | OUTPATIENT
Start: 2021-01-01 | End: 2021-01-01

## 2021-01-01 RX ORDER — MOMETASONE FUROATE 220 UG/1
1 INHALANT RESPIRATORY (INHALATION) ONCE
Refills: 0 | Status: DISCONTINUED | OUTPATIENT
Start: 2021-01-01 | End: 2021-01-01

## 2021-01-01 RX ORDER — SODIUM POLYSTYRENE SULFONATE 4.1 MEQ/G
15 POWDER, FOR SUSPENSION ORAL ONCE
Refills: 0 | Status: COMPLETED | OUTPATIENT
Start: 2021-01-01 | End: 2021-01-01

## 2021-01-01 RX ORDER — FUROSEMIDE 40 MG
40 TABLET ORAL
Refills: 0 | Status: DISCONTINUED | OUTPATIENT
Start: 2021-01-01 | End: 2021-01-01

## 2021-01-01 RX ORDER — DEXTROSE 50 % IN WATER 50 %
25 SYRINGE (ML) INTRAVENOUS ONCE
Refills: 0 | Status: DISCONTINUED | OUTPATIENT
Start: 2021-01-01 | End: 2021-01-01

## 2021-01-01 RX ORDER — POTASSIUM CHLORIDE 20 MEQ
20 PACKET (EA) ORAL
Refills: 0 | Status: DISCONTINUED | OUTPATIENT
Start: 2021-01-01 | End: 2021-01-01

## 2021-01-01 RX ORDER — METHOTREXATE 2.5 MG/1
15 TABLET ORAL
Qty: 0 | Refills: 0 | DISCHARGE

## 2021-01-01 RX ORDER — MORPHINE SULFATE 50 MG/1
4 CAPSULE, EXTENDED RELEASE ORAL EVERY 4 HOURS
Refills: 0 | Status: DISCONTINUED | OUTPATIENT
Start: 2021-01-01 | End: 2021-01-01

## 2021-01-01 RX ORDER — BUDESONIDE, MICRONIZED 100 %
2 POWDER (GRAM) MISCELLANEOUS
Qty: 0 | Refills: 0 | DISCHARGE

## 2021-01-01 RX ORDER — FUROSEMIDE 40 MG
20 TABLET ORAL ONCE
Refills: 0 | Status: COMPLETED | OUTPATIENT
Start: 2021-01-01 | End: 2021-01-01

## 2021-01-01 RX ORDER — SODIUM CHLORIDE 9 MG/ML
1000 INJECTION INTRAMUSCULAR; INTRAVENOUS; SUBCUTANEOUS
Refills: 0 | Status: DISCONTINUED | OUTPATIENT
Start: 2021-01-01 | End: 2021-01-01

## 2021-01-01 RX ORDER — MORPHINE SULFATE 50 MG/1
4 CAPSULE, EXTENDED RELEASE ORAL ONCE
Refills: 0 | Status: DISCONTINUED | OUTPATIENT
Start: 2021-01-01 | End: 2021-01-01

## 2021-01-01 RX ORDER — DEXAMETHASONE 0.5 MG/5ML
6 ELIXIR ORAL DAILY
Refills: 0 | Status: DISCONTINUED | OUTPATIENT
Start: 2021-01-01 | End: 2021-01-01

## 2021-01-01 RX ORDER — GLUCAGON INJECTION, SOLUTION 0.5 MG/.1ML
1 INJECTION, SOLUTION SUBCUTANEOUS ONCE
Refills: 0 | Status: DISCONTINUED | OUTPATIENT
Start: 2021-01-01 | End: 2021-01-01

## 2021-01-01 RX ORDER — POTASSIUM CHLORIDE 20 MEQ
20 PACKET (EA) ORAL DAILY
Refills: 0 | Status: DISCONTINUED | OUTPATIENT
Start: 2021-01-01 | End: 2021-01-01

## 2021-01-01 RX ORDER — FUROSEMIDE 40 MG
60 TABLET ORAL ONCE
Refills: 0 | Status: DISCONTINUED | OUTPATIENT
Start: 2021-01-01 | End: 2021-01-01

## 2021-01-01 RX ORDER — WARFARIN SODIUM 2.5 MG/1
2.5 TABLET ORAL ONCE
Refills: 0 | Status: COMPLETED | OUTPATIENT
Start: 2021-01-01 | End: 2021-01-01

## 2021-01-01 RX ORDER — CEFEPIME 1 G/1
2000 INJECTION, POWDER, FOR SOLUTION INTRAMUSCULAR; INTRAVENOUS EVERY 12 HOURS
Refills: 0 | Status: DISCONTINUED | OUTPATIENT
Start: 2021-01-01 | End: 2021-01-01

## 2021-01-01 RX ORDER — DEXTROSE 50 % IN WATER 50 %
15 SYRINGE (ML) INTRAVENOUS ONCE
Refills: 0 | Status: DISCONTINUED | OUTPATIENT
Start: 2021-01-01 | End: 2021-01-01

## 2021-01-01 RX ORDER — IPRATROPIUM/ALBUTEROL SULFATE 18-103MCG
2 AEROSOL WITH ADAPTER (GRAM) INHALATION
Refills: 0 | Status: DISCONTINUED | OUTPATIENT
Start: 2021-01-01 | End: 2021-01-01

## 2021-01-01 RX ORDER — AZITHROMYCIN 500 MG/1
500 TABLET, FILM COATED ORAL EVERY 24 HOURS
Refills: 0 | Status: COMPLETED | OUTPATIENT
Start: 2021-01-01 | End: 2021-01-01

## 2021-01-01 RX ORDER — MORPHINE SULFATE 50 MG/1
4 CAPSULE, EXTENDED RELEASE ORAL
Qty: 0 | Refills: 0 | DISCHARGE
Start: 2021-01-01

## 2021-01-01 RX ORDER — HYDROMORPHONE HYDROCHLORIDE 2 MG/ML
0.5 INJECTION INTRAMUSCULAR; INTRAVENOUS; SUBCUTANEOUS EVERY 4 HOURS
Refills: 0 | Status: DISCONTINUED | OUTPATIENT
Start: 2021-01-01 | End: 2021-01-01

## 2021-01-01 RX ORDER — CARVEDILOL PHOSPHATE 80 MG/1
50 CAPSULE, EXTENDED RELEASE ORAL
Refills: 0 | Status: DISCONTINUED | OUTPATIENT
Start: 2021-01-01 | End: 2021-01-01

## 2021-01-01 RX ORDER — SIMVASTATIN 20 MG/1
1 TABLET, FILM COATED ORAL
Qty: 0 | Refills: 0 | DISCHARGE

## 2021-01-01 RX ORDER — POLYETHYLENE GLYCOL 3350 17 G/17G
17 POWDER, FOR SOLUTION ORAL DAILY
Refills: 0 | Status: DISCONTINUED | OUTPATIENT
Start: 2021-01-01 | End: 2021-01-01

## 2021-01-01 RX ORDER — FOLIC ACID 0.8 MG
1 TABLET ORAL DAILY
Refills: 0 | Status: DISCONTINUED | OUTPATIENT
Start: 2021-01-01 | End: 2021-01-01

## 2021-01-01 RX ORDER — AMIODARONE HYDROCHLORIDE 400 MG/1
1 TABLET ORAL
Qty: 0 | Refills: 0 | DISCHARGE

## 2021-01-01 RX ORDER — BUDESONIDE, MICRONIZED 100 %
0.25 POWDER (GRAM) MISCELLANEOUS
Refills: 0 | Status: DISCONTINUED | OUTPATIENT
Start: 2021-01-01 | End: 2021-01-01

## 2021-01-01 RX ORDER — WARFARIN SODIUM 2.5 MG/1
1 TABLET ORAL
Qty: 0 | Refills: 0 | DISCHARGE

## 2021-01-01 RX ORDER — MORPHINE SULFATE 50 MG/1
2 CAPSULE, EXTENDED RELEASE ORAL ONCE
Refills: 0 | Status: DISCONTINUED | OUTPATIENT
Start: 2021-01-01 | End: 2021-01-01

## 2021-01-01 RX ORDER — CYCLOBENZAPRINE HYDROCHLORIDE 10 MG/1
1 TABLET, FILM COATED ORAL
Qty: 0 | Refills: 0 | DISCHARGE
Start: 2021-01-01

## 2021-01-01 RX ORDER — FOLIC ACID 0.8 MG
1 TABLET ORAL
Qty: 0 | Refills: 0 | DISCHARGE

## 2021-01-01 RX ORDER — REMDESIVIR 5 MG/ML
INJECTION INTRAVENOUS
Refills: 0 | Status: DISCONTINUED | OUTPATIENT
Start: 2021-01-01 | End: 2021-01-01

## 2021-01-01 RX ORDER — FUROSEMIDE 40 MG
40 TABLET ORAL ONCE
Refills: 0 | Status: COMPLETED | OUTPATIENT
Start: 2021-01-01 | End: 2021-01-01

## 2021-01-01 RX ORDER — BUDESONIDE AND FORMOTEROL FUMARATE DIHYDRATE 160; 4.5 UG/1; UG/1
2 AEROSOL RESPIRATORY (INHALATION)
Refills: 0 | Status: DISCONTINUED | OUTPATIENT
Start: 2021-01-01 | End: 2021-01-01

## 2021-01-01 RX ORDER — ALBUTEROL 90 UG/1
2 AEROSOL, METERED ORAL EVERY 6 HOURS
Refills: 0 | Status: DISCONTINUED | OUTPATIENT
Start: 2021-01-01 | End: 2021-01-01

## 2021-01-01 RX ORDER — REMDESIVIR 5 MG/ML
200 INJECTION INTRAVENOUS EVERY 24 HOURS
Refills: 0 | Status: COMPLETED | OUTPATIENT
Start: 2021-01-01 | End: 2021-01-01

## 2021-01-01 RX ORDER — CARVEDILOL PHOSPHATE 80 MG/1
2 CAPSULE, EXTENDED RELEASE ORAL
Qty: 0 | Refills: 0 | DISCHARGE

## 2021-01-01 RX ORDER — METHENAMINE MANDELATE 1 G
1 TABLET ORAL
Qty: 0 | Refills: 0 | DISCHARGE

## 2021-01-01 RX ORDER — INSULIN LISPRO 100/ML
VIAL (ML) SUBCUTANEOUS AT BEDTIME
Refills: 0 | Status: DISCONTINUED | OUTPATIENT
Start: 2021-01-01 | End: 2021-01-01

## 2021-01-01 RX ORDER — SACUBITRIL AND VALSARTAN 24; 26 MG/1; MG/1
1 TABLET, FILM COATED ORAL
Refills: 0 | Status: DISCONTINUED | OUTPATIENT
Start: 2021-01-01 | End: 2021-01-01

## 2021-01-01 RX ORDER — AZITHROMYCIN 500 MG/1
500 TABLET, FILM COATED ORAL DAILY
Refills: 0 | Status: COMPLETED | OUTPATIENT
Start: 2021-01-01 | End: 2021-01-01

## 2021-01-01 RX ORDER — SIMVASTATIN 20 MG/1
20 TABLET, FILM COATED ORAL AT BEDTIME
Refills: 0 | Status: DISCONTINUED | OUTPATIENT
Start: 2021-01-01 | End: 2021-01-01

## 2021-01-01 RX ORDER — CARVEDILOL PHOSPHATE 80 MG/1
1 CAPSULE, EXTENDED RELEASE ORAL
Qty: 0 | Refills: 0 | DISCHARGE

## 2021-01-01 RX ORDER — WARFARIN SODIUM 2.5 MG/1
2 TABLET ORAL DAILY
Refills: 0 | Status: COMPLETED | OUTPATIENT
Start: 2021-01-01 | End: 2021-01-01

## 2021-01-01 RX ORDER — REMDESIVIR 5 MG/ML
100 INJECTION INTRAVENOUS EVERY 24 HOURS
Refills: 0 | Status: DISCONTINUED | OUTPATIENT
Start: 2021-01-01 | End: 2021-01-01

## 2021-01-01 RX ORDER — METHOTREXATE 2.5 MG/1
20 TABLET ORAL
Qty: 0 | Refills: 0 | DISCHARGE

## 2021-01-01 RX ADMIN — SIMVASTATIN 20 MILLIGRAM(S): 20 TABLET, FILM COATED ORAL at 21:44

## 2021-01-01 RX ADMIN — LIDOCAINE 1 PATCH: 4 CREAM TOPICAL at 07:33

## 2021-01-01 RX ADMIN — CYCLOBENZAPRINE HYDROCHLORIDE 5 MILLIGRAM(S): 10 TABLET, FILM COATED ORAL at 20:48

## 2021-01-01 RX ADMIN — Medication 0.25 MILLIGRAM(S): at 09:48

## 2021-01-01 RX ADMIN — Medication 81 MILLIGRAM(S): at 11:20

## 2021-01-01 RX ADMIN — Medication 0.25 MILLIGRAM(S): at 15:22

## 2021-01-01 RX ADMIN — LIDOCAINE 1 PATCH: 4 CREAM TOPICAL at 19:00

## 2021-01-01 RX ADMIN — SACUBITRIL AND VALSARTAN 1 TABLET(S): 24; 26 TABLET, FILM COATED ORAL at 08:13

## 2021-01-01 RX ADMIN — CYCLOBENZAPRINE HYDROCHLORIDE 5 MILLIGRAM(S): 10 TABLET, FILM COATED ORAL at 05:41

## 2021-01-01 RX ADMIN — Medication 0.25 MILLIGRAM(S): at 08:57

## 2021-01-01 RX ADMIN — AZITHROMYCIN 500 MILLIGRAM(S): 500 TABLET, FILM COATED ORAL at 10:23

## 2021-01-01 RX ADMIN — Medication 40 MILLIGRAM(S): at 08:22

## 2021-01-01 RX ADMIN — SACUBITRIL AND VALSARTAN 1 TABLET(S): 24; 26 TABLET, FILM COATED ORAL at 10:11

## 2021-01-01 RX ADMIN — ALBUTEROL 2 PUFF(S): 90 AEROSOL, METERED ORAL at 01:00

## 2021-01-01 RX ADMIN — Medication 40 MILLIGRAM(S): at 22:21

## 2021-01-01 RX ADMIN — MEROPENEM 100 MILLIGRAM(S): 1 INJECTION INTRAVENOUS at 11:21

## 2021-01-01 RX ADMIN — Medication 1 TABLET(S): at 08:13

## 2021-01-01 RX ADMIN — OXYCODONE HYDROCHLORIDE 10 MILLIGRAM(S): 5 TABLET ORAL at 10:12

## 2021-01-01 RX ADMIN — BUDESONIDE AND FORMOTEROL FUMARATE DIHYDRATE 2 PUFF(S): 160; 4.5 AEROSOL RESPIRATORY (INHALATION) at 08:29

## 2021-01-01 RX ADMIN — SODIUM POLYSTYRENE SULFONATE 30 GRAM(S): 4.1 POWDER, FOR SUSPENSION ORAL at 11:08

## 2021-01-01 RX ADMIN — CARVEDILOL PHOSPHATE 50 MILLIGRAM(S): 80 CAPSULE, EXTENDED RELEASE ORAL at 10:03

## 2021-01-01 RX ADMIN — CARVEDILOL PHOSPHATE 50 MILLIGRAM(S): 80 CAPSULE, EXTENDED RELEASE ORAL at 22:18

## 2021-01-01 RX ADMIN — CYCLOBENZAPRINE HYDROCHLORIDE 5 MILLIGRAM(S): 10 TABLET, FILM COATED ORAL at 06:09

## 2021-01-01 RX ADMIN — CYCLOBENZAPRINE HYDROCHLORIDE 5 MILLIGRAM(S): 10 TABLET, FILM COATED ORAL at 13:33

## 2021-01-01 RX ADMIN — HYDROMORPHONE HYDROCHLORIDE 0.5 MILLIGRAM(S): 2 INJECTION INTRAMUSCULAR; INTRAVENOUS; SUBCUTANEOUS at 12:35

## 2021-01-01 RX ADMIN — Medication 1 TABLET(S): at 09:24

## 2021-01-01 RX ADMIN — CARVEDILOL PHOSPHATE 50 MILLIGRAM(S): 80 CAPSULE, EXTENDED RELEASE ORAL at 10:12

## 2021-01-01 RX ADMIN — Medication 40 MILLIGRAM(S): at 13:09

## 2021-01-01 RX ADMIN — Medication 1 MILLIGRAM(S): at 01:46

## 2021-01-01 RX ADMIN — Medication 40 MILLIGRAM(S): at 16:16

## 2021-01-01 RX ADMIN — Medication 40 MILLIGRAM(S): at 10:03

## 2021-01-01 RX ADMIN — Medication 650 MILLIGRAM(S): at 22:09

## 2021-01-01 RX ADMIN — Medication 40 MILLIGRAM(S): at 22:12

## 2021-01-01 RX ADMIN — Medication 2 TABLET(S): at 10:30

## 2021-01-01 RX ADMIN — Medication 2 TABLET(S): at 22:57

## 2021-01-01 RX ADMIN — LIDOCAINE 1 PATCH: 4 CREAM TOPICAL at 10:51

## 2021-01-01 RX ADMIN — LIDOCAINE 1 PATCH: 4 CREAM TOPICAL at 19:30

## 2021-01-01 RX ADMIN — MEROPENEM 100 MILLIGRAM(S): 1 INJECTION INTRAVENOUS at 09:44

## 2021-01-01 RX ADMIN — BUDESONIDE AND FORMOTEROL FUMARATE DIHYDRATE 2 PUFF(S): 160; 4.5 AEROSOL RESPIRATORY (INHALATION) at 09:35

## 2021-01-01 RX ADMIN — CARVEDILOL PHOSPHATE 50 MILLIGRAM(S): 80 CAPSULE, EXTENDED RELEASE ORAL at 22:20

## 2021-01-01 RX ADMIN — CYCLOBENZAPRINE HYDROCHLORIDE 5 MILLIGRAM(S): 10 TABLET, FILM COATED ORAL at 14:42

## 2021-01-01 RX ADMIN — WARFARIN SODIUM 2 MILLIGRAM(S): 2.5 TABLET ORAL at 21:47

## 2021-01-01 RX ADMIN — BUDESONIDE AND FORMOTEROL FUMARATE DIHYDRATE 2 PUFF(S): 160; 4.5 AEROSOL RESPIRATORY (INHALATION) at 08:36

## 2021-01-01 RX ADMIN — CYCLOBENZAPRINE HYDROCHLORIDE 5 MILLIGRAM(S): 10 TABLET, FILM COATED ORAL at 12:56

## 2021-01-01 RX ADMIN — CEFEPIME 100 MILLIGRAM(S): 1 INJECTION, POWDER, FOR SOLUTION INTRAMUSCULAR; INTRAVENOUS at 05:27

## 2021-01-01 RX ADMIN — CARVEDILOL PHOSPHATE 50 MILLIGRAM(S): 80 CAPSULE, EXTENDED RELEASE ORAL at 22:32

## 2021-01-01 RX ADMIN — Medication 2: at 21:24

## 2021-01-01 RX ADMIN — AZITHROMYCIN 255 MILLIGRAM(S): 500 TABLET, FILM COATED ORAL at 12:36

## 2021-01-01 RX ADMIN — Medication 6: at 12:34

## 2021-01-01 RX ADMIN — BUDESONIDE AND FORMOTEROL FUMARATE DIHYDRATE 2 PUFF(S): 160; 4.5 AEROSOL RESPIRATORY (INHALATION) at 09:02

## 2021-01-01 RX ADMIN — CYCLOBENZAPRINE HYDROCHLORIDE 5 MILLIGRAM(S): 10 TABLET, FILM COATED ORAL at 13:38

## 2021-01-01 RX ADMIN — Medication 1 MILLIGRAM(S): at 11:20

## 2021-01-01 RX ADMIN — CARVEDILOL PHOSPHATE 50 MILLIGRAM(S): 80 CAPSULE, EXTENDED RELEASE ORAL at 00:15

## 2021-01-01 RX ADMIN — Medication 81 MILLIGRAM(S): at 09:45

## 2021-01-01 RX ADMIN — Medication 81 MILLIGRAM(S): at 10:30

## 2021-01-01 RX ADMIN — Medication 20 MILLIEQUIVALENT(S): at 09:20

## 2021-01-01 RX ADMIN — LIDOCAINE 1 PATCH: 4 CREAM TOPICAL at 21:10

## 2021-01-01 RX ADMIN — Medication 1 MILLIGRAM(S): at 16:50

## 2021-01-01 RX ADMIN — Medication 600 MILLIGRAM(S): at 09:23

## 2021-01-01 RX ADMIN — MORPHINE SULFATE 4 MILLIGRAM(S): 50 CAPSULE, EXTENDED RELEASE ORAL at 14:56

## 2021-01-01 RX ADMIN — CARVEDILOL PHOSPHATE 50 MILLIGRAM(S): 80 CAPSULE, EXTENDED RELEASE ORAL at 09:15

## 2021-01-01 RX ADMIN — Medication 40 MILLIGRAM(S): at 13:24

## 2021-01-01 RX ADMIN — HYDROMORPHONE HYDROCHLORIDE 0.5 MILLIGRAM(S): 2 INJECTION INTRAMUSCULAR; INTRAVENOUS; SUBCUTANEOUS at 15:31

## 2021-01-01 RX ADMIN — Medication 2 TABLET(S): at 21:44

## 2021-01-01 RX ADMIN — SIMVASTATIN 20 MILLIGRAM(S): 20 TABLET, FILM COATED ORAL at 21:43

## 2021-01-01 RX ADMIN — LIDOCAINE 1 PATCH: 4 CREAM TOPICAL at 09:15

## 2021-01-01 RX ADMIN — Medication 40 MILLIGRAM(S): at 10:50

## 2021-01-01 RX ADMIN — Medication 2: at 09:21

## 2021-01-01 RX ADMIN — HYDROMORPHONE HYDROCHLORIDE 0.5 MILLIGRAM(S): 2 INJECTION INTRAMUSCULAR; INTRAVENOUS; SUBCUTANEOUS at 11:34

## 2021-01-01 RX ADMIN — SIMVASTATIN 20 MILLIGRAM(S): 20 TABLET, FILM COATED ORAL at 21:10

## 2021-01-01 RX ADMIN — LIDOCAINE 1 PATCH: 4 CREAM TOPICAL at 10:31

## 2021-01-01 RX ADMIN — SIMVASTATIN 20 MILLIGRAM(S): 20 TABLET, FILM COATED ORAL at 22:19

## 2021-01-01 RX ADMIN — CYCLOBENZAPRINE HYDROCHLORIDE 5 MILLIGRAM(S): 10 TABLET, FILM COATED ORAL at 22:23

## 2021-01-01 RX ADMIN — Medication 1 MILLIGRAM(S): at 09:23

## 2021-01-01 RX ADMIN — LIDOCAINE 1 PATCH: 4 CREAM TOPICAL at 19:41

## 2021-01-01 RX ADMIN — HYDROMORPHONE HYDROCHLORIDE 0.5 MILLIGRAM(S): 2 INJECTION INTRAMUSCULAR; INTRAVENOUS; SUBCUTANEOUS at 06:46

## 2021-01-01 RX ADMIN — CYCLOBENZAPRINE HYDROCHLORIDE 5 MILLIGRAM(S): 10 TABLET, FILM COATED ORAL at 21:41

## 2021-01-01 RX ADMIN — Medication 40 MILLIGRAM(S): at 22:26

## 2021-01-01 RX ADMIN — LIDOCAINE 1 PATCH: 4 CREAM TOPICAL at 11:20

## 2021-01-01 RX ADMIN — Medication 2 TABLET(S): at 08:14

## 2021-01-01 RX ADMIN — SACUBITRIL AND VALSARTAN 1 TABLET(S): 24; 26 TABLET, FILM COATED ORAL at 09:15

## 2021-01-01 RX ADMIN — Medication 2: at 17:19

## 2021-01-01 RX ADMIN — LIDOCAINE 1 PATCH: 4 CREAM TOPICAL at 18:17

## 2021-01-01 RX ADMIN — CARVEDILOL PHOSPHATE 50 MILLIGRAM(S): 80 CAPSULE, EXTENDED RELEASE ORAL at 11:21

## 2021-01-01 RX ADMIN — Medication 600 MILLIGRAM(S): at 22:20

## 2021-01-01 RX ADMIN — BUDESONIDE AND FORMOTEROL FUMARATE DIHYDRATE 2 PUFF(S): 160; 4.5 AEROSOL RESPIRATORY (INHALATION) at 21:08

## 2021-01-01 RX ADMIN — Medication 40 MILLIGRAM(S): at 11:27

## 2021-01-01 RX ADMIN — CYCLOBENZAPRINE HYDROCHLORIDE 5 MILLIGRAM(S): 10 TABLET, FILM COATED ORAL at 13:35

## 2021-01-01 RX ADMIN — SACUBITRIL AND VALSARTAN 1 TABLET(S): 24; 26 TABLET, FILM COATED ORAL at 10:03

## 2021-01-01 RX ADMIN — CEFEPIME 1000 MILLIGRAM(S): 1 INJECTION, POWDER, FOR SOLUTION INTRAMUSCULAR; INTRAVENOUS at 21:11

## 2021-01-01 RX ADMIN — Medication 81 MILLIGRAM(S): at 10:12

## 2021-01-01 RX ADMIN — Medication 1 MILLIGRAM(S): at 09:21

## 2021-01-01 RX ADMIN — CARVEDILOL PHOSPHATE 50 MILLIGRAM(S): 80 CAPSULE, EXTENDED RELEASE ORAL at 09:24

## 2021-01-01 RX ADMIN — LIDOCAINE 1 PATCH: 4 CREAM TOPICAL at 22:45

## 2021-01-01 RX ADMIN — Medication 40 MILLIGRAM(S): at 06:00

## 2021-01-01 RX ADMIN — Medication 1 MILLIGRAM(S): at 14:31

## 2021-01-01 RX ADMIN — MORPHINE SULFATE 2 MILLIGRAM(S): 50 CAPSULE, EXTENDED RELEASE ORAL at 13:09

## 2021-01-01 RX ADMIN — Medication 40 MILLIGRAM(S): at 21:41

## 2021-01-01 RX ADMIN — LIDOCAINE 1 PATCH: 4 CREAM TOPICAL at 19:45

## 2021-01-01 RX ADMIN — Medication 40 MILLIGRAM(S): at 11:20

## 2021-01-01 RX ADMIN — Medication 650 MILLIGRAM(S): at 00:55

## 2021-01-01 RX ADMIN — SACUBITRIL AND VALSARTAN 1 TABLET(S): 24; 26 TABLET, FILM COATED ORAL at 10:23

## 2021-01-01 RX ADMIN — Medication 40 MILLIGRAM(S): at 23:07

## 2021-01-01 RX ADMIN — CYCLOBENZAPRINE HYDROCHLORIDE 5 MILLIGRAM(S): 10 TABLET, FILM COATED ORAL at 19:26

## 2021-01-01 RX ADMIN — REMDESIVIR 580 MILLIGRAM(S): 5 INJECTION INTRAVENOUS at 07:02

## 2021-01-01 RX ADMIN — Medication 6 MILLIGRAM(S): at 10:23

## 2021-01-01 RX ADMIN — SACUBITRIL AND VALSARTAN 1 TABLET(S): 24; 26 TABLET, FILM COATED ORAL at 09:23

## 2021-01-01 RX ADMIN — SIMVASTATIN 20 MILLIGRAM(S): 20 TABLET, FILM COATED ORAL at 22:18

## 2021-01-01 RX ADMIN — MEROPENEM 100 MILLIGRAM(S): 1 INJECTION INTRAVENOUS at 22:22

## 2021-01-01 RX ADMIN — CARVEDILOL PHOSPHATE 50 MILLIGRAM(S): 80 CAPSULE, EXTENDED RELEASE ORAL at 22:23

## 2021-01-01 RX ADMIN — Medication 81 MILLIGRAM(S): at 09:21

## 2021-01-01 RX ADMIN — AZITHROMYCIN 255 MILLIGRAM(S): 500 TABLET, FILM COATED ORAL at 12:06

## 2021-01-01 RX ADMIN — Medication 6: at 17:23

## 2021-01-01 RX ADMIN — LIDOCAINE 1 PATCH: 4 CREAM TOPICAL at 10:12

## 2021-01-01 RX ADMIN — CYCLOBENZAPRINE HYDROCHLORIDE 5 MILLIGRAM(S): 10 TABLET, FILM COATED ORAL at 22:32

## 2021-01-01 RX ADMIN — BUDESONIDE AND FORMOTEROL FUMARATE DIHYDRATE 2 PUFF(S): 160; 4.5 AEROSOL RESPIRATORY (INHALATION) at 20:22

## 2021-01-01 RX ADMIN — CYCLOBENZAPRINE HYDROCHLORIDE 5 MILLIGRAM(S): 10 TABLET, FILM COATED ORAL at 06:04

## 2021-01-01 RX ADMIN — Medication 81 MILLIGRAM(S): at 08:14

## 2021-01-01 RX ADMIN — Medication 2 TABLET(S): at 22:32

## 2021-01-01 RX ADMIN — BUDESONIDE AND FORMOTEROL FUMARATE DIHYDRATE 2 PUFF(S): 160; 4.5 AEROSOL RESPIRATORY (INHALATION) at 20:02

## 2021-01-01 RX ADMIN — HYDROMORPHONE HYDROCHLORIDE 0.5 MILLIGRAM(S): 2 INJECTION INTRAMUSCULAR; INTRAVENOUS; SUBCUTANEOUS at 04:39

## 2021-01-01 RX ADMIN — Medication 650 MILLIGRAM(S): at 01:20

## 2021-01-01 RX ADMIN — MEROPENEM 100 MILLIGRAM(S): 1 INJECTION INTRAVENOUS at 22:18

## 2021-01-01 RX ADMIN — LIDOCAINE 1 PATCH: 4 CREAM TOPICAL at 08:14

## 2021-01-01 RX ADMIN — POLYETHYLENE GLYCOL 3350 17 GRAM(S): 17 POWDER, FOR SOLUTION ORAL at 11:19

## 2021-01-01 RX ADMIN — WARFARIN SODIUM 2.5 MILLIGRAM(S): 2.5 TABLET ORAL at 22:20

## 2021-01-01 RX ADMIN — Medication 81 MILLIGRAM(S): at 08:13

## 2021-01-01 RX ADMIN — LIDOCAINE 1 PATCH: 4 CREAM TOPICAL at 18:29

## 2021-01-01 RX ADMIN — LIDOCAINE 1 PATCH: 4 CREAM TOPICAL at 11:19

## 2021-01-01 RX ADMIN — LIDOCAINE 1 PATCH: 4 CREAM TOPICAL at 10:04

## 2021-01-01 RX ADMIN — SIMVASTATIN 20 MILLIGRAM(S): 20 TABLET, FILM COATED ORAL at 21:11

## 2021-01-01 RX ADMIN — CYCLOBENZAPRINE HYDROCHLORIDE 5 MILLIGRAM(S): 10 TABLET, FILM COATED ORAL at 13:29

## 2021-01-01 RX ADMIN — LIDOCAINE 1 PATCH: 4 CREAM TOPICAL at 09:44

## 2021-01-01 RX ADMIN — Medication 40 MILLIGRAM(S): at 17:38

## 2021-01-01 RX ADMIN — CYCLOBENZAPRINE HYDROCHLORIDE 5 MILLIGRAM(S): 10 TABLET, FILM COATED ORAL at 05:27

## 2021-01-01 RX ADMIN — Medication 40 MILLIGRAM(S): at 12:56

## 2021-01-01 RX ADMIN — CEFEPIME 1000 MILLIGRAM(S): 1 INJECTION, POWDER, FOR SOLUTION INTRAMUSCULAR; INTRAVENOUS at 08:15

## 2021-01-01 RX ADMIN — BUDESONIDE AND FORMOTEROL FUMARATE DIHYDRATE 2 PUFF(S): 160; 4.5 AEROSOL RESPIRATORY (INHALATION) at 21:02

## 2021-01-01 RX ADMIN — Medication 0: at 22:15

## 2021-01-01 RX ADMIN — HYDROMORPHONE HYDROCHLORIDE 0.5 MILLIGRAM(S): 2 INJECTION INTRAMUSCULAR; INTRAVENOUS; SUBCUTANEOUS at 17:58

## 2021-01-01 RX ADMIN — CEFEPIME 1000 MILLIGRAM(S): 1 INJECTION, POWDER, FOR SOLUTION INTRAMUSCULAR; INTRAVENOUS at 10:50

## 2021-01-01 RX ADMIN — BUDESONIDE AND FORMOTEROL FUMARATE DIHYDRATE 2 PUFF(S): 160; 4.5 AEROSOL RESPIRATORY (INHALATION) at 23:03

## 2021-01-01 RX ADMIN — BUDESONIDE AND FORMOTEROL FUMARATE DIHYDRATE 2 PUFF(S): 160; 4.5 AEROSOL RESPIRATORY (INHALATION) at 09:44

## 2021-01-01 RX ADMIN — CARVEDILOL PHOSPHATE 50 MILLIGRAM(S): 80 CAPSULE, EXTENDED RELEASE ORAL at 20:49

## 2021-01-01 RX ADMIN — LIDOCAINE 1 PATCH: 4 CREAM TOPICAL at 10:24

## 2021-01-01 RX ADMIN — CYCLOBENZAPRINE HYDROCHLORIDE 5 MILLIGRAM(S): 10 TABLET, FILM COATED ORAL at 22:18

## 2021-01-01 RX ADMIN — Medication 40 MILLIGRAM(S): at 14:20

## 2021-01-01 RX ADMIN — LIDOCAINE 1 PATCH: 4 CREAM TOPICAL at 21:35

## 2021-01-01 RX ADMIN — WARFARIN SODIUM 2 MILLIGRAM(S): 2.5 TABLET ORAL at 22:08

## 2021-01-01 RX ADMIN — CEFEPIME 1000 MILLIGRAM(S): 1 INJECTION, POWDER, FOR SOLUTION INTRAMUSCULAR; INTRAVENOUS at 22:32

## 2021-01-01 RX ADMIN — BUDESONIDE AND FORMOTEROL FUMARATE DIHYDRATE 2 PUFF(S): 160; 4.5 AEROSOL RESPIRATORY (INHALATION) at 20:37

## 2021-01-01 RX ADMIN — Medication 40 MILLIGRAM(S): at 10:23

## 2021-01-01 RX ADMIN — SACUBITRIL AND VALSARTAN 1 TABLET(S): 24; 26 TABLET, FILM COATED ORAL at 21:10

## 2021-01-01 RX ADMIN — MORPHINE SULFATE 4 MILLIGRAM(S): 50 CAPSULE, EXTENDED RELEASE ORAL at 13:31

## 2021-01-01 RX ADMIN — LIDOCAINE 1 PATCH: 4 CREAM TOPICAL at 09:24

## 2021-01-01 RX ADMIN — CYCLOBENZAPRINE HYDROCHLORIDE 5 MILLIGRAM(S): 10 TABLET, FILM COATED ORAL at 13:26

## 2021-01-01 RX ADMIN — Medication 4: at 17:02

## 2021-01-01 RX ADMIN — CYCLOBENZAPRINE HYDROCHLORIDE 5 MILLIGRAM(S): 10 TABLET, FILM COATED ORAL at 14:33

## 2021-01-01 RX ADMIN — BUDESONIDE AND FORMOTEROL FUMARATE DIHYDRATE 2 PUFF(S): 160; 4.5 AEROSOL RESPIRATORY (INHALATION) at 09:58

## 2021-01-01 RX ADMIN — Medication 1 MILLIGRAM(S): at 10:31

## 2021-01-01 RX ADMIN — Medication 2: at 08:11

## 2021-01-01 RX ADMIN — HYDROMORPHONE HYDROCHLORIDE 0.5 MILLIGRAM(S): 2 INJECTION INTRAMUSCULAR; INTRAVENOUS; SUBCUTANEOUS at 03:41

## 2021-01-01 RX ADMIN — OXYCODONE HYDROCHLORIDE 10 MILLIGRAM(S): 5 TABLET ORAL at 20:24

## 2021-01-01 RX ADMIN — MEROPENEM 100 MILLIGRAM(S): 1 INJECTION INTRAVENOUS at 21:41

## 2021-01-01 RX ADMIN — CEFEPIME 1000 MILLIGRAM(S): 1 INJECTION, POWDER, FOR SOLUTION INTRAMUSCULAR; INTRAVENOUS at 22:56

## 2021-01-01 RX ADMIN — SIMVASTATIN 20 MILLIGRAM(S): 20 TABLET, FILM COATED ORAL at 22:08

## 2021-01-01 RX ADMIN — SIMVASTATIN 20 MILLIGRAM(S): 20 TABLET, FILM COATED ORAL at 22:21

## 2021-01-01 RX ADMIN — LIDOCAINE 1 PATCH: 4 CREAM TOPICAL at 11:22

## 2021-01-01 RX ADMIN — HYDROMORPHONE HYDROCHLORIDE 0.5 MILLIGRAM(S): 2 INJECTION INTRAMUSCULAR; INTRAVENOUS; SUBCUTANEOUS at 18:06

## 2021-01-01 RX ADMIN — Medication 2: at 17:28

## 2021-01-01 RX ADMIN — OXYCODONE HYDROCHLORIDE 10 MILLIGRAM(S): 5 TABLET ORAL at 19:26

## 2021-01-01 RX ADMIN — CEFEPIME 1000 MILLIGRAM(S): 1 INJECTION, POWDER, FOR SOLUTION INTRAMUSCULAR; INTRAVENOUS at 10:31

## 2021-01-01 RX ADMIN — CARVEDILOL PHOSPHATE 50 MILLIGRAM(S): 80 CAPSULE, EXTENDED RELEASE ORAL at 10:23

## 2021-01-01 RX ADMIN — POLYETHYLENE GLYCOL 3350 17 GRAM(S): 17 POWDER, FOR SOLUTION ORAL at 08:14

## 2021-01-01 RX ADMIN — CARVEDILOL PHOSPHATE 50 MILLIGRAM(S): 80 CAPSULE, EXTENDED RELEASE ORAL at 09:21

## 2021-01-01 RX ADMIN — AZITHROMYCIN 500 MILLIGRAM(S): 500 TABLET, FILM COATED ORAL at 10:03

## 2021-01-01 RX ADMIN — Medication 81 MILLIGRAM(S): at 10:23

## 2021-01-01 RX ADMIN — SACUBITRIL AND VALSARTAN 1 TABLET(S): 24; 26 TABLET, FILM COATED ORAL at 08:14

## 2021-01-01 RX ADMIN — WARFARIN SODIUM 2 MILLIGRAM(S): 2.5 TABLET ORAL at 22:18

## 2021-01-01 RX ADMIN — MORPHINE SULFATE 4 MILLIGRAM(S): 50 CAPSULE, EXTENDED RELEASE ORAL at 21:47

## 2021-01-01 RX ADMIN — Medication 2: at 12:08

## 2021-01-01 RX ADMIN — LIDOCAINE 1 PATCH: 4 CREAM TOPICAL at 08:15

## 2021-01-01 RX ADMIN — SIMVASTATIN 20 MILLIGRAM(S): 20 TABLET, FILM COATED ORAL at 22:26

## 2021-01-01 RX ADMIN — CYCLOBENZAPRINE HYDROCHLORIDE 5 MILLIGRAM(S): 10 TABLET, FILM COATED ORAL at 06:07

## 2021-01-01 RX ADMIN — CARVEDILOL PHOSPHATE 50 MILLIGRAM(S): 80 CAPSULE, EXTENDED RELEASE ORAL at 22:56

## 2021-01-01 RX ADMIN — HYDROMORPHONE HYDROCHLORIDE 0.5 MILLIGRAM(S): 2 INJECTION INTRAMUSCULAR; INTRAVENOUS; SUBCUTANEOUS at 23:54

## 2021-01-01 RX ADMIN — CEFEPIME 100 MILLIGRAM(S): 1 INJECTION, POWDER, FOR SOLUTION INTRAMUSCULAR; INTRAVENOUS at 17:23

## 2021-01-01 RX ADMIN — LIDOCAINE 1 PATCH: 4 CREAM TOPICAL at 23:30

## 2021-01-01 RX ADMIN — Medication 81 MILLIGRAM(S): at 09:23

## 2021-01-01 RX ADMIN — BUDESONIDE AND FORMOTEROL FUMARATE DIHYDRATE 2 PUFF(S): 160; 4.5 AEROSOL RESPIRATORY (INHALATION) at 21:09

## 2021-01-01 RX ADMIN — Medication 1 MILLIGRAM(S): at 09:45

## 2021-01-01 RX ADMIN — CYCLOBENZAPRINE HYDROCHLORIDE 5 MILLIGRAM(S): 10 TABLET, FILM COATED ORAL at 14:01

## 2021-01-01 RX ADMIN — Medication 1 MILLIGRAM(S): at 09:14

## 2021-01-01 RX ADMIN — SACUBITRIL AND VALSARTAN 1 TABLET(S): 24; 26 TABLET, FILM COATED ORAL at 10:30

## 2021-01-01 RX ADMIN — Medication 40 MILLIGRAM(S): at 09:21

## 2021-01-01 RX ADMIN — LIDOCAINE 1 PATCH: 4 CREAM TOPICAL at 22:20

## 2021-01-01 RX ADMIN — LIDOCAINE 1 PATCH: 4 CREAM TOPICAL at 17:23

## 2021-01-01 RX ADMIN — Medication 600 MILLIGRAM(S): at 09:45

## 2021-01-01 RX ADMIN — Medication 40 MILLIGRAM(S): at 14:14

## 2021-01-01 RX ADMIN — Medication 40 MILLIGRAM(S): at 10:46

## 2021-01-01 RX ADMIN — CEFEPIME 1000 MILLIGRAM(S): 1 INJECTION, POWDER, FOR SOLUTION INTRAMUSCULAR; INTRAVENOUS at 08:16

## 2021-01-01 RX ADMIN — Medication 1 TABLET(S): at 11:20

## 2021-01-01 RX ADMIN — REMDESIVIR 540 MILLIGRAM(S): 5 INJECTION INTRAVENOUS at 07:24

## 2021-01-01 RX ADMIN — HYDROMORPHONE HYDROCHLORIDE 0.5 MILLIGRAM(S): 2 INJECTION INTRAMUSCULAR; INTRAVENOUS; SUBCUTANEOUS at 21:48

## 2021-01-01 RX ADMIN — Medication 5 MILLIGRAM(S): at 10:12

## 2021-01-01 RX ADMIN — HYDROMORPHONE HYDROCHLORIDE 0.25 MILLIGRAM(S): 2 INJECTION INTRAMUSCULAR; INTRAVENOUS; SUBCUTANEOUS at 14:14

## 2021-01-01 RX ADMIN — Medication 40 MILLIGRAM(S): at 12:36

## 2021-01-01 RX ADMIN — CYCLOBENZAPRINE HYDROCHLORIDE 5 MILLIGRAM(S): 10 TABLET, FILM COATED ORAL at 05:54

## 2021-01-01 RX ADMIN — CARVEDILOL PHOSPHATE 50 MILLIGRAM(S): 80 CAPSULE, EXTENDED RELEASE ORAL at 09:44

## 2021-01-01 RX ADMIN — Medication 40 MILLIGRAM(S): at 06:10

## 2021-01-01 RX ADMIN — Medication 2: at 17:16

## 2021-01-01 RX ADMIN — CYCLOBENZAPRINE HYDROCHLORIDE 5 MILLIGRAM(S): 10 TABLET, FILM COATED ORAL at 04:44

## 2021-01-01 RX ADMIN — Medication 40 MILLIGRAM(S): at 05:29

## 2021-01-01 RX ADMIN — Medication 1 TABLET(S): at 13:49

## 2021-01-01 RX ADMIN — Medication 2: at 07:47

## 2021-01-01 RX ADMIN — LIDOCAINE 1 PATCH: 4 CREAM TOPICAL at 20:29

## 2021-01-01 RX ADMIN — CYCLOBENZAPRINE HYDROCHLORIDE 5 MILLIGRAM(S): 10 TABLET, FILM COATED ORAL at 23:36

## 2021-01-01 RX ADMIN — SACUBITRIL AND VALSARTAN 1 TABLET(S): 24; 26 TABLET, FILM COATED ORAL at 21:44

## 2021-01-01 RX ADMIN — Medication 40 MILLIGRAM(S): at 06:31

## 2021-01-01 RX ADMIN — Medication 20 MILLIEQUIVALENT(S): at 14:55

## 2021-01-01 RX ADMIN — Medication 81 MILLIGRAM(S): at 09:14

## 2021-01-01 RX ADMIN — Medication 0.25 MILLIGRAM(S): at 15:07

## 2021-01-01 RX ADMIN — OXYCODONE HYDROCHLORIDE 10 MILLIGRAM(S): 5 TABLET ORAL at 04:44

## 2021-01-01 RX ADMIN — SIMVASTATIN 20 MILLIGRAM(S): 20 TABLET, FILM COATED ORAL at 22:32

## 2021-01-01 RX ADMIN — Medication 4: at 11:04

## 2021-01-01 RX ADMIN — Medication 2: at 07:59

## 2021-01-01 RX ADMIN — SIMVASTATIN 20 MILLIGRAM(S): 20 TABLET, FILM COATED ORAL at 20:49

## 2021-01-01 RX ADMIN — LIDOCAINE 1 PATCH: 4 CREAM TOPICAL at 23:26

## 2021-01-01 RX ADMIN — SACUBITRIL AND VALSARTAN 1 TABLET(S): 24; 26 TABLET, FILM COATED ORAL at 22:56

## 2021-01-01 RX ADMIN — CYCLOBENZAPRINE HYDROCHLORIDE 5 MILLIGRAM(S): 10 TABLET, FILM COATED ORAL at 22:20

## 2021-01-01 RX ADMIN — Medication 600 MILLIGRAM(S): at 22:22

## 2021-01-01 RX ADMIN — Medication 40 MILLIGRAM(S): at 22:16

## 2021-01-01 RX ADMIN — Medication 0.5 MILLIGRAM(S): at 15:41

## 2021-01-01 RX ADMIN — BUDESONIDE AND FORMOTEROL FUMARATE DIHYDRATE 2 PUFF(S): 160; 4.5 AEROSOL RESPIRATORY (INHALATION) at 20:05

## 2021-01-01 RX ADMIN — CARVEDILOL PHOSPHATE 50 MILLIGRAM(S): 80 CAPSULE, EXTENDED RELEASE ORAL at 10:50

## 2021-01-01 RX ADMIN — Medication 6 MILLIGRAM(S): at 19:52

## 2021-01-01 RX ADMIN — Medication 20 MILLIGRAM(S): at 12:32

## 2021-01-01 RX ADMIN — Medication 81 MILLIGRAM(S): at 10:03

## 2021-01-01 RX ADMIN — Medication 2 TABLET(S): at 21:11

## 2021-01-01 RX ADMIN — Medication 1 TABLET(S): at 09:44

## 2021-01-01 RX ADMIN — BUDESONIDE AND FORMOTEROL FUMARATE DIHYDRATE 2 PUFF(S): 160; 4.5 AEROSOL RESPIRATORY (INHALATION) at 09:15

## 2021-01-01 RX ADMIN — CYCLOBENZAPRINE HYDROCHLORIDE 5 MILLIGRAM(S): 10 TABLET, FILM COATED ORAL at 21:44

## 2021-01-01 RX ADMIN — WARFARIN SODIUM 2 MILLIGRAM(S): 2.5 TABLET ORAL at 21:10

## 2021-01-01 RX ADMIN — SACUBITRIL AND VALSARTAN 1 TABLET(S): 24; 26 TABLET, FILM COATED ORAL at 09:21

## 2021-01-01 RX ADMIN — CYCLOBENZAPRINE HYDROCHLORIDE 5 MILLIGRAM(S): 10 TABLET, FILM COATED ORAL at 22:57

## 2021-01-01 RX ADMIN — LIDOCAINE 1 PATCH: 4 CREAM TOPICAL at 22:01

## 2021-01-01 RX ADMIN — Medication 2 TABLET(S): at 10:51

## 2021-01-01 RX ADMIN — AZITHROMYCIN 500 MILLIGRAM(S): 500 TABLET, FILM COATED ORAL at 18:37

## 2021-01-01 RX ADMIN — LIDOCAINE 1 PATCH: 4 CREAM TOPICAL at 18:16

## 2021-01-01 RX ADMIN — WARFARIN SODIUM 2 MILLIGRAM(S): 2.5 TABLET ORAL at 22:25

## 2021-01-01 RX ADMIN — Medication 1 MILLIGRAM(S): at 10:11

## 2021-01-01 RX ADMIN — MEROPENEM 100 MILLIGRAM(S): 1 INJECTION INTRAVENOUS at 09:24

## 2021-01-01 RX ADMIN — Medication 4: at 11:27

## 2021-01-01 RX ADMIN — POLYETHYLENE GLYCOL 3350 17 GRAM(S): 17 POWDER, FOR SOLUTION ORAL at 13:29

## 2021-01-01 RX ADMIN — MEROPENEM 100 MILLIGRAM(S): 1 INJECTION INTRAVENOUS at 22:16

## 2021-01-01 RX ADMIN — Medication 2 TABLET(S): at 06:07

## 2021-01-01 RX ADMIN — CYCLOBENZAPRINE HYDROCHLORIDE 5 MILLIGRAM(S): 10 TABLET, FILM COATED ORAL at 17:19

## 2021-01-01 RX ADMIN — CYCLOBENZAPRINE HYDROCHLORIDE 5 MILLIGRAM(S): 10 TABLET, FILM COATED ORAL at 05:31

## 2021-01-01 RX ADMIN — Medication 6 MILLIGRAM(S): at 10:04

## 2021-01-01 RX ADMIN — Medication 5 MILLIGRAM(S): at 09:23

## 2021-01-01 RX ADMIN — Medication 2: at 07:57

## 2021-01-01 RX ADMIN — Medication 4: at 11:16

## 2021-01-01 RX ADMIN — CYCLOBENZAPRINE HYDROCHLORIDE 5 MILLIGRAM(S): 10 TABLET, FILM COATED ORAL at 15:38

## 2021-01-01 RX ADMIN — Medication 40 MILLIGRAM(S): at 00:50

## 2021-01-01 RX ADMIN — CEFEPIME 1000 MILLIGRAM(S): 1 INJECTION, POWDER, FOR SOLUTION INTRAMUSCULAR; INTRAVENOUS at 21:43

## 2021-01-01 RX ADMIN — HYDROMORPHONE HYDROCHLORIDE 0.5 MILLIGRAM(S): 2 INJECTION INTRAMUSCULAR; INTRAVENOUS; SUBCUTANEOUS at 16:54

## 2021-01-01 RX ADMIN — CARVEDILOL PHOSPHATE 50 MILLIGRAM(S): 80 CAPSULE, EXTENDED RELEASE ORAL at 08:13

## 2021-01-01 RX ADMIN — LIDOCAINE 1 PATCH: 4 CREAM TOPICAL at 22:25

## 2021-01-01 RX ADMIN — Medication 81 MILLIGRAM(S): at 10:49

## 2021-01-01 RX ADMIN — SACUBITRIL AND VALSARTAN 1 TABLET(S): 24; 26 TABLET, FILM COATED ORAL at 21:42

## 2021-01-01 RX ADMIN — BUDESONIDE AND FORMOTEROL FUMARATE DIHYDRATE 2 PUFF(S): 160; 4.5 AEROSOL RESPIRATORY (INHALATION) at 01:00

## 2021-01-01 RX ADMIN — LIDOCAINE 1 PATCH: 4 CREAM TOPICAL at 20:00

## 2021-01-01 RX ADMIN — Medication 1 MILLIGRAM(S): at 08:13

## 2021-01-01 RX ADMIN — CEFEPIME 100 MILLIGRAM(S): 1 INJECTION, POWDER, FOR SOLUTION INTRAMUSCULAR; INTRAVENOUS at 06:31

## 2021-01-01 RX ADMIN — BUDESONIDE AND FORMOTEROL FUMARATE DIHYDRATE 2 PUFF(S): 160; 4.5 AEROSOL RESPIRATORY (INHALATION) at 07:26

## 2021-01-01 RX ADMIN — SIMVASTATIN 20 MILLIGRAM(S): 20 TABLET, FILM COATED ORAL at 22:56

## 2021-01-01 RX ADMIN — Medication 2: at 17:49

## 2021-01-01 RX ADMIN — Medication 2 TABLET(S): at 08:16

## 2021-01-01 RX ADMIN — MOMETASONE FUROATE 1 PUFF(S): 220 INHALANT RESPIRATORY (INHALATION) at 08:18

## 2021-01-01 RX ADMIN — Medication 1 MILLIGRAM(S): at 08:14

## 2021-01-01 RX ADMIN — Medication 600 MILLIGRAM(S): at 21:41

## 2021-01-01 RX ADMIN — Medication 4: at 12:07

## 2021-01-01 RX ADMIN — WARFARIN SODIUM 2 MILLIGRAM(S): 2.5 TABLET ORAL at 21:44

## 2021-01-01 RX ADMIN — HYDROMORPHONE HYDROCHLORIDE 0.5 MILLIGRAM(S): 2 INJECTION INTRAMUSCULAR; INTRAVENOUS; SUBCUTANEOUS at 11:30

## 2021-01-01 RX ADMIN — CYCLOBENZAPRINE HYDROCHLORIDE 5 MILLIGRAM(S): 10 TABLET, FILM COATED ORAL at 13:55

## 2021-01-01 RX ADMIN — AZITHROMYCIN 500 MILLIGRAM(S): 500 TABLET, FILM COATED ORAL at 09:21

## 2021-01-01 RX ADMIN — Medication 40 MILLIGRAM(S): at 23:22

## 2021-01-01 RX ADMIN — LIDOCAINE 1 PATCH: 4 CREAM TOPICAL at 09:22

## 2021-01-01 RX ADMIN — SIMVASTATIN 20 MILLIGRAM(S): 20 TABLET, FILM COATED ORAL at 23:22

## 2021-01-01 RX ADMIN — SACUBITRIL AND VALSARTAN 1 TABLET(S): 24; 26 TABLET, FILM COATED ORAL at 22:25

## 2021-01-01 RX ADMIN — SIMVASTATIN 20 MILLIGRAM(S): 20 TABLET, FILM COATED ORAL at 21:41

## 2021-01-01 RX ADMIN — WARFARIN SODIUM 1 MILLIGRAM(S): 2.5 TABLET ORAL at 22:33

## 2021-01-01 RX ADMIN — Medication 40 MILLIGRAM(S): at 17:04

## 2021-01-01 RX ADMIN — CYCLOBENZAPRINE HYDROCHLORIDE 5 MILLIGRAM(S): 10 TABLET, FILM COATED ORAL at 17:37

## 2021-01-01 RX ADMIN — HYDROMORPHONE HYDROCHLORIDE 0.5 MILLIGRAM(S): 2 INJECTION INTRAMUSCULAR; INTRAVENOUS; SUBCUTANEOUS at 06:13

## 2021-01-01 RX ADMIN — Medication 1 MILLIGRAM(S): at 10:50

## 2021-01-01 RX ADMIN — OXYCODONE HYDROCHLORIDE 10 MILLIGRAM(S): 5 TABLET ORAL at 13:46

## 2021-01-01 RX ADMIN — CEFEPIME 100 MILLIGRAM(S): 1 INJECTION, POWDER, FOR SOLUTION INTRAMUSCULAR; INTRAVENOUS at 18:29

## 2021-01-01 RX ADMIN — CEFEPIME 1000 MILLIGRAM(S): 1 INJECTION, POWDER, FOR SOLUTION INTRAMUSCULAR; INTRAVENOUS at 10:23

## 2021-01-01 RX ADMIN — Medication 6 MILLIGRAM(S): at 09:21

## 2021-01-01 RX ADMIN — Medication 600 MILLIGRAM(S): at 11:21

## 2021-01-01 RX ADMIN — CYCLOBENZAPRINE HYDROCHLORIDE 5 MILLIGRAM(S): 10 TABLET, FILM COATED ORAL at 22:25

## 2021-01-01 RX ADMIN — CARVEDILOL PHOSPHATE 50 MILLIGRAM(S): 80 CAPSULE, EXTENDED RELEASE ORAL at 10:30

## 2021-01-01 RX ADMIN — LIDOCAINE 1 PATCH: 4 CREAM TOPICAL at 23:00

## 2021-01-01 RX ADMIN — Medication 2 TABLET(S): at 22:18

## 2021-01-01 RX ADMIN — BUDESONIDE AND FORMOTEROL FUMARATE DIHYDRATE 2 PUFF(S): 160; 4.5 AEROSOL RESPIRATORY (INHALATION) at 08:20

## 2021-01-01 RX ADMIN — Medication 40 MILLIGRAM(S): at 17:46

## 2021-01-01 RX ADMIN — Medication 40 MILLIGRAM(S): at 01:00

## 2021-01-01 RX ADMIN — Medication 40 MILLIGRAM(S): at 13:39

## 2021-01-01 RX ADMIN — LIDOCAINE 1 PATCH: 4 CREAM TOPICAL at 19:39

## 2021-01-01 RX ADMIN — Medication 1 MILLIGRAM(S): at 10:23

## 2021-01-01 RX ADMIN — Medication 4: at 21:44

## 2021-01-01 RX ADMIN — CYCLOBENZAPRINE HYDROCHLORIDE 5 MILLIGRAM(S): 10 TABLET, FILM COATED ORAL at 21:11

## 2021-01-01 RX ADMIN — CARVEDILOL PHOSPHATE 50 MILLIGRAM(S): 80 CAPSULE, EXTENDED RELEASE ORAL at 21:43

## 2021-01-01 RX ADMIN — LIDOCAINE 1 PATCH: 4 CREAM TOPICAL at 21:21

## 2021-01-01 RX ADMIN — LIDOCAINE 1 PATCH: 4 CREAM TOPICAL at 22:10

## 2021-01-01 RX ADMIN — Medication 6 MILLIGRAM(S): at 09:14

## 2021-01-01 RX ADMIN — Medication 2: at 17:24

## 2021-01-01 RX ADMIN — MORPHINE SULFATE 4 MILLIGRAM(S): 50 CAPSULE, EXTENDED RELEASE ORAL at 08:07

## 2021-01-01 RX ADMIN — Medication 600 MILLIGRAM(S): at 22:18

## 2021-01-01 RX ADMIN — CYCLOBENZAPRINE HYDROCHLORIDE 5 MILLIGRAM(S): 10 TABLET, FILM COATED ORAL at 10:19

## 2021-01-01 RX ADMIN — BUDESONIDE AND FORMOTEROL FUMARATE DIHYDRATE 2 PUFF(S): 160; 4.5 AEROSOL RESPIRATORY (INHALATION) at 20:52

## 2021-01-01 RX ADMIN — Medication 600 MILLIGRAM(S): at 22:49

## 2021-01-01 RX ADMIN — BUDESONIDE AND FORMOTEROL FUMARATE DIHYDRATE 2 PUFF(S): 160; 4.5 AEROSOL RESPIRATORY (INHALATION) at 21:57

## 2021-01-01 RX ADMIN — CYCLOBENZAPRINE HYDROCHLORIDE 5 MILLIGRAM(S): 10 TABLET, FILM COATED ORAL at 07:37

## 2021-01-01 RX ADMIN — SACUBITRIL AND VALSARTAN 1 TABLET(S): 24; 26 TABLET, FILM COATED ORAL at 10:51

## 2021-01-01 RX ADMIN — Medication 600 MILLIGRAM(S): at 11:20

## 2021-01-01 RX ADMIN — Medication 40 MILLIGRAM(S): at 06:32

## 2021-01-01 RX ADMIN — Medication 1 MILLIGRAM(S): at 10:03

## 2021-01-01 RX ADMIN — CEFEPIME 1000 MILLIGRAM(S): 1 INJECTION, POWDER, FOR SOLUTION INTRAMUSCULAR; INTRAVENOUS at 22:18

## 2021-01-01 RX ADMIN — Medication 40 MILLIGRAM(S): at 05:41

## 2021-02-26 NOTE — PHARMACOTHERAPY INTERVENTION NOTE - COMMENTS
med history complete, reviewed medications with patient and confirmed with doctor first med profile, all medication related questions answered, patient states she stopped taking amiodarone, last filled on 9/8/20 for 90 day supply

## 2021-02-26 NOTE — H&P ADULT - HISTORY OF PRESENT ILLNESS
75 year old female with known history of Afib cardiomyopathy EF 25% and AICD, urinary incontinence and chronmic knee and hip pain presents with severe back pain- found to have L2 fracture , having difficulty with ambulation and ADLs , presents for pain relief.  acute worsening L sided low back pain and she is unable to ambulate. Patient denies any recent falls or trauma. pain started past few days. She states the pain is mainly localized to her lower back and does not radiate, worsens with movement.   She denies any numbness or tingling in her bilateral lower extremities. She has had arthritis and pain both knees and hip, and has follows out patient with Dr Clancy.   "She states she was supposed to get her L knee replaced in 2018, but found to have new onset atrial fibrillation at the pre op testing, and subsequently was admitted and found to have E coli urosepsis and received urologic surgical intervention and developed respiratory failure requiring intubation. She states she really hasn't walked much since then and mainly uses a wheel chair and occasional stand to transfer. She states she has had urinary incontinence since that incident, but denies any new onset bowel incontinence of saddle anesthesia."- above per ortho Hand P verified with patient.

## 2021-02-26 NOTE — H&P ADULT - ASSESSMENT
75 year old female with known history of Afib cardiomyopathy EF 25% and AICD, urinary incontinence and chronic knee and hip pain presents with severe back pain- found to have L2 fracture     L2 fracture- already seen by ortho-following recommedation to be followed    low back pain is likely muscular in nature as well as the L2 compression fracture  . No acute red flag symptoms of cord compression.   WBAT bilateral lower extremities  - orthopedic spine recommendations per ortho spine   - PT/OT  - Analgesia as needed for back and hip pain  - no acute orthopedic surgical intervention at this time    - DVT ppx - INR therapeutic on coumadin- cont home dose     Arthritis- cont prednisone and Methotrexate- once a week 15 mg - please confirm the day of the week and restart Methotrexate    Cardiomyopathy , chronic CHF systolic- cont Coreg and lasix and Entresto    Afib- cont Coreg for ate control and Full Ac with coumadin

## 2021-02-26 NOTE — CONSULT NOTE ADULT - ASSESSMENT
75 F with acute low back pain in her lumbar paraspinal region and acute L2 compression fracture. Her low back pain is likely muscular in nature as well as the L2 compression fracture. No acute red flag symptoms of cord compression.     - WBAT bilateral lower extremities  - recommend LSO brace for patient for comfort   - PT/OT  - Analgesia as needed for back and hip pain  - Muscle relaxors as needed  - risk benefit discussion of DVT prophylaxis for patient given L2 compression fracture and risk of epidural hematoma  - no acute orthopedic surgical intervention at this time  - monitor patient for signs of neurologic changes and contact orthopedics if they were to occur  - No stable for DC  - patient may follow up with Dr Calero after discharge  - Will discuss with attending and advise further if plan changes  75 F with acute low back pain in her lumbar paraspinal region and acute L2 compression fracture. Her low back pain is likely muscular in nature as well as the L2 compression fracture. No acute red flag symptoms of cord compression.     - WBAT bilateral lower extremities  - recommend LSO brace for patient for comfort   - PT/OT  - Analgesia as needed for back and hip pain  - Muscle relaxors as needed  - risk benefit discussion of DVT prophylaxis for patient given L2 compression fracture and risk of epidural hematoma  - no acute orthopedic surgical intervention at this time  - monitor patient for signs of neurologic changes and contact orthopedics if they were to occur  - ortho stable for DC  - patient may follow up with Dr Calero after discharge  - Will discuss with attending and advise further if plan changes  75 F with acute low back pain in her lumbar paraspinal region and acute L2 compression fracture. Her low back pain is likely muscular in nature as well as the L2 compression fracture. No acute red flag symptoms of cord compression.     - WBAT bilateral lower extremities  - recommend LSO brace for patient for comfort   - PT/OT  - Analgesia as needed for back and hip pain  - Muscle relaxors as needed  - risk benefit discussion of DVT prophylaxis for patient given L2 compression fracture and risk of epidural hematoma  - no acute orthopedic surgical intervention at this time  - monitor patient for signs of neurologic changes and contact orthopedics if they were to occur  - will order MR of lumbar spine to evaluate fracture   - orthopedics to follow MRI  - discussed with attending and will advise further if plan changes

## 2021-02-26 NOTE — ED ADULT NURSE NOTE - NSIMPLEMENTINTERV_GEN_ALL_ED
Implemented All Fall Risk Interventions:  [place] to call system. Call bell, personal items and telephone within reach. Instruct patient to call for assistance. Room bathroom lighting operational. Non-slip footwear when patient is off stretcher. Physically safe environment: no spills, clutter or unnecessary equipment. Stretcher in lowest position, wheels locked, appropriate side rails in place. Provide visual cue, wrist band, yellow gown, etc. Monitor gait and stability. Monitor for mental status changes and reorient to person, place, and time. Review medications for side effects contributing to fall risk. Reinforce activity limits and safety measures with patient and family.

## 2021-02-26 NOTE — ED PROVIDER NOTE - PROGRESS NOTE DETAILS
Camden ISAACS for ED attending, Dr. Paeg: Discussed with orthopedics, ortho abdoulaye arriaza pt. Camden ISAACS for ED attending, Dr. Page: Updated pt on results of CT. Spine paged. Destiny ROSE: sign out received from Dr. Page. awaiting Ortho ans Spine evaluations. spoke with Spine, will see patient in ED. Ortho paged. will admit for pain control. Destiny ROSE: signout received from Dr. Page. patient with intractable pain secondary to L2 fracture. will admit for pain contol.

## 2021-02-26 NOTE — ED STATDOCS - PROGRESS NOTE DETAILS
Camden Troncoso for attending Dr. Allred: 76 y/o female with a PMHx of Afib, CAD s/p stents, CHF with cardiomyopathy, diverticulitis, edema, HLD, HTN, kidney stones, psoriasis, rheumatoid arthritis, urine incontinence, UTI presents to the ED c/o b/l hip pain x days, worsening this morning. Pt reports she could not get out of bed this morning secondary to pain. No other complaints at this time. Ortho: Dr. Clancy. PCP: Dr. Rios. Pt reports "I can not walk." Will send pt to main ED for further evaluation.

## 2021-02-26 NOTE — ED PROVIDER NOTE - ENMT, MLM
Airway patent, Nasal mucosa clear. Mouth with normal mucosa. Throat has no vesicles, no oropharyngeal exudates and uvula is midline. Airway patent, face mask in place.

## 2021-02-26 NOTE — ED PROVIDER NOTE - OBJECTIVE STATEMENT
76 y/o F with PMHx of Afib on Coumadin, CAD s/p coronary artery stent placement, CHF with cardiomyopathy, HTN, HLD, diverticulitis, kidney stones, psoriasis, RA, UTI, s/p bladder repair surgery, s/p  x2, and s/p hysterectomy presents to the ED c/o +b/l hip pain x2 months, worsening past 24 hours with associated +difficulty bearing weight 2/2 pain for the past few days. No fever. Denies trauma. Tried taking oxy and Tylenol yesterday without relief. Orthopedist: Dr. Clancy. PCP: Dr. Rios. Allergic to apixaban, fesoterodine, and Macrobid. PCP: Dr. Carl Rios.

## 2021-02-27 NOTE — PROGRESS NOTE ADULT - SUBJECTIVE AND OBJECTIVE BOX
C/c L2 fracture      HPI:  75F, pmh of HTN, HLD, RA on Prednisone 5mg daily, emphysema, CAD, stents, A fib on coumadin, Cardiomyopathy with EF 30%, AICD,  who  presents with severe back pain- found to have L2 fracture. She since onset of back pain she's been having difficulty ambulating. Denies recent falls/trauma. Pain started a few days propr to admission, mainly localizeed to left lower back.   She denies any numbness or tingling in her bilateral lower extremities. She has had arthritis and pain both knees and hip, and has follows out patient with Dr Clancy.   "She states she was supposed to get her L knee replaced in 2018, but found to have new onset atrial fibrillation at the pre op testing, and subsequently was admitted and found to have E coli urosepsis and received urologic surgical intervention and developed respiratory failure requiring intubation. She states she really hasn't walked much since then and mainly uses a wheel chair and occasional stand to transfer. She states she has had urinary incontinence since that incident, but denies any new onset bowel incontinence of saddle anesthesia."    2/27: pt seen and examined this am. c/o severe back pain. Got oxycodone, flexeril, and morphine without relief. No parasthesias.  Denies sob/chest pain. Does not use home o2.     Review of system- All 10 systems reviewed and is as per HPI otherwise negative.     Vital Signs Last 24 Hrs  T(C): 37.5 (27 Feb 2021 09:08), Max: 37.5 (27 Feb 2021 09:08)  T(F): 99.5 (27 Feb 2021 09:08), Max: 99.5 (27 Feb 2021 09:08)  HR: 108 (27 Feb 2021 09:08) (63 - 108)  BP: 105/60 (27 Feb 2021 09:08) (105/60 - 128/78)  BP(mean): 72 (26 Feb 2021 17:06) (72 - 84)  RR: 18 (27 Feb 2021 09:08) (16 - 18)  SpO2: 90% (27 Feb 2021 09:08) (90% - 95%)      PHYSICAL EXAM:    GENERAL: uncomfortable, laying in right lateral position.  HEAD:  Atraumatic, Normocephalic  EYES: EOMI, PERRLA  HEENT: Moist mucous membranes  NECK: Supple, No JVD  NERVOUS SYSTEM:  Alert & Oriented X3, non focal  CHEST/LUNG: Clear to auscultation bilaterally, decreased at baseas  HEART: Regular rate and rhythm  ABDOMEN: Soft, Nontender, Nondistended; Bowel sounds present  GENITOURINARY- Voiding, no palpable bladder  EXTREMITIES:  No clubbing, cyanosis, or edema  MUSCULOSKELETAL- tenderness left of midline lower back  SKIN-no rash    LABS:                        11.2   11.05 )-----------( 179      ( 27 Feb 2021 06:31 )             36.1     02-27    144  |  114<H>  |  21  ----------------------------<  107<H>  3.8   |  24  |  0.92    Ca    8.6      27 Feb 2021 06:31    TPro  5.8<L>  /  Alb  2.2<L>  /  TBili  0.7  /  DBili  x   /  AST  18  /  ALT  17  /  AlkPhos  145<H>  02-27    PT/INR - ( 27 Feb 2021 06:31 )   PT: 36.3 sec;   INR: 3.28 ratio         PTT - ( 27 Feb 2021 06:31 )  PTT:37.6 sec      CT Lumbar Spine No Cont (02.26.21 @ 13:24) >  IMPRESSION:  Acute appearing moderate L2 compression fracture. Minimal bony retropulsion with mild spinal canal stenosis at L1-L2 level.  MRI may be considered for more definitive spinal canal soft tissue content evaluation.    ASSESSMENT AND PLAN:    75F, PMH AS ABOVE A/W:    1. Acute spontaneous L2 fracture:  -could be related to osteoporosis/chronic steroid use.   -pain control  -brace ordered per spine.   -physical therapy  -incentive spirometry  -will need to see if aicd is mri compatible.    2. Acute hypoxic resp failure:  -likely related to atelectasis as pt not taking deep inspirations d/t pain with underlying emphysema  -incentive spirometry  -o2 via nasal cannula.     3. A fib:  -on coumadin, hold today for supratherapeutic INR  -continue bb    4. Chronic systolic CHF/AICD:  - continue lasix, entresto, bb    5. Rheumatoid arthritis:  -on prednisone, mtx.     6. DVT px:  -inr supratherapeutic.        C/c L2 fracture      HPI:  75F, pmh of HTN, HLD, RA on Prednisone 5mg daily, emphysema, CAD, stents, A fib on coumadin, Cardiomyopathy with EF 30%, AICD,  who  presents with severe back pain- found to have L2 fracture. She since onset of back pain she's been having difficulty ambulating. Denies recent falls/trauma. Pain started a few days propr to admission, mainly localizeed to left lower back.   She denies any numbness or tingling in her bilateral lower extremities. She has had arthritis and pain both knees and hip, and has follows out patient with Dr Clancy.   "She states she was supposed to get her L knee replaced in 2018, but found to have new onset atrial fibrillation at the pre op testing, and subsequently was admitted and found to have E coli urosepsis and received urologic surgical intervention and developed respiratory failure requiring intubation. She states she really hasn't walked much since then and mainly uses a wheel chair and occasional stand to transfer. She states she has had urinary incontinence since that incident, but denies any new onset bowel incontinence of saddle anesthesia."    2/27: pt seen and examined this am. c/o severe back pain. Got oxycodone, flexeril, and morphine without relief. No parasthesias.  Denies sob/chest pain. Does not use home o2.     Review of system- All 10 systems reviewed and is as per HPI otherwise negative.     Vital Signs Last 24 Hrs  T(C): 37.5 (27 Feb 2021 09:08), Max: 37.5 (27 Feb 2021 09:08)  T(F): 99.5 (27 Feb 2021 09:08), Max: 99.5 (27 Feb 2021 09:08)  HR: 108 (27 Feb 2021 09:08) (63 - 108)  BP: 105/60 (27 Feb 2021 09:08) (105/60 - 128/78)  BP(mean): 72 (26 Feb 2021 17:06) (72 - 84)  RR: 18 (27 Feb 2021 09:08) (16 - 18)  SpO2: 90% (27 Feb 2021 09:08) (90% - 95%)      PHYSICAL EXAM:    GENERAL: uncomfortable, laying in right lateral position.  HEAD:  Atraumatic, Normocephalic  EYES: EOMI, PERRLA  HEENT: Moist mucous membranes  NECK: Supple, No JVD  NERVOUS SYSTEM:  Alert & Oriented X3, non focal  CHEST/LUNG: Clear to auscultation bilaterally, decreased at baseas  HEART: Regular rate and rhythm  ABDOMEN: Soft, Nontender, Nondistended; Bowel sounds present  GENITOURINARY- Voiding, no palpable bladder  EXTREMITIES:  No clubbing, cyanosis, or edema  MUSCULOSKELETAL- tenderness left of midline lower back  SKIN-no rash    LABS:                        11.2   11.05 )-----------( 179      ( 27 Feb 2021 06:31 )             36.1     02-27    144  |  114<H>  |  21  ----------------------------<  107<H>  3.8   |  24  |  0.92    Ca    8.6      27 Feb 2021 06:31    TPro  5.8<L>  /  Alb  2.2<L>  /  TBili  0.7  /  DBili  x   /  AST  18  /  ALT  17  /  AlkPhos  145<H>  02-27    PT/INR - ( 27 Feb 2021 06:31 )   PT: 36.3 sec;   INR: 3.28 ratio         PTT - ( 27 Feb 2021 06:31 )  PTT:37.6 sec      CT Lumbar Spine No Cont (02.26.21 @ 13:24) >  IMPRESSION:  Acute appearing moderate L2 compression fracture. Minimal bony retropulsion with mild spinal canal stenosis at L1-L2 level.  MRI may be considered for more definitive spinal canal soft tissue content evaluation.    MEDICATIONS  (STANDING):  acetaminophen  IVPB .. 1000 milliGRAM(s) IV Intermittent once  albuterol/ipratropium for Nebulization 3 milliLiter(s) Nebulizer every 6 hours  aspirin enteric coated 81 milliGRAM(s) Oral daily  carvedilol Oral Tab/Cap - Peds 50 milliGRAM(s) Oral two times a day  folic acid 1 milliGRAM(s) Oral daily  lidocaine   Patch 1 Patch Transdermal daily  predniSONE   Tablet 5 milliGRAM(s) Oral daily  sacubitril 49 mG/valsartan 51 mG 1 Tablet(s) Oral two times a day  simvastatin 20 milliGRAM(s) Oral at bedtime  warfarin 2 milliGRAM(s) Oral daily    MEDICATIONS  (PRN):  acetaminophen   Tablet .. 650 milliGRAM(s) Oral every 6 hours PRN Temp greater or equal to 38C (100.4F), Mild Pain (1 - 3)  cyclobenzaprine 5 milliGRAM(s) Oral at bedtime PRN Muscle Spasm  cyclobenzaprine 5 milliGRAM(s) Oral three times a day PRN Muscle Spasm  HYDROmorphone  Injectable 0.5 milliGRAM(s) IV Push every 4 hours PRN Severe Pain (7 - 10)  oxyCODONE    IR 5 milliGRAM(s) Oral every 4 hours PRN Mild Pain (1 - 3)  oxyCODONE    IR 10 milliGRAM(s) Oral every 4 hours PRN Moderate Pain (4 - 6)    ASSESSMENT AND PLAN:    75F, PMH AS ABOVE A/W:    1. Acute spontaneous L2 fracture:  -could be related to osteoporosis/chronic steroid use.   -pain control  -brace ordered per spine.   -physical therapy  -incentive spirometry  -will need to see if aicd is mri compatible.    2. Acute hypoxic resp failure:  -likely related to atelectasis as pt not taking deep inspirations d/t pain with underlying emphysema  -incentive spirometry  -o2 via nasal cannula.     3. COPD:  -start albuterol prn.   -resume inhaled steroid .    4. A fib:  -on coumadin, hold today for supratherapeutic INR  -continue bb    5. Chronic systolic CHF/AICD:  - continue lasix, entresto, bb    6. Rheumatoid arthritis:  -on prednisone, mtx.     7. DVT px:  -inr supratherapeutic.

## 2021-02-27 NOTE — PHYSICAL THERAPY INITIAL EVALUATION ADULT - GENERAL OBSERVATIONS, REHAB EVAL
Pt encountered supine in bed on 2 North. Pt performed ther ex in bed and bed mobility. Pt refused bed transfer and ambulation training secondary to pain. Pt educated on importance of OOB and continued to decline. Pt left supine in bed in no acute distress, call bell within reach, bed alar, on.

## 2021-02-27 NOTE — PHYSICAL THERAPY INITIAL EVALUATION ADULT - ADDITIONAL COMMENTS
Pt states she uses a wheel chair to ambulate around the home. Pt states she can transfer from wheel chair to toilet or bed without an assistive device.

## 2021-02-27 NOTE — PHYSICAL THERAPY INITIAL EVALUATION ADULT - PERTINENT HX OF CURRENT PROBLEM, REHAB EVAL
75 year old female with known history of Afib cardiomyopathy EF 25% and AICD, urinary incontinence and chronmic knee and hip pain presents with severe back pain- found to have L2 fracture , having difficulty with ambulation and ADLs , presents for pain relief.

## 2021-02-28 NOTE — PROGRESS NOTE ADULT - SUBJECTIVE AND OBJECTIVE BOX
C/c L2 fracture      HPI:  75F, pmh of HTN, HLD, RA on Prednisone 5mg daily, emphysema, CAD, stents, A fib on coumadin, Cardiomyopathy with EF 30%, AICD,  who  presents with severe back pain- found to have L2 fracture. She since onset of back pain she's been having difficulty ambulating. Denies recent falls/trauma. Pain started a few days propr to admission, mainly localizeed to left lower back.   She denies any numbness or tingling in her bilateral lower extremities. She has had arthritis and pain both knees and hip, and has follows out patient with Dr Clancy.   "She states she was supposed to get her L knee replaced in 2018, but found to have new onset atrial fibrillation at the pre op testing, and subsequently was admitted and found to have E coli urosepsis and received urologic surgical intervention and developed respiratory failure requiring intubation. She states she really hasn't walked much since then and mainly uses a wheel chair and occasional stand to transfer. She states she has had urinary incontinence since that incident, but denies any new onset bowel incontinence of saddle anesthesia."    2/28: pt seen and examined this am. Was still having a lot of pain/spasms. Does get relief with flexeril..     Review of system- All 10 systems reviewed and is as per HPI otherwise negative.     Vital Signs Last 24 Hrs  T(C): 37 (28 Feb 2021 08:29), Max: 37 (28 Feb 2021 08:29)  T(F): 98.6 (28 Feb 2021 08:29), Max: 98.6 (28 Feb 2021 08:29)  HR: 89 (28 Feb 2021 08:29) (88 - 95)  BP: 103/48 (28 Feb 2021 08:29) (100/55 - 103/48)  RR: 18 (28 Feb 2021 08:29) (17 - 18)  SpO2: 97% (28 Feb 2021 08:29) (89% - 97%)    PHYSICAL EXAM:    GENERAL: uncomfortable, laying in bed  HEAD:  Atraumatic, Normocephalic  EYES: EOMI, PERRLA  HEENT: Moist mucous membranes  NECK: Supple, No JVD  NERVOUS SYSTEM:  Alert & Oriented X3, non focal  CHEST/LUNG: Clear to auscultation bilaterally, decreased at baseas  HEART: Regular rate and rhythm  ABDOMEN: Soft, Nontender, Nondistended; Bowel sounds present  GENITOURINARY- Voiding, no palpable bladder  EXTREMITIES:  No clubbing, cyanosis, or edema  MUSCULOSKELETAL- tenderness left of midline lower back  SKIN-no rash    LABS:                        11.2   11.05 )-----------( 179      ( 27 Feb 2021 06:31 )             36.1     02-28    142  |  110<H>  |  23  ----------------------------<  131<H>  3.7   |  27  |  1.04    Ca    8.6      28 Feb 2021 06:47    TPro  5.8<L>  /  Alb  2.2<L>  /  TBili  0.7  /  DBili  x   /  AST  18  /  ALT  17  /  AlkPhos  145<H>  02-27    PT/INR - ( 28 Feb 2021 06:47 )   PT: 27.4 sec;   INR: 2.47 ratio         PTT - ( 27 Feb 2021 06:31 )  PTT:37.6 sec      CT Lumbar Spine No Cont (02.26.21 @ 13:24) >  IMPRESSION:  Acute appearing moderate L2 compression fracture. Minimal bony retropulsion with mild spinal canal stenosis at L1-L2 level.  MRI may be considered for more definitive spinal canal soft tissue content evaluation.    MEDICATIONS  (STANDING):  acetaminophen  IVPB .. 1000 milliGRAM(s) IV Intermittent once  albuterol/ipratropium for Nebulization 3 milliLiter(s) Nebulizer every 6 hours  aspirin enteric coated 81 milliGRAM(s) Oral daily  carvedilol Oral Tab/Cap - Peds 50 milliGRAM(s) Oral two times a day  folic acid 1 milliGRAM(s) Oral daily  lidocaine   Patch 1 Patch Transdermal daily  predniSONE   Tablet 5 milliGRAM(s) Oral daily  sacubitril 49 mG/valsartan 51 mG 1 Tablet(s) Oral two times a day  simvastatin 20 milliGRAM(s) Oral at bedtime  warfarin 2 milliGRAM(s) Oral daily    MEDICATIONS  (PRN):  acetaminophen   Tablet .. 650 milliGRAM(s) Oral every 6 hours PRN Temp greater or equal to 38C (100.4F), Mild Pain (1 - 3)  cyclobenzaprine 5 milliGRAM(s) Oral at bedtime PRN Muscle Spasm  cyclobenzaprine 5 milliGRAM(s) Oral three times a day PRN Muscle Spasm  HYDROmorphone  Injectable 0.5 milliGRAM(s) IV Push every 4 hours PRN Severe Pain (7 - 10)  oxyCODONE    IR 5 milliGRAM(s) Oral every 4 hours PRN Mild Pain (1 - 3)  oxyCODONE    IR 10 milliGRAM(s) Oral every 4 hours PRN Moderate Pain (4 - 6)    ASSESSMENT AND PLAN:    75F, PMH AS ABOVE A/W:    1. Acute spontaneous L2 fracture:  -could be related to osteoporosis/chronic steroid use.   -pain control  -brace ordered per spine.   -physical therapy  -incentive spirometry  -will need to see if aicd is mri compatible.    2. Acute hypoxic resp failure:  -check CXR  -likely related to atelectasis as pt not taking deep inspirations d/t pain with underlying emphysema  -incentive spirometry  -o2 via nasal cannula.     3. COPD:  - albuterol prn.   -inhaled steroid .    4. A fib:  -on coumadin  -continue bb    5. Chronic systolic CHF/AICD:  - continue lasix, entresto, bb    6. Rheumatoid arthritis:  -on prednisone, mtx.     7. DVT px:  -inr therapeutic     C/c L2 fracture      HPI:  75F, pmh of HTN, HLD, RA on Prednisone 5mg daily, emphysema, CAD, stents, A fib on coumadin, Cardiomyopathy with EF 30%, AICD,  who  presents with severe back pain- found to have L2 fracture. She since onset of back pain she's been having difficulty ambulating. Denies recent falls/trauma. Pain started a few days propr to admission, mainly localizeed to left lower back.   She denies any numbness or tingling in her bilateral lower extremities. She has had arthritis and pain both knees and hip, and has follows out patient with Dr Clancy.   "She states she was supposed to get her L knee replaced in 2018, but found to have new onset atrial fibrillation at the pre op testing, and subsequently was admitted and found to have E coli urosepsis and received urologic surgical intervention and developed respiratory failure requiring intubation. She states she really hasn't walked much since then and mainly uses a wheel chair and occasional stand to transfer. She states she has had urinary incontinence since that incident, but denies any new onset bowel incontinence of saddle anesthesia."    2/28: pt seen and examined this am. Was still having a lot of pain/spasms. Does get relief with flexeril..     Review of system- All 10 systems reviewed and is as per HPI otherwise negative.     Vital Signs Last 24 Hrs  T(C): 37 (28 Feb 2021 08:29), Max: 37 (28 Feb 2021 08:29)  T(F): 98.6 (28 Feb 2021 08:29), Max: 98.6 (28 Feb 2021 08:29)  HR: 89 (28 Feb 2021 08:29) (88 - 95)  BP: 103/48 (28 Feb 2021 08:29) (100/55 - 103/48)  RR: 18 (28 Feb 2021 08:29) (17 - 18)  SpO2: 97% (28 Feb 2021 08:29) (89% - 97%)    PHYSICAL EXAM:    GENERAL: uncomfortable, laying in bed  HEAD:  Atraumatic, Normocephalic  EYES: EOMI, PERRLA  HEENT: Moist mucous membranes  NECK: Supple, No JVD  NERVOUS SYSTEM:  Alert & Oriented X3, non focal  CHEST/LUNG: Clear to auscultation bilaterally, decreased at baseas  HEART: Regular rate and rhythm  ABDOMEN: Soft, Nontender, Nondistended; Bowel sounds present  GENITOURINARY- Voiding, no palpable bladder  EXTREMITIES:  No clubbing, cyanosis, or edema  MUSCULOSKELETAL- tenderness left of midline lower back  SKIN-no rash    LABS:                        11.2   11.05 )-----------( 179      ( 27 Feb 2021 06:31 )             36.1     02-28    142  |  110<H>  |  23  ----------------------------<  131<H>  3.7   |  27  |  1.04    Ca    8.6      28 Feb 2021 06:47    TPro  5.8<L>  /  Alb  2.2<L>  /  TBili  0.7  /  DBili  x   /  AST  18  /  ALT  17  /  AlkPhos  145<H>  02-27    PT/INR - ( 28 Feb 2021 06:47 )   PT: 27.4 sec;   INR: 2.47 ratio         PTT - ( 27 Feb 2021 06:31 )  PTT:37.6 sec      CT Lumbar Spine No Cont (02.26.21 @ 13:24) >  IMPRESSION:  Acute appearing moderate L2 compression fracture. Minimal bony retropulsion with mild spinal canal stenosis at L1-L2 level.  MRI may be considered for more definitive spinal canal soft tissue content evaluation.    MEDICATIONS  (STANDING):  acetaminophen  IVPB .. 1000 milliGRAM(s) IV Intermittent once  albuterol/ipratropium for Nebulization 3 milliLiter(s) Nebulizer every 6 hours  aspirin enteric coated 81 milliGRAM(s) Oral daily  carvedilol Oral Tab/Cap - Peds 50 milliGRAM(s) Oral two times a day  folic acid 1 milliGRAM(s) Oral daily  lidocaine   Patch 1 Patch Transdermal daily  predniSONE   Tablet 5 milliGRAM(s) Oral daily  sacubitril 49 mG/valsartan 51 mG 1 Tablet(s) Oral two times a day  simvastatin 20 milliGRAM(s) Oral at bedtime  warfarin 2 milliGRAM(s) Oral daily    MEDICATIONS  (PRN):  acetaminophen   Tablet .. 650 milliGRAM(s) Oral every 6 hours PRN Temp greater or equal to 38C (100.4F), Mild Pain (1 - 3)  cyclobenzaprine 5 milliGRAM(s) Oral at bedtime PRN Muscle Spasm  cyclobenzaprine 5 milliGRAM(s) Oral three times a day PRN Muscle Spasm  HYDROmorphone  Injectable 0.5 milliGRAM(s) IV Push every 4 hours PRN Severe Pain (7 - 10)  oxyCODONE    IR 5 milliGRAM(s) Oral every 4 hours PRN Mild Pain (1 - 3)  oxyCODONE    IR 10 milliGRAM(s) Oral every 4 hours PRN Moderate Pain (4 - 6)    ASSESSMENT AND PLAN:    75F, PMH AS ABOVE A/W:    1. Acute spontaneous L2 fracture:  -could be related to osteoporosis/chronic steroid use.   -pain control  -brace ordered per spine.   -physical therapy  -incentive spirometry  -will need to see if aicd is mri compatible.    2. Acute hypoxic resp failure:  -check CXR  -o2 via nasal cannula.     3. COPD:  - albuterol prn.   -inhaled steroid .    4. A fib:  -on coumadin  -continue bb    5. Chronic systolic CHF/AICD:  - continue lasix, entresto, bb    6. Rheumatoid arthritis:  -on prednisone, mtx.     7. DVT px:  -inr therapeutic

## 2021-02-28 NOTE — PROGRESS NOTE ADULT - SUBJECTIVE AND OBJECTIVE BOX
Pt seen and examined. No new complaints. Localized low back pain. No radiation. No paresthesias.     Vital Signs Last 24 Hrs  T(C): 36.5 (26 Feb 2021 11:52), Max: 36.5 (26 Feb 2021 11:52)  T(F): 97.7 (26 Feb 2021 11:52), Max: 97.7 (26 Feb 2021 11:52)  HR: 63 (26 Feb 2021 11:52) (63 - 63)  BP: 108/72 (26 Feb 2021 11:52) (108/72 - 108/72)  BP(mean): 84 (26 Feb 2021 11:52) (84 - 84)  RR: 16 (26 Feb 2021 11:52) (16 - 16)  SpO2: 94% (26 Feb 2021 11:52) (94% - 94%)    Exam:  Gen: uncomfortable, laying on her side    Spine Exam:  Skin intact  No gross deformity  No midline TTP C/T/L/S spine  No bony step offs  R and left sided lumbar paraspinal muscle TTP/Hypertonicity   No Radicular Symptoms with SLR  Negative clonus  Negative babinski  Negative hester  No calf TTP    Motor:               C5           C6            C7               C8           T1   R         5/5          5/5            5/5             5/5          5/5  L          5/5          5/5            5/5             5/5          5/5                L2             L3             L4               L5            S1  R         5/5           5/5          5/5             5/5           5/5  L          5/5          5/5           5/5             5/5           5/5    Sensory:            C5         C6         C7      C8       T1        (0=absent, 1=impaired, 2=normal, NT=not testable)  R         2            2           2        2         2  L          2            2           2        2         2               L2          L3         L4      L5       S1         (0=absent, 1=impaired, 2=normal, NT=not testable)  R         2            2            2        2        2  L          2            2           2        2         2         CT lumbar spine: acute L2 compression fracture with minimal bony retropulsion, degenerative spine    CT pelvis: no acute fractures, severe bilateral hip arthritis    XR femurs/hip bilateral: negative fractures, severe hip arthritis bilateral  XR lumbar spine ordered    	  75 F with acute low back pain in her lumbar paraspinal region and acute L2 compression fracture. Her low back pain is likely muscular in nature as well as the L2 compression fracture. No acute red flag symptoms of cord compression.     - WBAT bilateral lower extremities  - recommend LSO brace for patient for comfort, ordered, to hopefully be delivered today  - PT/OT  - Analgesia as needed for back and hip pain  - Muscle relaxers as needed  - no acute orthopedic surgical intervention at this time  - monitor patient for signs of neurologic changes and contact orthopedics if they were to occur  - will order MR of lumbar spine to evaluate fracture, to be completed tomorrow am  - orthopedics to follow up on MRI  - discussed with attending and will advise further if plan changes

## 2021-03-01 NOTE — PROGRESS NOTE ADULT - SUBJECTIVE AND OBJECTIVE BOX
C/c L2 fracture      HPI:  75F, pmh of HTN, HLD, RA on Prednisone 5mg daily, emphysema, CAD, stents, A fib on coumadin, Cardiomyopathy with EF 30%, AICD,  who  presents with severe back pain- found to have L2 fracture. She since onset of back pain she's been having difficulty ambulating. Denies recent falls/trauma. Pain started a few days prior to admission, mainly localized to left lower back.   She denies any numbness or tingling in her bilateral lower extremities. She has had arthritis and pain both knees and hip, and has follows out patient with Dr Clancy.   "She states she was supposed to get her L knee replaced in 2018, but found to have new onset atrial fibrillation at the pre op testing, and subsequently was admitted and found to have E coli urosepsis and received urologic surgical intervention and developed respiratory failure requiring intubation. She states she really hasn't walked much since then and mainly uses a wheel chair and occasional stand to transfer. She states she has had urinary incontinence since that incident, but denies any new onset bowel incontinence of saddle anesthesia."    Hospital course notable for acute hypoxic respiratory failure, CT chest with with b/l ground glass opacities. Transferred to  to r/o [facility]. Given iv lasix for possible acute chf exacerbation.     3/1: pt seen and examined this am. Felt tired. Having spasms all night. No sob at rest. +cough.    Review of system- All 10 systems reviewed and is as per HPI otherwise negative.     Vital Signs Last 24 Hrs  T(C): 36.6 (01 Mar 2021 08:35), Max: 36.9 (28 Feb 2021 16:07)  T(F): 97.8 (01 Mar 2021 08:35), Max: 98.5 (28 Feb 2021 16:07)  HR: 93 (01 Mar 2021 08:35) (89 - 98)  BP: 119/49 (01 Mar 2021 08:35) (91/52 - 119/49)  BP(mean): 59 (28 Feb 2021 17:26) (59 - 69)  RR: 18 (01 Mar 2021 08:35) (18 - 18)  SpO2: 93% (01 Mar 2021 08:35) (93% - 98%)    PHYSICAL EXAM:    GENERAL: comfortable, weak appearing, nad.   HEAD:  Atraumatic, Normocephalic  EYES: EOMI, PERRLA  HEENT: Moist mucous membranes  NECK: Supple, No JVD  NERVOUS SYSTEM:  Alert & Oriented X3, non focal  CHEST/LUNG: Crackles posteriorly  HEART: Regular rate and rhythm  ABDOMEN: Soft, Nontender, Nondistended; Bowel sounds present  GENITOURINARY- Voiding, no palpable bladder  EXTREMITIES:  No clubbing, cyanosis, or edema  MUSCULOSKELETAL- tenderness left of midline lower back  SKIN-no rash  LABS:                        9.5    5.32  )-----------( 165      ( 01 Mar 2021 07:23 )             30.8     03-01    140  |  108  |  23  ----------------------------<  181<H>  3.6   |  28  |  0.95    Ca    8.7      01 Mar 2021 07:23  Phos  2.7     03-01  Mg     2.1     03-01    TPro  5.8<L>  /  Alb  2.0<L>  /  TBili  0.5  /  DBili  0.1  /  AST  40<H>  /  ALT  32  /  AlkPhos  144<H>  03-01    PT/INR - ( 01 Mar 2021 07:23 )   PT: 20.1 sec;   INR: 1.77 ratio        CT Lumbar Spine No Cont (02.26.21 @ 13:24) >  IMPRESSION:  Acute appearing moderate L2 compression fracture. Minimal bony retropulsion with mild spinal canal stenosis at L1-L2 level.  MRI may be considered for more definitive spinal canal soft tissue content evaluation.    MEDICATIONS  (STANDING):  acetaminophen  IVPB .. 1000 milliGRAM(s) IV Intermittent once  albuterol/ipratropium for Nebulization 3 milliLiter(s) Nebulizer every 6 hours  aspirin enteric coated 81 milliGRAM(s) Oral daily  carvedilol Oral Tab/Cap - Peds 50 milliGRAM(s) Oral two times a day  folic acid 1 milliGRAM(s) Oral daily  lidocaine   Patch 1 Patch Transdermal daily  predniSONE   Tablet 5 milliGRAM(s) Oral daily  sacubitril 49 mG/valsartan 51 mG 1 Tablet(s) Oral two times a day  simvastatin 20 milliGRAM(s) Oral at bedtime  warfarin 2 milliGRAM(s) Oral daily    MEDICATIONS  (PRN):  acetaminophen   Tablet .. 650 milliGRAM(s) Oral every 6 hours PRN Temp greater or equal to 38C (100.4F), Mild Pain (1 - 3)  cyclobenzaprine 5 milliGRAM(s) Oral at bedtime PRN Muscle Spasm  cyclobenzaprine 5 milliGRAM(s) Oral three times a day PRN Muscle Spasm  HYDROmorphone  Injectable 0.5 milliGRAM(s) IV Push every 4 hours PRN Severe Pain (7 - 10)  oxyCODONE    IR 5 milliGRAM(s) Oral every 4 hours PRN Mild Pain (1 - 3)  oxyCODONE    IR 10 milliGRAM(s) Oral every 4 hours PRN Moderate Pain (4 - 6)    ASSESSMENT AND PLAN:    75F, PMH AS ABOVE A/W:    1. Acute spontaneous L2 fracture:  -could be related to osteoporosis/chronic steroid use.   -pain control  -brace delivered.   -physical therapy  -incentive spirometry  -per pt , aicd is not mri compatible.     2. Acute hypoxic resp failure:  -Chest imaging with b/l ground glass opacities  -iv lasix for possible acute systolic CHF exacerbation.   -covid-19 neg, will repeat test.   -ID/pulm consult.     3. COPD:  - albuterol prn.   -inhaled steroid .    4. A fib:  -on coumadin  -continue bb    5. Chronic systolic CHF/AICD:  -IV  lasix for acute exacerbation  -entresto, bb    6. Rheumatoid arthritis:  -on prednisone  -hold mtx.     7. DVT px:  -on coumadin.     C/c L2 fracture      HPI:  75F, pmh of HTN, HLD, RA on Prednisone 5mg daily, emphysema, CAD, stents, A fib on coumadin, Cardiomyopathy with EF 30%, AICD,  who  presents with severe back pain- found to have L2 fracture. She since onset of back pain she's been having difficulty ambulating. Denies recent falls/trauma. Pain started a few days prior to admission, mainly localized to left lower back.   She denies any numbness or tingling in her bilateral lower extremities. She has had arthritis and pain both knees and hip, and has follows out patient with Dr Clancy.   "She states she was supposed to get her L knee replaced in 2018, but found to have new onset atrial fibrillation at the pre op testing, and subsequently was admitted and found to have E coli urosepsis and received urologic surgical intervention and developed respiratory failure requiring intubation. She states she really hasn't walked much since then and mainly uses a wheel chair and occasional stand to transfer. She states she has had urinary incontinence since that incident, but denies any new onset bowel incontinence of saddle anesthesia."    Hospital course notable for acute hypoxic respiratory failure, CT chest with with b/l ground glass opacities. Transferred to  to r/o [facility]. Given iv lasix for possible acute chf exacerbation.     3/1: pt seen and examined this am. Felt tired. Having spasms all night. No sob at rest. +cough.    Review of system- All 10 systems reviewed and is as per HPI otherwise negative.     Vital Signs Last 24 Hrs  T(C): 36.6 (01 Mar 2021 08:35), Max: 36.9 (28 Feb 2021 16:07)  T(F): 97.8 (01 Mar 2021 08:35), Max: 98.5 (28 Feb 2021 16:07)  HR: 93 (01 Mar 2021 08:35) (89 - 98)  BP: 119/49 (01 Mar 2021 08:35) (91/52 - 119/49)  BP(mean): 59 (28 Feb 2021 17:26) (59 - 69)  RR: 18 (01 Mar 2021 08:35) (18 - 18)  SpO2: 93% (01 Mar 2021 08:35) (93% - 98%)    PHYSICAL EXAM:    GENERAL: comfortable, weak appearing, nad.   HEAD:  Atraumatic, Normocephalic  EYES: EOMI, PERRLA  HEENT: Moist mucous membranes  NECK: Supple, No JVD  NERVOUS SYSTEM:  Alert & Oriented X3, non focal  CHEST/LUNG: Crackles posteriorly  HEART: Regular rate and rhythm  ABDOMEN: Soft, Nontender, Nondistended; Bowel sounds present  GENITOURINARY- Voiding, no palpable bladder  EXTREMITIES:  No clubbing, cyanosis, or edema  MUSCULOSKELETAL- tenderness left of midline lower back  SKIN-no rash  LABS:                        9.5    5.32  )-----------( 165      ( 01 Mar 2021 07:23 )             30.8     03-01    140  |  108  |  23  ----------------------------<  181<H>  3.6   |  28  |  0.95    Ca    8.7      01 Mar 2021 07:23  Phos  2.7     03-01  Mg     2.1     03-01    TPro  5.8<L>  /  Alb  2.0<L>  /  TBili  0.5  /  DBili  0.1  /  AST  40<H>  /  ALT  32  /  AlkPhos  144<H>  03-01    PT/INR - ( 01 Mar 2021 07:23 )   PT: 20.1 sec;   INR: 1.77 ratio        CT Lumbar Spine No Cont (02.26.21 @ 13:24) >  IMPRESSION:  Acute appearing moderate L2 compression fracture. Minimal bony retropulsion with mild spinal canal stenosis at L1-L2 level.  MRI may be considered for more definitive spinal canal soft tissue content evaluation.    MEDICATIONS  (STANDING):  acetaminophen  IVPB .. 1000 milliGRAM(s) IV Intermittent once  albuterol/ipratropium for Nebulization 3 milliLiter(s) Nebulizer every 6 hours  aspirin enteric coated 81 milliGRAM(s) Oral daily  carvedilol Oral Tab/Cap - Peds 50 milliGRAM(s) Oral two times a day  folic acid 1 milliGRAM(s) Oral daily  lidocaine   Patch 1 Patch Transdermal daily  predniSONE   Tablet 5 milliGRAM(s) Oral daily  sacubitril 49 mG/valsartan 51 mG 1 Tablet(s) Oral two times a day  simvastatin 20 milliGRAM(s) Oral at bedtime  warfarin 2 milliGRAM(s) Oral daily    MEDICATIONS  (PRN):  acetaminophen   Tablet .. 650 milliGRAM(s) Oral every 6 hours PRN Temp greater or equal to 38C (100.4F), Mild Pain (1 - 3)  cyclobenzaprine 5 milliGRAM(s) Oral at bedtime PRN Muscle Spasm  cyclobenzaprine 5 milliGRAM(s) Oral three times a day PRN Muscle Spasm  HYDROmorphone  Injectable 0.5 milliGRAM(s) IV Push every 4 hours PRN Severe Pain (7 - 10)  oxyCODONE    IR 5 milliGRAM(s) Oral every 4 hours PRN Mild Pain (1 - 3)  oxyCODONE    IR 10 milliGRAM(s) Oral every 4 hours PRN Moderate Pain (4 - 6)    ASSESSMENT AND PLAN:    75F, PMH AS ABOVE A/W:    1. Acute spontaneous L2 fracture:  -could be related to osteoporosis/chronic steroid use.   -pain control  -brace delivered.   -physical therapy  -incentive spirometry  -per pt , aicd is not mri compatible.     2. Acute hypoxic resp failure:  -Chest imaging with b/l ground glass opacities  -iv lasix for possible acute systolic CHF exacerbation.   -covid-19 neg, will repeat test.   -iv decadron  -remdesevir if covid +  -ID/pulm consult.     3. COPD:  - albuterol prn.   -inhaled steroid .    4. A fib:  -on coumadin  -continue bb    5. Chronic systolic CHF/AICD:  -IV  lasix for acute exacerbation  -entresto, bb    6. Rheumatoid arthritis:  -hold prednisone while on iv decadron.   -hold mtx.     7. DVT px:  -on coumadin.

## 2021-03-01 NOTE — PROGRESS NOTE ADULT - SUBJECTIVE AND OBJECTIVE BOX
Pt seen and examined.  Localized low back pain, spasms. No radiation. No paresthesias. Moved to COVID floor for concern of covid exposure and CT scan showing ground glass opacities    Vital Signs Last 24 Hrs  T(C): 36.2 (28 Feb 2021 22:10), Max: 37 (28 Feb 2021 08:29)  T(F): 97.2 (28 Feb 2021 22:10), Max: 98.6 (28 Feb 2021 08:29)  HR: 97 (28 Feb 2021 22:10) (88 - 98)  BP: 108/67 (28 Feb 2021 22:10) (91/52 - 108/67)  BP(mean): 59 (28 Feb 2021 17:26) (59 - 69)  RR: 18 (28 Feb 2021 22:10) (18 - 18)  SpO2: 95% (28 Feb 2021 22:10) (93% - 98%)    Exam:  Gen: comfortable, laying flat    Spine Exam:  Skin intact  No gross deformity  No midline TTP C/T/L/S spine  No bony step offs  R and left sided lumbar paraspinal muscle TTP/Hypertonicity   No Radicular Symptoms with SLR  Negative clonus  Negative babinski  Negative hester  No calf TTP    Motor:               C5           C6            C7               C8           T1   R         5/5          5/5            5/5             5/5          5/5  L          5/5          5/5            5/5             5/5          5/5                L2             L3             L4               L5            S1  R         5/5           5/5          5/5             5/5           5/5  L          5/5          5/5           5/5             5/5           5/5    Sensory:            C5         C6         C7      C8       T1        (0=absent, 1=impaired, 2=normal, NT=not testable)  R         2            2           2        2         2  L          2            2           2        2         2               L2          L3         L4      L5       S1         (0=absent, 1=impaired, 2=normal, NT=not testable)  R         2            2            2        2        2  L          2            2           2        2         2         CT lumbar spine: acute L2 compression fracture with minimal bony retropulsion, degenerative spine    CT pelvis: no acute fractures, severe bilateral hip arthritis    XR femurs/hip bilateral: negative fractures, severe hip arthritis bilateral  XR lumbar spine ordered    	  75 F with acute low back pain in her lumbar paraspinal region and acute L2 compression fracture. Her low back pain is likely muscular in nature as well as the L2 compression fracture. No acute red flag symptoms of cord compression.     - WBAT bilateral lower extremities  - recommend LSO brace for patient for comfort, ordered, to hopefully be delivered today  - PT/OT  - Analgesia as needed for back and hip pain  - Muscle relaxers as needed  - Medical care for hypoxia and ground glass opacities  - no acute orthopedic surgical intervention at this time  - monitor patient for signs of neurologic changes and contact orthopedics if they were to occur  - will order MR of lumbar spine to evaluate fracture, to be completed tomorrow am  - orthopedics to follow up on MRI  - discussed with attending and will advise further if plan changes

## 2021-03-01 NOTE — CONSULT NOTE ADULT - ASSESSMENT
Treat for multifocal PNA.  Abx's as per ID.  O2 as needed.  Albuterol as needed.  On decadron.  Methotrexate on hold.     Net diuresis for acute CHF component.  BNP elevated.     COVID test negative x 2.  On remdesivir and decadron.  For repeat COVID testing.  ID following.      Treat for multifocal PNA.  Abx's as per ID.  O2 as needed.  Albuterol as needed.  On decadron.  Methotrexate on hold.     Net diuresis for acute CHF component.  BNP elevated.     COVID test negative x 2.  On remdesivir and decadron.  For repeat COVID testing.  ID following.     h.o. previous MTX and amiodarone treatment.  Is off both meds.

## 2021-03-01 NOTE — CONSULT NOTE ADULT - ASSESSMENT
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  acute worsening L sided low back pain and she is unable to ambulate. Patient denies any recent falls or trauma. pain started past few days. She states the pain is mainly localized to her lower back and does not radiate, worsens with movement. She denies any numbness or tingling in her bilateral lower extremities. She has had arthritis and pain both knees and hip, and has follows out patient with Dr Clancy. Here noted with acute L2 compression fracture hospital course c/b acute hypoxic resp failure, CT chest with extensive b/l lung infiltrates concerning for covid-19, was given decadron/remdesivir.    1. acute hypoxic respiratory failure. suspect covid-19 viral syndrome. multifocal pneumonia  - imaging/clinical picture worrisome for covid-19  - f/u repeat covid-19 pcr, previous tests reviewed 2/26 covid-19 pcr/ab (-)   - on decadron 6mg daily #2  - on remdesivir #2 monitor renal/hepatic function closely on therapy  - isolation precautions  - f/u cbc  - monitor temps  - supportive care    2. 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  acute worsening L sided low back pain and she is unable to ambulate. Patient denies any recent falls or trauma. pain started past few days. She states the pain is mainly localized to her lower back and does not radiate, worsens with movement. She denies any numbness or tingling in her bilateral lower extremities. She has had arthritis and pain both knees and hip, and has follows out patient with Dr Clancy. Here noted with acute L2 compression fracture hospital course c/b acute hypoxic resp failure, CT chest with extensive b/l lung infiltrates concerning for covid-19, was given decadron/remdesivir.    1. acute hypoxic respiratory failure. possible covid-19 viral syndrome. multifocal pneumonia. chf   - imaging/clinical picture worrisome for covid19, also with chf and ? superimposed bacterial pna  - f/u repeat covid-19 pcr, previous tests reviewed 2/26 covid-19 pcr/ab (-)   - on decadron 6mg daily #2  - on remdesivir #2 monitor renal/hepatic function closely on therapy  - start cefepime [id number]/azithromycin 500mg daily   - isolation precautions  - being diuresed for chf  - case d/w pulmonary   - f/u cbc  - monitor temps  - supportive care    2. other issues - care per medicine

## 2021-03-02 NOTE — PROGRESS NOTE ADULT - ASSESSMENT
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  acute worsening L sided low back pain and she is unable to ambulate. Patient denies any recent falls or trauma. pain started past few days. She states the pain is mainly localized to her lower back and does not radiate, worsens with movement. She denies any numbness or tingling in her bilateral lower extremities. She has had arthritis and pain both knees and hip, and has follows out patient with Dr Clancy. Here noted with acute L2 compression fracture hospital course c/b acute hypoxic resp failure, CT chest with extensive b/l lung infiltrates concerning for covid-19, was given decadron/remdesivir.    1. acute hypoxic respiratory failure. multifocal pneumonia. chf ? viral syndrome  - imaging reviewed  - covid-19 pcr/ab (-), repeat pcr (-)    - on decadron 6mg daily #3  - s/p remdesivir #2 - lfts elevated, dc further therapy for now  - on cefepime [id number]/azithromycin 500mg daily #2  - continue with antibiotic coverage   - isolation precautions  - f/u cbc  - monitor temps  - supportive care    2. other issues - care per medicine

## 2021-03-02 NOTE — PROGRESS NOTE ADULT - SUBJECTIVE AND OBJECTIVE BOX
Date of service: 03-02-21 @ 10:51    pt seen and examined  removed IV lines overnight   does not recall this  has cough  feels slightly better this am  afebrile    ROS: no fever or chills; denies dizziness, no HA, no abdominal pain, no diarrhea or constipation; no dysuria, no urinary frequency, no legs pain, no rashes    MEDICATIONS  (STANDING):  acetaminophen  IVPB .. 1000 milliGRAM(s) IV Intermittent once  aspirin enteric coated 81 milliGRAM(s) Oral daily  azithromycin   Tablet 500 milliGRAM(s) Oral daily  budesonide 160 MICROgram(s)/formoterol 4.5 MICROgram(s) Inhaler 2 Puff(s) Inhalation two times a day  carvedilol Oral Tab/Cap - Peds 50 milliGRAM(s) Oral two times a day  cefepime   IVPB 2000 milliGRAM(s) IV Intermittent every 12 hours  dexAMETHasone  Injectable 6 milliGRAM(s) IV Push daily  folic acid 1 milliGRAM(s) Oral daily  lidocaine   Patch 1 Patch Transdermal daily  sacubitril 49 mG/valsartan 51 mG 1 Tablet(s) Oral two times a day  simvastatin 20 milliGRAM(s) Oral at bedtime  warfarin 2 milliGRAM(s) Oral daily      Vital Signs Last 24 Hrs  T(C): 36.6 (02 Mar 2021 08:14), Max: 36.6 (01 Mar 2021 14:57)  T(F): 97.8 (02 Mar 2021 08:14), Max: 97.9 (01 Mar 2021 14:57)  HR: 85 (02 Mar 2021 08:14) (84 - 95)  BP: 103/54 (02 Mar 2021 08:14) (98/58 - 115/55)  BP(mean): --  RR: 19 (02 Mar 2021 08:14) (18 - 20)  SpO2: 90% (02 Mar 2021 08:14) (90% - 93%)      PE:  Constitutional: frail looking  HEENT: NC/AT, EOMI, PERRLA, conjunctivae clear; ears and nose atraumatic; pharynx benign  Neck: supple; thyroid not palpable  Back: no tenderness  Respiratory: decreased breath sounds, crackles   Cardiovascular: S1S2 regular, no murmurs  Abdomen: soft, not tender, not distended, positive BS; liver and spleen WNL  Genitourinary: no suprapubic tenderness  Lymphatic: no LN palpable  Musculoskeletal: no muscle tenderness, no joint swelling or tenderness  Extremities: no pedal edema  Neurological/ Psychiatric: moving all extremities  Skin: no rashes; no palpable lesions    Labs: all available labs reviewed                                   10.5   10.16 )-----------( 213      ( 02 Mar 2021 07:22 )             33.6     03-02    142  |  106  |  30<H>  ----------------------------<  200<H>  4.1   |  30  |  1.08    Ca    8.6      02 Mar 2021 07:22  Phos  2.5     03-02  Mg     1.9     03-02    TPro  6.1  /  Alb  2.0<L>  /  TBili  0.4  /  DBili  x   /  AST  134<H>  /  ALT  133<H>  /  AlkPhos  147<H>  03-02      D-Dimer Assay, Quantitative: 275 ng/mL DDU (03-01-21 @ 07:23)  C-Reactive Protein, Serum: 206 mg/L (03-01-21 @ 07:23)  Ferritin, Serum: 383 ng/mL (03-01-21 @ 07:23)         Radiology: all available radiological tests reviewed  < from: CT Chest No Cont (02.28.21 @ 17:08) >  EXAM:  CT CHEST                            PROCEDURE DATE:  02/28/2021          INTERPRETATION:  EXAMINATION: CT CHEST    CLINICAL INDICATION: Covid.    TECHNIQUE: Noncontrast CT of the chest was obtained.    COMPARISON: Multiple CT, most recent 8/11/2020.    FINDINGS:    AIRWAYS AND LUNGS: The central tracheobronchial tree is patent.  Extensive bilateral groundglass and reticular opacities.    MEDIASTINUM AND PLEURA: There are no enlarged mediastinal, hilar or axillary lymph nodes. The visualized portion of the thyroid gland is heterogeneous. There is no pleural effusion. There is no pneumothorax.    HEART AND VESSELS: There is cardiomegaly.  There are atherosclerotic calcifications of the aorta and coronary arteries.  There is no pericardial effusion.  Aortic valve calcification. Left-sided defibrillator.    UPPER ABDOMEN: Images of the upper abdomen demonstrate cholelithiasis. Prominent left renal pelvis..    BONES AND SOFT TISSUES: The bones are unremarkable.  The soft tissues are unremarkable.    TUBES/LINES: None.    IMPRESSION:  COVID pneumonia    Prominent left renal pelvis is new from the prior study and may represent hydronephrosis. Renal ultrasound versus CT abdomen/pelvis recommended for complete evaluation.      < end of copied text >  < from: MR Lumbar Spine No Cont (03.01.21 @ 12:29) >    EXAM:  MR SPINE LUMBAR                            PROCEDURE DATE:  03/01/2021          INTERPRETATION:  Exam Date: 3/1/2021 12:29 PM    MR lumbar without gadolinium    CLINICAL INFORMATION:   L2 compression fracture    TECHNIQUE:   Sagittal and axial T1-weighted images, sagittal inversion recovery images, and sagittal and axial T1-weighted and T2-weighted images of the lumbar spine were obtained.    FINDINGS: Comparison is made to CT lumbar of 2/26/2021.    Reidentified is a acute compression fracture of the L2 vertebral body with approximately 45% vertebral body height loss within the mid body. There is no significant bony retropulsion. No associated paraspinal or epidural disease. No additional fractures.    Multilevel degenerative changes as follows:    At L1/L2 and L2/L3 there are small posterior disc bulges without significant foraminal or central spinal canal stenosis.    At L3/L4, there is a posterior disc bulge resulting in mild narrowing of the right neural foramen without significant left foraminal or central spinal canal stenosis.    At L4/L5 and L5/S1, there are small posterior disc bulges without significant foraminal or central spinal canal stenosis.    The distal cord maintains intact morphology.  Distal cord signal intensity is preserved.  The conus is normally positioned at the T12 level.  Nerve roots of the cauda equina appear intact.      IMPRESSION:    Reidentified is a acute compression fracture of the L2 vertebral body with approximately 45% vertebral body height loss within the mid body. There is no significant bony retropulsion. No associated paraspinal or epidural disease. No additional fractures.    Mild degenerative changes as above.        < end of copied text >    Advanced directives addressed: full resuscitation

## 2021-03-02 NOTE — PROGRESS NOTE ADULT - SUBJECTIVE AND OBJECTIVE BOX
Subjective:    Awake, alert. No dyspnea. Sl, non-productive cough    MEDICATIONS  (STANDING):  acetaminophen  IVPB .. 1000 milliGRAM(s) IV Intermittent once  aspirin enteric coated 81 milliGRAM(s) Oral daily  azithromycin   Tablet 500 milliGRAM(s) Oral daily  budesonide 160 MICROgram(s)/formoterol 4.5 MICROgram(s) Inhaler 2 Puff(s) Inhalation two times a day  carvedilol Oral Tab/Cap - Peds 50 milliGRAM(s) Oral two times a day  cefepime   IVPB 2000 milliGRAM(s) IV Intermittent every 12 hours  dexAMETHasone  Injectable 6 milliGRAM(s) IV Push daily  folic acid 1 milliGRAM(s) Oral daily  furosemide   Injectable 40 milliGRAM(s) IV Push two times a day  lidocaine   Patch 1 Patch Transdermal daily  mometasone 110 MICROgram(s) Inhaler 1 Puff(s) Inhalation daily  potassium chloride    Tablet ER 20 milliEquivalent(s) Oral daily  potassium chloride   Powder 20 milliEquivalent(s) Oral daily  remdesivir  IVPB 100 milliGRAM(s) IV Intermittent every 24 hours  sacubitril 49 mG/valsartan 51 mG 1 Tablet(s) Oral two times a day  simvastatin 20 milliGRAM(s) Oral at bedtime  warfarin 2 milliGRAM(s) Oral daily    MEDICATIONS  (PRN):  acetaminophen   Tablet .. 650 milliGRAM(s) Oral every 6 hours PRN Temp greater or equal to 38C (100.4F), fever, headaches  ALBUTerol    90 MICROgram(s) HFA Inhaler 2 Puff(s) Inhalation every 6 hours PRN Shortness of Breath and/or Wheezing  cyclobenzaprine 5 milliGRAM(s) Oral at bedtime PRN Muscle Spasm  cyclobenzaprine 5 milliGRAM(s) Oral three times a day PRN Muscle Spasm  HYDROmorphone  Injectable 0.5 milliGRAM(s) IV Push every 4 hours PRN Severe Pain (7 - 10)  oxyCODONE    IR 10 milliGRAM(s) Oral every 4 hours PRN Moderate Pain (4 - 6)  oxyCODONE    IR 5 milliGRAM(s) Oral every 4 hours PRN Mild Pain (1 - 3)      Allergies    apixaban (Other)  fesoterodine (Unknown)  Macrobid (Other)    Intolerances        Vital Signs Last 24 Hrs  T(C): 36.6 (02 Mar 2021 08:14), Max: 36.6 (01 Mar 2021 14:57)  T(F): 97.8 (02 Mar 2021 08:14), Max: 97.9 (01 Mar 2021 14:57)  HR: 85 (02 Mar 2021 08:14) (84 - 95)  BP: 103/54 (02 Mar 2021 08:14) (98/58 - 115/55)  BP(mean): --  RR: 19 (02 Mar 2021 08:14) (18 - 20)  SpO2: 90% (02 Mar 2021 08:14) (90% - 93%)    PHYSICAL EXAMINATION:    NECK:  Supple. No lymphadenopathy. Jugular venous pressure not elevated.   HEART:   The cardiac impulse has a normal quality. Reg., Nl S1 and S2.    CHEST:  Chest with few crackles at bases w/ L>R. Min exp. wheeze. Normal respiratory effort.  ABDOMEN:  Soft and nontender.   EXTREMITIES:  There is no edema.       LABS:                        10.5   10.16 )-----------( 213      ( 02 Mar 2021 07:22 )             33.6     03-01    140  |  108  |  23  ----------------------------<  181<H>  3.6   |  28  |  0.95    Ca    8.7      01 Mar 2021 07:23  Phos  2.7     03-01  Mg     2.1     03-01    TPro  5.8<L>  /  Alb  2.0<L>  /  TBili  0.5  /  DBili  0.1  /  AST  40<H>  /  ALT  32  /  AlkPhos  144<H>  03-01    PT/INR - ( 01 Mar 2021 07:23 )   PT: 20.1 sec;   INR: 1.77 ratio               RADIOLOGY & ADDITIONAL TESTS:< from: CT Chest No Cont (02.28.21 @ 17:08) >  EXAM:  CT CHEST                            PROCEDURE DATE:  02/28/2021          INTERPRETATION:  EXAMINATION: CT CHEST    CLINICAL INDICATION: Covid.    TECHNIQUE: Noncontrast CT of the chest was obtained.    COMPARISON: Multiple CT, most recent 8/11/2020.    FINDINGS:    AIRWAYS AND LUNGS: The central tracheobronchial tree is patent.  Extensive bilateral groundglass and reticular opacities.    MEDIASTINUM AND PLEURA: There are no enlarged mediastinal, hilar or axillary lymph nodes. The visualized portion of the thyroid gland is heterogeneous. There is no pleural effusion. There is no pneumothorax.    HEART AND VESSELS: There is cardiomegaly.  There are atherosclerotic calcifications of the aorta and coronary arteries.  There is no pericardial effusion.  Aortic valve calcification. Left-sided defibrillator.    UPPER ABDOMEN: Images of the upper abdomen demonstrate cholelithiasis. Prominent left renal pelvis..    BONES AND SOFT TISSUES: The bones are unremarkable.  The soft tissues are unremarkable.    TUBES/LINES: None.    IMPRESSION:  COVID pneumonia    Prominent left renal pelvis is new from the prior study and may represent hydronephrosis. Renal ultrasound versus CT abdomen/pelvis recommended for complete evaluation.        MAI SHAVER MD; Attending Radiologist      Assessment and Recommendation:   · Assessment	  Treat for multifocal PNA.  Abx's as per ID-Cefipime/Zithro.  O2 as needed.  Albuterol as needed.   Methotrexate on hold.     Net diuresis for acute CHF component.  BNP elevated.     COVID test negative x 2.  On remdesivir and decadron.  For repeat COVID testing.  ID following.     h.o. previous MTX and amiodarone treatment.  Is off both meds.     Start Symbicort-D/C Mometasone    Monitor O2 sats    Cardiology F/U      Problem/Recommendation - 1:  Respiratory failure with hypoxia.  Problem/Recommendation - 2:  ·  Multifocal pneumonia.   Problem/Recommendation - 3:  ·  CHF with cardiomyopathy.   Problem/Recommendation - 4:  ·  Afib.   Problem/Recommendation - 5:  ·  Edema.   Problem/Recommendation - 6:  R/O COPD with asthma.  Problem/Recommendation - 7:  Emphysema of lung.  Problem/Recommendation - 8:  CAD (coronary artery disease), native coronary artery.  Problem/Recommendation - 9:  Vertebral fracture, osteoporotic, initial encounter.  Problem/Recommendation - 10:  Rheumatoid arthritis.

## 2021-03-02 NOTE — PROGRESS NOTE ADULT - SUBJECTIVE AND OBJECTIVE BOX
C/c L2 fracture      HPI:  75F, pmh of HTN, HLD, RA on Prednisone 5mg daily, emphysema, CAD, stents, A fib on coumadin, Cardiomyopathy with EF 30%, AICD,  who  presents with severe back pain- found to have L2 fracture. She since onset of back pain she's been having difficulty ambulating. Denies recent falls/trauma. Pain started a few days prior to admission, mainly localized to left lower back.   She denies any numbness or tingling in her bilateral lower extremities. She has had arthritis and pain both knees and hip, and has follows out patient with Dr Clancy.   "She states she was supposed to get her L knee replaced in 2018, but found to have new onset atrial fibrillation at the pre op testing, and subsequently was admitted and found to have E coli urosepsis and received urologic surgical intervention and developed respiratory failure requiring intubation. She states she really hasn't walked much since then and mainly uses a wheel chair and occasional stand to transfer. She states she has had urinary incontinence since that incident, but denies any new onset bowel incontinence of saddle anesthesia."    Hospital course notable for acute hypoxic respiratory failure, CT chest with with b/l ground glass opacities. Transferred to  to r/o [facility]. Given iv lasix for possible acute chf exacerbation.     3/2: pt seen and examined this am. Feeling a little better. o2 requirement has not improved yet. DIuresed well with iv lasix. NO sob at rest. Still with back spasms.     Review of system- All 10 systems reviewed and is as per HPI otherwise negative.     Vital Signs Last 24 Hrs  T(C): 36.6 (02 Mar 2021 08:14), Max: 36.6 (01 Mar 2021 14:57)  T(F): 97.8 (02 Mar 2021 08:14), Max: 97.9 (01 Mar 2021 14:57)  HR: 85 (02 Mar 2021 08:14) (84 - 95)  BP: 103/54 (02 Mar 2021 08:14) (98/58 - 115/55)  RR: 19 (02 Mar 2021 08:14) (18 - 20)  SpO2: 90% (02 Mar 2021 08:14) (90% - 93%)    PHYSICAL EXAM:    GENERAL: comfortable, weak appearing, nad.   HEAD:  Atraumatic, Normocephalic  EYES: EOMI, PERRLA  HEENT: Moist mucous membranes  NECK: Supple, No JVD  NERVOUS SYSTEM:  Alert & Oriented X3, non focal  CHEST/LUNG: Fine Crackles posteriorly  HEART: Regular rate and rhythm  ABDOMEN: Soft, Nontender, Nondistended; Bowel sounds present  GENITOURINARY- Voiding, no palpable bladder  EXTREMITIES:  No clubbing, cyanosis, or edema  MUSCULOSKELETAL- tenderness left of midline lower back  SKIN-no rash    LABS:                        10.5   10.16 )-----------( 213      ( 02 Mar 2021 07:22 )             33.6     03-02    142  |  106  |  30<H>  ----------------------------<  200<H>  4.1   |  30  |  1.08    Ca    8.6      02 Mar 2021 07:22  Phos  2.5     03-02  Mg     1.9     03-02    TPro  6.1  /  Alb  2.0<L>  /  TBili  0.4  /  DBili  x   /  AST  134<H>  /  ALT  133<H>  /  AlkPhos  147<H>  03-02    PT/INR - ( 02 Mar 2021 07:22 )   PT: 19.8 sec;   INR: 1.75 ratio           CT Lumbar Spine No Cont (02.26.21 @ 13:24) >  IMPRESSION:  Acute appearing moderate L2 compression fracture. Minimal bony retropulsion with mild spinal canal stenosis at L1-L2 level.  MRI may be considered for more definitive spinal canal soft tissue content evaluation.    MEDICATIONS  (STANDING):  acetaminophen  IVPB .. 1000 milliGRAM(s) IV Intermittent once  albuterol/ipratropium for Nebulization 3 milliLiter(s) Nebulizer every 6 hours  aspirin enteric coated 81 milliGRAM(s) Oral daily  carvedilol Oral Tab/Cap - Peds 50 milliGRAM(s) Oral two times a day  folic acid 1 milliGRAM(s) Oral daily  lidocaine   Patch 1 Patch Transdermal daily  predniSONE   Tablet 5 milliGRAM(s) Oral daily  sacubitril 49 mG/valsartan 51 mG 1 Tablet(s) Oral two times a day  simvastatin 20 milliGRAM(s) Oral at bedtime  warfarin 2 milliGRAM(s) Oral daily    MEDICATIONS  (PRN):  acetaminophen   Tablet .. 650 milliGRAM(s) Oral every 6 hours PRN Temp greater or equal to 38C (100.4F), Mild Pain (1 - 3)  cyclobenzaprine 5 milliGRAM(s) Oral at bedtime PRN Muscle Spasm  cyclobenzaprine 5 milliGRAM(s) Oral three times a day PRN Muscle Spasm  HYDROmorphone  Injectable 0.5 milliGRAM(s) IV Push every 4 hours PRN Severe Pain (7 - 10)  oxyCODONE    IR 5 milliGRAM(s) Oral every 4 hours PRN Mild Pain (1 - 3)  oxyCODONE    IR 10 milliGRAM(s) Oral every 4 hours PRN Moderate Pain (4 - 6)    ASSESSMENT AND PLAN:    75F, PMH AS ABOVE A/W:    1. Acute spontaneous L2 fracture:  -could be related to osteoporosis/chronic steroid use.   -pain control  -brace delivered.   -physical therapy  -incentive spirometry    2. Acute hypoxic resp failure:  -Chest imaging with b/l ground glass opacities  -s/p iv lasix for acute chf exacerbation, change to po.   -covid-19 neg X 2  -d/w ID, remdesevir to be discontinued,   -on iv abx for bacterial pna.   -will d/w pulm re: steroids.     3. COPD:  -albuterol prn.   -inhaled steroid .    4. A fib:  -on coumadin  -continue bb    5. Chronic systolic CHF/AICD:  -s/p IV  lasix for acute exacerbation, will change to po  -entresto, bb    6. Rheumatoid arthritis:  -hold prednisone while on iv decadron.   -hold mtx.     7. DVT px:  -on coumadin.

## 2021-03-03 NOTE — PROVIDER CONTACT NOTE (MEDICATION) - REASON
pt with LOW BP. pt due to get BP medication / MEWS of 7
Pt with LOW BP BP medsication due / no parameters

## 2021-03-03 NOTE — PROGRESS NOTE ADULT - SUBJECTIVE AND OBJECTIVE BOX
Subjective:    Awake, alert. No dyspnea. Sl, non-productive cough    3/3: in bed, mildly SOB on 5 L/min NC O2.     MEDICATIONS  (STANDING):  acetaminophen  IVPB .. 1000 milliGRAM(s) IV Intermittent once  aspirin enteric coated 81 milliGRAM(s) Oral daily  azithromycin   Tablet 500 milliGRAM(s) Oral daily  budesonide 160 MICROgram(s)/formoterol 4.5 MICROgram(s) Inhaler 2 Puff(s) Inhalation two times a day  carvedilol Oral Tab/Cap - Peds 50 milliGRAM(s) Oral two times a day  cefepime   IVPB 2000 milliGRAM(s) IV Intermittent every 12 hours  dexAMETHasone  Injectable 6 milliGRAM(s) IV Push daily  folic acid 1 milliGRAM(s) Oral daily  furosemide   Injectable 40 milliGRAM(s) IV Push two times a day  lidocaine   Patch 1 Patch Transdermal daily  mometasone 110 MICROgram(s) Inhaler 1 Puff(s) Inhalation daily  potassium chloride    Tablet ER 20 milliEquivalent(s) Oral daily  potassium chloride   Powder 20 milliEquivalent(s) Oral daily  remdesivir  IVPB 100 milliGRAM(s) IV Intermittent every 24 hours  sacubitril 49 mG/valsartan 51 mG 1 Tablet(s) Oral two times a day  simvastatin 20 milliGRAM(s) Oral at bedtime  warfarin 2 milliGRAM(s) Oral daily    MEDICATIONS  (PRN):  acetaminophen   Tablet .. 650 milliGRAM(s) Oral every 6 hours PRN Temp greater or equal to 38C (100.4F), fever, headaches  ALBUTerol    90 MICROgram(s) HFA Inhaler 2 Puff(s) Inhalation every 6 hours PRN Shortness of Breath and/or Wheezing  cyclobenzaprine 5 milliGRAM(s) Oral at bedtime PRN Muscle Spasm  cyclobenzaprine 5 milliGRAM(s) Oral three times a day PRN Muscle Spasm  HYDROmorphone  Injectable 0.5 milliGRAM(s) IV Push every 4 hours PRN Severe Pain (7 - 10)  oxyCODONE    IR 10 milliGRAM(s) Oral every 4 hours PRN Moderate Pain (4 - 6)  oxyCODONE    IR 5 milliGRAM(s) Oral every 4 hours PRN Mild Pain (1 - 3)      Allergies    apixaban (Other)  fesoterodine (Unknown)  Macrobid (Other)    Intolerances        Vital Signs Last 24 Hrs  T(C): 36.6 (02 Mar 2021 08:14), Max: 36.6 (01 Mar 2021 14:57)  T(F): 97.8 (02 Mar 2021 08:14), Max: 97.9 (01 Mar 2021 14:57)  HR: 85 (02 Mar 2021 08:14) (84 - 95)  BP: 103/54 (02 Mar 2021 08:14) (98/58 - 115/55)  BP(mean): --  RR: 19 (02 Mar 2021 08:14) (18 - 20)  SpO2: 90% (02 Mar 2021 08:14) (90% - 93%)    PHYSICAL EXAMINATION:    NECK:  Supple. No lymphadenopathy. Jugular venous pressure not elevated.   HEART:   The cardiac impulse has a normal quality. Reg., Nl S1 and S2.    CHEST:  Few subaxillary crackles.    ABDOMEN:  Soft and nontender.   EXTREMITIES:  There is no edema.       LABS:                        10.5   10.16 )-----------( 213      ( 02 Mar 2021 07:22 )             33.6     03-01    140  |  108  |  23  ----------------------------<  181<H>  3.6   |  28  |  0.95    Ca    8.7      01 Mar 2021 07:23  Phos  2.7     03-01  Mg     2.1     03-01    TPro  5.8<L>  /  Alb  2.0<L>  /  TBili  0.5  /  DBili  0.1  /  AST  40<H>  /  ALT  32  /  AlkPhos  144<H>  03-01    PT/INR - ( 01 Mar 2021 07:23 )   PT: 20.1 sec;   INR: 1.77 ratio               RADIOLOGY & ADDITIONAL TESTS:< from: CT Chest No Cont (02.28.21 @ 17:08) >  EXAM:  CT CHEST                            PROCEDURE DATE:  02/28/2021          INTERPRETATION:  EXAMINATION: CT CHEST    CLINICAL INDICATION: Covid.    TECHNIQUE: Noncontrast CT of the chest was obtained.    COMPARISON: Multiple CT, most recent 8/11/2020.    FINDINGS:    AIRWAYS AND LUNGS: The central tracheobronchial tree is patent.  Extensive bilateral groundglass and reticular opacities.    MEDIASTINUM AND PLEURA: There are no enlarged mediastinal, hilar or axillary lymph nodes. The visualized portion of the thyroid gland is heterogeneous. There is no pleural effusion. There is no pneumothorax.    HEART AND VESSELS: There is cardiomegaly.  There are atherosclerotic calcifications of the aorta and coronary arteries.  There is no pericardial effusion.  Aortic valve calcification. Left-sided defibrillator.    UPPER ABDOMEN: Images of the upper abdomen demonstrate cholelithiasis. Prominent left renal pelvis..    BONES AND SOFT TISSUES: The bones are unremarkable.  The soft tissues are unremarkable.    TUBES/LINES: None.    IMPRESSION:  COVID pneumonia    Prominent left renal pelvis is new from the prior study and may represent hydronephrosis. Renal ultrasound versus CT abdomen/pelvis recommended for complete evaluation.        MAI SHAVER MD; Attending Radiologist      Assessment and Recommendation:   · Assessment	  Treat for multifocal PNA.  Abx's as per ID-Cefipime/Zithro.  O2 as needed.  Albuterol as needed.   Methotrexate on hold.     Net diuresis for acute CHF component.  BNP elevated.     COVID test negative x 2.  On decadron.  ID following.      h.o. previous MTX and amiodarone treatment.  Is off both meds.     Start Symbicort-D/C Mometasone    Monitor O2 sats    Cardiology F/U      Problem/Recommendation - 1:  Respiratory failure with hypoxia.  Problem/Recommendation - 2:  ·  Multifocal pneumonia.   Problem/Recommendation - 3:  ·  CHF with cardiomyopathy.   Problem/Recommendation - 4:  ·  Afib.   Problem/Recommendation - 5:  ·  Edema.   Problem/Recommendation - 6:  R/O COPD with asthma.  Problem/Recommendation - 7:  Emphysema of lung.  Problem/Recommendation - 8:  CAD (coronary artery disease), native coronary artery.  Problem/Recommendation - 9:  Vertebral fracture, osteoporotic, initial encounter.  Problem/Recommendation - 10:  Rheumatoid arthritis.     Subjective:    Awake, alert. No dyspnea. Sl, non-productive cough    3/3: in bed, mildly SOB on 5 L/min NC O2.     MEDICATIONS  (STANDING):  acetaminophen  IVPB .. 1000 milliGRAM(s) IV Intermittent once  aspirin enteric coated 81 milliGRAM(s) Oral daily  azithromycin   Tablet 500 milliGRAM(s) Oral daily  budesonide 160 MICROgram(s)/formoterol 4.5 MICROgram(s) Inhaler 2 Puff(s) Inhalation two times a day  carvedilol Oral Tab/Cap - Peds 50 milliGRAM(s) Oral two times a day  cefepime   IVPB 2000 milliGRAM(s) IV Intermittent every 12 hours  dexAMETHasone  Injectable 6 milliGRAM(s) IV Push daily  folic acid 1 milliGRAM(s) Oral daily  furosemide   Injectable 40 milliGRAM(s) IV Push two times a day  lidocaine   Patch 1 Patch Transdermal daily  mometasone 110 MICROgram(s) Inhaler 1 Puff(s) Inhalation daily  potassium chloride    Tablet ER 20 milliEquivalent(s) Oral daily  potassium chloride   Powder 20 milliEquivalent(s) Oral daily  remdesivir  IVPB 100 milliGRAM(s) IV Intermittent every 24 hours  sacubitril 49 mG/valsartan 51 mG 1 Tablet(s) Oral two times a day  simvastatin 20 milliGRAM(s) Oral at bedtime  warfarin 2 milliGRAM(s) Oral daily    MEDICATIONS  (PRN):  acetaminophen   Tablet .. 650 milliGRAM(s) Oral every 6 hours PRN Temp greater or equal to 38C (100.4F), fever, headaches  ALBUTerol    90 MICROgram(s) HFA Inhaler 2 Puff(s) Inhalation every 6 hours PRN Shortness of Breath and/or Wheezing  cyclobenzaprine 5 milliGRAM(s) Oral at bedtime PRN Muscle Spasm  cyclobenzaprine 5 milliGRAM(s) Oral three times a day PRN Muscle Spasm  HYDROmorphone  Injectable 0.5 milliGRAM(s) IV Push every 4 hours PRN Severe Pain (7 - 10)  oxyCODONE    IR 10 milliGRAM(s) Oral every 4 hours PRN Moderate Pain (4 - 6)  oxyCODONE    IR 5 milliGRAM(s) Oral every 4 hours PRN Mild Pain (1 - 3)      Allergies    apixaban (Other)  fesoterodine (Unknown)  Macrobid (Other)    Intolerances        Vital Signs Last 24 Hrs  T(C): 36.6 (02 Mar 2021 08:14), Max: 36.6 (01 Mar 2021 14:57)  T(F): 97.8 (02 Mar 2021 08:14), Max: 97.9 (01 Mar 2021 14:57)  HR: 85 (02 Mar 2021 08:14) (84 - 95)  BP: 103/54 (02 Mar 2021 08:14) (98/58 - 115/55)  BP(mean): --  RR: 19 (02 Mar 2021 08:14) (18 - 20)  SpO2: 90% (02 Mar 2021 08:14) (90% - 93%)    PHYSICAL EXAMINATION:    NECK:  Supple. No lymphadenopathy. Jugular venous pressure not elevated.   HEART:   The cardiac impulse has a normal quality. Reg., Nl S1 and S2.    CHEST:  Few subaxillary crackles.    ABDOMEN:  Soft and nontender.   EXTREMITIES:  There is no edema.       LABS:                        10.5   10.16 )-----------( 213      ( 02 Mar 2021 07:22 )             33.6     03-01    140  |  108  |  23  ----------------------------<  181<H>  3.6   |  28  |  0.95    Ca    8.7      01 Mar 2021 07:23  Phos  2.7     03-01  Mg     2.1     03-01    TPro  5.8<L>  /  Alb  2.0<L>  /  TBili  0.5  /  DBili  0.1  /  AST  40<H>  /  ALT  32  /  AlkPhos  144<H>  03-01    PT/INR - ( 01 Mar 2021 07:23 )   PT: 20.1 sec;   INR: 1.77 ratio               RADIOLOGY & ADDITIONAL TESTS:< from: CT Chest No Cont (02.28.21 @ 17:08) >  EXAM:  CT CHEST                            PROCEDURE DATE:  02/28/2021          INTERPRETATION:  EXAMINATION: CT CHEST    CLINICAL INDICATION: Covid.    TECHNIQUE: Noncontrast CT of the chest was obtained.    COMPARISON: Multiple CT, most recent 8/11/2020.    FINDINGS:    AIRWAYS AND LUNGS: The central tracheobronchial tree is patent.  Extensive bilateral groundglass and reticular opacities.    MEDIASTINUM AND PLEURA: There are no enlarged mediastinal, hilar or axillary lymph nodes. The visualized portion of the thyroid gland is heterogeneous. There is no pleural effusion. There is no pneumothorax.    HEART AND VESSELS: There is cardiomegaly.  There are atherosclerotic calcifications of the aorta and coronary arteries.  There is no pericardial effusion.  Aortic valve calcification. Left-sided defibrillator.    UPPER ABDOMEN: Images of the upper abdomen demonstrate cholelithiasis. Prominent left renal pelvis..    BONES AND SOFT TISSUES: The bones are unremarkable.  The soft tissues are unremarkable.    TUBES/LINES: None.    IMPRESSION:  COVID pneumonia    Prominent left renal pelvis is new from the prior study and may represent hydronephrosis. Renal ultrasound versus CT abdomen/pelvis recommended for complete evaluation.        MAI SHAVER MD; Attending Radiologist      Assessment and Recommendation:   · Assessment	  Treat for multifocal PNA.  Abx's as per ID-Cefipime/Zithro.  O2 as needed.  Albuterol as needed.   Methotrexate on hold.     Net diuresis for acute CHF component.  BNP elevated.     COVID test negative x 2.  On decadron.  ID following.      h.o. previous MTX and amiodarone treatment.  Is off both meds.     Start Symbicort-D/C Mometasone    Monitor O2 sats,  For repeat CXR.    Cardiology F/U      Problem/Recommendation - 1:  Respiratory failure with hypoxia.  Problem/Recommendation - 2:  ·  Multifocal pneumonia.   Problem/Recommendation - 3:  ·  CHF with cardiomyopathy.   Problem/Recommendation - 4:  ·  Afib.   Problem/Recommendation - 5:  ·  Edema.   Problem/Recommendation - 6:  R/O COPD with asthma.  Problem/Recommendation - 7:  Emphysema of lung.  Problem/Recommendation - 8:  CAD (coronary artery disease), native coronary artery.  Problem/Recommendation - 9:  Vertebral fracture, osteoporotic, initial encounter.  Problem/Recommendation - 10:  Rheumatoid arthritis.

## 2021-03-03 NOTE — PROGRESS NOTE ADULT - SUBJECTIVE AND OBJECTIVE BOX
Patient is a 75y old  Female who presents with a chief complaint of pain uncontrolled L2 fracture (03 Mar 2021 12:48)    HPI:  75 year old female with known history of Afib cardiomyopathy EF 25% and AICD, urinary incontinence and chronmic knee and hip pain presents with severe back pain- found to have L2 fracture , having difficulty with ambulation and ADLs , presents for pain relief.  acute worsening L sided low back pain and she is unable to ambulate. Patient denies any recent falls or trauma. pain started past few days. She states the pain is mainly localized to her lower back and does not radiate, worsens with movement.   She denies any numbness or tingling in her bilateral lower extremities. She has had arthritis and pain both knees and hip, and has follows out patient with Dr Clancy.   "She states she was supposed to get her L knee replaced in 2018, but found to have new onset atrial fibrillation at the pre op testing, and subsequently was admitted and found to have E coli urosepsis and received urologic surgical intervention and developed respiratory failure requiring intubation. She states she really hasn't walked much since then and mainly uses a wheel chair and occasional stand to transfer. She states she has had urinary incontinence since that incident, but denies any new onset bowel incontinence of saddle anesthesia."- above per ortho Hand P verified with patient.     At bedside patient speaking in full sentences without evidence of respiratory distress on 6L. O2 sats reading ~92%    Denies any SOB        (2021 17:32)    Allergies: apixaban  fesoterodine  Macrobid    PAST MEDICAL & SURGICAL HISTORY:  Urine incontinence    Stented coronary artery  BMS x3  2019    CAD (coronary artery disease), native coronary artery    CHF with cardiomyopathy  2019 EF 25%    Rheumatoid arthritis    UTI (urinary tract infection)    Afib    Diverticulitis    Kidney stones    Psoriasis    Edema    HLD (hyperlipidemia)    HTN (hypertension)    S/P   x2    H/O bladder repair surgery    H/O arthroscopy of shoulder  left     H/O: hysterectomy  due to bleeding       FAMILY HISTORY:  FH: colon cancer  mother    FH: breast cancer  mother      SOCIAL HISTORY:    Home Medications:    Review of Systems:  Constitutional: no fever, chills, fatigue  Neuro: no headache, numbness, weakness  Resp: no cough, wheezing, shortness of breath  CVS: no chest pain, palpitations, leg swelling  GI: no abdominal pain, nausea, vomiting, diarrhea   : no dysuria, frequency, incontinence  Skin: no itching, burning, rashes, or lesions   Msk: no joint pain or swelling  Psych: no depression, anxiety, mood swings    T(F): 96 (21 @ 08:06), Max: 97.3 (21 @ 16:45)  HR: 92 (21 @ 12:24) (74 - 94)  BP: 95/52 (21 @ 12:24) (95/52 - 132/83)  RR: 18 (21 @ 12:24) (18 - 24)  SpO2: 93% (21 @ 12:24)  Wt(kg): --    CAPILLARY BLOOD GLUCOSE        I&O's Summary    02 Mar 2021 07:01  -  03 Mar 2021 07:00  --------------------------------------------------------  IN: 0 mL / OUT: 900 mL / NET: -900 mL    03 Mar 2021 07:01  -  03 Mar 2021 13:32  --------------------------------------------------------  IN: 240 mL / OUT: 0 mL / NET: 240 mL        Physical Exam:     Gen:  Neuro:  HEENT:  CVS:  Resp:  Abd:  Ext:  Skin:    Meds:  azithromycin   Tablet 500 milliGRAM(s) Oral daily  cefepime   IVPB 2000 milliGRAM(s) IV Intermittent every 12 hours    carvedilol Oral Tab/Cap - Peds 50 milliGRAM(s) Oral two times a day  furosemide    Tablet 40 milliGRAM(s) Oral daily  sacubitril 49 mG/valsartan 51 mG 1 Tablet(s) Oral two times a day     dexAMETHasone  Injectable 6 milliGRAM(s) IV Push daily  simvastatin 20 milliGRAM(s) Oral at bedtime     ALBUTerol    90 MICROgram(s) HFA Inhaler 2 Puff(s) Inhalation every 6 hours PRN  budesonide 160 MICROgram(s)/formoterol 4.5 MICROgram(s) Inhaler 2 Puff(s) Inhalation two times a day     acetaminophen   Tablet .. 650 milliGRAM(s) Oral every 6 hours PRN  acetaminophen  IVPB .. 1000 milliGRAM(s) IV Intermittent once  cyclobenzaprine 5 milliGRAM(s) Oral three times a day  cyclobenzaprine 5 milliGRAM(s) Oral at bedtime PRN  HYDROmorphone  Injectable 0.5 milliGRAM(s) IV Push every 4 hours PRN  oxyCODONE    IR 10 milliGRAM(s) Oral every 4 hours PRN  oxyCODONE    IR 5 milliGRAM(s) Oral every 4 hours PRN        aspirin enteric coated 81 milliGRAM(s) Oral daily  warfarin 2 milliGRAM(s) Oral daily           folic acid 1 milliGRAM(s) Oral daily        lidocaine   Patch 1 Patch Transdermal daily                              10.7   11.35 )-----------( 229      ( 03 Mar 2021 06:50 )             34.6       03-03    141  |  105  |  39<H>  ----------------------------<  173<H>  4.1   |  32<H>  |  1.16    Ca    8.8      03 Mar 2021 06:50  Phos  2.8     03-03  Mg     2.1     -03    TPro  5.8<L>  /  Alb  1.9<L>  /  TBili  0.4  /  DBili  x   /  AST  49<H>  /  ALT  124<H>  /  AlkPhos  146<H>  03-03          PT/INR - ( 03 Mar 2021 06:50 )   PT: 22.5 sec;   INR: 1.99 ratio                     Radiology: ***    Bedside lung ultrasound: ***    Bedside ECHO: ***    CODE STATUS: ***  [facility] discussion: Y  Critical care time spent (mins): *** Patient is a 75y old  Female who presents with a chief complaint of pain uncontrolled L2 fracture (03 Mar 2021 12:48)    HPI:  75 year old female with known history of Afib cardiomyopathy EF 25% and AICD, urinary incontinence and chronmic knee and hip pain presents with severe back pain- found to have L2 fracture. Hospital course complicated by hypoxemia and pneumonia.    At bedside patient speaking in full sentences without evidence of respiratory distress on 6L. O2 sats reading ~92%. Completed lunch tray on bed. Admits to intermittent episodes of SOB however feels much better now. Denies any respiratory distress or cough.     Denies any SOB        (2021 17:32)    Allergies: apixaban  fesoterodine  Macrobid    PAST MEDICAL & SURGICAL HISTORY:  Urine incontinence    Stented coronary artery  BMS x3  2019    CAD (coronary artery disease), native coronary artery    CHF with cardiomyopathy  2019 EF 25%    Rheumatoid arthritis    UTI (urinary tract infection)    Afib    Diverticulitis    Kidney stones    Psoriasis    Edema    HLD (hyperlipidemia)    HTN (hypertension)    S/P   x2    H/O bladder repair surgery    H/O arthroscopy of shoulder  left     H/O: hysterectomy  due to bleeding       FAMILY HISTORY:  FH: colon cancer  mother    FH: breast cancer  mother      SOCIAL HISTORY:    Home Medications:    Review of Systems:  Constitutional: no fever, chills, fatigue  Neuro: no headache, numbness, weakness  Resp: Intermittent sob, no cough, wheezing,   CVS: no chest pain, palpitations, leg swelling  GI: no abdominal pain, nausea, vomiting, diarrhea   : no dysuria, frequency, incontinence  T(F): 96 (21 @ 08:06), Max: 97.3 (21 @ 16:45)  HR: 92 (21 @ 12:24) (74 - 94)  BP: 95/52 (21 @ 12:24) (95/52 - 132/83)  RR: 18 (21 @ 12:24) (18 - 24)  SpO2: 93% (21 @ 12:24)  Wt(kg): --    CAPILLARY BLOOD GLUCOSE        I&O's Summary    02 Mar 2021 07:01  -  03 Mar 2021 07:00  --------------------------------------------------------  IN: 0 mL / OUT: 900 mL / NET: -900 mL    03 Mar 2021 07:01  -  03 Mar 2021 13:32  --------------------------------------------------------  IN: 240 mL / OUT: 0 mL / NET: 240 mL        Physical Exam:     Gen: NADF  Neuro: awake alert oriented, CEDILLO  CVS: +S1S2  Resp: CTA  Abd: soft, NT, ND  Ext: warm,dry, no edema    Meds:  azithromycin   Tablet 500 milliGRAM(s) Oral daily  cefepime   IVPB 2000 milliGRAM(s) IV Intermittent every 12 hours    carvedilol Oral Tab/Cap - Peds 50 milliGRAM(s) Oral two times a day  furosemide    Tablet 40 milliGRAM(s) Oral daily  sacubitril 49 mG/valsartan 51 mG 1 Tablet(s) Oral two times a day     dexAMETHasone  Injectable 6 milliGRAM(s) IV Push daily  simvastatin 20 milliGRAM(s) Oral at bedtime     ALBUTerol    90 MICROgram(s) HFA Inhaler 2 Puff(s) Inhalation every 6 hours PRN  budesonide 160 MICROgram(s)/formoterol 4.5 MICROgram(s) Inhaler 2 Puff(s) Inhalation two times a day     acetaminophen   Tablet .. 650 milliGRAM(s) Oral every 6 hours PRN  acetaminophen  IVPB .. 1000 milliGRAM(s) IV Intermittent once  cyclobenzaprine 5 milliGRAM(s) Oral three times a day  cyclobenzaprine 5 milliGRAM(s) Oral at bedtime PRN  HYDROmorphone  Injectable 0.5 milliGRAM(s) IV Push every 4 hours PRN  oxyCODONE    IR 10 milliGRAM(s) Oral every 4 hours PRN  oxyCODONE    IR 5 milliGRAM(s) Oral every 4 hours PRN        aspirin enteric coated 81 milliGRAM(s) Oral daily  warfarin 2 milliGRAM(s) Oral daily           folic acid 1 milliGRAM(s) Oral daily        lidocaine   Patch 1 Patch Transdermal daily                              10.7   11.35 )-----------( 229      ( 03 Mar 2021 06:50 )             34.6       03-03    141  |  105  |  39<H>  ----------------------------<  173<H>  4.1   |  32<H>  |  1.16    Ca    8.8      03 Mar 2021 06:50  Phos  2.8     03-03  Mg     2.1     03-03    TPro  5.8<L>  /  Alb  1.9<L>  /  TBili  0.4  /  DBili  x   /  AST  49<H>  /  ALT  124<H>  /  AlkPhos  146<H>  03-03          PT/INR - ( 03 Mar 2021 06:50 )   PT: 22.5 sec;   INR: 1.99 ratio                     Radiology:     EXAM:  CT CHEST                            PROCEDURE DATE:  2021          INTERPRETATION:  EXAMINATION: CT CHEST    CLINICAL INDICATION: Covid.    TECHNIQUE: Noncontrast CT of the chest was obtained.    COMPARISON: Multiple CT, most recent 2020.    FINDINGS:    AIRWAYS AND LUNGS: The central tracheobronchial tree is patent.  Extensive bilateral groundglass and reticular opacities.    MEDIASTINUM AND PLEURA: There are no enlarged mediastinal, hilar or axillary lymph nodes. The visualized portion of the thyroid gland is heterogeneous. There is no pleural effusion. There is no pneumothorax.    HEART AND VESSELS: There is cardiomegaly.  There are atherosclerotic calcifications of the aorta and coronary arteries.  There is no pericardial effusion.  Aortic valve calcification. Left-sided defibrillator.    UPPER ABDOMEN: Images of the upper abdomen demonstrate cholelithiasis. Prominent left renal pelvis..    BONES AND SOFT TISSUES: The bones are unremarkable.  The soft tissues are unremarkable.    TUBES/LINES: None.    IMPRESSION:  COVID pneumonia    Prominent left renal pelvis is new from the prior study and may represent hydronephrosis. Renal ultrasound versus CT abdomen/pelvis recommended for complete evaluation.            MAI SHAVER MD; Attending Radiologist  This document has been electronically signed. 2021  5:42PM    Problems  -Acute hypoxic respiratory failure  -Pneumonia    Assessment/Plan:  75 year old female with known history of Afib cardiomyopathy EF 25% and AICD, urinary incontinence and chronmic knee and hip pain presents with severe back pain- found to have L2 fracture. Hospital course complicated by hypoxemia and pneumonia. ICU consulted now for increasing O2 requirements    -Currently on 6L NC. O2 sat ~92%. No apparent distress. Can up-titrate as needed  -CT chest with diffuse groundglass opacities, likely acute infection as not previously seen on CT chest  however noted hx of Emphysema. Recc inhaled corticorsteroids and bronchodilators. Encourage incentive spirometry  -COVID negative x2. Check viral panel. Sputum culture. CAP coverage w/ Cefepime and Azithromycin. ID following case   -s/p Remdisivir for COVID concern, now d/c. Continue Decadron per ID/pulm  -Hx CHF w/ EF 25%. Diurese as tolerated, does not appear clinically overloaded. Monitor strict I's and O's    Patient with no acute distress at this time on 6L NC. Does not require ICU admission. Re-consult as needed

## 2021-03-03 NOTE — PROGRESS NOTE ADULT - ASSESSMENT
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 acute worsening L sided low back pain and she is unable to ambulate. Patient denies any recent falls or trauma. pain started past few days. She states the pain is mainly localized to her lower back and does not radiate, worsens with movement. She denies any numbness or tingling in her bilateral lower extremities. She has had arthritis and pain both knees and hip, and has follows out patient with Dr Clancy. Here noted with acute L2 compression fracture hospital course c/b acute hypoxic resp failure, CT chest with extensive b/l lung infiltrates concerning for covid-19, was given decadron/remdesivir.    1. acute hypoxic respiratory failure. multifocal pneumonia vs interstitial lung disease vs ? drug induced pneumonitis.  chf  - imaging reviewed, ? drug related pneumonitis was on mtx/amio, mtx was stopped 3/1 d/t being put on decadron, will try to obtain further info on timing of amio, both these drugs can cause pulmonary toxicity   - covid-19 pcr x 2 and covid ab (-)   - on decadron 6mg daily #4  - s/p remdesivir #2 - discontinued   - on cefepime [id number]/azithromycin 500mg daily #3  - continue with antibiotic coverage for superimposed bacterial coverage  - pulmonary following - suggest bronchoscopy for further eval   - isolation precautions  - f/u cbc  - monitor temps  - supportive care    2. 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 acute worsening L sided low back pain and she is unable to ambulate. Patient denies any recent falls or trauma. pain started past few days. She states the pain is mainly localized to her lower back and does not radiate, worsens with movement. She denies any numbness or tingling in her bilateral lower extremities. She has had arthritis and pain both knees and hip, and has follows out patient with Dr Clancy. Here noted with acute L2 compression fracture hospital course c/b acute hypoxic resp failure, CT chest with extensive b/l lung infiltrates concerning for covid-19, was given decadron/remdesivir.    1. acute hypoxic respiratory failure. multifocal pneumonia vs interstitial lung disease vs ? drug induced pneumonitis.  chf  - imaging reviewed, ? drug related pneumonitis was on mtx/amio, mtx was put on hold while here d/t being put on decadron and concern for infection, will try to obtain further info on timing of amio, both these drugs can cause pulmonary toxicity   - covid-19 pcr x 2 and covid ab (-)   - on decadron 6mg daily #4  - s/p remdesivir #2 - discontinued   - on cefepime [id number]/azithromycin 500mg daily #3  - continue with antibiotic coverage for superimposed bacterial coverage  - pulmonary following - suggest bronchoscopy for further eval   - isolation precautions  - f/u cbc  - monitor temps  - supportive care    2. 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 acute worsening L sided low back pain and she is unable to ambulate. Patient denies any recent falls or trauma. pain started past few days. She states the pain is mainly localized to her lower back and does not radiate, worsens with movement. She denies any numbness or tingling in her bilateral lower extremities. She has had arthritis and pain both knees and hip, and has follows out patient with Dr Clancy. Here noted with acute L2 compression fracture hospital course c/b acute hypoxic resp failure, CT chest with extensive b/l lung infiltrates unclear etiology ? pna ? ild ? chf? viral process ?     1. acute hypoxic respiratory failure. multifocal pneumonia PCP? interstitial lung disease. chf. immunosuppressed host.  - imaging reviewed, ddx pcp pna ? - was on steroids/mtx pta, pneumonitis/ild ? drug related - was on mtx/amio prior, chf  - covid-19 pcr x 2 and covid ab (-)   - on decadron 6mg daily #4  - s/p remdesivir #2 - discontinued   - on cefepime [id number]/azithromycin 500mg daily #3  - add bactrim 2DS q8h for possible pcp  - continue with above antibiotics   - pulmonary following - suggest bronchoscopy for further eval   - isolation precautions  - f/u cbc  - monitor temps  - supportive care    2. 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 acute worsening L sided low back pain and she is unable to ambulate. Patient denies any recent falls or trauma. pain started past few days. She states the pain is mainly localized to her lower back and does not radiate, worsens with movement. She denies any numbness or tingling in her bilateral lower extremities. She has had arthritis and pain both knees and hip, and has follows out patient with Dr Clancy. Here noted with acute L2 compression fracture hospital course c/b acute hypoxic resp failure, CT chest with extensive b/l lung infiltrates unclear etiology ? pna ? ild ? chf? viral process ?     1. acute hypoxic respiratory failure. multifocal pneumonia -atypical/PCP? interstitial lung disease. chf. immunosuppressed host.  - imaging reviewed, ddx pcp pna ? - was on steroids/mtx pta, pneumonitis/ild ? drug related - was on mtx/amio prior, chf  - covid-19 pcr x 2 and covid ab (-)   - on decadron 6mg daily #4  - s/p remdesivir #2 - discontinued   - on cefepime [id number]/azithromycin 500mg daily #3  - add bactrim 2DS q8h for possible pcp  - continue with above antibiotics   - pulmonary following - suggest bronchoscopy for further eval   - isolation precautions  - f/u cbc  - monitor temps  - supportive care    2. other issues - care per medicine

## 2021-03-03 NOTE — PROGRESS NOTE ADULT - SUBJECTIVE AND OBJECTIVE BOX
CC:  Patient is a 75y old  Female who presents with a chief complaint of pain uncontrolled L2 fracture (03 Mar 2021 13:32)    SUBJECTIVE:     - SpO2 93% @ 6 LPM via NC.    ROS:  all other review of systems are negative unless indicated above.    acetaminophen   Tablet .. 650 milliGRAM(s) Oral every 6 hours PRN  acetaminophen  IVPB .. 1000 milliGRAM(s) IV Intermittent once  ALBUTerol    90 MICROgram(s) HFA Inhaler 2 Puff(s) Inhalation every 6 hours PRN  aspirin enteric coated 81 milliGRAM(s) Oral daily  azithromycin   Tablet 500 milliGRAM(s) Oral daily  budesonide 160 MICROgram(s)/formoterol 4.5 MICROgram(s) Inhaler 2 Puff(s) Inhalation two times a day  carvedilol Oral Tab/Cap - Peds 50 milliGRAM(s) Oral two times a day  cefepime   IVPB 2000 milliGRAM(s) IV Intermittent every 12 hours  cyclobenzaprine 5 milliGRAM(s) Oral three times a day  cyclobenzaprine 5 milliGRAM(s) Oral at bedtime PRN  dexAMETHasone  Injectable 6 milliGRAM(s) IV Push daily  folic acid 1 milliGRAM(s) Oral daily  furosemide    Tablet 40 milliGRAM(s) Oral daily  HYDROmorphone  Injectable 0.5 milliGRAM(s) IV Push every 4 hours PRN  lidocaine   Patch 1 Patch Transdermal daily  oxyCODONE    IR 10 milliGRAM(s) Oral every 4 hours PRN  oxyCODONE    IR 5 milliGRAM(s) Oral every 4 hours PRN  sacubitril 49 mG/valsartan 51 mG 1 Tablet(s) Oral two times a day  simvastatin 20 milliGRAM(s) Oral at bedtime  warfarin 2 milliGRAM(s) Oral daily    T(C): 35.6 (03-03-21 @ 08:06), Max: 36.3 (03-02-21 @ 16:45)  HR: 92 (03-03-21 @ 12:24) (74 - 94)  BP: 95/52 (03-03-21 @ 12:24) (95/52 - 132/83)  RR: 18 (03-03-21 @ 12:24) (18 - 24)  SpO2: 93% (03-03-21 @ 12:24) (87% - 98%)    Constitutional: NAD.   HEENT: PERRL, EOMI, MMM.  Neck: Soft and supple, No carotid bruit, No JVD  Respiratory: Breath sounds are clear bilaterally, No wheezing, rales or rhonchi  Cardiovascular: S1 and S2, regular rate and rhythm, no murmur, rub or gallop.  Gastrointestinal: Bowel Sounds present, soft, nontender, nondistended, no guarding, no rebound, no mass.  Extremities: No peripheral edema  Vascular: 2+ peripheral pulses  Neurological: A/O x , no focal deficits  Musculoskeletal: 5/5 strength b/l upper and lower extremities  Skin:  no visible rashes.                         10.7   11.35 )-----------( 229      ( 03 Mar 2021 06:50 )             34.6     PT/INR - ( 03 Mar 2021 06:50 )   PT: 22.5 sec;   INR: 1.99 ratio           03-03    141  |  105  |  39<H>  ----------------------------<  173<H>  4.1   |  32<H>  |  1.16    Ca    8.8      03 Mar 2021 06:50  Phos  2.8     03-03  Mg     2.1     03-03    TPro  5.8<L>  /  Alb  1.9<L>  /  TBili  0.4  /  DBili  x   /  AST  49<H>  /  ALT  124<H>  /  AlkPhos  146<H>  03-03

## 2021-03-03 NOTE — PROGRESS NOTE ADULT - ASSESSMENT
75F, PMH AS ABOVE A/W:     Acute spontaneous L2 fracture:  -could be related to osteoporosis/chronic steroid use.   -pain control  -brace delivered.   -physical therapy  -incentive spirometry    hypoxic respiratory failure.  - multifactorial:  HCAP;  CHF.  - ddx:  ILD due to MTX and/or amiodarone.  - CXR:  unchanged b/l infiltrates.  - sputum Cx.  - possible bronchoscopy when more stable.  - ABx.  - steroids.  - Lasix.     COPD:  -albuterol prn.   -inhaled steroid .     A fib:  -on coumadin  -continue bb     Chronic systolic CHF/AICD:  - Lasix PO.  -entresto, bb     Rheumatoid arthritis:  -hold prednisone while on iv decadron.   -hold mtx.      DVT px:  -on coumadin.    communication.  - RN.  - Critical Care PA.  - ID.  - Pulmonology.

## 2021-03-03 NOTE — PROGRESS NOTE ADULT - SUBJECTIVE AND OBJECTIVE BOX
Date of service: 03-03-21 @ 12:49    pt seen and examined  episodes sob/hypoxia on 4L   now on 6L NC  feels weak, has cough    ROS: no fever or chills; denies dizziness, no HA, no abdominal pain, no diarrhea or constipation; no dysuria, no urinary frequency, no legs pain, no rashes    MEDICATIONS  (STANDING):  acetaminophen  IVPB .. 1000 milliGRAM(s) IV Intermittent once  aspirin enteric coated 81 milliGRAM(s) Oral daily  azithromycin   Tablet 500 milliGRAM(s) Oral daily  budesonide 160 MICROgram(s)/formoterol 4.5 MICROgram(s) Inhaler 2 Puff(s) Inhalation two times a day  carvedilol Oral Tab/Cap - Peds 50 milliGRAM(s) Oral two times a day  cefepime   IVPB 2000 milliGRAM(s) IV Intermittent every 12 hours  cyclobenzaprine 5 milliGRAM(s) Oral three times a day  dexAMETHasone  Injectable 6 milliGRAM(s) IV Push daily  folic acid 1 milliGRAM(s) Oral daily  furosemide    Tablet 40 milliGRAM(s) Oral daily  lidocaine   Patch 1 Patch Transdermal daily  sacubitril 49 mG/valsartan 51 mG 1 Tablet(s) Oral two times a day  simvastatin 20 milliGRAM(s) Oral at bedtime  warfarin 2 milliGRAM(s) Oral daily    Vital Signs Last 24 Hrs  T(C): 35.6 (03 Mar 2021 08:06), Max: 36.3 (02 Mar 2021 16:45)  T(F): 96 (03 Mar 2021 08:06), Max: 97.3 (02 Mar 2021 16:45)  HR: 92 (03 Mar 2021 12:24) (74 - 94)  BP: 95/52 (03 Mar 2021 12:24) (95/52 - 132/83)  BP(mean): --  RR: 18 (03 Mar 2021 12:24) (18 - 24)  SpO2: 93% (03 Mar 2021 12:24) (87% - 98%)    PE:  Constitutional: frail looking  HEENT: NC/AT, EOMI, PERRLA, conjunctivae clear; ears and nose atraumatic; pharynx benign  Neck: supple; thyroid not palpable  Back: no tenderness  Respiratory: decreased breath sounds, crackles   Cardiovascular: S1S2 regular, no murmurs  Abdomen: soft, not tender, not distended, positive BS; liver and spleen WNL  Genitourinary: no suprapubic tenderness  Lymphatic: no LN palpable  Musculoskeletal: no muscle tenderness, no joint swelling or tenderness  Extremities: no pedal edema  Neurological/ Psychiatric: moving all extremities  Skin: no rashes; no palpable lesions    Labs: all available labs reviewed                                   10.7   11.35 )-----------( 229      ( 03 Mar 2021 06:50 )             34.6     03-03    141  |  105  |  39<H>  ----------------------------<  173<H>  4.1   |  32<H>  |  1.16    Ca    8.8      03 Mar 2021 06:50  Phos  2.8     03-03  Mg     2.1     03-03    TPro  5.8<L>  /  Alb  1.9<L>  /  TBili  0.4  /  DBili  x   /  AST  49<H>  /  ALT  124<H>  /  AlkPhos  146<H>  03-03          D-Dimer Assay, Quantitative: 275 ng/mL DDU (03-01-21 @ 07:23)  C-Reactive Protein, Serum: 206 mg/L (03-01-21 @ 07:23)  Ferritin, Serum: 383 ng/mL (03-01-21 @ 07:23)        Radiology: all available radiological tests reviewed    EXAM:  CT CHEST                            PROCEDURE DATE:  02/28/2021          INTERPRETATION:  EXAMINATION: CT CHEST    CLINICAL INDICATION: Covid.    TECHNIQUE: Noncontrast CT of the chest was obtained.    COMPARISON: Multiple CT, most recent 8/11/2020.    FINDINGS:    AIRWAYS AND LUNGS: The central tracheobronchial tree is patent.  Extensive bilateral groundglass and reticular opacities.    MEDIASTINUM AND PLEURA: There are no enlarged mediastinal, hilar or axillary lymph nodes. The visualized portion of the thyroid gland is heterogeneous. There is no pleural effusion. There is no pneumothorax.    HEART AND VESSELS: There is cardiomegaly.  There are atherosclerotic calcifications of the aorta and coronary arteries.  There is no pericardial effusion.  Aortic valve calcification. Left-sided defibrillator.    UPPER ABDOMEN: Images of the upper abdomen demonstrate cholelithiasis. Prominent left renal pelvis..    BONES AND SOFT TISSUES: The bones are unremarkable.  The soft tissues are unremarkable.    TUBES/LINES: None.    IMPRESSION:  COVID pneumonia    Prominent left renal pelvis is new from the prior study and may represent hydronephrosis. Renal ultrasound versus CT abdomen/pelvis recommended for complete evaluation.      < end of copied text >  < from: MR Lumbar Spine No Cont (03.01.21 @ 12:29) >    EXAM:  MR SPINE LUMBAR                            PROCEDURE DATE:  03/01/2021          INTERPRETATION:  Exam Date: 3/1/2021 12:29 PM    MR lumbar without gadolinium    CLINICAL INFORMATION:   L2 compression fracture    TECHNIQUE:   Sagittal and axial T1-weighted images, sagittal inversion recovery images, and sagittal and axial T1-weighted and T2-weighted images of the lumbar spine were obtained.    FINDINGS: Comparison is made to CT lumbar of 2/26/2021.    Reidentified is a acute compression fracture of the L2 vertebral body with approximately 45% vertebral body height loss within the mid body. There is no significant bony retropulsion. No associated paraspinal or epidural disease. No additional fractures.    Multilevel degenerative changes as follows:    At L1/L2 and L2/L3 there are small posterior disc bulges without significant foraminal or central spinal canal stenosis.    At L3/L4, there is a posterior disc bulge resulting in mild narrowing of the right neural foramen without significant left foraminal or central spinal canal stenosis.    At L4/L5 and L5/S1, there are small posterior disc bulges without significant foraminal or central spinal canal stenosis.    The distal cord maintains intact morphology.  Distal cord signal intensity is preserved.  The conus is normally positioned at the T12 level.  Nerve roots of the cauda equina appear intact.      IMPRESSION:    Reidentified is a acute compression fracture of the L2 vertebral body with approximately 45% vertebral body height loss within the mid body. There is no significant bony retropulsion. No associated paraspinal or epidural disease. No additional fractures.    Mild degenerative changes as above.        < end of copied text >    Advanced directives addressed: full resuscitation Date of service: 03-03-21 @ 12:49    pt seen and examined  episodes sob/hypoxia on 4L   now on 6L NC  feels weak, has cough  reports being on steroids prior to admit for > 2 months and MTX for 4 months  was not on pcp prophylaxis     ROS: no fever or chills; denies dizziness, no HA, no abdominal pain, no diarrhea or constipation; no dysuria, no urinary frequency, no legs pain, no rashes    MEDICATIONS  (STANDING):  acetaminophen  IVPB .. 1000 milliGRAM(s) IV Intermittent once  aspirin enteric coated 81 milliGRAM(s) Oral daily  azithromycin   Tablet 500 milliGRAM(s) Oral daily  budesonide 160 MICROgram(s)/formoterol 4.5 MICROgram(s) Inhaler 2 Puff(s) Inhalation two times a day  carvedilol Oral Tab/Cap - Peds 50 milliGRAM(s) Oral two times a day  cefepime   IVPB 2000 milliGRAM(s) IV Intermittent every 12 hours  cyclobenzaprine 5 milliGRAM(s) Oral three times a day  dexAMETHasone  Injectable 6 milliGRAM(s) IV Push daily  folic acid 1 milliGRAM(s) Oral daily  furosemide    Tablet 40 milliGRAM(s) Oral daily  lidocaine   Patch 1 Patch Transdermal daily  sacubitril 49 mG/valsartan 51 mG 1 Tablet(s) Oral two times a day  simvastatin 20 milliGRAM(s) Oral at bedtime  warfarin 2 milliGRAM(s) Oral daily    Vital Signs Last 24 Hrs  T(C): 35.6 (03 Mar 2021 08:06), Max: 36.3 (02 Mar 2021 16:45)  T(F): 96 (03 Mar 2021 08:06), Max: 97.3 (02 Mar 2021 16:45)  HR: 92 (03 Mar 2021 12:24) (74 - 94)  BP: 95/52 (03 Mar 2021 12:24) (95/52 - 132/83)  BP(mean): --  RR: 18 (03 Mar 2021 12:24) (18 - 24)  SpO2: 93% (03 Mar 2021 12:24) (87% - 98%)    PE:  Constitutional: frail looking  HEENT: NC/AT, EOMI, PERRLA, conjunctivae clear; ears and nose atraumatic; pharynx benign  Neck: supple; thyroid not palpable  Back: no tenderness  Respiratory: decreased breath sounds, crackles   Cardiovascular: S1S2 regular, no murmurs  Abdomen: soft, not tender, not distended, positive BS; liver and spleen WNL  Genitourinary: no suprapubic tenderness  Lymphatic: no LN palpable  Musculoskeletal: no muscle tenderness, no joint swelling or tenderness  Extremities: no pedal edema  Neurological/ Psychiatric: moving all extremities  Skin: no rashes; no palpable lesions    Labs: all available labs reviewed                                   10.7   11.35 )-----------( 229      ( 03 Mar 2021 06:50 )             34.6     03-03    141  |  105  |  39<H>  ----------------------------<  173<H>  4.1   |  32<H>  |  1.16    Ca    8.8      03 Mar 2021 06:50  Phos  2.8     03-03  Mg     2.1     03-03    TPro  5.8<L>  /  Alb  1.9<L>  /  TBili  0.4  /  DBili  x   /  AST  49<H>  /  ALT  124<H>  /  AlkPhos  146<H>  03-03          D-Dimer Assay, Quantitative: 275 ng/mL DDU (03-01-21 @ 07:23)  C-Reactive Protein, Serum: 206 mg/L (03-01-21 @ 07:23)  Ferritin, Serum: 383 ng/mL (03-01-21 @ 07:23)        Radiology: all available radiological tests reviewed    EXAM:  CT CHEST                            PROCEDURE DATE:  02/28/2021          INTERPRETATION:  EXAMINATION: CT CHEST    CLINICAL INDICATION: Covid.    TECHNIQUE: Noncontrast CT of the chest was obtained.    COMPARISON: Multiple CT, most recent 8/11/2020.    FINDINGS:    AIRWAYS AND LUNGS: The central tracheobronchial tree is patent.  Extensive bilateral groundglass and reticular opacities.    MEDIASTINUM AND PLEURA: There are no enlarged mediastinal, hilar or axillary lymph nodes. The visualized portion of the thyroid gland is heterogeneous. There is no pleural effusion. There is no pneumothorax.    HEART AND VESSELS: There is cardiomegaly.  There are atherosclerotic calcifications of the aorta and coronary arteries.  There is no pericardial effusion.  Aortic valve calcification. Left-sided defibrillator.    UPPER ABDOMEN: Images of the upper abdomen demonstrate cholelithiasis. Prominent left renal pelvis..    BONES AND SOFT TISSUES: The bones are unremarkable.  The soft tissues are unremarkable.    TUBES/LINES: None.    IMPRESSION:  COVID pneumonia    Prominent left renal pelvis is new from the prior study and may represent hydronephrosis. Renal ultrasound versus CT abdomen/pelvis recommended for complete evaluation.      < end of copied text >  < from: MR Lumbar Spine No Cont (03.01.21 @ 12:29) >    EXAM:  MR SPINE LUMBAR                            PROCEDURE DATE:  03/01/2021          INTERPRETATION:  Exam Date: 3/1/2021 12:29 PM    MR lumbar without gadolinium    CLINICAL INFORMATION:   L2 compression fracture    TECHNIQUE:   Sagittal and axial T1-weighted images, sagittal inversion recovery images, and sagittal and axial T1-weighted and T2-weighted images of the lumbar spine were obtained.    FINDINGS: Comparison is made to CT lumbar of 2/26/2021.    Reidentified is a acute compression fracture of the L2 vertebral body with approximately 45% vertebral body height loss within the mid body. There is no significant bony retropulsion. No associated paraspinal or epidural disease. No additional fractures.    Multilevel degenerative changes as follows:    At L1/L2 and L2/L3 there are small posterior disc bulges without significant foraminal or central spinal canal stenosis.    At L3/L4, there is a posterior disc bulge resulting in mild narrowing of the right neural foramen without significant left foraminal or central spinal canal stenosis.    At L4/L5 and L5/S1, there are small posterior disc bulges without significant foraminal or central spinal canal stenosis.    The distal cord maintains intact morphology.  Distal cord signal intensity is preserved.  The conus is normally positioned at the T12 level.  Nerve roots of the cauda equina appear intact.      IMPRESSION:    Reidentified is a acute compression fracture of the L2 vertebral body with approximately 45% vertebral body height loss within the mid body. There is no significant bony retropulsion. No associated paraspinal or epidural disease. No additional fractures.    Mild degenerative changes as above.        < end of copied text >    Advanced directives addressed: full resuscitation

## 2021-03-04 NOTE — PROGRESS NOTE ADULT - ATTENDING COMMENTS
patient seen and examined with PA student Trice Menjivar,  I  was physically present for the key portions of the evaluation and management (E/M) service provided.  I agree with the above history, physical, and plan which I have reviewed and edited where appropriate.  -pt with worsening hypoxia overnight and now on 50% VM  - cardio and pulm input noted.  added inhaled corticosteroids  - ID eval noted.  continue bactrim and cefepime   - worsening leukocytosis likely 2/2 to steroids  - monitor cr

## 2021-03-04 NOTE — PROGRESS NOTE ADULT - ASSESSMENT
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 acute worsening L sided low back pain and she is unable to ambulate. Patient denies any recent falls or trauma. pain started past few days. She states the pain is mainly localized to her lower back and does not radiate, worsens with movement. She denies any numbness or tingling in her bilateral lower extremities. She has had arthritis and pain both knees and hip, and has follows out patient with Dr Clancy. Here noted with acute L2 compression fracture hospital course c/b acute hypoxic resp failure, CT chest with extensive b/l lung infiltrates unclear etiology ? pna ? ild ? chf? viral process ?     1. acute hypoxic respiratory failure. multifocal pneumonia -atypical/PCP? interstitial lung disease. chf. immunosuppressed host.  - imaging reviewed, ddx pcp pna ? - was on steroids/mtx pta, pneumonitis/ild ? drug related - was on mtx/amio prior, chf  - covid-19 pcr x 2 and covid ab (-) repeat ab pending from 3/2   - on decadron 9mg daily #5  - s/p remdesivir #2 - discontinued   - on cefepime [id number]/azithromycin 500mg daily #4  - on bactrim #2 - decrease dose d/t Cr to 2DS q12h- possible pcp  - continue with above antibiotics   - pulmonary following -consider bronchoscopy for further eval   - isolation precautions  - f/u cbc  - monitor temps  - supportive care    2. 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 acute worsening L sided low back pain and she is unable to ambulate. Patient denies any recent falls or trauma. pain started past few days. She states the pain is mainly localized to her lower back and does not radiate, worsens with movement. She denies any numbness or tingling in her bilateral lower extremities. She has had arthritis and pain both knees and hip, and has follows out patient with Dr Clancy. Here noted with acute L2 compression fracture hospital course c/b acute hypoxic resp failure, CT chest with extensive b/l lung infiltrates unclear etiology ? pna ? ild ? chf? viral process ?     1. acute hypoxic respiratory failure. multifocal pneumonia -atypical/PCP? interstitial lung disease. chf. immunosuppressed host.  - imaging reviewed, ddx pcp pna ? - was on steroids/mtx pta, pneumonitis/ild ? drug related - was on mtx/amio prior, chf  - covid-19 pcr x 2 and covid ab (-) repeat ab pending from 3/2   - on decadron 9mg daily #6  - s/p remdesivir #2 - discontinued   - on cefepime [id number]/azithromycin 500mg daily #5  - on bactrim #3 - decrease dose d/t Cr to 2DS q12h- possible pcp  - continue with above antibiotics   - pulmonary following-consider bronchoscopy for further eval   - repeat Ct chest tomorrow   - isolation precautions  - f/u cbc  - monitor temps  - supportive care    2. 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 acute worsening L sided low back pain and she is unable to ambulate. Patient denies any recent falls or trauma. pain started past few days. She states the pain is mainly localized to her lower back and does not radiate, worsens with movement. She denies any numbness or tingling in her bilateral lower extremities. She has had arthritis and pain both knees and hip, and has follows out patient with Dr Clancy. Here noted with acute L2 compression fracture hospital course c/b acute hypoxic resp failure, CT chest with extensive b/l lung infiltrates unclear etiology ? pna ? ild ? chf? viral process ?     1. acute hypoxic respiratory failure. multifocal pneumonia -atypical/PCP? interstitial lung disease. chf. immunosuppressed host.  - imaging reviewed, ddx pcp pna ? - was on steroids/mtx pta, pneumonitis/ild ? drug related - was on mtx/amio prior, chf  - covid-19 pcr x 2 and covid ab (-) repeat ab pending from 3/2   - on decadron 9mg daily #5  - s/p remdesivir #2 - discontinued   - on cefepime [id number]/azithromycin 500mg daily #4  - on bactrim #2 - decrease dose d/t Cr to 2DS q12h- possible pcp  - continue with above antibiotics   - pulmonary following-consider bronchoscopy for further eval   - isolation precautions  - f/u cbc  - monitor temps  - supportive care    2. other issues - care per medicine

## 2021-03-04 NOTE — PROGRESS NOTE ADULT - SUBJECTIVE AND OBJECTIVE BOX
Subjective:    Awake, alert. Still dyspneic. No cough or sputum. Events noted.    MEDICATIONS  (STANDING):  acetaminophen  IVPB .. 1000 milliGRAM(s) IV Intermittent once  aspirin enteric coated 81 milliGRAM(s) Oral daily  azithromycin   Tablet 500 milliGRAM(s) Oral daily  budesonide 160 MICROgram(s)/formoterol 4.5 MICROgram(s) Inhaler 2 Puff(s) Inhalation two times a day  carvedilol Oral Tab/Cap - Peds 50 milliGRAM(s) Oral two times a day  cefepime  Injectable. 1000 milliGRAM(s) IV Push every 12 hours  cyclobenzaprine 5 milliGRAM(s) Oral three times a day  dexAMETHasone  Injectable 6 milliGRAM(s) IV Push daily  folic acid 1 milliGRAM(s) Oral daily  furosemide    Tablet 40 milliGRAM(s) Oral daily  lidocaine   Patch 1 Patch Transdermal daily  sacubitril 49 mG/valsartan 51 mG 1 Tablet(s) Oral two times a day  simvastatin 20 milliGRAM(s) Oral at bedtime  trimethoprim  160 mG/sulfamethoxazole 800 mG 2 Tablet(s) Oral every 8 hours  warfarin 2 milliGRAM(s) Oral daily    MEDICATIONS  (PRN):  acetaminophen   Tablet .. 650 milliGRAM(s) Oral every 6 hours PRN Temp greater or equal to 38C (100.4F), fever, headaches  ALBUTerol    90 MICROgram(s) HFA Inhaler 2 Puff(s) Inhalation every 6 hours PRN Shortness of Breath and/or Wheezing  cyclobenzaprine 5 milliGRAM(s) Oral at bedtime PRN Muscle Spasm  HYDROmorphone  Injectable 0.5 milliGRAM(s) IV Push every 4 hours PRN Severe Pain (7 - 10)  oxyCODONE    IR 10 milliGRAM(s) Oral every 4 hours PRN Moderate Pain (4 - 6)  oxyCODONE    IR 5 milliGRAM(s) Oral every 4 hours PRN Mild Pain (1 - 3)      Allergies    apixaban (Other)  fesoterodine (Unknown)  Macrobid (Other)    Intolerances        Vital Signs Last 24 Hrs  T(C): 36.3 (04 Mar 2021 07:50), Max: 36.7 (04 Mar 2021 03:42)  T(F): 97.4 (04 Mar 2021 07:50), Max: 98 (04 Mar 2021 03:42)  HR: 96 (04 Mar 2021 07:50) (80 - 96)  BP: 102/65 (04 Mar 2021 07:50) (90/66 - 108/70)  BP(mean): --  RR: 18 (04 Mar 2021 07:50) (18 - 25)  SpO2: 97% (04 Mar 2021 07:50) (93% - 97%)    PHYSICAL EXAMINATION:    NECK:  Supple. No lymphadenopathy. Jugular venous pressure not elevated.   HEART:   The cardiac impulse has a normal quality. Reg., Nl S1 and S2.   CHEST:  Chest with minimal crackles. Normal respiratory effort.  ABDOMEN:  Soft and nontender.   EXTREMITIES:  There is no edema.       LABS:                        10.7   11.35 )-----------( 229      ( 03 Mar 2021 06:50 )             34.6     03-03    141  |  105  |  39<H>  ----------------------------<  173<H>  4.1   |  32<H>  |  1.16    Ca    8.8      03 Mar 2021 06:50  Phos  2.8     03-03  Mg     2.1     03-03    TPro  5.8<L>  /  Alb  1.9<L>  /  TBili  0.4  /  DBili  x   /  AST  49<H>  /  ALT  124<H>  /  AlkPhos  146<H>  03-03    PT/INR - ( 03 Mar 2021 06:50 )   PT: 22.5 sec;   INR: 1.99 ratio               RADIOLOGY & ADDITIONAL TESTS:< from: Xray Chest 1 View- PORTABLE-Urgent (Xray Chest 1 View- PORTABLE-Urgent .) (03.03.21 @ 11:00) >  EXAM:  XR CHEST PORTABLE URGENT 1V                            PROCEDURE DATE:  03/03/2021          INTERPRETATION:  AP chest on March 30, 2021 at 10:39 AM. Patient is short of breath. Covid test is negative.    Heart could be enlarged. Left-sided defibrillator again noted.    There is diffuse interstitial infiltration throughout all lung fields similar to February 28 of this year. We have to consider chronic interstitial lung disease.    IMPRESSION: Unchanged advanced interstitial infiltrates possibly indicative of chronic interstitial lung disease.        WAYNE MENDOZA MD; Attending Radiologist      Assessment and Recommendation:   · Assessment	  Treat for multifocal PNA.  Abx's as per ID-Cefipime/Zithro. Bactrim added for possible PCP.  Albuterol as needed.   Methotrexate on hold.     Net diuresis for acute CHF - approx 700cc neg fluid balance.      COVID test negative x 2.  On decadron.  ID following.      h.o. previous MTX and amiodarone treatment.  Is off both meds.     Start Symbicort-D/C Mometasone    Monitor O2 sats-change to V/M @ 50%,  Repeat CXR-noted.    Cardiology Evaluation    Consider COVID Antibody    Problem/Recommendation - 1:  Respiratory failure with hypoxia.  Problem/Recommendation - 2:  ·  Multifocal pneumonia.   Problem/Recommendation - 3:  ·  CHF with cardiomyopathy.   Problem/Recommendation - 4:  ·  Afib.   Problem/Recommendation - 5:  ·  Edema.   Problem/Recommendation - 6:  R/O COPD with asthma.  Problem/Recommendation - 7:  Emphysema of lung.  Problem/Recommendation - 8:  CAD (coronary artery disease), native coronary artery.  Problem/Recommendation - 9:  Vertebral fracture, osteoporotic, initial encounter.  Problem/Recommendation - 10:  Rheumatoid arthritis.

## 2021-03-04 NOTE — PROGRESS NOTE ADULT - SUBJECTIVE AND OBJECTIVE BOX
CC:  Patient is a 75y old  Female who presents with a chief complaint of pain uncontrolled L2 fracture    3/4 pt seen and evaluated at bedside. Comfortable at rest. Denies SOB. No complaints of pain.    ROS:   All 10 systems reviewed and found to be negative with the exception of what has been described above.    ICU Vital Signs Last 24 Hrs  T(C): 36.3 (04 Mar 2021 07:50), Max: 36.7 (04 Mar 2021 03:42)  T(F): 97.4 (04 Mar 2021 07:50), Max: 98 (04 Mar 2021 03:42)  HR: 71 (04 Mar 2021 08:58) (71 - 96)  BP: 102/65 (04 Mar 2021 07:50) (90/66 - 108/70)  BP(mean): --  ABP: --  ABP(mean): --  RR: 18 (04 Mar 2021 07:50) (18 - 25)  SpO2: 94% (04 Mar 2021 08:58) (93% - 97%)    GEN: lying in bed, NAD  HEENT:   NC/AT, pupils equal and reactive, EOMI  CV:  +S1, +S2, RRR  RESP:   lungs clear to auscultation bilaterally, no wheeze, rales, rhonchi   BREAST:  not examined  GI:  abdomen soft, non-tender, non-distended, normoactive BS  RECTAL:  not examined  :  not examined  MSK:   normal muscle tone  EXT:  no edema  NEURO:  AAOX3, no focal neurological deficits  SKIN:  no rashes    labs reviewed               11.3   14.92 )-----------( 270      ( 04 Mar 2021 09:24 )             36.1     03-04    136  |  101  |  46<H>  ----------------------------<  242<H>  4.1   |  28  |  1.31<H>    Ca    8.6      04 Mar 2021 09:24  Phos  2.8     03-03  Mg     2.1     03-03    TPro  5.7<L>  /  Alb  2.0<L>  /  TBili  0.3  /  DBili  x   /  AST  29  /  ALT  103<H>  /  AlkPhos  150<H>  03-04    LIVER FUNCTIONS - ( 04 Mar 2021 09:24 )  Alb: 2.0 g/dL / Pro: 5.7 gm/dL / ALK PHOS: 150 U/L / ALT: 103 U/L / AST: 29 U/L / GGT: x           PT/INR - ( 04 Mar 2021 09:24 )   PT: 25.1 sec;   INR: 2.25 ratio      ABG - ( 03 Mar 2021 13:31 )  pH, Arterial: 7.48  pH, Blood: x     /  pCO2: 39    /  pO2: 54    / HCO3: 29    / Base Excess: 5.1   /  SaO2: 88        CXR 3/3: Unchanged advanced interstitial infiltrates possibly indicative of chronic interstitial lung disease.     MR Lumbar Spine Noncon 3/1: Reidentified is a acute compression fracture of the L2 vertebral body with approximately 45% vertebral body height loss within the mid body. There is no significant bony retropulsion. No associated paraspinal or epidural disease. No additional fractures.    CT Chest Noncon 2/28: COVID pneumonia         CC:  Patient is a 75y old  Female who presents with a chief complaint of pain uncontrolled L2 fracture    3/4 pt seen and evaluated at bedside. Comfortable at rest. Denies SOB. No complaints of pain.      ROS:   All 10 systems reviewed and found to be negative with the exception of what has been described above.    ICU Vital Signs Last 24 Hrs  T(C): 36.3 (04 Mar 2021 07:50), Max: 36.7 (04 Mar 2021 03:42)  T(F): 97.4 (04 Mar 2021 07:50), Max: 98 (04 Mar 2021 03:42)  HR: 71 (04 Mar 2021 08:58) (71 - 96)  BP: 102/65 (04 Mar 2021 07:50) (90/66 - 108/70)  BP(mean): --  ABP: --  ABP(mean): --  RR: 18 (04 Mar 2021 07:50) (18 - 25)  SpO2: 94% (04 Mar 2021 08:58) (93% - 97%)    GEN: lying in bed, NAD  HEENT:   NC/AT, pupils equal and reactive, EOMI  CV:  +S1, +S2, RRR  RESP:   lungs clear to auscultation bilaterally, no wheeze, rales, rhonchi   BREAST:  not examined  GI:  abdomen soft, non-tender, non-distended, normoactive BS  RECTAL:  not examined  :  not examined  MSK:   normal muscle tone  EXT:  no edema  NEURO:  AAOX3, no focal neurological deficits  SKIN:  no rashes    labs reviewed               11.3   14.92 )-----------( 270      ( 04 Mar 2021 09:24 )             36.1     03-04    136  |  101  |  46<H>  ----------------------------<  242<H>  4.1   |  28  |  1.31<H>    Ca    8.6      04 Mar 2021 09:24  Phos  2.8     03-03  Mg     2.1     03-03    TPro  5.7<L>  /  Alb  2.0<L>  /  TBili  0.3  /  DBili  x   /  AST  29  /  ALT  103<H>  /  AlkPhos  150<H>  03-04    LIVER FUNCTIONS - ( 04 Mar 2021 09:24 )  Alb: 2.0 g/dL / Pro: 5.7 gm/dL / ALK PHOS: 150 U/L / ALT: 103 U/L / AST: 29 U/L / GGT: x           PT/INR - ( 04 Mar 2021 09:24 )   PT: 25.1 sec;   INR: 2.25 ratio      ABG - ( 03 Mar 2021 13:31 )  pH, Arterial: 7.48  pH, Blood: x     /  pCO2: 39    /  pO2: 54    / HCO3: 29    / Base Excess: 5.1   /  SaO2: 88        CXR 3/3: Unchanged advanced interstitial infiltrates possibly indicative of chronic interstitial lung disease.     MR Lumbar Spine Noncon 3/1: Reidentified is a acute compression fracture of the L2 vertebral body with approximately 45% vertebral body height loss within the mid body. There is no significant bony retropulsion. No associated paraspinal or epidural disease. No additional fractures.    CT Chest Noncon 2/28: COVID pneumonia

## 2021-03-04 NOTE — PROGRESS NOTE ADULT - SUBJECTIVE AND OBJECTIVE BOX
Date of service: 03-03-21 @ 12:49    pt seen and examined  on VM, feels weak  dyspnea/sob  no reported fevers    ROS: no fever or chills; denies dizziness, no HA, no abdominal pain, no diarrhea or constipation; no dysuria, no urinary frequency, no legs pain, no rashes    MEDICATIONS  (STANDING):  acetaminophen  IVPB .. 1000 milliGRAM(s) IV Intermittent once  aspirin enteric coated 81 milliGRAM(s) Oral daily  azithromycin   Tablet 500 milliGRAM(s) Oral daily  budesonide 160 MICROgram(s)/formoterol 4.5 MICROgram(s) Inhaler 2 Puff(s) Inhalation two times a day  carvedilol Oral Tab/Cap - Peds 50 milliGRAM(s) Oral two times a day  cefepime   IVPB 2000 milliGRAM(s) IV Intermittent every 12 hours  cyclobenzaprine 5 milliGRAM(s) Oral three times a day  dexAMETHasone  Injectable 6 milliGRAM(s) IV Push daily  folic acid 1 milliGRAM(s) Oral daily  furosemide    Tablet 40 milliGRAM(s) Oral daily  lidocaine   Patch 1 Patch Transdermal daily  sacubitril 49 mG/valsartan 51 mG 1 Tablet(s) Oral two times a day  simvastatin 20 milliGRAM(s) Oral at bedtime  warfarin 2 milliGRAM(s) Oral daily    Vital Signs Last 24 Hrs  T(C): 35.6 (03 Mar 2021 08:06), Max: 36.3 (02 Mar 2021 16:45)  T(F): 96 (03 Mar 2021 08:06), Max: 97.3 (02 Mar 2021 16:45)  HR: 92 (03 Mar 2021 12:24) (74 - 94)  BP: 95/52 (03 Mar 2021 12:24) (95/52 - 132/83)  BP(mean): --  RR: 18 (03 Mar 2021 12:24) (18 - 24)  SpO2: 93% (03 Mar 2021 12:24) (87% - 98%)    PE:  Constitutional: frail looking  HEENT: NC/AT, EOMI, PERRLA, conjunctivae clear; ears and nose atraumatic; pharynx benign  Neck: supple; thyroid not palpable  Back: no tenderness  Respiratory: decreased breath sounds, crackles   Cardiovascular: S1S2 regular, no murmurs  Abdomen: soft, not tender, not distended, positive BS; liver and spleen WNL  Genitourinary: no suprapubic tenderness  Lymphatic: no LN palpable  Musculoskeletal: no muscle tenderness, no joint swelling or tenderness  Extremities: no pedal edema  Neurological/ Psychiatric: moving all extremities  Skin: no rashes; no palpable lesions    Labs: all available labs reviewed                                   11.3   14.92 )-----------( 270      ( 04 Mar 2021 09:24 )             36.1     03-04    136  |  101  |  46<H>  ----------------------------<  242<H>  4.1   |  28  |  1.31<H>    Ca    8.6      04 Mar 2021 09:24  Phos  2.8     03-03  Mg     2.1     03-03    TPro  5.7<L>  /  Alb  2.0<L>  /  TBili  0.3  /  DBili  x   /  AST  29  /  ALT  103<H>  /  AlkPhos  150<H>  03-04      Radiology: all available radiological tests reviewed    EXAM:  CT CHEST                            PROCEDURE DATE:  02/28/2021          INTERPRETATION:  EXAMINATION: CT CHEST    CLINICAL INDICATION: Covid.    TECHNIQUE: Noncontrast CT of the chest was obtained.    COMPARISON: Multiple CT, most recent 8/11/2020.    FINDINGS:    AIRWAYS AND LUNGS: The central tracheobronchial tree is patent.  Extensive bilateral groundglass and reticular opacities.    MEDIASTINUM AND PLEURA: There are no enlarged mediastinal, hilar or axillary lymph nodes. The visualized portion of the thyroid gland is heterogeneous. There is no pleural effusion. There is no pneumothorax.    HEART AND VESSELS: There is cardiomegaly.  There are atherosclerotic calcifications of the aorta and coronary arteries.  There is no pericardial effusion.  Aortic valve calcification. Left-sided defibrillator.    UPPER ABDOMEN: Images of the upper abdomen demonstrate cholelithiasis. Prominent left renal pelvis..    BONES AND SOFT TISSUES: The bones are unremarkable.  The soft tissues are unremarkable.    TUBES/LINES: None.    IMPRESSION:  COVID pneumonia    Prominent left renal pelvis is new from the prior study and may represent hydronephrosis. Renal ultrasound versus CT abdomen/pelvis recommended for complete evaluation.      < end of copied text >  < from: MR Lumbar Spine No Cont (03.01.21 @ 12:29) >    EXAM:  MR SPINE LUMBAR                            PROCEDURE DATE:  03/01/2021          INTERPRETATION:  Exam Date: 3/1/2021 12:29 PM    MR lumbar without gadolinium    CLINICAL INFORMATION:   L2 compression fracture    TECHNIQUE:   Sagittal and axial T1-weighted images, sagittal inversion recovery images, and sagittal and axial T1-weighted and T2-weighted images of the lumbar spine were obtained.    FINDINGS: Comparison is made to CT lumbar of 2/26/2021.    Reidentified is a acute compression fracture of the L2 vertebral body with approximately 45% vertebral body height loss within the mid body. There is no significant bony retropulsion. No associated paraspinal or epidural disease. No additional fractures.    Multilevel degenerative changes as follows:    At L1/L2 and L2/L3 there are small posterior disc bulges without significant foraminal or central spinal canal stenosis.    At L3/L4, there is a posterior disc bulge resulting in mild narrowing of the right neural foramen without significant left foraminal or central spinal canal stenosis.    At L4/L5 and L5/S1, there are small posterior disc bulges without significant foraminal or central spinal canal stenosis.    The distal cord maintains intact morphology.  Distal cord signal intensity is preserved.  The conus is normally positioned at the T12 level.  Nerve roots of the cauda equina appear intact.      IMPRESSION:    Reidentified is a acute compression fracture of the L2 vertebral body with approximately 45% vertebral body height loss within the mid body. There is no significant bony retropulsion. No associated paraspinal or epidural disease. No additional fractures.    Mild degenerative changes as above.        < end of copied text >    Advanced directives addressed: full resuscitation Date of service: 03-05-21 @ 13:37    pt seen and examined  on VM, feels weak and tired   has dyspnea, sob  no sputum productions  no fevers    ROS: no fever or chills; denies dizziness, no HA, no abdominal pain, no diarrhea or constipation; no dysuria, no urinary frequency, no legs pain, no rashes    MEDICATIONS  (STANDING):  acetaminophen  IVPB .. 1000 milliGRAM(s) IV Intermittent once  aspirin enteric coated 81 milliGRAM(s) Oral daily  azithromycin  IVPB 500 milliGRAM(s) IV Intermittent every 24 hours  budesonide 160 MICROgram(s)/formoterol 4.5 MICROgram(s) Inhaler 2 Puff(s) Inhalation two times a day  carvedilol Oral Tab/Cap - Peds 50 milliGRAM(s) Oral two times a day  cefepime  Injectable. 1000 milliGRAM(s) IV Push every 12 hours  cyclobenzaprine 5 milliGRAM(s) Oral three times a day  dextrose 40% Gel 15 Gram(s) Oral once  dextrose 5%. 1000 milliLiter(s) (50 mL/Hr) IV Continuous <Continuous>  dextrose 5%. 1000 milliLiter(s) (100 mL/Hr) IV Continuous <Continuous>  dextrose 50% Injectable 25 Gram(s) IV Push once  dextrose 50% Injectable 12.5 Gram(s) IV Push once  dextrose 50% Injectable 25 Gram(s) IV Push once  folic acid 1 milliGRAM(s) Oral daily  furosemide    Tablet 40 milliGRAM(s) Oral daily  glucagon  Injectable 1 milliGRAM(s) IntraMuscular once  insulin lispro (ADMELOG) corrective regimen sliding scale   SubCutaneous three times a day before meals  insulin lispro (ADMELOG) corrective regimen sliding scale   SubCutaneous at bedtime  lidocaine   Patch 1 Patch Transdermal daily  methylPREDNISolone sodium succinate Injectable 40 milliGRAM(s) IV Push every 6 hours  sacubitril 49 mG/valsartan 51 mG 1 Tablet(s) Oral two times a day  simvastatin 20 milliGRAM(s) Oral at bedtime  trimethoprim  160 mG/sulfamethoxazole 800 mG 2 Tablet(s) Oral every 12 hours  warfarin 2 milliGRAM(s) Oral daily    Vital Signs Last 24 Hrs  T(C): 36.4 (05 Mar 2021 08:11), Max: 36.7 (04 Mar 2021 21:25)  T(F): 97.5 (05 Mar 2021 08:11), Max: 98 (04 Mar 2021 21:25)  HR: 86 (05 Mar 2021 09:50) (80 - 93)  BP: 110/60 (05 Mar 2021 08:11) (90/58 - 110/60)  BP(mean): --  RR: 19 (05 Mar 2021 08:11) (19 - 20)  SpO2: 93% (05 Mar 2021 09:50) (91% - 95%)    PE:  Constitutional: frail looking  HEENT: NC/AT, EOMI, PERRLA, conjunctivae clear; ears and nose atraumatic; pharynx benign  Neck: supple; thyroid not palpable  Back: no tenderness  Respiratory: decreased breath sounds, crackles   Cardiovascular: S1S2 regular, no murmurs  Abdomen: soft, not tender, not distended, positive BS; liver and spleen WNL  Genitourinary: no suprapubic tenderness  Lymphatic: no LN palpable  Musculoskeletal: no muscle tenderness, no joint swelling or tenderness  Extremities: no pedal edema  Neurological/ Psychiatric: moving all extremities  Skin: no rashes; no palpable lesions    Labs: all available labs reviewed                                   11.3   14.92 )-----------( 270      ( 04 Mar 2021 09:24 )             36.1     03-04    136  |  101  |  46<H>  ----------------------------<  242<H>  4.1   |  28  |  1.31<H>    Ca    8.6      04 Mar 2021 09:24  Phos  2.8     03-03  Mg     2.1     03-03    TPro  5.7<L>  /  Alb  2.0<L>  /  TBili  0.3  /  DBili  x   /  AST  29  /  ALT  103<H>  /  AlkPhos  150<H>  03-04      Radiology: all available radiological tests reviewed    EXAM:  CT CHEST                            PROCEDURE DATE:  02/28/2021          INTERPRETATION:  EXAMINATION: CT CHEST    CLINICAL INDICATION: Covid.    TECHNIQUE: Noncontrast CT of the chest was obtained.    COMPARISON: Multiple CT, most recent 8/11/2020.    FINDINGS:    AIRWAYS AND LUNGS: The central tracheobronchial tree is patent.  Extensive bilateral groundglass and reticular opacities.    MEDIASTINUM AND PLEURA: There are no enlarged mediastinal, hilar or axillary lymph nodes. The visualized portion of the thyroid gland is heterogeneous. There is no pleural effusion. There is no pneumothorax.    HEART AND VESSELS: There is cardiomegaly.  There are atherosclerotic calcifications of the aorta and coronary arteries.  There is no pericardial effusion.  Aortic valve calcification. Left-sided defibrillator.    UPPER ABDOMEN: Images of the upper abdomen demonstrate cholelithiasis. Prominent left renal pelvis..    BONES AND SOFT TISSUES: The bones are unremarkable.  The soft tissues are unremarkable.    TUBES/LINES: None.    IMPRESSION:  COVID pneumonia    Prominent left renal pelvis is new from the prior study and may represent hydronephrosis. Renal ultrasound versus CT abdomen/pelvis recommended for complete evaluation.      < end of copied text >  < from: MR Lumbar Spine No Cont (03.01.21 @ 12:29) >    EXAM:  MR SPINE LUMBAR                            PROCEDURE DATE:  03/01/2021          INTERPRETATION:  Exam Date: 3/1/2021 12:29 PM    MR lumbar without gadolinium    CLINICAL INFORMATION:   L2 compression fracture    TECHNIQUE:   Sagittal and axial T1-weighted images, sagittal inversion recovery images, and sagittal and axial T1-weighted and T2-weighted images of the lumbar spine were obtained.    FINDINGS: Comparison is made to CT lumbar of 2/26/2021.    Reidentified is a acute compression fracture of the L2 vertebral body with approximately 45% vertebral body height loss within the mid body. There is no significant bony retropulsion. No associated paraspinal or epidural disease. No additional fractures.    Multilevel degenerative changes as follows:    At L1/L2 and L2/L3 there are small posterior disc bulges without significant foraminal or central spinal canal stenosis.    At L3/L4, there is a posterior disc bulge resulting in mild narrowing of the right neural foramen without significant left foraminal or central spinal canal stenosis.    At L4/L5 and L5/S1, there are small posterior disc bulges without significant foraminal or central spinal canal stenosis.    The distal cord maintains intact morphology.  Distal cord signal intensity is preserved.  The conus is normally positioned at the T12 level.  Nerve roots of the cauda equina appear intact.      IMPRESSION:    Reidentified is a acute compression fracture of the L2 vertebral body with approximately 45% vertebral body height loss within the mid body. There is no significant bony retropulsion. No associated paraspinal or epidural disease. No additional fractures.    Mild degenerative changes as above.        < end of copied text >    Advanced directives addressed: full resuscitation Date of service: 03-04-21 @ 10:20    pt seen and examined  on VM, feels weak and tired   has dyspnea, sob  no sputum productions  no fevers    ROS: no fever or chills; denies dizziness, no HA, no abdominal pain, no diarrhea or constipation; no dysuria, no urinary frequency, no legs pain, no rashes    MEDICATIONS  (STANDING):  acetaminophen  IVPB .. 1000 milliGRAM(s) IV Intermittent once  aspirin enteric coated 81 milliGRAM(s) Oral daily  azithromycin  IVPB 500 milliGRAM(s) IV Intermittent every 24 hours  budesonide 160 MICROgram(s)/formoterol 4.5 MICROgram(s) Inhaler 2 Puff(s) Inhalation two times a day  carvedilol Oral Tab/Cap - Peds 50 milliGRAM(s) Oral two times a day  cefepime  Injectable. 1000 milliGRAM(s) IV Push every 12 hours  cyclobenzaprine 5 milliGRAM(s) Oral three times a day  dextrose 40% Gel 15 Gram(s) Oral once  dextrose 5%. 1000 milliLiter(s) (50 mL/Hr) IV Continuous <Continuous>  dextrose 5%. 1000 milliLiter(s) (100 mL/Hr) IV Continuous <Continuous>  dextrose 50% Injectable 25 Gram(s) IV Push once  dextrose 50% Injectable 12.5 Gram(s) IV Push once  dextrose 50% Injectable 25 Gram(s) IV Push once  folic acid 1 milliGRAM(s) Oral daily  furosemide    Tablet 40 milliGRAM(s) Oral daily  glucagon  Injectable 1 milliGRAM(s) IntraMuscular once  insulin lispro (ADMELOG) corrective regimen sliding scale   SubCutaneous three times a day before meals  insulin lispro (ADMELOG) corrective regimen sliding scale   SubCutaneous at bedtime  lidocaine   Patch 1 Patch Transdermal daily  methylPREDNISolone sodium succinate Injectable 40 milliGRAM(s) IV Push every 6 hours  sacubitril 49 mG/valsartan 51 mG 1 Tablet(s) Oral two times a day  simvastatin 20 milliGRAM(s) Oral at bedtime  trimethoprim  160 mG/sulfamethoxazole 800 mG 2 Tablet(s) Oral every 12 hours  warfarin 2 milliGRAM(s) Oral daily    T(C): 36.3 (04 Mar 2021 07:50), Max: 36.7 (04 Mar 2021 03:42)  T(F): 97.4 (04 Mar 2021 07:50), Max: 98 (04 Mar 2021 03:42)  HR: 96 (04 Mar 2021 07:50) (80 - 96)  BP: 102/65 (04 Mar 2021 07:50) (90/66 - 108/70)  BP(mean): --  RR: 18 (04 Mar 2021 07:50) (18 - 25)  SpO2: 97% (04 Mar 2021 07:50) (93% - 97%)    PE:  Constitutional: frail looking  HEENT: NC/AT, EOMI, PERRLA, conjunctivae clear; ears and nose atraumatic; pharynx benign  Neck: supple; thyroid not palpable  Back: no tenderness  Respiratory: decreased breath sounds, crackles   Cardiovascular: S1S2 regular, no murmurs  Abdomen: soft, not tender, not distended, positive BS; liver and spleen WNL  Genitourinary: no suprapubic tenderness  Lymphatic: no LN palpable  Musculoskeletal: no muscle tenderness, no joint swelling or tenderness  Extremities: no pedal edema  Neurological/ Psychiatric: moving all extremities  Skin: no rashes; no palpable lesions    Labs: all available labs reviewed                                   11.3   14.92 )-----------( 270      ( 04 Mar 2021 09:24 )             36.1     03-04    136  |  101  |  46<H>  ----------------------------<  242<H>  4.1   |  28  |  1.31<H>    Ca    8.6      04 Mar 2021 09:24  Phos  2.8     03-03  Mg     2.1     03-03    TPro  5.7<L>  /  Alb  2.0<L>  /  TBili  0.3  /  DBili  x   /  AST  29  /  ALT  103<H>  /  AlkPhos  150<H>  03-04      Radiology: all available radiological tests reviewed    EXAM:  CT CHEST                            PROCEDURE DATE:  02/28/2021          INTERPRETATION:  EXAMINATION: CT CHEST    CLINICAL INDICATION: Covid.    TECHNIQUE: Noncontrast CT of the chest was obtained.    COMPARISON: Multiple CT, most recent 8/11/2020.    FINDINGS:    AIRWAYS AND LUNGS: The central tracheobronchial tree is patent.  Extensive bilateral groundglass and reticular opacities.    MEDIASTINUM AND PLEURA: There are no enlarged mediastinal, hilar or axillary lymph nodes. The visualized portion of the thyroid gland is heterogeneous. There is no pleural effusion. There is no pneumothorax.    HEART AND VESSELS: There is cardiomegaly.  There are atherosclerotic calcifications of the aorta and coronary arteries.  There is no pericardial effusion.  Aortic valve calcification. Left-sided defibrillator.    UPPER ABDOMEN: Images of the upper abdomen demonstrate cholelithiasis. Prominent left renal pelvis..    BONES AND SOFT TISSUES: The bones are unremarkable.  The soft tissues are unremarkable.    TUBES/LINES: None.    IMPRESSION:  COVID pneumonia    Prominent left renal pelvis is new from the prior study and may represent hydronephrosis. Renal ultrasound versus CT abdomen/pelvis recommended for complete evaluation.      < end of copied text >  < from: MR Lumbar Spine No Cont (03.01.21 @ 12:29) >    EXAM:  MR SPINE LUMBAR                            PROCEDURE DATE:  03/01/2021          INTERPRETATION:  Exam Date: 3/1/2021 12:29 PM    MR lumbar without gadolinium    CLINICAL INFORMATION:   L2 compression fracture    TECHNIQUE:   Sagittal and axial T1-weighted images, sagittal inversion recovery images, and sagittal and axial T1-weighted and T2-weighted images of the lumbar spine were obtained.    FINDINGS: Comparison is made to CT lumbar of 2/26/2021.    Reidentified is a acute compression fracture of the L2 vertebral body with approximately 45% vertebral body height loss within the mid body. There is no significant bony retropulsion. No associated paraspinal or epidural disease. No additional fractures.    Multilevel degenerative changes as follows:    At L1/L2 and L2/L3 there are small posterior disc bulges without significant foraminal or central spinal canal stenosis.    At L3/L4, there is a posterior disc bulge resulting in mild narrowing of the right neural foramen without significant left foraminal or central spinal canal stenosis.    At L4/L5 and L5/S1, there are small posterior disc bulges without significant foraminal or central spinal canal stenosis.    The distal cord maintains intact morphology.  Distal cord signal intensity is preserved.  The conus is normally positioned at the T12 level.  Nerve roots of the cauda equina appear intact.      IMPRESSION:    Reidentified is a acute compression fracture of the L2 vertebral body with approximately 45% vertebral body height loss within the mid body. There is no significant bony retropulsion. No associated paraspinal or epidural disease. No additional fractures.    Mild degenerative changes as above.        < end of copied text >    Advanced directives addressed: full resuscitation

## 2021-03-04 NOTE — PROVIDER CONTACT NOTE (OTHER) - ACTION/TREATMENT ORDERED:
[FreeTextEntry1] : s/p distal pancreatectomy 9/9/18 for IPMN- path with high grade dysplasia. CT abdomen without recurrence, stable 0.9 cm cyst in HOP.\par -Repeat CT abdomen in 1 year\par -RTC after CT \par 
no new orders received, will monitor
spoke with service at [phone]

## 2021-03-04 NOTE — PROGRESS NOTE ADULT - ASSESSMENT
Patient is a 75y old  Female who presents with a chief complaint of pain uncontrolled L2 fracture.      # Acute spontaneous L2 fracture:  - could be related to osteoporosis/chronic steroid use.   - hydromorphone IV PRN for severe pain, oxycodone PO PRN for mild pain  - cyclobenzaprine PO PRN for muscle spasm  - lidocaine 1 patch daily for pain  - brace delivered.   - physical therapy  - incentive spirometry    # Hypoxic respiratory failure.  - multifactorial:  HCAP; CHF.  - ddx: ILD due to MTX and/or amiodarone.  - continue Bactrim and cefepime for suspected HCAP.  - Remdesevir day 5 / dexamethasone day 5  - acetaminophen PRN for fever >100.4 F.  - CXR 3/3 unchanged b/l infiltrates.  - sputum Cx.  - possible bronchoscopy when more stable.  - continue Lasix.  - ID consult appreciated.  - Pulm consulted  - Critical Care consulted  - Cardiology consulted     # COPD:  - albuterol prn.   - inhaled steroid.  - Pulm consulted    # A fib:  - continue coumadin, aspirin  - continue carvedilol  - Cardiology consulted     Chronic systolic CHF/AICD:  - continue Lasix PO.  - continue entresto, carvedilol   - Cardiology consulted      # Rheumatoid arthritis:  - hold prednisone while on iv decadron.   - hold mtx.     # DVT px:  - pt on coumadin. Patient is a 75y old  Female who presents with a chief complaint of pain uncontrolled L2 fracture.      # Acute spontaneous L2 fracture:  - could be related to osteoporosis/chronic steroid use.   - hydromorphone IV PRN for severe pain, oxycodone PO PRN for mild pain  - cyclobenzaprine PO PRN for muscle spasm  - lidocaine 1 patch daily for pain  - brace delivered.   - physical therapy  - incentive spirometry    # Hypoxic respiratory failure - worsening. pt now on 50%VM  - multifactorial:  HCAP; CHF.  - ddx: ILD due to MTX and/or amiodarone.  - continue Bactrim and cefepime for suspected HCAP.  - Remdesevir completed 2 days  - dexamethasone day 5  - acetaminophen PRN for fever >100.4 F.  - CXR 3/3 unchanged b/l infiltrates.  - sputum Cx.  - possible bronchoscopy when more stable.  - continue Lasix.  - ID consult appreciated.  - Pulm consulted  - Critical Care consulted  - Cardiology consulted     # COPD:  - albuterol prn.   - inhaled steroid.  - Pulm consult appreciated.     # A fib:  - continue coumadin, aspirin  - continue carvedilol  - Cardiology consult noted. continue lasix     # Chronic systolic CHF/AICD:  - continue Lasix PO.  - continue entresto, carvedilol   - Cardiology consult noted      # Rheumatoid arthritis:  - hold prednisone while on iv decadron.   - hold mtx.     # DVT px:  - pt on coumadin.

## 2021-03-05 NOTE — PROGRESS NOTE ADULT - ASSESSMENT
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 acute worsening L sided low back pain and she is unable to ambulate. Patient denies any recent falls or trauma. pain started past few days. She states the pain is mainly localized to her lower back and does not radiate, worsens with movement. She denies any numbness or tingling in her bilateral lower extremities. She has had arthritis and pain both knees and hip, and has follows out patient with Dr Clancy. Here noted with acute L2 compression fracture hospital course c/b acute hypoxic resp failure, CT chest with extensive b/l lung infiltrates unclear etiology ? pna ? ild ? chf? viral process ?     1. acute hypoxic respiratory failure. multifocal pneumonia -atypical/PCP? interstitial lung disease. chf. immunosuppressed host.  - imaging reviewed, ddx pcp pna ? - was on steroids/mtx pta, pneumonitis/ild ? drug related - was on mtx/amio prior, chf  - covid-19 pcr x 2 and covid ab (-) repeat ab pending from 3/2   - on decadron 9mg daily #5  - s/p remdesivir #2 - discontinued   - on cefepime [id number]/azithromycin 500mg daily #4  - on bactrim #2 - decrease dose d/t Cr to 2DS q12h- possible pcp  - continue with above antibiotics   - pulmonary following -consider bronchoscopy for further eval   - isolation precautions  - f/u cbc  - monitor temps  - supportive care    2. 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 acute worsening L sided low back pain and she is unable to ambulate. Patient denies any recent falls or trauma. pain started past few days. She states the pain is mainly localized to her lower back and does not radiate, worsens with movement. She denies any numbness or tingling in her bilateral lower extremities. She has had arthritis and pain both knees and hip, and has follows out patient with Dr Clancy. Here noted with acute L2 compression fracture hospital course c/b acute hypoxic resp failure, CT chest with extensive b/l lung infiltrates unclear etiology ? pna ? ild ? chf? viral process ?     1. acute hypoxic respiratory failure. multifocal pneumonia -atypical/PCP? interstitial lung disease. chf. immunosuppressed host.  - imaging reviewed, ddx pcp pna ? - was on steroids/mtx pta, pneumonitis/ild ? drug related - was on mtx/amio prior, chf  - covid-19 pcr x 2 and covid ab (-) repeat ab pending from 3/2   - on decadron 9mg daily #6  - s/p remdesivir #2 - discontinued   - on cefepime [id number]/azithromycin 500mg daily #4  - on bactrim #3 possible pcp  - continue with above antibiotics   - pulmonary following -consider bronchoscopy for further eval   - isolation precautions  - f/u cbc  - monitor temps  - supportive care    2. other issues - care per medicine

## 2021-03-05 NOTE — PROGRESS NOTE ADULT - ATTENDING COMMENTS
patient seen and examined with PA student Trice Menjivar,  I  was physically present for the key portions of the evaluation and management (E/M) service provided.  I agree with the above history, physical, and plan which I have reviewed and edited where appropriate.  -pt with worsening hypoxia overnight and now on 50% VM  - cardio and pulm input noted.  added inhaled corticosteroids  - ID eval noted.  continue bactrim and cefepime   - worsening leukocytosis likely 2/2 to steroids  - monitor cr patient seen and examined with PA student Trice Menjivar,  I  was physically present for the key portions of the evaluation and management (E/M) service provided.  I agree with the above history, physical, and plan which I have reviewed and edited where appropriate.  -pt with worsening hypoxia overnight and now on 50% VM and vikram ed/w dr galindo.  maintain sats > 90  - cardio and pulm input noted.  continue inhaler corticosteroids.  change to solumedrol q6h and plan to check CT chest in AM as per dr galindo.   - ID eval noted.  continue bactrim and cefepime   - monitor cr    son updated

## 2021-03-05 NOTE — PROGRESS NOTE ADULT - SUBJECTIVE AND OBJECTIVE BOX
CC:  Patient is a 75y old  Female who presents with a chief complaint of pain uncontrolled L2 fracture    3/5 pt seen and evaluated at bedside. Pt c/o mild SOB and back pain. REGAN noted.     ROS:   All 10 systems reviewed and found to be negative with the exception of what has been described above.    ICU Vital Signs Last 24 Hrs  T(C): 36.4 (05 Mar 2021 08:11), Max: 36.7 (04 Mar 2021 21:25)  T(F): 97.5 (05 Mar 2021 08:11), Max: 98 (04 Mar 2021 21:25)  HR: 88 (05 Mar 2021 08:11) (80 - 93)  BP: 110/60 (05 Mar 2021 08:11) (90/58 - 110/60)  BP(mean): --  ABP: --  ABP(mean): --  RR: 19 (05 Mar 2021 08:11) (19 - 20)  SpO2: 95% (05 Mar 2021 08:11) (91% - 95%)    GEN: lying in bed, mild respiratory distress and increased WOB.  HEENT:   NC/AT, pupils equal and reactive, EOMI  CV:  +S1, +S2, RRR  RESP:   fine crackles appreciated at b/l lung bases, diffuse expiratory wheeze noted.  BREAST:  not examined  GI:  abdomen soft, non-tender, non-distended, normoactive BS  RECTAL:  not examined  :  not examined  MSK:   normal muscle tone  EXT:  no edema  NEURO:  AAOX3, no focal neurological deficits  SKIN:  no rashes    labs reviewed                        11.4   17.16 )-----------( 269      ( 05 Mar 2021 07:18 )             35.8     03-05    139  |  105  |  46<H>  ----------------------------<  140<H>  4.7   |  29  |  1.32<H>    Ca    8.4<L>      05 Mar 2021 07:18    TPro  5.7<L>  /  Alb  2.1<L>  /  TBili  0.3  /  DBili  x   /  AST  17  /  ALT  84<H>  /  AlkPhos  153<H>  03-05    LIVER FUNCTIONS - ( 05 Mar 2021 07:18 )  Alb: 2.1 g/dL / Pro: 5.7 gm/dL / ALK PHOS: 153 U/L / ALT: 84 U/L / AST: 17 U/L / GGT: x           PT/INR - ( 05 Mar 2021 07:18 )   PT: 34.6 sec;   INR: 3.12 ratio      ABG - ( 03 Mar 2021 13:31 )  pH, Arterial: 7.48  pH, Blood: x     /  pCO2: 39    /  pO2: 54    / HCO3: 29    / Base Excess: 5.1   /  SaO2: 88

## 2021-03-05 NOTE — PROGRESS NOTE ADULT - SUBJECTIVE AND OBJECTIVE BOX
Date of service: 03-03-21 @ 12:49    pt seen and examined  on VM, feels weak  dyspnea/sob  no reported fevers    ROS: no fever or chills; denies dizziness, no HA, no abdominal pain, no diarrhea or constipation; no dysuria, no urinary frequency, no legs pain, no rashes    MEDICATIONS  (STANDING):  acetaminophen  IVPB .. 1000 milliGRAM(s) IV Intermittent once  aspirin enteric coated 81 milliGRAM(s) Oral daily  azithromycin   Tablet 500 milliGRAM(s) Oral daily  budesonide 160 MICROgram(s)/formoterol 4.5 MICROgram(s) Inhaler 2 Puff(s) Inhalation two times a day  carvedilol Oral Tab/Cap - Peds 50 milliGRAM(s) Oral two times a day  cefepime   IVPB 2000 milliGRAM(s) IV Intermittent every 12 hours  cyclobenzaprine 5 milliGRAM(s) Oral three times a day  dexAMETHasone  Injectable 6 milliGRAM(s) IV Push daily  folic acid 1 milliGRAM(s) Oral daily  furosemide    Tablet 40 milliGRAM(s) Oral daily  lidocaine   Patch 1 Patch Transdermal daily  sacubitril 49 mG/valsartan 51 mG 1 Tablet(s) Oral two times a day  simvastatin 20 milliGRAM(s) Oral at bedtime  warfarin 2 milliGRAM(s) Oral daily    Vital Signs Last 24 Hrs  T(C): 35.6 (03 Mar 2021 08:06), Max: 36.3 (02 Mar 2021 16:45)  T(F): 96 (03 Mar 2021 08:06), Max: 97.3 (02 Mar 2021 16:45)  HR: 92 (03 Mar 2021 12:24) (74 - 94)  BP: 95/52 (03 Mar 2021 12:24) (95/52 - 132/83)  BP(mean): --  RR: 18 (03 Mar 2021 12:24) (18 - 24)  SpO2: 93% (03 Mar 2021 12:24) (87% - 98%)    PE:  Constitutional: frail looking  HEENT: NC/AT, EOMI, PERRLA, conjunctivae clear; ears and nose atraumatic; pharynx benign  Neck: supple; thyroid not palpable  Back: no tenderness  Respiratory: decreased breath sounds, crackles   Cardiovascular: S1S2 regular, no murmurs  Abdomen: soft, not tender, not distended, positive BS; liver and spleen WNL  Genitourinary: no suprapubic tenderness  Lymphatic: no LN palpable  Musculoskeletal: no muscle tenderness, no joint swelling or tenderness  Extremities: no pedal edema  Neurological/ Psychiatric: moving all extremities  Skin: no rashes; no palpable lesions    Labs: all available labs reviewed                                   11.3   14.92 )-----------( 270      ( 04 Mar 2021 09:24 )             36.1     03-04    136  |  101  |  46<H>  ----------------------------<  242<H>  4.1   |  28  |  1.31<H>    Ca    8.6      04 Mar 2021 09:24  Phos  2.8     03-03  Mg     2.1     03-03    TPro  5.7<L>  /  Alb  2.0<L>  /  TBili  0.3  /  DBili  x   /  AST  29  /  ALT  103<H>  /  AlkPhos  150<H>  03-04      Radiology: all available radiological tests reviewed    EXAM:  CT CHEST                            PROCEDURE DATE:  02/28/2021          INTERPRETATION:  EXAMINATION: CT CHEST    CLINICAL INDICATION: Covid.    TECHNIQUE: Noncontrast CT of the chest was obtained.    COMPARISON: Multiple CT, most recent 8/11/2020.    FINDINGS:    AIRWAYS AND LUNGS: The central tracheobronchial tree is patent.  Extensive bilateral groundglass and reticular opacities.    MEDIASTINUM AND PLEURA: There are no enlarged mediastinal, hilar or axillary lymph nodes. The visualized portion of the thyroid gland is heterogeneous. There is no pleural effusion. There is no pneumothorax.    HEART AND VESSELS: There is cardiomegaly.  There are atherosclerotic calcifications of the aorta and coronary arteries.  There is no pericardial effusion.  Aortic valve calcification. Left-sided defibrillator.    UPPER ABDOMEN: Images of the upper abdomen demonstrate cholelithiasis. Prominent left renal pelvis..    BONES AND SOFT TISSUES: The bones are unremarkable.  The soft tissues are unremarkable.    TUBES/LINES: None.    IMPRESSION:  COVID pneumonia    Prominent left renal pelvis is new from the prior study and may represent hydronephrosis. Renal ultrasound versus CT abdomen/pelvis recommended for complete evaluation.      < end of copied text >  < from: MR Lumbar Spine No Cont (03.01.21 @ 12:29) >    EXAM:  MR SPINE LUMBAR                            PROCEDURE DATE:  03/01/2021          INTERPRETATION:  Exam Date: 3/1/2021 12:29 PM    MR lumbar without gadolinium    CLINICAL INFORMATION:   L2 compression fracture    TECHNIQUE:   Sagittal and axial T1-weighted images, sagittal inversion recovery images, and sagittal and axial T1-weighted and T2-weighted images of the lumbar spine were obtained.    FINDINGS: Comparison is made to CT lumbar of 2/26/2021.    Reidentified is a acute compression fracture of the L2 vertebral body with approximately 45% vertebral body height loss within the mid body. There is no significant bony retropulsion. No associated paraspinal or epidural disease. No additional fractures.    Multilevel degenerative changes as follows:    At L1/L2 and L2/L3 there are small posterior disc bulges without significant foraminal or central spinal canal stenosis.    At L3/L4, there is a posterior disc bulge resulting in mild narrowing of the right neural foramen without significant left foraminal or central spinal canal stenosis.    At L4/L5 and L5/S1, there are small posterior disc bulges without significant foraminal or central spinal canal stenosis.    The distal cord maintains intact morphology.  Distal cord signal intensity is preserved.  The conus is normally positioned at the T12 level.  Nerve roots of the cauda equina appear intact.      IMPRESSION:    Reidentified is a acute compression fracture of the L2 vertebral body with approximately 45% vertebral body height loss within the mid body. There is no significant bony retropulsion. No associated paraspinal or epidural disease. No additional fractures.    Mild degenerative changes as above.        < end of copied text >    Advanced directives addressed: full resuscitation Date of service: 03-05-21 @ 13:33    pt seen and examined  on   feels weak  anxious  dyspneic  no reported fevers    ROS: no fever or chills; denies dizziness, no HA, no abdominal pain, no diarrhea or constipation; no dysuria, no urinary frequency, no legs pain, no rashes    MEDICATIONS  (STANDING):  acetaminophen  IVPB .. 1000 milliGRAM(s) IV Intermittent once  aspirin enteric coated 81 milliGRAM(s) Oral daily  azithromycin   Tablet 500 milliGRAM(s) Oral daily  budesonide 160 MICROgram(s)/formoterol 4.5 MICROgram(s) Inhaler 2 Puff(s) Inhalation two times a day  carvedilol Oral Tab/Cap - Peds 50 milliGRAM(s) Oral two times a day  cefepime   IVPB 2000 milliGRAM(s) IV Intermittent every 12 hours  cyclobenzaprine 5 milliGRAM(s) Oral three times a day  dexAMETHasone  Injectable 6 milliGRAM(s) IV Push daily  folic acid 1 milliGRAM(s) Oral daily  furosemide    Tablet 40 milliGRAM(s) Oral daily  lidocaine   Patch 1 Patch Transdermal daily  sacubitril 49 mG/valsartan 51 mG 1 Tablet(s) Oral two times a day  simvastatin 20 milliGRAM(s) Oral at bedtime  warfarin 2 milliGRAM(s) Oral daily    Vital Signs Last 24 Hrs  T(C): 36.4 (05 Mar 2021 08:11), Max: 36.7 (04 Mar 2021 21:25)  T(F): 97.5 (05 Mar 2021 08:11), Max: 98 (04 Mar 2021 21:25)  HR: 88 (05 Mar 2021 08:11) (80 - 93)  BP: 110/60 (05 Mar 2021 08:11) (90/58 - 110/60)  BP(mean): --  RR: 19 (05 Mar 2021 08:11) (19 - 20)  SpO2: 95% (05 Mar 2021 08:11) (91% - 95%)    PE:  Constitutional: frail looking  HEENT: NC/AT, EOMI, PERRLA, conjunctivae clear; ears and nose atraumatic; pharynx benign  Neck: supple; thyroid not palpable  Back: no tenderness  Respiratory: decreased breath sounds, crackles   Cardiovascular: S1S2 regular, no murmurs  Abdomen: soft, not tender, not distended, positive BS; liver and spleen WNL  Genitourinary: no suprapubic tenderness  Lymphatic: no LN palpable  Musculoskeletal: no muscle tenderness, no joint swelling or tenderness  Extremities: no pedal edema  Neurological/ Psychiatric: moving all extremities  Skin: no rashes; no palpable lesions    Labs: all available labs reviewed                                   11.3   14.92 )-----------( 270      ( 04 Mar 2021 09:24 )             36.1     03-04    136  |  101  |  46<H>  ----------------------------<  242<H>  4.1   |  28  |  1.31<H>    Ca    8.6      04 Mar 2021 09:24  Phos  2.8     03-03  Mg     2.1     03-03    TPro  5.7<L>  /  Alb  2.0<L>  /  TBili  0.3  /  DBili  x   /  AST  29  /  ALT  103<H>  /  AlkPhos  150<H>  03-04      Radiology: all available radiological tests reviewed    EXAM:  CT CHEST                            PROCEDURE DATE:  02/28/2021          INTERPRETATION:  EXAMINATION: CT CHEST    CLINICAL INDICATION: Covid.    TECHNIQUE: Noncontrast CT of the chest was obtained.    COMPARISON: Multiple CT, most recent 8/11/2020.    FINDINGS:    AIRWAYS AND LUNGS: The central tracheobronchial tree is patent.  Extensive bilateral groundglass and reticular opacities.    MEDIASTINUM AND PLEURA: There are no enlarged mediastinal, hilar or axillary lymph nodes. The visualized portion of the thyroid gland is heterogeneous. There is no pleural effusion. There is no pneumothorax.    HEART AND VESSELS: There is cardiomegaly.  There are atherosclerotic calcifications of the aorta and coronary arteries.  There is no pericardial effusion.  Aortic valve calcification. Left-sided defibrillator.    UPPER ABDOMEN: Images of the upper abdomen demonstrate cholelithiasis. Prominent left renal pelvis..    BONES AND SOFT TISSUES: The bones are unremarkable.  The soft tissues are unremarkable.    TUBES/LINES: None.    IMPRESSION:  COVID pneumonia    Prominent left renal pelvis is new from the prior study and may represent hydronephrosis. Renal ultrasound versus CT abdomen/pelvis recommended for complete evaluation.      < end of copied text >  < from: MR Lumbar Spine No Cont (03.01.21 @ 12:29) >    EXAM:  MR SPINE LUMBAR                            PROCEDURE DATE:  03/01/2021          INTERPRETATION:  Exam Date: 3/1/2021 12:29 PM    MR lumbar without gadolinium    CLINICAL INFORMATION:   L2 compression fracture    TECHNIQUE:   Sagittal and axial T1-weighted images, sagittal inversion recovery images, and sagittal and axial T1-weighted and T2-weighted images of the lumbar spine were obtained.    FINDINGS: Comparison is made to CT lumbar of 2/26/2021.    Reidentified is a acute compression fracture of the L2 vertebral body with approximately 45% vertebral body height loss within the mid body. There is no significant bony retropulsion. No associated paraspinal or epidural disease. No additional fractures.    Multilevel degenerative changes as follows:    At L1/L2 and L2/L3 there are small posterior disc bulges without significant foraminal or central spinal canal stenosis.    At L3/L4, there is a posterior disc bulge resulting in mild narrowing of the right neural foramen without significant left foraminal or central spinal canal stenosis.    At L4/L5 and L5/S1, there are small posterior disc bulges without significant foraminal or central spinal canal stenosis.    The distal cord maintains intact morphology.  Distal cord signal intensity is preserved.  The conus is normally positioned at the T12 level.  Nerve roots of the cauda equina appear intact.      IMPRESSION:    Reidentified is a acute compression fracture of the L2 vertebral body with approximately 45% vertebral body height loss within the mid body. There is no significant bony retropulsion. No associated paraspinal or epidural disease. No additional fractures.    Mild degenerative changes as above.        < end of copied text >    Advanced directives addressed: full resuscitation

## 2021-03-05 NOTE — PROGRESS NOTE ADULT - SUBJECTIVE AND OBJECTIVE BOX
Subjective:    Awake, alert. Still breathless with sl sputum.    MEDICATIONS  (STANDING):  acetaminophen  IVPB .. 1000 milliGRAM(s) IV Intermittent once  aspirin enteric coated 81 milliGRAM(s) Oral daily  budesonide 160 MICROgram(s)/formoterol 4.5 MICROgram(s) Inhaler 2 Puff(s) Inhalation two times a day  carvedilol Oral Tab/Cap - Peds 50 milliGRAM(s) Oral two times a day  cefepime  Injectable. 1000 milliGRAM(s) IV Push every 12 hours  cyclobenzaprine 5 milliGRAM(s) Oral three times a day  dextrose 40% Gel 15 Gram(s) Oral once  dextrose 5%. 1000 milliLiter(s) (50 mL/Hr) IV Continuous <Continuous>  dextrose 5%. 1000 milliLiter(s) (100 mL/Hr) IV Continuous <Continuous>  dextrose 50% Injectable 25 Gram(s) IV Push once  dextrose 50% Injectable 12.5 Gram(s) IV Push once  dextrose 50% Injectable 25 Gram(s) IV Push once  folic acid 1 milliGRAM(s) Oral daily  furosemide    Tablet 40 milliGRAM(s) Oral daily  glucagon  Injectable 1 milliGRAM(s) IntraMuscular once  insulin lispro (ADMELOG) corrective regimen sliding scale   SubCutaneous three times a day before meals  insulin lispro (ADMELOG) corrective regimen sliding scale   SubCutaneous at bedtime  lidocaine   Patch 1 Patch Transdermal daily  methylPREDNISolone sodium succinate Injectable 40 milliGRAM(s) IV Push every 8 hours  sacubitril 49 mG/valsartan 51 mG 1 Tablet(s) Oral two times a day  simvastatin 20 milliGRAM(s) Oral at bedtime  trimethoprim  160 mG/sulfamethoxazole 800 mG 2 Tablet(s) Oral every 12 hours  warfarin 2 milliGRAM(s) Oral daily    MEDICATIONS  (PRN):  acetaminophen   Tablet .. 650 milliGRAM(s) Oral every 6 hours PRN Temp greater or equal to 38C (100.4F), fever, headaches  ALBUTerol    90 MICROgram(s) HFA Inhaler 2 Puff(s) Inhalation every 6 hours PRN Shortness of Breath and/or Wheezing  cyclobenzaprine 5 milliGRAM(s) Oral at bedtime PRN Muscle Spasm  HYDROmorphone  Injectable 0.5 milliGRAM(s) IV Push every 4 hours PRN Severe Pain (7 - 10)  oxyCODONE    IR 10 milliGRAM(s) Oral every 4 hours PRN Moderate Pain (4 - 6)  oxyCODONE    IR 5 milliGRAM(s) Oral every 4 hours PRN Mild Pain (1 - 3)      Allergies    apixaban (Other)  fesoterodine (Unknown)  Macrobid (Other)    Intolerances        Vital Signs Last 24 Hrs  T(C): 36.4 (05 Mar 2021 08:11), Max: 36.7 (04 Mar 2021 21:25)  T(F): 97.5 (05 Mar 2021 08:11), Max: 98 (04 Mar 2021 21:25)  HR: 88 (05 Mar 2021 08:11) (80 - 93)  BP: 110/60 (05 Mar 2021 08:11) (90/58 - 110/60)  BP(mean): --  RR: 19 (05 Mar 2021 08:11) (19 - 20)  SpO2: 95% (05 Mar 2021 08:11) (91% - 95%)    PHYSICAL EXAMINATION:    NECK:  Supple. No lymphadenopathy. Jugular venous pressure not elevated.   HEART:   The cardiac impulse has a normal quality. Reg., Nl S1 and S2.    CHEST:  Chest with few basilar crackles, diffusely diminished . Sl increased respiratory effort.  ABDOMEN:  Soft and nontender.   EXTREMITIES:  There is no edema.       LABS:                        11.4   17.16 )-----------( 269      ( 05 Mar 2021 07:18 )             35.8     03-05    139  |  105  |  46<H>  ----------------------------<  140<H>  4.7   |  29  |  1.32<H>    Ca    8.4<L>      05 Mar 2021 07:18    TPro  5.7<L>  /  Alb  2.1<L>  /  TBili  0.3  /  DBili  x   /  AST  17  /  ALT  84<H>  /  AlkPhos  153<H>  03-05    PT/INR - ( 05 Mar 2021 07:18 )   PT: 34.6 sec;   INR: 3.12 ratio               RADIOLOGY & ADDITIONAL TESTS:    Assessment and Recommendation:   · Assessment	  Treat for multifocal PNA.  Abx's as per ID-Cefipime/Zithro. Bactrim added for possible PCP.  Albuterol as needed.   Methotrexate on hold.     Neg fluid balance.      COVID test negative x 2.  Decadron switched to Solumedrol 40 mg Q6H.  ID following.      h.o. previous MTX and amiodarone treatment.  Is off both meds.     Symbicort    Monitor O2 sats-change to V/M @ 50%,  Repeat CXR-noted.    Cardiology Evaluation noted    COVID Antibody-PND    Check sputum cultures    Repeact CT Chest on 3/6    Problem/Recommendation - 1:  Respiratory failure with hypoxia.  Problem/Recommendation - 2:  ·  Multifocal pneumonia.   Problem/Recommendation - 3:  ·  CHF with cardiomyopathy.   Problem/Recommendation - 4:  ·  Afib.   Problem/Recommendation - 5:  ·  Edema.   Problem/Recommendation - 6:  R/O COPD with asthma.  Problem/Recommendation - 7:  Emphysema of lung.  Problem/Recommendation - 8:  CAD (coronary artery disease), native coronary artery.  Problem/Recommendation - 9:  Vertebral fracture, osteoporotic, initial encounter.  Problem/Recommendation - 10:  Rheumatoid arthritis.

## 2021-03-05 NOTE — PROGRESS NOTE ADULT - ASSESSMENT
Patient is a 75y old  Female who presents with a chief complaint of pain uncontrolled L2 fracture.      # Acute spontaneous L2 fracture:  - could be related to osteoporosis/chronic steroid use.   - hydromorphone IV PRN for severe pain, oxycodone PO PRN for mild pain  - cyclobenzaprine PO PRN for muscle spasm  - lidocaine 1 patch daily for pain  - brace delivered.   - physical therapy  - incentive spirometry    # Hypoxic respiratory failure - unchanged from yesterday. pt still on 50% VM  - multifactorial:  HCAP; CHF.  - ddx: ILD due to MTX and/or amiodarone.  - As per ID, continue bactrim and cefepime. azithromycin IV 500mg Q24h added today 3/5.  - Remdesevir completed 2 days  - WBC elevated to 17 today, D-dimer trending up,  - suspect for possible COVID PNA ?   - Pulm consult appreciated, dexamethasone changed to solumedrol 40mg IV Q8h. repeat Chest CT tomorrow.  - acetaminophen PRN for fever >100.4 F.  - sputum Cx. and possible bronchoscopy when more stable.    # Hypotensive at baseline, /66 today  - Hold Lasix today to avoid further worsening hypotension.     # Hyperglycemia, likely secondary to dexamethasone treatment  - ISS  - routine POCT      # COPD:  - albuterol prn.   - inhaled steroid.    # A fib:  - continue coumadin, aspirin  - continue carvedilol  - Cardiology consult noted.    # Chronic systolic CHF/AICD:  - Hold Lasix today to avoid further worsening hypotension.  - continue entresto, carvedilol   - Cardiology consult noted      # Rheumatoid arthritis:  - continue to hold prednisone.  - hold mtx.     # DVT px:  - pt on coumadin. Patient is a 75y old  Female who presents with a chief complaint of pain uncontrolled L2 fracture.      # Acute spontaneous L2 fracture:  - could be related to osteoporosis/chronic steroid use.   - hydromorphone IV PRN for severe pain, oxycodone PO PRN for mild pain  - cyclobenzaprine PO PRN for muscle spasm  - lidocaine 1 patch daily for pain  - brace delivered.   - physical therapy  - incentive spirometry    # Hypoxic respiratory failure - unchanged from yesterday. pt still on 50% VM  - multifactorial:  HCAP; CHF.  - ddx: ILD due to MTX and/or amiodarone.  - As per ID, continue bactrim and cefepime. azithromycin IV 500mg Q24h added today 3/5.  - Remdesevir completed 2 days  - WBC elevated to 17 today, D-dimer trending up,  - suspect for possible COVID PNA ?   - Pulm consult appreciated, dexamethasone changed to solumedrol 40mg IV Q8h. repeat Chest CT tomorrow.  - acetaminophen PRN for fever >100.4 F.  - sputum Cx. and possible bronchoscopy when more stable.    # Hypotensive at baseline, /66 today  - Hold Lasix today to avoid further worsening hypotension.     # Hyperglycemia, likely secondary to dexamethasone treatment  - ISS  - routine POCT     # PT/INR elevated from yesterday  - continue pt on warfarin with hold instructions     # COPD:  - albuterol prn.   - inhaled steroid.    # A fib:  - continue coumadin, aspirin  - continue carvedilol  - Cardiology consult noted.    # Chronic systolic CHF/AICD:  - Hold Lasix today to avoid further worsening hypotension.  - continue entresto, carvedilol   - Cardiology consult noted      # Rheumatoid arthritis:  - continue to hold prednisone.  - hold mtx.     # DVT px:  - pt on coumadin.

## 2021-03-06 NOTE — PROGRESS NOTE ADULT - SUBJECTIVE AND OBJECTIVE BOX
pt seen and examined  on VM, feels weak  dyspnea/sob  no reported fevers    ROS: no fever or chills; denies dizziness, no HA, no abdominal pain, no diarrhea or constipation; no dysuria, no urinary frequency, no legs pain, no rashes    MEDICATIONS  (STANDING):  acetaminophen  IVPB .. 1000 milliGRAM(s) IV Intermittent once  aspirin enteric coated 81 milliGRAM(s) Oral daily  azithromycin   Tablet 500 milliGRAM(s) Oral daily  budesonide 160 MICROgram(s)/formoterol 4.5 MICROgram(s) Inhaler 2 Puff(s) Inhalation two times a day  carvedilol Oral Tab/Cap - Peds 50 milliGRAM(s) Oral two times a day  cefepime   IVPB 2000 milliGRAM(s) IV Intermittent every 12 hours  cyclobenzaprine 5 milliGRAM(s) Oral three times a day  dexAMETHasone  Injectable 6 milliGRAM(s) IV Push daily  folic acid 1 milliGRAM(s) Oral daily  furosemide    Tablet 40 milliGRAM(s) Oral daily  lidocaine   Patch 1 Patch Transdermal daily  sacubitril 49 mG/valsartan 51 mG 1 Tablet(s) Oral two times a day  simvastatin 20 milliGRAM(s) Oral at bedtime  warfarin 2 milliGRAM(s) Oral daily    Vital Signs Last 24 Hrs  T(C): 35.6 (03 Mar 2021 08:06), Max: 36.3 (02 Mar 2021 16:45)  T(F): 96 (03 Mar 2021 08:06), Max: 97.3 (02 Mar 2021 16:45)  HR: 92 (03 Mar 2021 12:24) (74 - 94)  BP: 95/52 (03 Mar 2021 12:24) (95/52 - 132/83)  BP(mean): --  RR: 18 (03 Mar 2021 12:24) (18 - 24)  SpO2: 93% (03 Mar 2021 12:24) (87% - 98%)    PE:  Constitutional: frail looking  HEENT: NC/AT, EOMI, PERRLA, conjunctivae clear; ears and nose atraumatic; pharynx benign  Neck: supple; thyroid not palpable  Back: no tenderness  Respiratory: decreased breath sounds, crackles   Cardiovascular: S1S2 regular, no murmurs  Abdomen: soft, not tender, not distended, positive BS; liver and spleen WNL  Genitourinary: no suprapubic tenderness  Lymphatic: no LN palpable  Musculoskeletal: no muscle tenderness, no joint swelling or tenderness  Extremities: no pedal edema  Neurological/ Psychiatric: moving all extremities  Skin: no rashes; no palpable lesions    Labs: all available labs reviewed                                   11.3   14.92 )-----------( 270      ( 04 Mar 2021 09:24 )             36.1     03-04    136  |  101  |  46<H>  ----------------------------<  242<H>  4.1   |  28  |  1.31<H>    Ca    8.6      04 Mar 2021 09:24  Phos  2.8     03-03  Mg     2.1     03-03    TPro  5.7<L>  /  Alb  2.0<L>  /  TBili  0.3  /  DBili  x   /  AST  29  /  ALT  103<H>  /  AlkPhos  150<H>  03-04      Radiology: all available radiological tests reviewed    EXAM:  CT CHEST                            PROCEDURE DATE:  02/28/2021          INTERPRETATION:  EXAMINATION: CT CHEST    CLINICAL INDICATION: Covid.    TECHNIQUE: Noncontrast CT of the chest was obtained.    COMPARISON: Multiple CT, most recent 8/11/2020.    FINDINGS:    AIRWAYS AND LUNGS: The central tracheobronchial tree is patent.  Extensive bilateral groundglass and reticular opacities.    MEDIASTINUM AND PLEURA: There are no enlarged mediastinal, hilar or axillary lymph nodes. The visualized portion of the thyroid gland is heterogeneous. There is no pleural effusion. There is no pneumothorax.    HEART AND VESSELS: There is cardiomegaly.  There are atherosclerotic calcifications of the aorta and coronary arteries.  There is no pericardial effusion.  Aortic valve calcification. Left-sided defibrillator.    UPPER ABDOMEN: Images of the upper abdomen demonstrate cholelithiasis. Prominent left renal pelvis..    BONES AND SOFT TISSUES: The bones are unremarkable.  The soft tissues are unremarkable.    TUBES/LINES: None.    IMPRESSION:  COVID pneumonia    Prominent left renal pelvis is new from the prior study and may represent hydronephrosis. Renal ultrasound versus CT abdomen/pelvis recommended for complete evaluation.      < end of copied text >  < from: MR Lumbar Spine No Cont (03.01.21 @ 12:29) >    EXAM:  MR SPINE LUMBAR                            PROCEDURE DATE:  03/01/2021          INTERPRETATION:  Exam Date: 3/1/2021 12:29 PM    MR lumbar without gadolinium    CLINICAL INFORMATION:   L2 compression fracture    TECHNIQUE:   Sagittal and axial T1-weighted images, sagittal inversion recovery images, and sagittal and axial T1-weighted and T2-weighted images of the lumbar spine were obtained.    FINDINGS: Comparison is made to CT lumbar of 2/26/2021.    Reidentified is a acute compression fracture of the L2 vertebral body with approximately 45% vertebral body height loss within the mid body. There is no significant bony retropulsion. No associated paraspinal or epidural disease. No additional fractures.    Multilevel degenerative changes as follows:    At L1/L2 and L2/L3 there are small posterior disc bulges without significant foraminal or central spinal canal stenosis.    At L3/L4, there is a posterior disc bulge resulting in mild narrowing of the right neural foramen without significant left foraminal or central spinal canal stenosis.    At L4/L5 and L5/S1, there are small posterior disc bulges without significant foraminal or central spinal canal stenosis.    The distal cord maintains intact morphology.  Distal cord signal intensity is preserved.  The conus is normally positioned at the T12 level.  Nerve roots of the cauda equina appear intact.      IMPRESSION:    Reidentified is a acute compression fracture of the L2 vertebral body with approximately 45% vertebral body height loss within the mid body. There is no significant bony retropulsion. No associated paraspinal or epidural disease. No additional fractures.    Mild degenerative changes as above.        < end of copied text >    Advanced directives addressed: full resuscitation Date of service: 03-06-21 @ 16:58    pt seen and examined  on VM, off mask, hypoxic  has dyspnea  weak, anxious   no reported fevers    ROS: no fever or chills; denies dizziness, no HA, no abdominal pain, no diarrhea or constipation; no dysuria, no urinary frequency, no legs pain, no rashes    MEDICATIONS  (STANDING):  acetaminophen  IVPB .. 1000 milliGRAM(s) IV Intermittent once  aspirin enteric coated 81 milliGRAM(s) Oral daily  budesonide 160 MICROgram(s)/formoterol 4.5 MICROgram(s) Inhaler 2 Puff(s) Inhalation two times a day  carvedilol Oral Tab/Cap - Peds 50 milliGRAM(s) Oral two times a day  cefepime  Injectable. 1000 milliGRAM(s) IV Push every 12 hours  cyclobenzaprine 5 milliGRAM(s) Oral three times a day  dextrose 40% Gel 15 Gram(s) Oral once  dextrose 5%. 1000 milliLiter(s) (50 mL/Hr) IV Continuous <Continuous>  dextrose 5%. 1000 milliLiter(s) (100 mL/Hr) IV Continuous <Continuous>  dextrose 50% Injectable 25 Gram(s) IV Push once  dextrose 50% Injectable 12.5 Gram(s) IV Push once  dextrose 50% Injectable 25 Gram(s) IV Push once  folic acid 1 milliGRAM(s) Oral daily  furosemide    Tablet 40 milliGRAM(s) Oral daily  glucagon  Injectable 1 milliGRAM(s) IntraMuscular once  insulin lispro (ADMELOG) corrective regimen sliding scale   SubCutaneous three times a day before meals  insulin lispro (ADMELOG) corrective regimen sliding scale   SubCutaneous at bedtime  lidocaine   Patch 1 Patch Transdermal daily  methylPREDNISolone sodium succinate Injectable 40 milliGRAM(s) IV Push every 6 hours  sacubitril 49 mG/valsartan 51 mG 1 Tablet(s) Oral two times a day  simvastatin 20 milliGRAM(s) Oral at bedtime  trimethoprim  160 mG/sulfamethoxazole 800 mG 2 Tablet(s) Oral every 12 hours      Vital Signs Last 24 Hrs  T(C): 36.6 (06 Mar 2021 16:54), Max: 36.8 (06 Mar 2021 08:05)  T(F): 97.8 (06 Mar 2021 16:54), Max: 98.2 (06 Mar 2021 08:05)  HR: 85 (06 Mar 2021 16:54) (83 - 97)  BP: 95/63 (06 Mar 2021 16:54) (92/64 - 118/91)  BP(mean): --  RR: 18 (06 Mar 2021 16:54) (18 - 20)  SpO2: 93% (06 Mar 2021 16:54) (92% - 97%)    PE:  Constitutional: frail looking  HEENT: NC/AT, EOMI, PERRLA, conjunctivae clear; ears and nose atraumatic; pharynx benign  Neck: supple; thyroid not palpable  Back: no tenderness  Respiratory: decreased breath sounds, crackles   Cardiovascular: S1S2 regular, no murmurs  Abdomen: soft, not tender, not distended, positive BS; liver and spleen WNL  Genitourinary: no suprapubic tenderness  Lymphatic: no LN palpable  Musculoskeletal: no muscle tenderness, no joint swelling or tenderness  Extremities: no pedal edema  Neurological/ Psychiatric: moving all extremities  Skin: no rashes; no palpable lesions    Labs: all available labs reviewed                                              11.9   17.61 )-----------( 241      ( 06 Mar 2021 06:52 )             38.1     03-06    137  |  105  |  49<H>  ----------------------------<  168<H>  5.5<H>   |  25  |  1.29    Ca    8.8      06 Mar 2021 09:58    TPro  5.9<L>  /  Alb  2.4<L>  /  TBili  0.4  /  DBili  x   /  AST  18  /  ALT  77  /  AlkPhos  158<H>  03-06        C-Reactive Protein, Serum: 17 mg/L (03-05-21 @ 07:18)  Ferritin, Serum: 301 ng/mL (03-05-21 @ 07:18)  D-Dimer Assay, Quantitative: 301 ng/mL DDU (03-05-21 @ 07:18)  D-Dimer Assay, Quantitative: 256 ng/mL DDU (03-04-21 @ 17:30)  D-Dimer Assay, Quantitative: 275 ng/mL DDU (03-01-21 @ 07:23)  C-Reactive Protein, Serum: 206 mg/L (03-01-21 @ 07:23)  Ferritin, Serum: 383 ng/mL (03-01-21 @ 07:23)      Radiology: all available radiological tests reviewed    EXAM:  CT CHEST                            PROCEDURE DATE:  02/28/2021          INTERPRETATION:  EXAMINATION: CT CHEST    CLINICAL INDICATION: Covid.    TECHNIQUE: Noncontrast CT of the chest was obtained.    COMPARISON: Multiple CT, most recent 8/11/2020.    FINDINGS:    AIRWAYS AND LUNGS: The central tracheobronchial tree is patent.  Extensive bilateral groundglass and reticular opacities.    MEDIASTINUM AND PLEURA: There are no enlarged mediastinal, hilar or axillary lymph nodes. The visualized portion of the thyroid gland is heterogeneous. There is no pleural effusion. There is no pneumothorax.    HEART AND VESSELS: There is cardiomegaly.  There are atherosclerotic calcifications of the aorta and coronary arteries.  There is no pericardial effusion.  Aortic valve calcification. Left-sided defibrillator.    UPPER ABDOMEN: Images of the upper abdomen demonstrate cholelithiasis. Prominent left renal pelvis..    BONES AND SOFT TISSUES: The bones are unremarkable.  The soft tissues are unremarkable.    TUBES/LINES: None.    IMPRESSION:  COVID pneumonia    Prominent left renal pelvis is new from the prior study and may represent hydronephrosis. Renal ultrasound versus CT abdomen/pelvis recommended for complete evaluation.      < end of copied text >  < from: MR Lumbar Spine No Cont (03.01.21 @ 12:29) >    EXAM:  MR SPINE LUMBAR                            PROCEDURE DATE:  03/01/2021          INTERPRETATION:  Exam Date: 3/1/2021 12:29 PM    MR lumbar without gadolinium    CLINICAL INFORMATION:   L2 compression fracture    TECHNIQUE:   Sagittal and axial T1-weighted images, sagittal inversion recovery images, and sagittal and axial T1-weighted and T2-weighted images of the lumbar spine were obtained.    FINDINGS: Comparison is made to CT lumbar of 2/26/2021.    Reidentified is a acute compression fracture of the L2 vertebral body with approximately 45% vertebral body height loss within the mid body. There is no significant bony retropulsion. No associated paraspinal or epidural disease. No additional fractures.    Multilevel degenerative changes as follows:    At L1/L2 and L2/L3 there are small posterior disc bulges without significant foraminal or central spinal canal stenosis.    At L3/L4, there is a posterior disc bulge resulting in mild narrowing of the right neural foramen without significant left foraminal or central spinal canal stenosis.    At L4/L5 and L5/S1, there are small posterior disc bulges without significant foraminal or central spinal canal stenosis.    The distal cord maintains intact morphology.  Distal cord signal intensity is preserved.  The conus is normally positioned at the T12 level.  Nerve roots of the cauda equina appear intact.      IMPRESSION:    Reidentified is a acute compression fracture of the L2 vertebral body with approximately 45% vertebral body height loss within the mid body. There is no significant bony retropulsion. No associated paraspinal or epidural disease. No additional fractures.    Mild degenerative changes as above.        < end of copied text >    Advanced directives addressed: full resuscitation

## 2021-03-06 NOTE — PROGRESS NOTE ADULT - ASSESSMENT
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 acute worsening L sided low back pain and she is unable to ambulate. Patient denies any recent falls or trauma. pain started past few days. She states the pain is mainly localized to her lower back and does not radiate, worsens with movement. She denies any numbness or tingling in her bilateral lower extremities. She has had arthritis and pain both knees and hip, and has follows out patient with Dr Clancy. Here noted with acute L2 compression fracture hospital course c/b acute hypoxic resp failure, CT chest with extensive b/l lung infiltrates unclear etiology ? pna ? ild ? chf? viral process ?     1. acute hypoxic respiratory failure. multifocal pneumonia -atypical/PCP? interstitial lung disease. chf. immunosuppressed host.  - imaging reviewed, ddx pcp pna ? - was on steroids/mtx pta, pneumonitis/ild ? drug related - was on mtx/amio prior, chf  - covid-19 pcr x 2 and covid ab (-) repeat ab pending from 3/2   - on decadron 9mg daily #5  - s/p remdesivir #2 - discontinued   - on cefepime [id number]/azithromycin 500mg daily #4  - on bactrim #2 - decrease dose d/t Cr to 2DS q12h- possible pcp  - continue with above antibiotics   - pulmonary following -consider bronchoscopy for further eval   - isolation precautions  - f/u cbc  - monitor temps  - supportive care    2. 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 acute worsening L sided low back pain and she is unable to ambulate. Patient denies any recent falls or trauma. pain started past few days. She states the pain is mainly localized to her lower back and does not radiate, worsens with movement. She denies any numbness or tingling in her bilateral lower extremities. She has had arthritis and pain both knees and hip, and has follows out patient with Dr Clancy. Here noted with acute L2 compression fracture hospital course c/b acute hypoxic resp failure, CT chest with extensive b/l lung infiltrates unclear etiology ? pna ? ild ? chf? viral process ?     1. acute hypoxic respiratory failure. multifocal pneumonia -atypical/PCP? interstitial lung disease. chf. immunosuppressed host.  - imaging reviewed, ddx pcp pna ? - was on steroids/mtx pta, pneumonitis/ild ? drug related - was on mtx/amio prior, chf  - covid-19 pcr x 2 and covid ab (-) repeat ab pending from 3/2   - on decadron 9mg daily #6  - s/p remdesivir #2 - discontinued   - on cefepime [id number]/azithromycin 500mg daily #5  - on bactrim #3  possible pcp  - continue with above antibiotics   - f/u repeat lung imaging  - consider bronchoscopy for further eval when able   - isolation precautions  - f/u cbc  - monitor temps  - supportive care    2. other issues - care per medicine

## 2021-03-06 NOTE — PROGRESS NOTE ADULT - ATTENDING COMMENTS
patient seen and examined with PA student Trice Menjivar,  I  was physically present for the key portions of the evaluation and management (E/M) service provided.  I agree with the above history, physical, and plan which I have reviewed and edited where appropriate.  -pt with worsening hypoxia overnight and now on 50% VM and vikram ed/w dr galindo.  maintain sats > 90  - cardio and pulm input noted.  continue inhaler corticosteroids.  change to solumedrol q6h and plan to check CT chest in AM as per dr galindo.   - ID eval noted.  continue bactrim and cefepime   - monitor cr    son updated patient seen and examined with PA student Trice Menjivar,  I  was physically present for the key portions of the evaluation and management (E/M) service provided.  I agree with the above history, physical, and plan which I have reviewed and edited where appropriate.  -pt with worsening hypoxia overnight and now on 50% VM and vikram ed/w dr galindo.  maintain sats > 90  - cardio and pulm input noted.  continue inhaler corticosteroids.  continue solumedrol q6h for now  - resp status about the same as  yesterday.    - ID eval noted.  continue bactrim and cefepime   - monitor cr    son updated

## 2021-03-06 NOTE — PROGRESS NOTE ADULT - ASSESSMENT
- CT scan shows bilat ground glass infiltrates  - consistent w COVID, however, nasal PCR negative  - on cefepime, bactrim, azithromax  - now on solumedrol q6h  - cont symbicort  - dvt proph

## 2021-03-06 NOTE — PROGRESS NOTE ADULT - SUBJECTIVE AND OBJECTIVE BOX
Subjective:  still sob    MEDICATIONS  (STANDING):  acetaminophen  IVPB .. 1000 milliGRAM(s) IV Intermittent once  aspirin enteric coated 81 milliGRAM(s) Oral daily  azithromycin  IVPB 500 milliGRAM(s) IV Intermittent every 24 hours  budesonide 160 MICROgram(s)/formoterol 4.5 MICROgram(s) Inhaler 2 Puff(s) Inhalation two times a day  carvedilol Oral Tab/Cap - Peds 50 milliGRAM(s) Oral two times a day  cefepime  Injectable. 1000 milliGRAM(s) IV Push every 12 hours  cyclobenzaprine 5 milliGRAM(s) Oral three times a day  dextrose 40% Gel 15 Gram(s) Oral once  dextrose 5%. 1000 milliLiter(s) (50 mL/Hr) IV Continuous <Continuous>  dextrose 5%. 1000 milliLiter(s) (100 mL/Hr) IV Continuous <Continuous>  dextrose 50% Injectable 12.5 Gram(s) IV Push once  dextrose 50% Injectable 25 Gram(s) IV Push once  dextrose 50% Injectable 25 Gram(s) IV Push once  folic acid 1 milliGRAM(s) Oral daily  furosemide    Tablet 40 milliGRAM(s) Oral daily  glucagon  Injectable 1 milliGRAM(s) IntraMuscular once  insulin lispro (ADMELOG) corrective regimen sliding scale   SubCutaneous three times a day before meals  insulin lispro (ADMELOG) corrective regimen sliding scale   SubCutaneous at bedtime  lidocaine   Patch 1 Patch Transdermal daily  methylPREDNISolone sodium succinate Injectable 40 milliGRAM(s) IV Push every 6 hours  sacubitril 49 mG/valsartan 51 mG 1 Tablet(s) Oral two times a day  simvastatin 20 milliGRAM(s) Oral at bedtime  trimethoprim  160 mG/sulfamethoxazole 800 mG 2 Tablet(s) Oral every 12 hours    MEDICATIONS  (PRN):  acetaminophen   Tablet .. 650 milliGRAM(s) Oral every 6 hours PRN Temp greater or equal to 38C (100.4F), fever, headaches  ALBUTerol    90 MICROgram(s) HFA Inhaler 2 Puff(s) Inhalation every 6 hours PRN Shortness of Breath and/or Wheezing  cyclobenzaprine 5 milliGRAM(s) Oral at bedtime PRN Muscle Spasm  HYDROmorphone  Injectable 0.5 milliGRAM(s) IV Push every 4 hours PRN Severe Pain (7 - 10)  oxyCODONE    IR 5 milliGRAM(s) Oral every 4 hours PRN Mild Pain (1 - 3)      Allergies    apixaban (Other)  fesoterodine (Unknown)  Macrobid (Other)    Intolerances        REVIEW OF SYSTEMS:    CONSTITUTIONAL:  As per HPI.  HEENT:  Eyes:  No diplopia or blurred vision. ENT:  No earache, sore throat or runny nose.  CARDIOVASCULAR:  No pressure, squeezing, tightness, heaviness or aching about the chest, neck, axilla or epigastrium.  RESPIRATORY:  cough, sob  GASTROINTESTINAL:  No nausea, vomiting or diarrhea.  GENITOURINARY:  No dysuria, frequency or urgency.  MUSCULOSKELETAL:  no joint pain, deformity, tenderness  EXTREMITIES: no clubbing cyanosis,edema  SKIN:  No change in skin, hair or nails.  NEUROLOGIC:  No paresthesias, fasciculations, seizures or weakness.  PSYCHIATRIC:  No disorder of thought or mood.  ENDOCRINE:  No heat or cold intolerance, polyuria or polydipsia.  HEMATOLOGICAL:  No easy bruising or bleedings:    Vital Signs Last 24 Hrs  T(C): 36.8 (06 Mar 2021 08:05), Max: 36.8 (06 Mar 2021 08:05)  T(F): 98.2 (06 Mar 2021 08:05), Max: 98.2 (06 Mar 2021 08:05)  HR: 95 (06 Mar 2021 08:05) (82 - 97)  BP: 99/60 (06 Mar 2021 08:05) (92/64 - 118/91)  BP(mean): --  RR: 19 (06 Mar 2021 08:05) (18 - 20)  SpO2: 97% (06 Mar 2021 08:05) (92% - 97%)    PHYSICAL EXAMINATION:  SKIN: no rashes  HEAD: NC/AT  EYES: PERRLA, EOMI  EARS: TM's intact  NOSE: no abnormalities  NECK:  Supple. No lymphadenopathy. Jugular venous pressure not elevated. Carotids equal.   HEART:   S1S2 reg  CHEST:  Scattered crackles  ABDOMEN:  Soft and nontender.   EXTREMITIES:  no C/C/E  NEURO: AAO x 3, no focal deficts       LABS:                        11.9   17.61 )-----------( 241      ( 06 Mar 2021 06:52 )             38.1     03-06    137  |  105  |  49<H>  ----------------------------<  168<H>  5.5<H>   |  25  |  1.29    Ca    8.8      06 Mar 2021 09:58    TPro  5.9<L>  /  Alb  2.4<L>  /  TBili  0.4  /  DBili  x   /  AST  18  /  ALT  77  /  AlkPhos  158<H>  03-06    PT/INR - ( 06 Mar 2021 06:52 )   PT: 46.1 sec;   INR: 4.26 ratio               RADIOLOGY & ADDITIONAL TESTS:

## 2021-03-06 NOTE — PROGRESS NOTE ADULT - ASSESSMENT
Patient is a 75y old  Female who presents with a chief complaint of pain uncontrolled L2 fracture.      # Acute spontaneous L2 fracture:  - could be related to osteoporosis/chronic steroid use.   - hydromorphone IV PRN for severe pain, oxycodone PO PRN for mild pain  - cyclobenzaprine PO PRN for muscle spasm  - lidocaine 1 patch daily for pain  - brace delivered.   - physical therapy  - incentive spirometry    # Hypoxic respiratory failure - unchanged from yesterday. pt still on 50% VM  - multifactorial:  HCAP; CHF.  - ddx: ILD due to MTX and/or amiodarone.  - As per ID, continue bactrim / cefepime / azithromycin  - Remdesevir completed 2 days  - WBC still elevated at 17.61 - possibly due in part to solumedrol.  - continue solumderol.  - acetaminophen PRN for fever >100.4 F.  - sputum Cx. and possible bronchoscopy when more stable.    # Hypotensive at baseline, BP 90s systolic  - Continue to hold Lasix today to avoid further worsening hypotension ?     # Hyperglycemia, likely secondary to solumedrol  - ISS  - routine POCT     # PT/INR elevated from yesterday  - continue pt on warfarin with hold instructions     # COPD:  - albuterol prn.   - inhaled steroid.    # A fib:  - continue coumadin, aspirin  - continue carvedilol  - Cardiology consult noted.    # Chronic systolic CHF/AICD:  - Hold Lasix today to avoid further worsening hypotension.  - continue entresto, carvedilol   - Cardiology consult noted      # Rheumatoid arthritis:  - continue to hold prednisone.  - hold mtx.     # DVT px:  - pt on coumadin.

## 2021-03-06 NOTE — PROGRESS NOTE ADULT - SUBJECTIVE AND OBJECTIVE BOX
CC:  Patient is a 75y old  Female who presents with a chief complaint of pain uncontrolled L2 fracture    3/6 pt seen and evaluated at bedside.     ROS:   All 10 systems reviewed and found to be negative with the exception of what has been described above.    ICU Vital Signs Last 24 Hrs  T(C): 36.8 (06 Mar 2021 08:05), Max: 36.8 (06 Mar 2021 08:05)  T(F): 98.2 (06 Mar 2021 08:05), Max: 98.2 (06 Mar 2021 08:05)  HR: 95 (06 Mar 2021 08:05) (82 - 97)  BP: 99/60 (06 Mar 2021 08:05) (92/64 - 118/91)  BP(mean): --  ABP: --  ABP(mean): --  RR: 19 (06 Mar 2021 08:05) (18 - 20)  SpO2: 97% (06 Mar 2021 08:05) (92% - 97%)    GEN: lying in bed, mild respiratory distress and increased WOB.  HEENT:   NC/AT, pupils equal and reactive, EOMI  CV:  +S1, +S2, RRR  RESP:   fine crackles appreciated at b/l lung bases, diffuse expiratory wheeze noted.  BREAST:  not examined  GI:  abdomen soft, non-tender, non-distended, normoactive BS  RECTAL:  not examined  :  not examined  MSK:   normal muscle tone  EXT:  no edema  NEURO:  AAOX3, no focal neurological deficits  SKIN:  no rashes    labs reviewed                        11.9   17.61 )-----------( 241      ( 06 Mar 2021 06:52 )             38.1     03-06    137  |  105  |  49<H>  ----------------------------<  168<H>  5.5<H>   |  25  |  1.29    Ca    8.8      06 Mar 2021 09:58    TPro  5.9<L>  /  Alb  2.4<L>  /  TBili  0.4  /  DBili  x   /  AST  18  /  ALT  77  /  AlkPhos  158<H>  03-06    LIVER FUNCTIONS - ( 06 Mar 2021 09:58 )  Alb: 2.4 g/dL / Pro: 5.9 gm/dL / ALK PHOS: 158 U/L / ALT: 77 U/L / AST: 18 U/L / GGT: x           PT/INR - ( 06 Mar 2021 06:52 )   PT: 46.1 sec;   INR: 4.26 ratio      CT Chest ?                                               CC:  Patient is a 75y old  Female who presents with a chief complaint of pain uncontrolled L2 fracture    3/6 pt seen and evaluated at bedside. SOB about the same as yesterday no improvement.      ROS:   All 10 systems reviewed and found to be negative with the exception of what has been described above.    ICU Vital Signs Last 24 Hrs  T(C): 36.8 (06 Mar 2021 08:05), Max: 36.8 (06 Mar 2021 08:05)  T(F): 98.2 (06 Mar 2021 08:05), Max: 98.2 (06 Mar 2021 08:05)  HR: 95 (06 Mar 2021 08:05) (82 - 97)  BP: 99/60 (06 Mar 2021 08:05) (92/64 - 118/91)  BP(mean): --  ABP: --  ABP(mean): --  RR: 19 (06 Mar 2021 08:05) (18 - 20)  SpO2: 97% (06 Mar 2021 08:05) (92% - 97%)    GEN: lying in bed, mild respiratory distress and increased WOB.  HEENT:   NC/AT, pupils equal and reactive, EOMI  CV:  +S1, +S2, RRR  RESP:   fine crackles appreciated at b/l lung bases, no expiratory wheeze  BREAST:  not examined  GI:  abdomen soft, non-tender, non-distended, normoactive BS  RECTAL:  not examined  :  not examined  MSK:   normal muscle tone  EXT:  no edema  NEURO:  AAOX3, no focal neurological deficits  SKIN:  no rashes    labs reviewed                        11.9   17.61 )-----------( 241      ( 06 Mar 2021 06:52 )             38.1     03-06    137  |  105  |  49<H>  ----------------------------<  168<H>  5.5<H>   |  25  |  1.29    Ca    8.8      06 Mar 2021 09:58    TPro  5.9<L>  /  Alb  2.4<L>  /  TBili  0.4  /  DBili  x   /  AST  18  /  ALT  77  /  AlkPhos  158<H>  03-06    LIVER FUNCTIONS - ( 06 Mar 2021 09:58 )  Alb: 2.4 g/dL / Pro: 5.9 gm/dL / ALK PHOS: 158 U/L / ALT: 77 U/L / AST: 18 U/L / GGT: x           PT/INR - ( 06 Mar 2021 06:52 )   PT: 46.1 sec;   INR: 4.26 ratio      ct chest - pending results

## 2021-03-07 NOTE — PROGRESS NOTE ADULT - ATTENDING COMMENTS
patient seen and examined with PA student Trice Menjivar,  I  was physically present for the key portions of the evaluation and management (E/M) service provided.  I agree with the above history, physical, and plan which I have reviewed and edited where appropriate.  -pt with worsening hypoxia overnight and now on 50% VM and vikram ed/w dr galindo.  maintain sats > 90  - cardio and pulm input noted.  continue inhaler corticosteroids.  continue solumedrol q6h for now  - resp status about the same as  yesterday.    - ID eval noted.  continue bactrim and cefepime   - monitor cr    son updated patient seen and examined with PA student Trice Menjivar,  I  was physically present for the key portions of the evaluation and management (E/M) service provided.  I agree with the above history, physical, and plan which I have reviewed and edited where appropriate.  -pt with worsening hypoxia overnight and now on 50% VM and vikram ed/w dr galindo.  maintain sats > 90  - cardio and pulm input noted.  continue inhaler corticosteroids.  continue solumedrol q6h for now.  CT chest reviewed stable b/l infitlrates  - resp status about the same as  yesterday   - ID eval noted.  continue bactrim and cefepime   - monitor cr  - hyperkalemia - kaeyx X 1 check BMP in AM     son updated patient seen and examined with PA student Trice Menjivar,  I  was physically present for the key portions of the evaluation and management (E/M) service provided.  I agree with the above history, physical, and plan which I have reviewed and edited where appropriate.  -pt with worsening hypoxia overnight and now on 50% VM and vikram ed/w dr galindo.  maintain sats > 90  - cardio and pulm input noted.  continue inhaler corticosteroids.  continue solumedrol q6h for now.  CT chest reviewed stable b/l infitlrates  - resp status about the same as  yesterday   - ID eval noted.  continue bactrim and cefepime   - hyperkalemia - kaeyx X 1 check BMP in AM     son updated

## 2021-03-07 NOTE — PROGRESS NOTE ADULT - ASSESSMENT
- CT scan shows bilat ground glass infiltrates  - consistent w COVID, however, nasal PCR negative  - on cefepime, bactrim, azithromax  - nacho cont on solumedrol q6h  - cont symbicort  - INR cont to rise; consider vit K; follow INR  - dvt proph

## 2021-03-07 NOTE — PROGRESS NOTE ADULT - SUBJECTIVE AND OBJECTIVE BOX
Subjective:  remains sob w minimal exertion    MEDICATIONS  (STANDING):  acetaminophen  IVPB .. 1000 milliGRAM(s) IV Intermittent once  aspirin enteric coated 81 milliGRAM(s) Oral daily  budesonide 160 MICROgram(s)/formoterol 4.5 MICROgram(s) Inhaler 2 Puff(s) Inhalation two times a day  carvedilol Oral Tab/Cap - Peds 50 milliGRAM(s) Oral two times a day  cefepime  Injectable. 1000 milliGRAM(s) IV Push every 12 hours  cyclobenzaprine 5 milliGRAM(s) Oral three times a day  dextrose 40% Gel 15 Gram(s) Oral once  dextrose 5%. 1000 milliLiter(s) (50 mL/Hr) IV Continuous <Continuous>  dextrose 5%. 1000 milliLiter(s) (100 mL/Hr) IV Continuous <Continuous>  dextrose 50% Injectable 25 Gram(s) IV Push once  dextrose 50% Injectable 12.5 Gram(s) IV Push once  dextrose 50% Injectable 25 Gram(s) IV Push once  folic acid 1 milliGRAM(s) Oral daily  furosemide    Tablet 40 milliGRAM(s) Oral daily  glucagon  Injectable 1 milliGRAM(s) IntraMuscular once  insulin lispro (ADMELOG) corrective regimen sliding scale   SubCutaneous three times a day before meals  insulin lispro (ADMELOG) corrective regimen sliding scale   SubCutaneous at bedtime  lidocaine   Patch 1 Patch Transdermal daily  methylPREDNISolone sodium succinate Injectable 40 milliGRAM(s) IV Push every 6 hours  sacubitril 49 mG/valsartan 51 mG 1 Tablet(s) Oral two times a day  simvastatin 20 milliGRAM(s) Oral at bedtime  trimethoprim  160 mG/sulfamethoxazole 800 mG 2 Tablet(s) Oral every 12 hours    MEDICATIONS  (PRN):  acetaminophen   Tablet .. 650 milliGRAM(s) Oral every 6 hours PRN Temp greater or equal to 38C (100.4F), fever, headaches  ALBUTerol    90 MICROgram(s) HFA Inhaler 2 Puff(s) Inhalation every 6 hours PRN Shortness of Breath and/or Wheezing  cyclobenzaprine 5 milliGRAM(s) Oral at bedtime PRN Muscle Spasm      Allergies    apixaban (Other)  fesoterodine (Unknown)  Macrobid (Other)    Intolerances        REVIEW OF SYSTEMS:    CONSTITUTIONAL:  As per HPI.  HEENT:  Eyes:  No diplopia or blurred vision. ENT:  No earache, sore throat or runny nose.  CARDIOVASCULAR:  No pressure, squeezing, tightness, heaviness or aching about the chest, neck, axilla or epigastrium.  RESPIRATORY:  No cough, shortness of breath, PND or orthopnea.  GASTROINTESTINAL:  No nausea, vomiting or diarrhea.  GENITOURINARY:  No dysuria, frequency or urgency.  MUSCULOSKELETAL:  no joint pain, deformity, tenderness  EXTREMITIES: no clubbing cyanosis,edema  SKIN:  No change in skin, hair or nails.  NEUROLOGIC:  No paresthesias, fasciculations, seizures or weakness.  PSYCHIATRIC:  No disorder of thought or mood.  ENDOCRINE:  No heat or cold intolerance, polyuria or polydipsia.  HEMATOLOGICAL:  No easy bruising or bleedings:    Vital Signs Last 24 Hrs  T(C): 36.6 (06 Mar 2021 23:07), Max: 36.6 (06 Mar 2021 16:54)  T(F): 97.8 (06 Mar 2021 23:07), Max: 97.8 (06 Mar 2021 16:54)  HR: 72 (06 Mar 2021 23:07) (72 - 85)  BP: 102/62 (06 Mar 2021 23:07) (95/63 - 102/62)  BP(mean): --  RR: 18 (06 Mar 2021 23:07) (18 - 18)  SpO2: 92% (07 Mar 2021 09:05) (92% - 99%)    PHYSICAL EXAMINATION:  SKIN: no rashes  HEAD: NC/AT  EYES: PERRLA, EOMI  EARS: TM's intact  NOSE: no abnormalities  NECK:  Supple. No lymphadenopathy. Jugular venous pressure not elevated. Carotids equal.   HEART:  S1S2 reg  CHEST:  bilat ronchi; crackles  ABDOMEN:  Soft and nontender.   EXTREMITIES:  no C/C/E  NEURO: AAO x 3, no focal deficts       LABS:                        11.9   17.20 )-----------( 224      ( 07 Mar 2021 06:27 )             37.9     03-07    138  |  104  |  59<H>  ----------------------------<  155<H>  5.5<H>   |  25  |  1.50<H>    Ca    8.6      07 Mar 2021 06:27    TPro  5.6<L>  /  Alb  2.2<L>  /  TBili  0.4  /  DBili  x   /  AST  18  /  ALT  63  /  AlkPhos  151<H>  03-07    PT/INR - ( 07 Mar 2021 06:27 )   PT: 60.8 sec;   INR: 5.64 ratio               RADIOLOGY & ADDITIONAL TESTS:

## 2021-03-07 NOTE — PROGRESS NOTE ADULT - ASSESSMENT
Patient is a 75y old  Female who presents with a chief complaint of pain uncontrolled L2 fracture.      # Acute spontaneous L2 fracture:  - could be related to osteoporosis/chronic steroid use.   - hydromorphone IV PRN for severe pain, oxycodone PO PRN for mild pain  - cyclobenzaprine PO PRN for muscle spasm  - lidocaine 1 patch daily for pain  - continue bracing  - continue physical therapy  - incentive spirometry  - started on OsCal 500 + D 1 tab PO qd to promote fx healing.    # Hypoxic respiratory failure - improved from yesterday  - multifactorial:  HCAP; CHF.  - ddx: ILD due to MTX and/or amiodarone.  - Bactrim #4 / cefepime #5 - continue as per ID.  - azithromycin discontinued as per ID 3/6.  - Remdesevir completed 2 days  - WBC still elevated at 17.20 - possibly due in part to solumedrol.  - taper O2 to 40% via VM.  - solumedrol #3 - continue. Consider taper if pt can tolerate decreased O2 support.  - acetaminophen PRN for fever >100.4 F.  - sputum Cx. and possible bronchoscopy when more stable.    # constipation  - started on Miralax 17 gm PO qd    # Hypotensive at baseline, BP 90s systolic - improved  - Lasix resumed. continue    # Hyperglycemia, likely secondary to solumedrol - stable  - ISS  - routine POCT     # PT/INR elevated from yesterday  - continue pt on warfarin with hold instructions  - no signs of active bleeding - continue to observe  - Hgb 11.9 / Hct 37.9     # COPD:  - albuterol prn.   - inhaled steroid.    # A fib:  - continue coumadin, aspirin  - continue carvedilol  - Cardiology consult appreciated.    # Chronic systolic CHF/AICD:  - continue Lasix  - continue entresto, carvedilol   - Cardiology consult appreciated.     # Rheumatoid arthritis:  - continue to hold prednisone while solumedrol is administered  - hold mtx.     # DVT px:  - pt on coumadin with hold parameters. Patient is a 75y old  Female who presents with a chief complaint of pain uncontrolled L2 fracture.      # Acute spontaneous L2 fracture:  - could be related to osteoporosis/chronic steroid use.   - hydromorphone IV PRN for severe pain, oxycodone PO PRN for mild pain  - cyclobenzaprine PO PRN for muscle spasm  - lidocaine 1 patch daily for pain  - continue bracing  - continue physical therapy  - incentive spirometry  - started on OsCal 500 + D 1 tab PO qd to promote fx healing.    # Hypoxic respiratory failure - improved from yesterday  - multifactorial:  HCAP; CHF.  - ddx: ILD due to MTX and/or amiodarone.  - Bactrim #4 / cefepime #5 - continue as per ID.  - azithromycin discontinued as per ID 3/6.  - Remdesevir completed 2 days  - WBC still elevated at 17.20 - possibly due in part to solumedrol.  - taper O2 to 40% via VM.  - solumedrol #3 - continue. Consider taper if pt can tolerate decreased O2 support.  - acetaminophen PRN for fever >100.4 F.  - sputum Cx. and possible bronchoscopy when more stable.    # constipation  - started on Miralax 17 gm PO qd    # poor PO intake  - Nutrition eval    # Hypotensive at baseline, BP 90s systolic - improved  - Lasix resumed. continue    # Hyperglycemia, likely secondary to solumedrol - stable  - ISS  - routine POCT     # PT/INR elevated from yesterday  - continue pt on warfarin with hold instructions  - no signs of active bleeding - continue to observe  - Hgb 11.9 / Hct 37.9     # COPD:  - albuterol prn.   - inhaled steroid.    # A fib:  - continue coumadin, aspirin  - continue carvedilol  - Cardiology consult appreciated.    # Chronic systolic CHF/AICD:  - continue Lasix  - continue entresto, carvedilol   - Cardiology consult appreciated.     # Rheumatoid arthritis:  - continue to hold prednisone while solumedrol is administered  - hold mtx.     # DVT px:  - pt on coumadin with hold parameters. Patient is a 75y old  Female who presents with a chief complaint of pain uncontrolled L2 fracture.      # Acute spontaneous L2 fracture:  - could be related to osteoporosis/chronic steroid use.   - hydromorphone IV PRN for severe pain, oxycodone PO PRN for mild pain  - cyclobenzaprine PO PRN for muscle spasm  - lidocaine 1 patch daily for pain  - continue bracing  - continue physical therapy  - incentive spirometry  - started on OsCal 500 + D 1 tab PO qd to promote fx healing.    # Hypoxic respiratory failure - improved from yesterday  - multifactorial:  HCAP; CHF.  - ddx: ILD due to MTX and/or amiodarone.  - Bactrim #4 / cefepime #5 - continue as per ID.  - azithromycin discontinued as per ID 3/6.  - Remdesevir completed 2 days  - WBC still elevated at 17.20 - possibly due in part to solumedrol.  - taper O2 to 40% via VM.  - solumedrol #3 - continue. Consider taper if pt can tolerate decreased O2 support.  - acetaminophen PRN for fever >100.4 F.  - sputum Cx. and possible bronchoscopy when more stable.    # constipation  - started on Miralax 17 gm PO qd    # Hyperkalemia to 5.5  - started kayexalate 15 gm PO x 1  - check BMP in AM    # poor PO intake  - Nutrition eval    # Hypotensive at baseline, BP 90s systolic - improved  - Lasix resumed. continue    # Hyperglycemia, likely secondary to solumedrol - stable  - ISS  - routine POCT     # PT/INR elevated from yesterday  - continue pt on warfarin with hold instructions  - no signs of active bleeding - continue to observe  - Hgb 11.9 / Hct 37.9     # COPD:  - albuterol prn.   - inhaled steroid.    # A fib:  - continue coumadin, aspirin  - continue carvedilol  - Cardiology consult appreciated.    # Chronic systolic CHF/AICD:  - continue Lasix  - continue entresto, carvedilol   - Cardiology consult appreciated.     # Rheumatoid arthritis:  - continue to hold prednisone while solumedrol is administered  - hold mtx.     # DVT px:  - pt on coumadin with hold parameters.

## 2021-03-07 NOTE — PROGRESS NOTE ADULT - SUBJECTIVE AND OBJECTIVE BOX
CC:  Patient is a 75y old  Female who presents with a chief complaint of pain uncontrolled L2 fracture    3/7 pt seen and evaluated at bedside. Pt denies complaints of pain, states she is "just sore." Pt states she is still experiencing REGAN but is feeling better than yesterday. Pt also c/o constipation.     ROS:   All 10 systems reviewed and found to be negative with the exception of what has been described above.    ICU Vital Signs Last 24 Hrs  T(C): 36.6 (07 Mar 2021 10:01), Max: 36.6 (06 Mar 2021 16:54)  T(F): 97.9 (07 Mar 2021 10:01), Max: 97.9 (07 Mar 2021 10:01)  HR: 83 (07 Mar 2021 10:01) (72 - 85)  BP: 109/55 (07 Mar 2021 10:01) (95/63 - 109/55)  BP(mean): --  ABP: --  ABP(mean): --  RR: 18 (07 Mar 2021 10:01) (18 - 18)  SpO2: 99% (07 Mar 2021 10:01) (92% - 99%)    GEN: lying in bed, mild respiratory distress.  HEENT:   NC/AT, pupils equal and reactive, EOMI  CV:  +S1, +S2, RRR  RESP:   fine crackles appreciated at b/l lung bases, no expiratory wheeze  BREAST:  not examined  GI:  abdomen soft, non-tender, non-distended, normoactive BS  RECTAL:  not examined  :  not examined  MSK:   normal muscle tone  EXT:  no edema  NEURO:  AAOX3, no focal neurological deficits  SKIN:  no rashes    labs reviewed 3/7                        11.9   17.20 )-----------( 224      ( 07 Mar 2021 06:27 )             37.9     03-07    138  |  104  |  59<H>  ----------------------------<  155<H>  5.5<H>   |  25  |  1.50<H>    Ca    8.6      07 Mar 2021 06:27    TPro  5.6<L>  /  Alb  2.2<L>  /  TBili  0.4  /  DBili  x   /  AST  18  /  ALT  63  /  AlkPhos  151<H>  03-07    LIVER FUNCTIONS - ( 07 Mar 2021 06:27 )  Alb: 2.2 g/dL / Pro: 5.6 gm/dL / ALK PHOS: 151 U/L / ALT: 63 U/L / AST: 18 U/L / GGT: x           PT/INR - ( 07 Mar 2021 06:27 )   PT: 60.8 sec;   INR: 5.64 ratio      POCT  Blood Glucose (03.07.21 @ 11:25)    POCT Blood Glucose.: 202 mg/dL  POCT  Blood Glucose (03.07.21 @ 08:10)    POCT Blood Glucose.: 141 mg/dL  POCT  Blood Glucose (03.06.21 @ 21:18)    POCT Blood Glucose.: 272 mg/dL    EXAM:  CT CHEST                        PROCEDURE DATE:  03/06/2021    INTERPRETATION:  CLINICAL INDICATION: 75 years  Female with Hypox Resp. Failure/Bilateral Infiltrates. Covid.  COMPARISON: Contrast-enhanced CT 2/28/2021  CONTRAST/COMPLICATIONS:  IV Contrast: None  Oral Contrast: None  Complications: None  PROCEDURE:  CT of the Chest was performed.  Sagittal and coronal reformats were performed.  IMPRESSION:  Stable diffuse bilateral infiltrates.

## 2021-03-08 NOTE — CONSULT NOTE ADULT - REASON FOR ADMISSION
pain uncontrolled L2 fracture

## 2021-03-08 NOTE — PROGRESS NOTE ADULT - SUBJECTIVE AND OBJECTIVE BOX
CC:  Patient is a 75y old  Female who presents with a chief complaint of pain uncontrolled L2 fracture    3/8 pt seen and evaluated at bedside. Pt denies complaints of pain. Pt states she is still experiencing REGAN but feels about the same as yesterday.     ROS:   All 10 systems reviewed and found to be negative with the exception of what has been described above.    ICU Vital Signs Last 24 Hrs  T(C): 36.4 (08 Mar 2021 07:30), Max: 36.6 (07 Mar 2021 22:54)  T(F): 97.6 (08 Mar 2021 07:30), Max: 97.9 (07 Mar 2021 22:54)  HR: 93 (08 Mar 2021 07:35) (92 - 100)  BP: 104/55 (08 Mar 2021 07:35) (93/77 - 113/69)  BP(mean): --  ABP: --  ABP(mean): --  RR: 19 (08 Mar 2021 07:30) (19 - 20)  SpO2: 92% (08 Mar 2021 07:30) (92% - 93%)    GEN: lying in bed, mild respiratory distress.  HEENT:   NC/AT, pupils equal and reactive, EOMI  CV:  +S1, +S2, RRR  RESP:   fine crackles appreciated at b/l lung bases, no expiratory wheeze  BREAST:  not examined  GI:  abdomen soft, non-tender, non-distended, normoactive BS  RECTAL:  not examined  :  not examined  MSK:   normal muscle tone  EXT:  no edema  NEURO:  AAOX3, no focal neurological deficits  SKIN:  no rashes    labs reviewed 3/8                        12.3   19.63 )-----------( 238      ( 08 Mar 2021 08:37 )             39.6     03-08    134<L>  |  105  |  69<H>  ----------------------------<  161<H>  5.8<H>   |  24  |  1.85<H>    Ca    8.7      08 Mar 2021 08:37    TPro  5.9<L>  /  Alb  2.3<L>  /  TBili  0.4  /  DBili  x   /  AST  25  /  ALT  73  /  AlkPhos  165<H>  03-08    LIVER FUNCTIONS - ( 08 Mar 2021 08:37 )  Alb: 2.3 g/dL / Pro: 5.9 gm/dL / ALK PHOS: 165 U/L / ALT: 73 U/L / AST: 25 U/L / GGT: x           PT/INR - ( 08 Mar 2021 08:37 )   PT: 65.9 sec;   INR: 6.20 ratio      CAPILLARY BLOOD GLUCOSE  POCT Blood Glucose.: 204 mg/dL (08 Mar 2021 11:11)  POCT Blood Glucose.: 162 mg/dL (08 Mar 2021 08:04)  POCT Blood Glucose.: 213 mg/dL (07 Mar 2021 22:38)  POCT Blood Glucose.: 128 mg/dL (07 Mar 2021 17:45)  POCT Blood Glucose.: 202 mg/dL (07 Mar 2021 11:25)    EXAM:  CT CHEST                        PROCEDURE DATE:  03/06/2021    INTERPRETATION:  CLINICAL INDICATION: 75 years  Female with Hypox Resp. Failure/Bilateral Infiltrates. Covid.  COMPARISON: Contrast-enhanced CT 2/28/2021  CONTRAST/COMPLICATIONS:  IV Contrast: None  Oral Contrast: None  Complications: None  PROCEDURE:  CT of the Chest was performed.  Sagittal and coronal reformats were performed.  IMPRESSION:  Stable diffuse bilateral infiltrates. CC:  Patient is a 75y old  Female who presents with a chief complaint of pain uncontrolled L2 fracture    3/8 pt seen and evaluated at bedside. Pt denies complaints of pain. Pt states she is still experiencing REGAN but feels about the same as yesterday. Pt reports 1 normal BM since yesterday.      ROS:   All 10 systems reviewed and found to be negative with the exception of what has been described above.    ICU Vital Signs Last 24 Hrs  T(C): 36.4 (08 Mar 2021 07:30), Max: 36.6 (07 Mar 2021 22:54)  T(F): 97.6 (08 Mar 2021 07:30), Max: 97.9 (07 Mar 2021 22:54)  HR: 93 (08 Mar 2021 07:35) (92 - 100)  BP: 104/55 (08 Mar 2021 07:35) (93/77 - 113/69)  BP(mean): --  ABP: --  ABP(mean): --  RR: 19 (08 Mar 2021 07:30) (19 - 20)  SpO2: 92% (08 Mar 2021 07:30) (92% - 93%)    GEN: lying in bed, mild respiratory distress.  HEENT:   NC/AT, pupils equal and reactive, EOMI  CV:  +S1, +S2, RRR  RESP:   fine crackles appreciated at b/l lung bases, no expiratory wheeze  BREAST:  not examined  GI:  abdomen soft, non-tender, non-distended, normoactive BS  RECTAL:  not examined  :  not examined  MSK:   normal muscle tone  EXT:  no edema  NEURO:  AAOX3, no focal neurological deficits  SKIN:  no rashes    labs reviewed 3/8                        12.3   19.63 )-----------( 238      ( 08 Mar 2021 08:37 )             39.6     03-08    134<L>  |  105  |  69<H>  ----------------------------<  161<H>  5.8<H>   |  24  |  1.85<H>    Ca    8.7      08 Mar 2021 08:37    TPro  5.9<L>  /  Alb  2.3<L>  /  TBili  0.4  /  DBili  x   /  AST  25  /  ALT  73  /  AlkPhos  165<H>  03-08    LIVER FUNCTIONS - ( 08 Mar 2021 08:37 )  Alb: 2.3 g/dL / Pro: 5.9 gm/dL / ALK PHOS: 165 U/L / ALT: 73 U/L / AST: 25 U/L / GGT: x           PT/INR - ( 08 Mar 2021 08:37 )   PT: 65.9 sec;   INR: 6.20 ratio      CAPILLARY BLOOD GLUCOSE  POCT Blood Glucose.: 204 mg/dL (08 Mar 2021 11:11)  POCT Blood Glucose.: 162 mg/dL (08 Mar 2021 08:04)  POCT Blood Glucose.: 213 mg/dL (07 Mar 2021 22:38)  POCT Blood Glucose.: 128 mg/dL (07 Mar 2021 17:45)  POCT Blood Glucose.: 202 mg/dL (07 Mar 2021 11:25)    EXAM:  CT CHEST                        PROCEDURE DATE:  03/06/2021    INTERPRETATION:  CLINICAL INDICATION: 75 years  Female with Hypox Resp. Failure/Bilateral Infiltrates. Covid.  COMPARISON: Contrast-enhanced CT 2/28/2021  CONTRAST/COMPLICATIONS:  IV Contrast: None  Oral Contrast: None  Complications: None  PROCEDURE:  CT of the Chest was performed.  Sagittal and coronal reformats were performed.  IMPRESSION:  Stable diffuse bilateral infiltrates.

## 2021-03-08 NOTE — PROGRESS NOTE ADULT - ATTENDING COMMENTS
patient seen and examined with PA student Trice Menjivar,  I  was physically present for the key portions of the evaluation and management (E/M) service provided.  I agree with the above history, physical, and plan which I have reviewed and edited where appropriate.  -pt with worsening hypoxia overnight and now on 50% VM and vikram ed/w dr galindo.  maintain sats > 90  - cardio and pulm input noted.  continue inhaler corticosteroids.  continue solumedrol q6h for now.  CT chest reviewed stable b/l infitlrates  - resp status about the same as  yesterday   - ID eval noted.  continue bactrim and cefepime   - hyperkalemia - kaeyx X 1 check BMP in AM     son updated patient seen and examined with PA student Trice Menjivar,  I  was physically present for the key portions of the evaluation and management (E/M) service provided.  I agree with the above history, physical, and plan which I have reviewed and edited where appropriate.  -pt with worsening hypoxia overnight and now on 50% VM and vikram ed/w dr galindo.  unable to titrate down further   - cardio and pulm input noted.  continue inhaler corticosteroids.  continue solumedrol q6h for now.  CT chest reviewed stable b/l infitlrates  - resp status about the same as  yesterday   - ID eval noted.  stop bactrim given worsening STACIE  - STACIE - cr worsening.  stop lasix, bactrim.  ?stop entresto - will d/w renal  - hyperkalemia - kaeyx X 1 check BMP in AM     son updated  prognosis guarded

## 2021-03-08 NOTE — PROGRESS NOTE ADULT - SUBJECTIVE AND OBJECTIVE BOX
Subjective:    Awake, resting comfortably. No sputum. +REGAN. Several family members have tested + for COVID over the weekend.    MEDICATIONS  (STANDING):  acetaminophen  IVPB .. 1000 milliGRAM(s) IV Intermittent once  aspirin enteric coated 81 milliGRAM(s) Oral daily  budesonide 160 MICROgram(s)/formoterol 4.5 MICROgram(s) Inhaler 2 Puff(s) Inhalation two times a day  calcium carbonate 1250 mG  + Vitamin D (OsCal 500 + D) 1 Tablet(s) Oral daily  carvedilol Oral Tab/Cap - Peds 50 milliGRAM(s) Oral two times a day  cefepime  Injectable. 1000 milliGRAM(s) IV Push every 12 hours  cyclobenzaprine 5 milliGRAM(s) Oral three times a day  dextrose 40% Gel 15 Gram(s) Oral once  dextrose 5%. 1000 milliLiter(s) (50 mL/Hr) IV Continuous <Continuous>  dextrose 5%. 1000 milliLiter(s) (100 mL/Hr) IV Continuous <Continuous>  dextrose 50% Injectable 25 Gram(s) IV Push once  dextrose 50% Injectable 25 Gram(s) IV Push once  dextrose 50% Injectable 12.5 Gram(s) IV Push once  folic acid 1 milliGRAM(s) Oral daily  furosemide    Tablet 40 milliGRAM(s) Oral daily  glucagon  Injectable 1 milliGRAM(s) IntraMuscular once  insulin lispro (ADMELOG) corrective regimen sliding scale   SubCutaneous three times a day before meals  insulin lispro (ADMELOG) corrective regimen sliding scale   SubCutaneous at bedtime  lidocaine   Patch 1 Patch Transdermal daily  methylPREDNISolone sodium succinate Injectable 40 milliGRAM(s) IV Push every 8 hours  polyethylene glycol 3350 17 Gram(s) Oral daily  sacubitril 49 mG/valsartan 51 mG 1 Tablet(s) Oral two times a day  simvastatin 20 milliGRAM(s) Oral at bedtime  trimethoprim  160 mG/sulfamethoxazole 800 mG 2 Tablet(s) Oral every 12 hours    MEDICATIONS  (PRN):  acetaminophen   Tablet .. 650 milliGRAM(s) Oral every 6 hours PRN Temp greater or equal to 38C (100.4F), fever, headaches  ALBUTerol    90 MICROgram(s) HFA Inhaler 2 Puff(s) Inhalation every 6 hours PRN Shortness of Breath and/or Wheezing  cyclobenzaprine 5 milliGRAM(s) Oral at bedtime PRN Muscle Spasm      Allergies    apixaban (Other)  fesoterodine (Unknown)  Macrobid (Other)    Intolerances        Vital Signs Last 24 Hrs  T(C): 36.4 (08 Mar 2021 07:30), Max: 36.6 (07 Mar 2021 10:01)  T(F): 97.6 (08 Mar 2021 07:30), Max: 97.9 (07 Mar 2021 10:01)  HR: 93 (08 Mar 2021 07:35) (83 - 100)  BP: 104/55 (08 Mar 2021 07:35) (93/77 - 113/69)  BP(mean): --  RR: 19 (08 Mar 2021 07:30) (18 - 20)  SpO2: 92% (08 Mar 2021 07:30) (92% - 99%)    PHYSICAL EXAMINATION:    NECK:  Supple. No lymphadenopathy. Jugular venous pressure not elevated.    HEART:   The cardiac impulse has a normal quality. Reg., Nl S1 and S2.   CHEST:  Chest is clear to auscultation. BS diffusely diminished. No wheezes or rales. Increased respiratory effort.  ABDOMEN:  Soft and nontender.   EXTREMITIES:  There is no edema.       LABS:                        12.3   19.63 )-----------( 238      ( 08 Mar 2021 08:37 )             39.6     03-07    138  |  104  |  59<H>  ----------------------------<  155<H>  5.5<H>   |  25  |  1.50<H>    Ca    8.6      07 Mar 2021 06:27    TPro  5.6<L>  /  Alb  2.2<L>  /  TBili  0.4  /  DBili  x   /  AST  18  /  ALT  63  /  AlkPhos  151<H>  03-07    PT/INR - ( 07 Mar 2021 06:27 )   PT: 60.8 sec;   INR: 5.64 ratio               RADIOLOGY & ADDITIONAL TESTS:< from: CT Chest No Cont (03.06.21 @ 09:25) >  EXAM:  CT CHEST                            PROCEDURE DATE:  03/06/2021          INTERPRETATION:  CLINICAL INDICATION: 75 years  Female with Hypox Resp. Failure/Bilateral Infiltrates. Covid.    COMPARISON: Contrast-enhanced CT 2/28/2021    CONTRAST/COMPLICATIONS:  IV Contrast: None  Oral Contrast: None  Complications: None    PROCEDURE:  CT of the Chest was performed.  Sagittal and coronal reformats were performed.    FINDINGS:    LUNGS AND AIRWAYS: Patent central airways.  Stable diffuse bilateral groundglass infiltrates. Stable underlying reticular opacities and mild traction bronchiectasis.  PLEURA: No pleural effusion.  MEDIASTINUM AND ADRIANA: No lymphadenopathy.  VESSELS: Coronary and aortic atherosclerosis.  HEART: Mild cardiomegaly. No pericardial effusion.  CHEST WALL AND LOWER NECK: 5 mm left lower pole thyroid nodule. Pacemaker pulse generator in the left chest wall.  VISUALIZED UPPER ABDOMEN: Stable cholelithiasis versus porcelain gallbladder.  BONES: Degenerative changes.    IMPRESSION:  Stable diffuse bilateral infiltrates.      SHAHZAD SPENCER MD; Attending Radiologist        Assessment and Plan:   · Assessment  	  - CT scan shows bilat ground glass infiltrates - consistent w COVID, however, nasal PCR negative  - on cefepime, bactrim  - will decrease solumedrol to 40mg q8h  - cont symbicort  - INR cont to rise; consider vit K; follow INR  - dvt proph  - Bronchoscopy is too risky at present given high FiO2 requirements and EF of 25%    Problem/Plan - 1:  ·  Problem: Acute respiratory failure with hypoxia.   Problem/Plan - 2:  ·  Problem: Multifocal pneumonia.   Problem/Plan - 3:  ·  Problem: Emphysema of lung.   Problem/Plan - 4:  ·  Problem: CHF with cardiomyopathy.   Problem/Plan - 5:  ·  Problem: Anemia.   Problem/Plan - 6:  Problem: Coagulopathy.

## 2021-03-08 NOTE — CONSULT NOTE ADULT - ASSESSMENT
76 yo female with hx of AICD, CM, and now presenting with lower back pains wth l2 fracture and hypoxia with bilateral GGO infiltrates on CT but with negative COVID    + family members of COVID   PUI status     STACIE in setting of bactrim use while on lasix and entresto   Hyperkalemia while pt was constipated as well   bladder scan neg  check renal sono   low k diet   SPS given = repeat K   hold lasix, Entresto and Bactrim for now  formal renal sono   IVF if Cr continues to rise  check urine lytes and urine eosinophils  hematuria with bacteruria - with negative urine cultures  repeat UA and urine protein   check serologies in light of pulm findings     Thank you for the courtesy of this consult. We will follow this patient with you.   Management is subject to change if new information becomes available or patient condition changes.      d/w Dr Perez

## 2021-03-08 NOTE — CONSULT NOTE ADULT - CONSULT REQUESTED DATE/TIME
04-Mar-2021 09:56
26-Feb-2021 16:42
08-Mar-2021 15:14
08-Mar-2021 15:19
26-Feb-2021 16:19
01-Mar-2021 14:08
01-Mar-2021 18:52

## 2021-03-08 NOTE — ASU PATIENT PROFILE, ADULT - SURGICAL SITE INCISION
Office Visit    Assessment      1. Hypothyroidism.  2. Osteopenia.  3. Leukopenia.  4. Hypercholesterolemia.      PLAN    1. She will continue levothyroxine 75 mcg daily.  2. She will continue calcium 600 mg twice daily and vitamin D3.  3. We will continue to monitor her leukopenia.  4. After discussion of current guidelines for lipid management, the patient declines medication at this time.  She believes she can improve her diet and exercise efforts.  5. She will come in in 6 weeks for a repeat T4 and TSH.  6. She will recheck here in 1 year for her Medicare wellness visit.      No orders of the defined types were placed in this encounter.    Return in about 1 year (around 3/8/2022) for MWV.    CHIEF COMPLAINT    Chief Complaint   Patient presents with   • Medication Management   • Convey Results         History of present illness    She is here today for a follow-up on several medical issues.    The patient has a hypothyroidism. She is presently on levothyroxine 75 µg daily. She denies any problems with skin, hair, constipation, or energy.  Preclinic lab showed very mildly elevated TSH at 5.118 and normal T4 at 1.1.     The patient has leukopenia. She has been evaluated by Dr. Veras. Is recommended that she undergo yearly CBCs.  Preclinic CBC showed her white blood count to be 3.3.  Absolute neutrophil count was also low at 1.5.     The patient has osteopenia for which she takes calcium 600 mg twice daily as well as vitamin D3.    The patient has hypercholesterolemia.  She has been managing this with diet and exercise.  Preclinic lab showed total cholesterol 225, , HDL 92 and triglycerides 34.  The 10-year ASCVD risk score ([place] JUD Jr., et al., 2013) is: 10.4%    Values used to calculate the score:      Age: 70 years      Sex: Female      Is Non- : No      Diabetic: No      Tobacco smoker: No      Systolic Blood Pressure: 122 mmHg      Is BP treated: No      HDL Cholesterol: 92  mg/dL      Total Cholesterol: 225 mg/dL     I have reviewed the allergies, medication list and past medical, family and social history sections listed in the above medical record as well as any pertinent nursing notes.     Physical Exam    Vital Signs:    Vitals:    03/08/21 0851   BP: 122/62   BP Location: LUE - Left upper extremity   Patient Position: Sitting   Cuff Size: Regular   Pulse: 68   Resp: 15   Weight: 67.6 kg   Height: 5' (1.524 m)   Body mass index is 29.1 kg/m².   General:  Well developed, well nourished.  In no apparent distress.  Skin:   Warm, dry and well hydrated to inspection and palpation.   Respiratory:  Normal respiratory effort.  No chest wall tenderness.  Lungs clear to auscultation bilaterally.  Symmetrical chest expansion.  Cardiovascular:   Regular rate and rhythm.  No murmurs.  No peripheral edema.   Psychiatric:  The patient is well groomed and appropriately dressed.  Eye contact is good.  Speech is logical and goal-directed.  Judgment appears to be intact.    I have discussed the following labs with the patient:   Lab Services on 03/04/2021   Component Date Value Ref Range Status   • Fasting Status 03/04/2021 12  Hours Final   • Sodium 03/04/2021 141  135 - 145 mmol/L Final   • Potassium 03/04/2021 4.3  3.4 - 5.1 mmol/L Final   • Chloride 03/04/2021 103  98 - 107 mmol/L Final   • Carbon Dioxide 03/04/2021 29  21 - 32 mmol/L Final   • Anion Gap 03/04/2021 13  10 - 20 mmol/L Final   • Glucose 03/04/2021 88  65 - 99 mg/dL Final   • BUN 03/04/2021 14  6 - 20 mg/dL Final   • Creatinine 03/04/2021 0.73  0.51 - 0.95 mg/dL Final   • Glomerular Filtration Rate 03/04/2021 83* >90 mL/min/1.73m2 Final   • BUN/ Creatinine Ratio 03/04/2021 19  7 - 25 Final   • Calcium 03/04/2021 8.7  8.4 - 10.2 mg/dL Final   • Bilirubin, Total 03/04/2021 0.5  0.2 - 1.0 mg/dL Final   • GOT/AST 03/04/2021 15  <=37 Units/L Final   • GPT/ALT 03/04/2021 16  <64 Units/L Final   • Alkaline Phosphatase 03/04/2021 50  45 -  117 Units/L Final   • Albumin 03/04/2021 3.6  3.6 - 5.1 g/dL Final   • Protein, Total 03/04/2021 6.3* 6.4 - 8.2 g/dL Final   • Globulin 03/04/2021 2.7  2.0 - 4.0 g/dL Final   • A/G Ratio 03/04/2021 1.3  1.0 - 2.4 Final   • T4, Free 03/04/2021 1.1  0.8 - 1.5 ng/dL Final   • Fasting Status 03/04/2021 12  Hours Final   • Cholesterol 03/04/2021 225* <=199 mg/dL Final   • Triglycerides 03/04/2021 34  <=149 mg/dL Final   • HDL 03/04/2021 92  >=50 mg/dL Final   • LDL 03/04/2021 126  <=129 mg/dL Final   • Non-HDL Cholesterol 03/04/2021 133  mg/dL Final   • Cholesterol/ HDL Ratio 03/04/2021 2.4  <=4.4 Final   • TSH 03/04/2021 5.118* 0.350 - 5.000 mcUnits/mL Final   • Hemoglobin A1C 03/04/2021 5.2  4.5 - 5.6 % Final   • WBC 03/04/2021 3.3* 4.2 - 11.0 K/mcL Final   • RBC 03/04/2021 4.42  4.00 - 5.20 mil/mcL Final   • HGB 03/04/2021 14.5  12.0 - 15.5 g/dL Final   • HCT 03/04/2021 44.1  36.0 - 46.5 % Final   • MCV 03/04/2021 99.8  78.0 - 100.0 fl Final   • MCH 03/04/2021 32.8  26.0 - 34.0 pg Final   • MCHC 03/04/2021 32.9  32.0 - 36.5 g/dL Final   • RDW-CV 03/04/2021 13.0  11.0 - 15.0 % Final   • RDW-SD 03/04/2021 47.8  39.0 - 50.0 fL Final   • PLT 03/04/2021 194  140 - 450 K/mcL Final   • NRBC 03/04/2021 0  <=0 /100 WBC Final   • Neutrophil, Percent 03/04/2021 47  % Final   • Lymphocytes, Percent 03/04/2021 35  % Final   • Mono, Percent 03/04/2021 11  % Final   • Eosinophils, Percent 03/04/2021 6  % Final   • Basophils, Percent 03/04/2021 1  % Final   • Immature Granulocytes 03/04/2021 0  % Final   • Absolute Neutrophils 03/04/2021 1.5* 1.8 - 7.7 K/mcL Final   • Absolute Lymphocytes 03/04/2021 1.2  1.0 - 4.0 K/mcL Final   • Absolute Monocytes 03/04/2021 0.4  0.3 - 0.9 K/mcL Final   • Absolute Eosinophils  03/04/2021 0.2  0.0 - 0.5 K/mcL Final   • Absolute Basophils 03/04/2021 0.0  0.0 - 0.3 K/mcL Final   • Absolute Immmature Granulocytes 03/04/2021 0.0  0.0 - 0.2 K/mcL Final       no

## 2021-03-08 NOTE — CONSULT NOTE ADULT - SUBJECTIVE AND OBJECTIVE BOX
75 F who presents to ED with acute worsening L sided low back pain and inability to ambulate. Patient denies any recent falls or trauma. her pain came on the past few days. She states the pain is mainly localized to her lower back and does not radiate. Her pain is aggravated by movement. She denies any numbness or tingling in her bilateral lower extremities. She has had a long history of bilateral knee and hip pain, and has followed with Dr Clancy. She states she was supposed to get her L knee replaced in 2018, but found to have new onset atrial fibrillation at the pre op testing, and subsequently was admitted and found to have e coli urosepsis and received urologic surgical intervention and developed respiratory failure requiring intubation. She states she really hasn't walked much since then and mainly uses a wheel chair and occasional stand to transfer. She states she has had urinary incontinence since that incident, but denies any new onset bowel incontinence of saddle anesthesia.     Vital Signs Last 24 Hrs  T(C): 36.5 (26 Feb 2021 11:52), Max: 36.5 (26 Feb 2021 11:52)  T(F): 97.7 (26 Feb 2021 11:52), Max: 97.7 (26 Feb 2021 11:52)  HR: 63 (26 Feb 2021 11:52) (63 - 63)  BP: 108/72 (26 Feb 2021 11:52) (108/72 - 108/72)  BP(mean): 84 (26 Feb 2021 11:52) (84 - 84)  RR: 16 (26 Feb 2021 11:52) (16 - 16)  SpO2: 94% (26 Feb 2021 11:52) (94% - 94%)    Exam:  Gen: uncomfortable, laying on her side    Spine Exam:  Skin intact  No gross deformity  No midline TTP C/T/L/S spine  No bony step offs  R and left sided lumbar paraspinal muscle TTP/Hypertonicity   No Radicular Symptoms with SLR  Negative clonus  Negative babinski  Negative hester  No calf TTP    Motor:               C5           C6            C7               C8           T1   R         5/5          5/5            5/5             5/5          5/5  L          5/5          5/5            5/5             5/5          5/5                L2             L3             L4               L5            S1  R         5/5           5/5          5/5             5/5           5/5  L          5/5          5/5           5/5             5/5           5/5    Sensory:            C5         C6         C7      C8       T1        (0=absent, 1=impaired, 2=normal, NT=not testable)  R         2            2           2        2         2  L          2            2           2        2         2               L2          L3         L4      L5       S1         (0=absent, 1=impaired, 2=normal, NT=not testable)  R         2            2            2        2        2  L          2            2           2        2         2         CT lumbar spine: acute L2 compressionf fracture with minimal bony retropulsion, degenerative spine    CT pelvis: no acute fractures, severe bilateral hip arthritis    XR femurs/hip bilateral: negative fractures, severe hip arthritis bilateral  XR lumbar spine ordered
75 F who presents to ED with acute worsening L sided low back pain and inability to ambulate. Patient denies any recent falls or trauma. her pain came on the past few days. She states the pain is mainly localized to her lower back and does not radiate. Her pain is aggravated by movement. She denies any numbness or tingling in her bilateral lower extremities. She has had a long history of bilateral knee and hip pain, and has followed with Dr Clancy. She states she was supposed to get her L knee replaced in 2018, but found to have new onset atrial fibrillation at the pre op testing, and subsequently was admitted and found to have e coli urosepsis and received urologic surgical intervention and developed respiratory failure requiring intubation. She states she really hasn't walked much since then and mainly uses a wheel chair and occasional stand to transfer. She states she has had urinary incontinence since that incident, but denies any new onset bowel incontinence of saddle anesthesia.     Vital Signs Last 24 Hrs  T(C): 36.5 (26 Feb 2021 11:52), Max: 36.5 (26 Feb 2021 11:52)  T(F): 97.7 (26 Feb 2021 11:52), Max: 97.7 (26 Feb 2021 11:52)  HR: 63 (26 Feb 2021 11:52) (63 - 63)  BP: 108/72 (26 Feb 2021 11:52) (108/72 - 108/72)  BP(mean): 84 (26 Feb 2021 11:52) (84 - 84)  RR: 16 (26 Feb 2021 11:52) (16 - 16)  SpO2: 94% (26 Feb 2021 11:52) (94% - 94%)    Exam:  Gen: uncomfortable, laying on her side    Spine Exam:  Skin intact  No gross deformity  No midline TTP C/T/L/S spine  No bony step offs  R and left sided lumbar paraspinal muscle TTP/Hypertonicity   No Radicular Symptoms with SLR    B/l lower extremities:  skin intact over bilateral hip   negative pain with log roll  able to SLR legs   hip ROM limited  nonpainful ROM of bilateral knees  SILT b/l   DP+ b/l          CT lumbar spine: acute L2 compression fracture with minimal bony retropulsion, degenerative spine    CT pelvis: no acute fractures, severe bilateral hip arthritis    XR femurs/hip bilateral: negative fractures, severe hip arthritis bilateral  XR lumbar spine ordered    Assessment and Recommendation:         75 F with bilateral hip osteoarthritis and acute low back pain in her lumbar paraspinal region and acute L2 compression fracture. Her low back pain is likely muscular in nature as well as the L2 compression fracture. No acute red flag symptoms of cord compression. Discussed with patient at this time and she is not interested in operative treatment for her hip arthritis.     - WBAT bilateral lower extremities  - orthopedic spine recommendations per ortho spine   - PT/OT  - Analgesia as needed for back and hip pain  - DVT ppx per medical team  - no acute orthopedic surgical intervention at this time  - ortho stable for DC  - patient may follow up with Dr Clancy after discharge  - Will discuss with attending and advise further if plan changes   
Consult: CHF, AF  CC: Cant breath well.     History of Present Illness:   75 year old female with known history of Afib cardiomyopathy EF 25% and AICD, urinary incontinence and chronic knee and hip pain presents with severe back pain- found to have L2 fracture , having difficulty with ambulation and ADLs , presents for pain relief.  acute worsening L sided low back pain and she is unable to ambulate. Patient denies any recent falls or trauma. pain started past few days. She states the pain is mainly localized to her lower back and does not radiate, worsens with movement.   She denies any numbness or tingling in her bilateral lower extremities. She has had arthritis and pain both knees and hip, and has follows out patient with Dr Clancy.   Admitted for above, developed then difficulty breathing. Has been treated for new b/l lung infiltrates.   Examined at bed side, no angina, palpitations or leg edema, not in acute distress, able to speak in full sentences, not in altered mental state. Sats stable on aerosol mask.     Review of Systems   Review of Systems: negative other than reported on HPI      Allergies and Intolerances:        Allergies:  	Macrobid: Drug, Other, Originally Entered as [Facial swelling] reaction to [Macrobid]  	apixaban: Drug, Other, large hematoma  	fesoterodine: Drug, Unknown    Home Medications:   * Patient Currently Takes Medications as of 26-Feb-2021 15:25 documented in Structured Notes  · 	aspirin 81 mg oral tablet: Last Dose Taken:  , 1 tab(s) orally once a day  · 	simvastatin 20 mg oral tablet: Last Dose Taken:  , 1 tab(s) orally once a day (at bedtime)  · 	folic acid 1 mg oral tablet: Last Dose Taken:  , 1 tab(s) orally once a day  · 	predniSONE 5 mg oral tablet: Last Dose Taken:  , 1 tab(s) orally once a day  · 	warfarin 2 mg oral tablet: Last Dose Taken:  , 1 tab(s) orally once a day  · 	Entresto 49 mg-51 mg oral tablet: Last Dose Taken:  , 1 tab(s) orally 2 times a day  · 	carvedilol 25 mg oral tablet: Last Dose Taken:  , 2 tab(s) orally 2 times a day  · 	methotrexate 2.5 mg oral tablet: Last Dose Taken:  , 15 milligram(s) orally once a week on Wed  · 	methenamine hippurate 1 g oral tablet: Last Dose Taken:  , 1 tab(s) orally 2 times a day  · 	budesonide 0.5 mg/2 mL inhalation suspension: Last Dose Taken:  , 2 milliliter(s) inhaled 2 times a day    Social History   Social History (marital status, living situation, occupation, tobacco use, alcohol and drug use, and sexual history): lives at home  no smoking no alcoholism    Physical Exam:   VS stable.   Gen: in NAD  Neck: supple, no JVD  Resp: diffuse reduced BS, diffuse b/l crackles.   CV: IRR, no m/g/r  Ext: No edema  Neuro: AAOX3, non focal     Laboratory, ECG and Imaging: reviewed    Assessment and Plan:     hypoxic respiratory failure.  Pulm, ID on case. Abx, steroids. Nebs.   IV lasix  strict I/O  Low salt diet, fluid restriction.      A fib: Rate controlled  -on coumadin  -continue bb     Chronic systolic CHF/AICD:  -Cont entresto, bb  Diuresis as above      
Patient is a 75y old  Female who presents with a chief complaint of pain uncontrolled L2 fracture (01 Mar 2021 11:13)    HPI:  75 year old female with known history of Afib cardiomyopathy EF 25% and AICD, urinary incontinence and chronic knee and hip pain presents with severe back pain- found to have L2 fracture , having difficulty with ambulation and ADLs , presents for pain relief on 2/26.  acute worsening L sided low back pain and she is unable to ambulate. Patient denies any recent falls or trauma. pain started past few days. She states the pain is mainly localized to her lower back and does not radiate, worsens with movement. She denies any numbness or tingling in her bilateral lower extremities. She has had arthritis and pain both knees and hip, and has follows out patient with Dr Clancy. Here noted with  acute L2 compression fracture hospital course c/b acute hypoxic resp failure, cough, CT chest with extensive b/l lung infiltrates concerning for covid-19, was given decadron/remdesivir.       PMH: as above  PSH: as above  Meds: per reconciliation sheet, noted below  MEDICATIONS  (STANDING):  acetaminophen  IVPB .. 1000 milliGRAM(s) IV Intermittent once  aspirin enteric coated 81 milliGRAM(s) Oral daily  carvedilol Oral Tab/Cap - Peds 50 milliGRAM(s) Oral two times a day  dexAMETHasone  Injectable 6 milliGRAM(s) IV Push daily  folic acid 1 milliGRAM(s) Oral daily  furosemide   Injectable 40 milliGRAM(s) IV Push two times a day  lidocaine   Patch 1 Patch Transdermal daily  mometasone 110 MICROgram(s) Inhaler 1 Puff(s) Inhalation daily  potassium chloride    Tablet ER 20 milliEquivalent(s) Oral daily  potassium chloride   Powder 20 milliEquivalent(s) Oral daily  sacubitril 49 mG/valsartan 51 mG 1 Tablet(s) Oral two times a day  simvastatin 20 milliGRAM(s) Oral at bedtime  warfarin 2 milliGRAM(s) Oral daily    Allergies    apixaban (Other)  fesoterodine (Unknown)  Macrobid (Other)    Intolerances      Social: no smoking, no alcohol, no illegal drugs; no recent travel, no exposure to TB  FAMILY HISTORY:  FH: colon cancer  mother    FH: breast cancer  mother       no history of premature cardiovascular disease in first degree relatives    ROS: the patient denies fever, no chills, no HA, no dizziness, no sore throat, no blurry vision, no CP, no palpitations,  no abdominal pain, no diarrhea, no N/V, no dysuria, no leg pain, no claudication, no rash, no joint aches, no rectal pain or bleeding, no night sweats + sob/+ cough    All other systems reviewed and are negative    Vital Signs Last 24 Hrs  T(C): 36.6 (01 Mar 2021 08:35), Max: 36.9 (28 Feb 2021 16:07)  T(F): 97.8 (01 Mar 2021 08:35), Max: 98.5 (28 Feb 2021 16:07)  HR: 93 (01 Mar 2021 08:35) (89 - 98)  BP: 119/49 (01 Mar 2021 08:35) (91/52 - 119/49)  BP(mean): 59 (28 Feb 2021 17:26) (59 - 69)  RR: 18 (01 Mar 2021 08:35) (18 - 18)  SpO2: 93% (01 Mar 2021 08:35) (93% - 98%)  Daily     Daily     PE:  Constitutional: frail looking  HEENT: NC/AT, EOMI, PERRLA, conjunctivae clear; ears and nose atraumatic; pharynx benign  Neck: supple; thyroid not palpable  Back: no tenderness  Respiratory: decreased breath sounds, crackles   Cardiovascular: S1S2 regular, no murmurs  Abdomen: soft, not tender, not distended, positive BS; liver and spleen WNL  Genitourinary: no suprapubic tenderness  Lymphatic: no LN palpable  Musculoskeletal: no muscle tenderness, no joint swelling or tenderness  Extremities: no pedal edema  Neurological/ Psychiatric: moving all extremities  Skin: no rashes; no palpable lesions    Labs: all available labs reviewed                        9.5    5.32  )-----------( 165      ( 01 Mar 2021 07:23 )             30.8     03-01    140  |  108  |  23  ----------------------------<  181<H>  3.6   |  28  |  0.95    Ca    8.7      01 Mar 2021 07:23  Phos  2.7     03-01  Mg     2.1     03-01    TPro  5.8<L>  /  Alb  2.0<L>  /  TBili  0.5  /  DBili  0.1  /  AST  40<H>  /  ALT  32  /  AlkPhos  144<H>  03-01     LIVER FUNCTIONS - ( 01 Mar 2021 07:23 )  Alb: 2.0 g/dL / Pro: 5.8 gm/dL / ALK PHOS: 144 U/L / ALT: 32 U/L / AST: 40 U/L / GGT: x                 Radiology: all available radiological tests reviewed  < from: CT Chest No Cont (02.28.21 @ 17:08) >  EXAM:  CT CHEST                            PROCEDURE DATE:  02/28/2021          INTERPRETATION:  EXAMINATION: CT CHEST    CLINICAL INDICATION: Covid.    TECHNIQUE: Noncontrast CT of the chest was obtained.    COMPARISON: Multiple CT, most recent 8/11/2020.    FINDINGS:    AIRWAYS AND LUNGS: The central tracheobronchial tree is patent.  Extensive bilateral groundglass and reticular opacities.    MEDIASTINUM AND PLEURA: There are no enlarged mediastinal, hilar or axillary lymph nodes. The visualized portion of the thyroid gland is heterogeneous. There is no pleural effusion. There is no pneumothorax.    HEART AND VESSELS: There is cardiomegaly.  There are atherosclerotic calcifications of the aorta and coronary arteries.  There is no pericardial effusion.  Aortic valve calcification. Left-sided defibrillator.    UPPER ABDOMEN: Images of the upper abdomen demonstrate cholelithiasis. Prominent left renal pelvis..    BONES AND SOFT TISSUES: The bones are unremarkable.  The soft tissues are unremarkable.    TUBES/LINES: None.    IMPRESSION:  COVID pneumonia    Prominent left renal pelvis is new from the prior study and may represent hydronephrosis. Renal ultrasound versus CT abdomen/pelvis recommended for complete evaluation.      < end of copied text >  < from: MR Lumbar Spine No Cont (03.01.21 @ 12:29) >    EXAM:  MR SPINE LUMBAR                            PROCEDURE DATE:  03/01/2021          INTERPRETATION:  Exam Date: 3/1/2021 12:29 PM    MR lumbar without gadolinium    CLINICAL INFORMATION:   L2 compression fracture    TECHNIQUE:   Sagittal and axial T1-weighted images, sagittal inversion recovery images, and sagittal and axial T1-weighted and T2-weighted images of the lumbar spine were obtained.    FINDINGS: Comparison is made to CT lumbar of 2/26/2021.    Reidentified is a acute compression fracture of the L2 vertebral body with approximately 45% vertebral body height loss within the mid body. There is no significant bony retropulsion. No associated paraspinal or epidural disease. No additional fractures.    Multilevel degenerative changes as follows:    At L1/L2 and L2/L3 there are small posterior disc bulges without significant foraminal or central spinal canal stenosis.    At L3/L4, there is a posterior disc bulge resulting in mild narrowing of the right neural foramen without significant left foraminal or central spinal canal stenosis.    At L4/L5 and L5/S1, there are small posterior disc bulges without significant foraminal or central spinal canal stenosis.    The distal cord maintains intact morphology.  Distal cord signal intensity is preserved.  The conus is normally positioned at the T12 level.  Nerve roots of the cauda equina appear intact.      IMPRESSION:    Reidentified is a acute compression fracture of the L2 vertebral body with approximately 45% vertebral body height loss within the mid body. There is no significant bony retropulsion. No associated paraspinal or epidural disease. No additional fractures.    Mild degenerative changes as above.        < end of copied text >    Advanced directives addressed: full resuscitation
Patient is a 75y old  Female who presents with a chief complaint of pain uncontrolled L2 fracture, hypoxia with GGO on CT chest,no effusions, with COVID negative and now renal eval called for STACIE and Hyperkalemia  Other hx of CM with EF 30 % and AICD    today   pt feeling ok  on VM   + loose BM after SPS         PAST MEDICAL & SURGICAL HISTORY:  Urine incontinence  Stented coronary artery  BMS x3  june 2019  CAD (coronary artery disease), native coronary artery  CHF with cardiomyopathy  12/2019 EF 25%  Rheumatoid arthritis  UTI (urinary tract infection)  Afib  Diverticultis  Kidney stones  Psoriasis  Edema  HLD (hyperlipidemia)  HTN (hypertension)  H/O bladder repair surgery  H/O arthroscopy of shoulder  left 2011  H/O: hysterectomy  due to bleeding 2016      MEDICATIONS  (STANDING):  acetaminophen  IVPB .. 1000 milliGRAM(s) IV Intermittent once  aspirin enteric coated 81 milliGRAM(s) Oral daily  budesonide 160 MICROgram(s)/formoterol 4.5 MICROgram(s) Inhaler 2 Puff(s) Inhalation two times a day  calcium carbonate 1250 mG  + Vitamin D (OsCal 500 + D) 1 Tablet(s) Oral daily  carvedilol Oral Tab/Cap - Peds 50 milliGRAM(s) Oral two times a day  cyclobenzaprine 5 milliGRAM(s) Oral three times a day  dextrose 40% Gel 15 Gram(s) Oral once  dextrose 5%. 1000 milliLiter(s) (50 mL/Hr) IV Continuous <Continuous>  dextrose 5%. 1000 milliLiter(s) (100 mL/Hr) IV Continuous <Continuous>  dextrose 50% Injectable 25 Gram(s) IV Push once  dextrose 50% Injectable 25 Gram(s) IV Push once  dextrose 50% Injectable 12.5 Gram(s) IV Push once  folic acid 1 milliGRAM(s) Oral daily  glucagon  Injectable 1 milliGRAM(s) IntraMuscular once  guaiFENesin  milliGRAM(s) Oral every 12 hours  insulin lispro (ADMELOG) corrective regimen sliding scale   SubCutaneous at bedtime  insulin lispro (ADMELOG) corrective regimen sliding scale   SubCutaneous three times a day before meals  lidocaine   Patch 1 Patch Transdermal daily  meropenem  IVPB 1000 milliGRAM(s) IV Intermittent every 12 hours  methylPREDNISolone sodium succinate Injectable 40 milliGRAM(s) IV Push every 8 hours  polyethylene glycol 3350 17 Gram(s) Oral daily  simvastatin 20 milliGRAM(s) Oral at bedtime      Allergies    apixaban (Other)  fesoterodine (Unknown)  Macrobid (Other)    Intolerances        SOCIAL HISTORY:  Denies ETOh,Smoking,     FAMILY HISTORY:  FH: colon cancer  mother    FH: breast cancer  mother        REVIEW OF SYSTEMS:    CONSTITUTIONAL: No weakness, fevers or chills  EYES/ENT: No visual changes;  No vertigo or throat pain   NECK: No pain or stiffness  RESPIRATORY: No cough, wheezing, hemoptysis; stable  shortness of breath  CARDIOVASCULAR: No chest pain or palpitations  GASTROINTESTINAL: No abdominal or epigastric pain. No nausea, vomiting, or hematemesis; No diarrhea or constipation. No melena or hematochezia.  GENITOURINARY: No dysuria, frequency or hematuria  NEUROLOGICAL: No numbness or weakness  SKIN: No itching, burning, rashes, or lesions   All other review of systems is negative unless indicated above.    VITAL:  T(C): , Max: 36.6 (03-07-21 @ 22:54)  T(F): , Max: 97.9 (03-07-21 @ 22:54)  HR: 93 (03-08-21 @ 07:35)  BP: 104/55 (03-08-21 @ 07:35)  BP(mean): --  RR: 19 (03-08-21 @ 07:30)  SpO2: 92% (03-08-21 @ 07:30)  Wt(kg): --    I&O's Detail    07 Mar 2021 07:01  -  08 Mar 2021 07:00  --------------------------------------------------------  IN:    Oral Fluid: 490 mL  Total IN: 490 mL    OUT:    Voided (mL): 400 mL  Total OUT: 400 mL    Total NET: 90 mL    PHYSICAL EXAM:    Constitutional: frail  HEENT: EOMI  Neck: No LAD, No JVD  Respiratory: poor AE   Cardiovascular: S1 and S2  Gastrointestinal: BS+, soft, NT/ND  Extremities: No peripheral edema  Neurological: A/O x 3, no focal deficits  : No Quezada  Skin: No rashes  Access: Not applicable    LABS:        134    |  105    |  69     ----------------------------<  161       08 Mar 2021 08:37  5.8     |  24     |  1.85     138    |  104    |  59     ----------------------------<  155       07 Mar 2021 06:27  5.5     |  25     |  1.50     137    |  105    |  49     ----------------------------<  168       06 Mar 2021 09:58  5.5     |  25     |  1.29     Ca    8.7        08 Mar 2021 08:37  Ca    8.6        07 Mar 2021 06:27                                  12.3   19.63 )-----------( 238      ( 08 Mar 2021 08:37 )             39.6       Ca    8.7      08 Mar 2021 08:37    TPro  5.9<L>  /  Alb  2.3<L>  /  TBili  0.4  /  DBili  x   /  AST  25  /  ALT  73  /  AlkPhos  165<H>  03-08      Urine Studies:      RADIOLOGY & ADDITIONAL STUDIES:                    
  HPI:  75 year old female with known history of Afib cardiomyopathy EF 25% and AICD, urinary incontinence and chronmic knee and hip pain presents with severe back pain- found to have L2 fracture , having difficulty with ambulation and ADLs , presents for pain relief.  acute worsening L sided low back pain and she is unable to ambulate. Patient denies any recent falls or trauma. pain started past few days. She states the pain is mainly localized to her lower back and does not radiate, worsens with movement.   She denies any numbness or tingling in her bilateral lower extremities. She has had arthritis and pain both knees and hip, and has follows out patient with Dr Clancy.   "She states she was supposed to get her L knee replaced in 2018, but found to have new onset atrial fibrillation at the pre op testing, and subsequently was admitted and found to have E coli urosepsis and received urologic surgical intervention and developed respiratory failure requiring intubation. She states she really hasn't walked much since then and mainly uses a wheel chair and occasional stand to transfer. She states she has had urinary incontinence since that incident, but denies any new onset bowel incontinence of saddle anesthesia."- above per ortho Hand P verified with patient.     Has h.o. COPD with asthma, cardiomyopathy, severe decreased EF, AF, CAD.     Hospital course notable for acute hypoxic respiratory failure, CT chest with with b/l ground glass opacities. Transferred to  to r/o covid. Given iv lasix for possible acute chf exacerbation.     3/1:  mild mildly tachypneic, has cough, some noted SOB.  chronic edema, some orthopnea.          (2021 17:32)      PAST MEDICAL & SURGICAL HISTORY:  Urine incontinence    ICD    Stented coronary artery  BMS x3  2019    CAD (coronary artery disease), native coronary artery    CHF with cardiomyopathy  2019 EF 25%    Rheumatoid arthritis    UTI (urinary tract infection)    Afib    Diverticulitis    Kidney stones    Psoriasis    Edema    HLD (hyperlipidemia)    HTN (hypertension)    S/P   x2    H/O bladder repair surgery    H/O arthroscopy of shoulder  left     H/O: hysterectomy  due to bleeding         MEDICATIONS  (STANDING):  acetaminophen  IVPB .. 1000 milliGRAM(s) IV Intermittent once  aspirin enteric coated 81 milliGRAM(s) Oral daily  azithromycin   Tablet 500 milliGRAM(s) Oral daily  carvedilol Oral Tab/Cap - Peds 50 milliGRAM(s) Oral two times a day  cefepime   IVPB 2000 milliGRAM(s) IV Intermittent every 12 hours  dexAMETHasone  Injectable 6 milliGRAM(s) IV Push daily  folic acid 1 milliGRAM(s) Oral daily  furosemide   Injectable 40 milliGRAM(s) IV Push two times a day  lidocaine   Patch 1 Patch Transdermal daily  mometasone 110 MICROgram(s) Inhaler 1 Puff(s) Inhalation daily  potassium chloride    Tablet ER 20 milliEquivalent(s) Oral daily  potassium chloride   Powder 20 milliEquivalent(s) Oral daily  remdesivir  IVPB 100 milliGRAM(s) IV Intermittent every 24 hours  sacubitril 49 mG/valsartan 51 mG 1 Tablet(s) Oral two times a day  simvastatin 20 milliGRAM(s) Oral at bedtime  warfarin 2 milliGRAM(s) Oral daily    MEDICATIONS  (PRN):  acetaminophen   Tablet .. 650 milliGRAM(s) Oral every 6 hours PRN Temp greater or equal to 38C (100.4F), fever, headaches  ALBUTerol    90 MICROgram(s) HFA Inhaler 2 Puff(s) Inhalation every 6 hours PRN Shortness of Breath and/or Wheezing  cyclobenzaprine 5 milliGRAM(s) Oral at bedtime PRN Muscle Spasm  cyclobenzaprine 5 milliGRAM(s) Oral three times a day PRN Muscle Spasm  HYDROmorphone  Injectable 0.5 milliGRAM(s) IV Push every 4 hours PRN Severe Pain (7 - 10)  oxyCODONE    IR 10 milliGRAM(s) Oral every 4 hours PRN Moderate Pain (4 - 6)  oxyCODONE    IR 5 milliGRAM(s) Oral every 4 hours PRN Mild Pain (1 - 3)      Allergies    apixaban (Other)  fesoterodine (Unknown)  Macrobid (Other)    Intolerances        SOCIAL HISTORY: Denies tobacco, etoh abuse or illicit drug use    FAMILY HISTORY:  FH: colon cancer  mother    FH: breast cancer  mother        Vital Signs Last 24 Hrs  T(C): 36.6 (01 Mar 2021 16:41), Max: 36.7 (2021 19:14)  T(F): 97.8 (01 Mar 2021 16:41), Max: 98.1 (2021 19:14)  HR: 95 (01 Mar 2021 16:41) (89 - 97)  BP: 98/58 (01 Mar 2021 16:41) (96/57 - 119/49)  BP(mean): --  RR: 18 (01 Mar 2021 16:41) (18 - 18)  SpO2: 93% (01 Mar 2021 16:41) (93% - 96%)    REVIEW OF SYSTEMS:    CONSTITUTIONAL:  As per HPI.  HEENT:  Eyes:  No diplopia or blurred vision. ENT:  No earache, sore throat or runny nose.  CARDIOVASCULAR:  see above.   RESPIRATORY:  see above.  GASTROINTESTINAL:  No nausea, vomiting or diarrhea.  GENITOURINARY:  No dysuria, frequency or urgency.  MUSCULOSKELETAL:  As per HPI.  SKIN:  No change in skin, hair or nails.  NEUROLOGIC:  No paresthesias, fasciculations, seizures or weakness.  PSYCHIATRIC:  No disorder of thought or mood.  ENDOCRINE:  No heat or cold intolerance, polyuria or polydipsia.  HEMATOLOGICAL:  No easy bruising or bleedings:  .     PHYSICAL EXAMINATION:    GENERAL APPEARANCE:  Pt. is not currently dyspneic, in no distress. Pt. is alert, oriented, and pleasant.  HEENT:  Pupils are normal and react normally. No icterus. Mucous membranes well colored.  NECK:  Supple. No lymphadenopathy. Jugular venous pressure not elevated. Carotids equal.   HEART:   The cardiac impulse has a normal quality. HR 84.   CHEST:  Subaxillary crackles.   ABDOMEN:  Soft and nontender.   EXTREMITIES:  There is no cyanosis, clubbing. Chronic LE swelling bilat.   SKIN:  No rash or significant lesions are noted.  Neuro: Alert, awake, and O x 3.      LABS:                        9.5    5.32  )-----------( 165      ( 01 Mar 2021 07:23 )             30.8     -    140  |  108  |  23  ----------------------------<  181<H>  3.6   |  28  |  0.95    Ca    8.7      01 Mar 2021 07:23  Phos  2.7     03-  Mg     2.1     -    TPro  5.8<L>  /  Alb  2.0<L>  /  TBili  0.5  /  DBili  0.1  /  AST  40<H>  /  ALT  32  /  AlkPhos  144<H>      LIVER FUNCTIONS - ( 01 Mar 2021 07:23 )  Alb: 2.0 g/dL / Pro: 5.8 gm/dL / ALK PHOS: 144 U/L / ALT: 32 U/L / AST: 40 U/L / GGT: x           PT/INR - ( 01 Mar 2021 07:23 )   PT: 20.1 sec;   INR: 1.77 ratio                     RADIOLOGY & ADDITIONAL STUDIES:       EXAM:  XR CHEST PORTABLE URGENT 1V                            PROCEDURE DATE:  2021          INTERPRETATION:  History: Covid, dyspnea    Chest:  one view.    Comparison: 2020    AP radiograph of the chest demonstrates diffuse BILATERAL alveolar infiltrates most prominent in the LEFT lower lobe with small LEFT pleural effusion. The cardiac silhouette is normal in size. Pacemaker intact. Osseous structures are intact.    Impression:diffuse BILATERAL alveolar infiltrates most prominentin the LEFT lower lobe with small LEFT pleural effusion.          EXAM:  CT CHEST                            PROCEDURE DATE:  2021          INTERPRETATION:  EXAMINATION: CT CHEST    CLINICAL INDICATION: Covid.    TECHNIQUE: Noncontrast CT of the chest was obtained.    COMPARISON: Multiple CT, most recent 2020.    FINDINGS:    AIRWAYS AND LUNGS: The central tracheobronchial tree is patent.  Extensive bilateral groundglass and reticular opacities.    MEDIASTINUM AND PLEURA: There are no enlarged mediastinal, hilar or axillary lymph nodes. The visualized portion of the thyroid gland is heterogeneous. There is no pleural effusion. There is no pneumothorax.    HEART AND VESSELS: There is cardiomegaly.  There are atherosclerotic calcifications of the aorta and coronary arteries.  There is no pericardial effusion.  Aortic valve calcification. Left-sided defibrillator.    UPPER ABDOMEN: Images of the upper abdomen demonstrate cholelithiasis. Prominent left renal pelvis..    BONES AND SOFT TISSUES: The bones are unremarkable.  The soft tissues are unremarkable.    TUBES/LINES: None.    IMPRESSION:  COVID pneumonia    Prominent left renal pelvis is new from the prior study and may represent hydronephrosis. Renal ultrasound versus CT abdomen/pelvis recommended for complete evaluation.

## 2021-03-08 NOTE — CONSULT NOTE ADULT - CONSULT REASON
history of hip pain and osteoarthritis
CHF
STACIE  Hyperkalemia
bilateral hip arthritis
resp failure, pna / ? covid
hypoxia, acute respiratory failure

## 2021-03-08 NOTE — PROGRESS NOTE ADULT - SUBJECTIVE AND OBJECTIVE BOX
Date of service: 03-08-21 @ 13:50    pt seen and examined  on VM  able to take deep breaths  has dyspnea  no reported fevers       ROS: no fever or chills; denies dizziness, no HA, no abdominal pain, no diarrhea or constipation; no dysuria, no urinary frequency, no legs pain, no rashes    MEDICATIONS  (STANDING):  acetaminophen  IVPB .. 1000 milliGRAM(s) IV Intermittent once  aspirin enteric coated 81 milliGRAM(s) Oral daily  budesonide 160 MICROgram(s)/formoterol 4.5 MICROgram(s) Inhaler 2 Puff(s) Inhalation two times a day  calcium carbonate 1250 mG  + Vitamin D (OsCal 500 + D) 1 Tablet(s) Oral daily  carvedilol Oral Tab/Cap - Peds 50 milliGRAM(s) Oral two times a day  cyclobenzaprine 5 milliGRAM(s) Oral three times a day  dextrose 40% Gel 15 Gram(s) Oral once  dextrose 5%. 1000 milliLiter(s) (50 mL/Hr) IV Continuous <Continuous>  dextrose 5%. 1000 milliLiter(s) (100 mL/Hr) IV Continuous <Continuous>  dextrose 50% Injectable 25 Gram(s) IV Push once  dextrose 50% Injectable 25 Gram(s) IV Push once  dextrose 50% Injectable 12.5 Gram(s) IV Push once  folic acid 1 milliGRAM(s) Oral daily  glucagon  Injectable 1 milliGRAM(s) IntraMuscular once  guaiFENesin  milliGRAM(s) Oral every 12 hours  insulin lispro (ADMELOG) corrective regimen sliding scale   SubCutaneous three times a day before meals  insulin lispro (ADMELOG) corrective regimen sliding scale   SubCutaneous at bedtime  lidocaine   Patch 1 Patch Transdermal daily  meropenem  IVPB 1000 milliGRAM(s) IV Intermittent every 12 hours  methylPREDNISolone sodium succinate Injectable 40 milliGRAM(s) IV Push every 8 hours  polyethylene glycol 3350 17 Gram(s) Oral daily  sacubitril 49 mG/valsartan 51 mG 1 Tablet(s) Oral two times a day  simvastatin 20 milliGRAM(s) Oral at bedtime      Vital Signs Last 24 Hrs  T(C): 36.4 (08 Mar 2021 07:30), Max: 36.6 (07 Mar 2021 22:54)  T(F): 97.6 (08 Mar 2021 07:30), Max: 97.9 (07 Mar 2021 22:54)  HR: 93 (08 Mar 2021 07:35) (92 - 100)  BP: 104/55 (08 Mar 2021 07:35) (93/77 - 113/69)  BP(mean): --  RR: 19 (08 Mar 2021 07:30) (19 - 20)  SpO2: 92% (08 Mar 2021 07:30) (92% - 93%)        PE:  Constitutional: frail looking  HEENT: NC/AT, EOMI, PERRLA, conjunctivae clear; ears and nose atraumatic; pharynx benign  Neck: supple; thyroid not palpable  Back: no tenderness  Respiratory: decreased breath sounds, crackles   Cardiovascular: S1S2 regular, no murmurs  Abdomen: soft, not tender, not distended, positive BS; liver and spleen WNL  Genitourinary: no suprapubic tenderness  Lymphatic: no LN palpable  Musculoskeletal: no muscle tenderness, no joint swelling or tenderness  Extremities: no pedal edema  Neurological/ Psychiatric: moving all extremities  Skin: no rashes; no palpable lesions    Labs: all available labs reviewed                                                              12.3   19.63 )-----------( 238      ( 08 Mar 2021 08:37 )             39.6     03-08    134<L>  |  105  |  69<H>  ----------------------------<  161<H>  5.8<H>   |  24  |  1.85<H>    Ca    8.7      08 Mar 2021 08:37    TPro  5.9<L>  /  Alb  2.3<L>  /  TBili  0.4  /  DBili  x   /  AST  25  /  ALT  73  /  AlkPhos  165<H>  03-08      C-Reactive Protein, Serum: 17 mg/L (03-05-21 @ 07:18)  Ferritin, Serum: 301 ng/mL (03-05-21 @ 07:18)  D-Dimer Assay, Quantitative: 301 ng/mL DDU (03-05-21 @ 07:18)  D-Dimer Assay, Quantitative: 256 ng/mL DDU (03-04-21 @ 17:30)  D-Dimer Assay, Quantitative: 275 ng/mL DDU (03-01-21 @ 07:23)  C-Reactive Protein, Serum: 206 mg/L (03-01-21 @ 07:23)  Ferritin, Serum: 383 ng/mL (03-01-21 @ 07:23)      Radiology: all available radiological tests reviewed    EXAM:  CT CHEST                            PROCEDURE DATE:  02/28/2021          INTERPRETATION:  EXAMINATION: CT CHEST    CLINICAL INDICATION: Covid.    TECHNIQUE: Noncontrast CT of the chest was obtained.    COMPARISON: Multiple CT, most recent 8/11/2020.    FINDINGS:    AIRWAYS AND LUNGS: The central tracheobronchial tree is patent.  Extensive bilateral groundglass and reticular opacities.    MEDIASTINUM AND PLEURA: There are no enlarged mediastinal, hilar or axillary lymph nodes. The visualized portion of the thyroid gland is heterogeneous. There is no pleural effusion. There is no pneumothorax.    HEART AND VESSELS: There is cardiomegaly.  There are atherosclerotic calcifications of the aorta and coronary arteries.  There is no pericardial effusion.  Aortic valve calcification. Left-sided defibrillator.    UPPER ABDOMEN: Images of the upper abdomen demonstrate cholelithiasis. Prominent left renal pelvis..    BONES AND SOFT TISSUES: The bones are unremarkable.  The soft tissues are unremarkable.    TUBES/LINES: None.    IMPRESSION:  COVID pneumonia    Prominent left renal pelvis is new from the prior study and may represent hydronephrosis. Renal ultrasound versus CT abdomen/pelvis recommended for complete evaluation.      < end of copied text >  < from: MR Lumbar Spine No Cont (03.01.21 @ 12:29) >    EXAM:  MR SPINE LUMBAR                            PROCEDURE DATE:  03/01/2021          INTERPRETATION:  Exam Date: 3/1/2021 12:29 PM    MR lumbar without gadolinium    CLINICAL INFORMATION:   L2 compression fracture    TECHNIQUE:   Sagittal and axial T1-weighted images, sagittal inversion recovery images, and sagittal and axial T1-weighted and T2-weighted images of the lumbar spine were obtained.    FINDINGS: Comparison is made to CT lumbar of 2/26/2021.    Reidentified is a acute compression fracture of the L2 vertebral body with approximately 45% vertebral body height loss within the mid body. There is no significant bony retropulsion. No associated paraspinal or epidural disease. No additional fractures.    Multilevel degenerative changes as follows:    At L1/L2 and L2/L3 there are small posterior disc bulges without significant foraminal or central spinal canal stenosis.    At L3/L4, there is a posterior disc bulge resulting in mild narrowing of the right neural foramen without significant left foraminal or central spinal canal stenosis.    At L4/L5 and L5/S1, there are small posterior disc bulges without significant foraminal or central spinal canal stenosis.    The distal cord maintains intact morphology.  Distal cord signal intensity is preserved.  The conus is normally positioned at the T12 level.  Nerve roots of the cauda equina appear intact.      IMPRESSION:    Reidentified is a acute compression fracture of the L2 vertebral body with approximately 45% vertebral body height loss within the mid body. There is no significant bony retropulsion. No associated paraspinal or epidural disease. No additional fractures.    Mild degenerative changes as above.        < end of copied text >    Advanced directives addressed: full resuscitation

## 2021-03-08 NOTE — PROGRESS NOTE ADULT - ASSESSMENT
Patient is a 75y old  Female who presents with a chief complaint of pain uncontrolled L2 fracture.      # Acute spontaneous L2 fracture:  - could be related to osteoporosis/chronic steroid use.   - cyclobenzaprine PO PRN for muscle spasm  - lidocaine 1 patch daily for pain  - continue bracing  - continue physical therapy  - incentive spirometry  - started on OsCal 500 + D 1 tab PO qd to promote fx healing.    # Hypoxic respiratory failure - improved from yesterday  - multifactorial:  HCAP; CHF.  - ddx: ILD due to MTX and/or amiodarone.  - Bactrim #5 / cefepime #6 - continue as per ID.  - azithromycin discontinued as per ID 3/6.  - Remdesevir completed 2 days  - WBC still elevated at 19.63 - possibly due in part to solumedrol.  - O2 taper to 40% via VM attempted 3/7 - pt did not tolerate, O2 titrated back up to 50% via VM and pt is stable.  - solumedrol #4 - as per pulm, decreased to 40mg q8h. Consider further taper if pt can tolerate decreased O2 support.  - acetaminophen PRN for fever >100.4 F.  - sputum Cx. and possible bronchoscopy when more stable.    # constipation  - started on Miralax 17 gm PO qd    # STACIE without hx CKD  - worsening BUN / Cr  - hyperkalemia to 5.8  - possibly 2/2 Bactrim administration  - pt refused kayexalate yesterday 3/7  - attempt kayexalate 15 gm PO x 1 today  - continue to monitor BMP  - nephrology consult noted  - urine electrolytes pending  - consult ID - consider d/c Bactrim. ?    # poor PO intake  - Nutrition eval noted    # Hypotensive at baseline, BP 90s systolic - improved  - continue Lasix    # Hyperglycemia, likely secondary to solumedrol - stable  - ISS  - routine POCT     # PT/INR elevated from yesterday  - continue pt on warfarin with hold instructions  - no signs of active bleeding / anemia - continue to observe  - Hgb 12.9 / Hct 39.6 /      # COPD:  - albuterol prn.   - inhaled steroid.    # A fib:  - continue coumadin, aspirin  - continue carvedilol  - Cardiology consult appreciated.    # Chronic systolic CHF/AICD:  - continue Lasix  - continue entresto, carvedilol   - Cardiology consult appreciated.     # Rheumatoid arthritis:  - continue to hold prednisone while solumedrol is administered  - hold mtx.     # DVT px:  - pt on coumadin with hold parameters. Patient is a 75y old  Female who presents with a chief complaint of pain uncontrolled L2 fracture.      # Acute spontaneous L2 fracture:  - could be related to osteoporosis/chronic steroid use.   - cyclobenzaprine PO PRN for muscle spasm  - lidocaine 1 patch daily for pain  - continue bracing  - continue physical therapy  - incentive spirometry  - started on OsCal 500 + D 1 tab PO qd to promote fx healing.    # Hypoxic respiratory failure - improved from yesterday  - multifactorial:  HCAP; CHF.  - ddx: ILD due to MTX and/or amiodarone.  - Bactrim #5 / cefepime #6 - continue as per ID.  - as per ID - d/c Bactrim 2/2 worsening BUN / Cr / Hyperkalemia.   - azithromycin discontinued as per ID 3/6.  - Remdesevir completed 2 days  - WBC still elevated at 19.63 - possibly due in part to solumedrol.  - O2 taper to 40% via VM attempted 3/7 - pt did not tolerate, O2 titrated back up to 50% via VM and pt is stable.  - solumedrol #4 - as per pulm, decreased to 40mg q8h. Consider further taper if pt can tolerate decreased O2 support.  - acetaminophen PRN for fever >100.4 F.  - sputum Cx. and possible bronchoscopy when more stable.    # constipation  - continue Miralax 17 gm PO qd    # STACIE without hx CKD  - worsening BUN / Cr (69 / 1.85)  - hyperkalemia to 5.8  - possibly 2/2 Bactrim administration  - pt refused kayexalate yesterday 3/7  - kayexalate 30 gm PO x 1 today  - continue to monitor BMP  - nephrology consult noted  - urine electrolytes pending  - as per ID - d/c Bactrim    # poor PO intake  - Nutrition eval noted    # Hypotensive at baseline, BP 90s systolic - improved  - continue Lasix    # Hyperglycemia, likely secondary to solumedrol - stable  - ISS  - routine POCT     # PT/INR elevated from yesterday  - continue pt on warfarin with hold instructions  - no signs of active bleeding / anemia - continue to observe  - Hgb 12.9 / Hct 39.6 /      # COPD:  - albuterol prn.   - inhaled steroid.    # A fib:  - continue coumadin, aspirin  - continue carvedilol  - Cardiology consult appreciated.    # Chronic systolic CHF/AICD:  - continue Lasix  - continue entresto, carvedilol   - Cardiology consult appreciated.     # Rheumatoid arthritis:  - continue to hold prednisone while solumedrol is administered  - hold mtx.     # DVT px:  - pt on coumadin with hold parameters. Patient is a 75y old  Female who presents with a chief complaint of pain uncontrolled L2 fracture.      # Acute spontaneous L2 fracture:  - could be related to osteoporosis/chronic steroid use.   - cyclobenzaprine PO PRN for muscle spasm  - lidocaine 1 patch daily for pain  - continue bracing  - continue physical therapy  - incentive spirometry  - started on OsCal 500 + D 1 tab PO qd to promote fx healing.    # Hypoxic respiratory failure - stable  - multifactorial:  HCAP; CHF.  - ddx: ILD due to MTX and/or amiodarone.  - Bactrim #5 / cefepime #6 - continue as per ID.  - as per ID - d/c Bactrim 2/2 worsening BUN / Cr / Hyperkalemia.   - azithromycin discontinued as per ID 3/6.  - Remdesevir completed 2 days  - WBC still elevated at 19.63 - possibly due in part to solumedrol.  - O2 taper to 40% via VM attempted 3/7 - pt did not tolerate, O2 titrated back up to 50% via VM and pt is stable.  - solumedrol #4 - as per pulm, decreased to 40mg q8h. Consider further taper if pt can tolerate decreased O2 support.  - as per pulm, bronchoscopy too high risk at this point.   - acetaminophen PRN for fever >100.4 F.  - add Mucinex BID for cough    # constipation  - pt reports normal BM 3/8.  - continue Miralax 17 gm PO qd    # STACIE without hx CKD  - worsening BUN / Cr (69 / 1.85)  - hyperkalemia to 5.8  - possibly 2/2 Bactrim administration  - pt refused kayexalate yesterday 3/7  - kayexalate 30 gm PO x 1 today  - continue to monitor BMP   - nephrology consult noted - will discuss whether to d/c Entresto in the setting of STACIE.   - will d/c Lasix in the setting of rising Cr and /55.   - urine electrolytes pending   - as per ID - d/c Bactrim  - avoid nephrotoxic agents    # poor PO intake  - Nutrition eval noted    # Hypotensive at baseline, BP 90s-100s systolic  - d/c Lasix 3/8    # Hyperglycemia, likely secondary to solumedrol - stable  - ISS  - routine POCT     # PT/INR elevated from yesterday  - continue pt on warfarin with hold instructions  - no signs of active bleeding / anemia - continue to observe  - Hgb 12.9 / Hct 39.6 /   - monitor CBC, LFTs      # COPD:  - albuterol prn.   - inhaled steroid.    # A fib:  - continue coumadin, aspirin  - continue carvedilol  - Cardiology consult appreciated.    # Chronic systolic CHF/AICD:  - d/c Lasix in the setting of STACIE.   - continue entresto, carvedilol  - nephrology consult noted - will defer to them decision to continue Entresto in the setting of STACIE.  - Cardiology consult appreciated.     # Rheumatoid arthritis:  - continue to hold prednisone while solumedrol is administered  - hold mtx.     # DVT px:  - pt on coumadin with hold parameters.    Dispo - pt stable on 50% O2 via VM. Titrate O2 down as tolerated. Consider steroid taper once O2 requirement improved. Discharge not likely until pt more stable.

## 2021-03-09 NOTE — PROGRESS NOTE ADULT - SUBJECTIVE AND OBJECTIVE BOX
Patient is a 75y Female who reports no complaints as new    MEDICATIONS  (STANDING):  acetaminophen  IVPB .. 1000 milliGRAM(s) IV Intermittent once  aspirin enteric coated 81 milliGRAM(s) Oral daily  budesonide 160 MICROgram(s)/formoterol 4.5 MICROgram(s) Inhaler 2 Puff(s) Inhalation two times a day  calcium carbonate 1250 mG  + Vitamin D (OsCal 500 + D) 1 Tablet(s) Oral daily  carvedilol Oral Tab/Cap - Peds 50 milliGRAM(s) Oral two times a day  cyclobenzaprine 5 milliGRAM(s) Oral three times a day  dextrose 40% Gel 15 Gram(s) Oral once  dextrose 5%. 1000 milliLiter(s) (50 mL/Hr) IV Continuous <Continuous>  dextrose 5%. 1000 milliLiter(s) (100 mL/Hr) IV Continuous <Continuous>  dextrose 50% Injectable 25 Gram(s) IV Push once  dextrose 50% Injectable 12.5 Gram(s) IV Push once  dextrose 50% Injectable 25 Gram(s) IV Push once  folic acid 1 milliGRAM(s) Oral daily  glucagon  Injectable 1 milliGRAM(s) IntraMuscular once  guaiFENesin  milliGRAM(s) Oral every 12 hours  insulin lispro (ADMELOG) corrective regimen sliding scale   SubCutaneous three times a day before meals  insulin lispro (ADMELOG) corrective regimen sliding scale   SubCutaneous at bedtime  lidocaine   Patch 1 Patch Transdermal daily  meropenem  IVPB 1000 milliGRAM(s) IV Intermittent every 12 hours  methylPREDNISolone sodium succinate Injectable 40 milliGRAM(s) IV Push every 8 hours  polyethylene glycol 3350 17 Gram(s) Oral daily  simvastatin 20 milliGRAM(s) Oral at bedtime    MEDICATIONS  (PRN):  acetaminophen   Tablet .. 650 milliGRAM(s) Oral every 6 hours PRN Temp greater or equal to 38C (100.4F), fever, headaches  ALBUTerol    90 MICROgram(s) HFA Inhaler 2 Puff(s) Inhalation every 6 hours PRN Shortness of Breath and/or Wheezing  cyclobenzaprine 5 milliGRAM(s) Oral at bedtime PRN Muscle Spasm        T(C): , Max: 36.6 (21 @ 16:39)  T(F): , Max: 97.9 (21 @ 16:39)  HR: 80 (21 @ 09:07)  BP: 108/64 (21 @ 09:07)  BP(mean): --  RR: 18 (21 @ 09:07)  SpO2: 93% (21 @ 09:07)  Wt(kg): --     @ 07:01  -   @ 07:00  --------------------------------------------------------  IN: 100 mL / OUT: 900 mL / NET: -800 mL          PHYSICAL EXAM:    Constitutional: NAD  HEENT: MMM  Neck: No LAD, No JVD  Respiratory: CTAB  Cardiovascular: S1 and S2, RRR  Gastrointestinal: BS+, soft, NT/ND  Extremities: No peripheral edema  Neurological: A/O x 3, no focal deficits  Psychiatric: Normal mood, normal affect  : No Quezada  Skin: No rashes  Access: Not applicable        LABS:                        12.4   23.06 )-----------( 235      ( 09 Mar 2021 06:55 )             39.5     09 Mar 2021 06:55    141    |  107    |  67     ----------------------------<  134    4.9     |  25     |  1.86   08 Mar 2021 08:37    134    |  105    |  69     ----------------------------<  161    5.8     |  24     |  1.85   07 Mar 2021 06:27    138    |  104    |  59     ----------------------------<  155    5.5     |  25     |  1.50   06 Mar 2021 09:58    137    |  105    |  49     ----------------------------<  168    5.5     |  25     |  1.29     Ca    8.4        09 Mar 2021 06:55  Ca    8.7        08 Mar 2021 08:37  Ca    8.6        07 Mar 2021 06:27  Ca    8.8        06 Mar 2021 09:58    TPro  6.0    /  Alb  2.3    /  TBili  0.4    /  DBili  x      /  AST  21     /  ALT  63     /  AlkPhos  168    09 Mar 2021 06:55  TPro  5.9    /  Alb  2.3    /  TBili  0.4    /  DBili  x      /  AST  25     /  ALT  73     /  AlkPhos  165    08 Mar 2021 08:37  TPro  5.6    /  Alb  2.2    /  TBili  0.4    /  DBili  x      /  AST  18     /  ALT  63     /  AlkPhos  151    07 Mar 2021 06:27  TPro  5.9    /  Alb  2.4    /  TBili  0.4    /  DBili  x      /  AST  18     /  ALT  77     /  AlkPhos  158    06 Mar 2021 09:58          Urine Studies:  Urinalysis Basic - ( 08 Mar 2021 15:45 )    Color: Yellow / Appearance: very cloudy / S.020 / pH: x  Gluc: x / Ketone: Negative  / Bili: Negative / Urobili: Negative mg/dL   Blood: x / Protein: 30 mg/dL / Nitrite: Negative   Leuk Esterase: Small / RBC: 3-5 /HPF / WBC 6-10   Sq Epi: x / Non Sq Epi: Occasional / Bacteria: Occasional      Creatinine, Random Urine: 68 mg/dL ( @ 15:45)  Creatinine, Random Urine: 67.0 mg/dL ( @ 15:45)  Protein/Creatinine Ratio Calculation: 1.3 Ratio ( @ 15:45)  Potassium, Random Urine: 53.0 mmol/L ( @ 11:00)  Sodium, Random Urine: 65 mmol/L ( @ 11:00)        RADIOLOGY & ADDITIONAL STUDIES:

## 2021-03-09 NOTE — DIETITIAN INITIAL EVALUATION ADULT. - ENERGY INTAKE
pt reports only consuming 1/4 of meals up till today; today appetite improving, pt consumed 1/2 of breakfast..  pt meeting <50% of estimated nutr needs x 11 days. Poor (<50%)

## 2021-03-09 NOTE — DIETITIAN INITIAL EVALUATION ADULT. - PERTINENT LABORATORY DATA
03-09    141  |  107  |  67<H>  ----------------------------<  134<H>  4.9   |  25  |  1.86<H>    Ca    8.4<L>      09 Mar 2021 06:55    TPro  6.0  /  Alb  2.3<L>  /  TBili  0.4  /  DBili  x   /  AST  21  /  ALT  63  /  AlkPhos  168<H>  03-09    HbA1c: A1C with Estimated Average Glucose Result: 6.0 % (03-05-21 @ 07:18)  Glucose: POCT Blood Glucose.: 218 mg/dL (03-09-21 @ 11:02)

## 2021-03-09 NOTE — PROGRESS NOTE ADULT - ASSESSMENT
76 yo female with hx of AICD, CM, and now presenting with lower back pains wth l2 fracture and hypoxia with bilateral GGO infiltrates on CT but with negative COVID (PUI)    STACIE in setting of bactrim use while on lasix and entresto   Renal function stable, K improved  maintain hold lasix, Entresto and Bactrim   formal renal sono noted, monitor voiding (had 186 cc in bladder)  IVF if Cr rises, monitor bladder scans with findings on US  F/u serologies    d/c with staff and team

## 2021-03-09 NOTE — DIETITIAN INITIAL EVALUATION ADULT. - MALNUTRITION
likely given poor PO intake since admission; however, with wt loss being partially 2/2 fluid loss from CHF, unable to determine % from poor PO intake

## 2021-03-09 NOTE — PROGRESS NOTE ADULT - ASSESSMENT
HCAP/CHF/ILD  on ventimask , contrinue trial of weaning  on solumedrol symbicort, s/p cefepime #7, now meropenem    STACIE on CKD  held lasix , held bactrim, held entresto    hx afib   INr supratherapeutic   held coumadin     75y old  Female who presents with a chief complaint of pain uncontrolled L2 fracture.      # Acute spontaneous L2 fracture:  - could be related to osteoporosis/chronic steroid use.   - cyclobenzaprine PO PRN for muscle spasm  - lidocaine 1 patch daily for pain  - continue bracing  - continue physical therapy  - incentive spirometry  - started on OsCal 500 + D 1 tab PO qd to promote fx healing.    # Hypoxic respiratory failure - stable  - multifactorial:  HCAP; CHF.  - ddx: ILD due to MTX and/or amiodarone.  - Bactrim #5 / cefepime #6 - continue as per ID.  - as per ID - d/c Bactrim 2/2 worsening BUN / Cr / Hyperkalemia.   - azithromycin discontinued as per ID 3/6.  - Remdesevir completed 2 days  - WBC still elevated at 19.63 - possibly due in part to solumedrol.  - O2 taper to 40% via VM attempted 3/7 - pt did not tolerate, O2 titrated back up to 50% via VM and pt is stable.  - solumedrol #4 - as per pulm, decreased to 40mg q8h. Consider further taper if pt can tolerate decreased O2 support.  - as per pulm, bronchoscopy too high risk at this point.   - acetaminophen PRN for fever >100.4 F.  - add Mucinex BID for cough    # constipation  - pt reports normal BM 3/8.  - continue Miralax 17 gm PO qd    # STACIE without hx CKD  - worsening BUN / Cr (69 / 1.85)  - hyperkalemia to 5.8  - possibly 2/2 Bactrim administration  - pt refused kayexalate yesterday 3/7  - kayexalate 30 gm PO x 1 today  - continue to monitor BMP   - nephrology consult noted - will discuss whether to d/c Entresto in the setting of STACIE.   - will d/c Lasix in the setting of rising Cr and /55.   - urine electrolytes pending   - as per ID - d/c Bactrim  - avoid nephrotoxic agents    # poor PO intake  - Nutrition eval noted    # Hypotensive at baseline, BP 90s-100s systolic  - d/c Lasix 3/8    # Hyperglycemia, likely secondary to solumedrol - stable  - ISS  - routine POCT     # PT/INR elevated from yesterday  - continue pt on warfarin with hold instructions  - no signs of active bleeding / anemia - continue to observe  - Hgb 12.9 / Hct 39.6 /   - monitor CBC, LFTs      # COPD:  - albuterol prn.   - inhaled steroid.    # A fib:  - continue coumadin, aspirin  - continue carvedilol  - Cardiology consult appreciated.    # Chronic systolic CHF/AICD:  - d/c Lasix in the setting of STACIE.   - continue entresto, carvedilol  - nephrology consult noted - will defer to them decision to continue Entresto in the setting of STACIE.  - Cardiology consult appreciated.     # Rheumatoid arthritis:  - continue to hold prednisone while solumedrol is administered  - hold mtx.     # DVT px:  - pt on coumadin with hold parameters.    Dispo - pt stable on 50% O2 via VM. Titrate O2 down as tolerated. Consider steroid taper once O2 requirement improved. Discharge not likely until pt more stable.

## 2021-03-09 NOTE — PROGRESS NOTE ADULT - SUBJECTIVE AND OBJECTIVE BOX
Subjective:    Awake, alert. Looks sl better today. Above noted with abnormal lytes.    MEDICATIONS  (STANDING):  acetaminophen  IVPB .. 1000 milliGRAM(s) IV Intermittent once  aspirin enteric coated 81 milliGRAM(s) Oral daily  budesonide 160 MICROgram(s)/formoterol 4.5 MICROgram(s) Inhaler 2 Puff(s) Inhalation two times a day  calcium carbonate 1250 mG  + Vitamin D (OsCal 500 + D) 1 Tablet(s) Oral daily  carvedilol Oral Tab/Cap - Peds 50 milliGRAM(s) Oral two times a day  cyclobenzaprine 5 milliGRAM(s) Oral three times a day  dextrose 40% Gel 15 Gram(s) Oral once  dextrose 5%. 1000 milliLiter(s) (50 mL/Hr) IV Continuous <Continuous>  dextrose 5%. 1000 milliLiter(s) (100 mL/Hr) IV Continuous <Continuous>  dextrose 50% Injectable 25 Gram(s) IV Push once  dextrose 50% Injectable 12.5 Gram(s) IV Push once  dextrose 50% Injectable 25 Gram(s) IV Push once  folic acid 1 milliGRAM(s) Oral daily  glucagon  Injectable 1 milliGRAM(s) IntraMuscular once  guaiFENesin  milliGRAM(s) Oral every 12 hours  insulin lispro (ADMELOG) corrective regimen sliding scale   SubCutaneous three times a day before meals  insulin lispro (ADMELOG) corrective regimen sliding scale   SubCutaneous at bedtime  lidocaine   Patch 1 Patch Transdermal daily  meropenem  IVPB 1000 milliGRAM(s) IV Intermittent every 12 hours  methylPREDNISolone sodium succinate Injectable 40 milliGRAM(s) IV Push every 8 hours  polyethylene glycol 3350 17 Gram(s) Oral daily  simvastatin 20 milliGRAM(s) Oral at bedtime    MEDICATIONS  (PRN):  acetaminophen   Tablet .. 650 milliGRAM(s) Oral every 6 hours PRN Temp greater or equal to 38C (100.4F), fever, headaches  ALBUTerol    90 MICROgram(s) HFA Inhaler 2 Puff(s) Inhalation every 6 hours PRN Shortness of Breath and/or Wheezing  cyclobenzaprine 5 milliGRAM(s) Oral at bedtime PRN Muscle Spasm      Allergies    apixaban (Other)  fesoterodine (Unknown)  Macrobid (Other)    Intolerances        Vital Signs Last 24 Hrs  T(C): 36.6 (09 Mar 2021 09:07), Max: 36.6 (08 Mar 2021 16:39)  T(F): 97.8 (09 Mar 2021 09:07), Max: 97.9 (08 Mar 2021 16:39)  HR: 80 (09 Mar 2021 09:07) (80 - 93)  BP: 108/64 (09 Mar 2021 09:07) (95/59 - 108/64)  BP(mean): --  RR: 18 (09 Mar 2021 09:07) (17 - 18)  SpO2: 93% (09 Mar 2021 09:07) (90% - 93%)    PHYSICAL EXAMINATION:    NECK:  Supple. No lymphadenopathy. Jugular venous pressure not elevated.   HEART:   The cardiac impulse has a normal quality. Reg., Nl S1 and S2.    CHEST:  Chest is clear to auscultation. Sl decreased BS at bases. Sl increased respiratory effort.  ABDOMEN:  Soft and nontender.   EXTREMITIES:  There is no edema.       LABS:                        12.3   19.63 )-----------( 238      ( 08 Mar 2021 08:37 )             39.6     03    141  |  107  |  67<H>  ----------------------------<  134<H>  4.9   |  25  |  1.86<H>    Ca    8.4<L>      09 Mar 2021 06:55    TPro  6.0  /  Alb  2.3<L>  /  TBili  0.4  /  DBili  x   /  AST  21  /  ALT  63  /  AlkPhos  168<H>  09    PT/INR - ( 08 Mar 2021 08:37 )   PT: 65.9 sec;   INR: 6.20 ratio           Urinalysis Basic - ( 08 Mar 2021 15:45 )    Color: Yellow / Appearance: very cloudy / S.020 / pH: x  Gluc: x / Ketone: Negative  / Bili: Negative / Urobili: Negative mg/dL   Blood: x / Protein: 30 mg/dL / Nitrite: Negative   Leuk Esterase: Small / RBC: 3-5 /HPF / WBC 6-10   Sq Epi: x / Non Sq Epi: Occasional / Bacteria: Occasional        RADIOLOGY & ADDITIONAL TESTS:    ssessment and Plan:   · Assessment  	  - CT scan shows bilat ground glass infiltrates - consistent w COVID, however, nasal PCR negative  - on Meropenem. Bactrim D/C'ed due to renal insufficiency/hyperkalemia  - Cont. solumedrol at 40mg q8h  - cont symbicort  - dvt proph  - Renal F/U  - OOB to chair    Problem/Plan - 1:  ·  Problem: Acute respiratory failure with hypoxia.   Problem/Plan - 2:  ·  Problem: Multifocal pneumonia.   Problem/Plan - 3:  ·  Problem: Emphysema of lung.   Problem/Plan - 4:  ·  Problem: CHF with cardiomyopathy.   Problem/Plan - 5:  ·  Problem: Anemia.   Problem/Plan - 6:  Problem: Coagulopathy.

## 2021-03-09 NOTE — DIETITIAN INITIAL EVALUATION ADULT. - ADD RECOMMEND
1) add potassium restriction to diet Rx to maintain lower K level; add nepro once daily and gelatein BID to optimize PO intake 2) monitor PO intake/tolerance 3) consider checking vitamin D level and supplement prn 4) daily wt checks to track/trend changes 1) change diet Rx to low sodium, consistent carb, with potassium restriction to diet Rx to maintain lower K level; add nepro once daily and gelatein BID to optimize PO intake 2) monitor PO intake/tolerance 3) consider checking vitamin D level and supplement prn 4) daily wt checks to track/trend changes

## 2021-03-09 NOTE — PROGRESS NOTE ADULT - SUBJECTIVE AND OBJECTIVE BOX
C:  Patient is a 75y old  Female who presents with a chief complaint of pain uncontrolled L2 fracture    3/8 pt seen and evaluated at bedside. Pt denies complaints of pain. Pt states she is still experiencing REGAN but feels about the same as yesterday. Pt reports 1 normal BM since yesterday.    3/9 feels winded on minimal exertion  ROS:   All 10 systems reviewed and found to be negative with the exception of what has been described above.  GEN: lying in bed, mild respiratory distress.  HEENT:   NC/AT, pupils equal and reactive, EOMI  CV:  +S1, +S2, RRR  RESP:   fine crackles appreciated at b/l lung bases, no expiratory wheeze  BREAST:  not examined  GI:  abdomen soft, non-tender, non-distended, normoactive BS  RECTAL:  not examined  :  not examined  MSK:   normal muscle tone  EXT:  no edema  NEURO:  AAOX3, no focal neurological deficits  SKIN:  no rashes      PHYSICAL EXAM:    Daily     Daily     ICU Vital Signs Last 24 Hrs  T(C): 36.6 (09 Mar 2021 09:07), Max: 36.6 (08 Mar 2021 16:39)  T(F): 97.8 (09 Mar 2021 09:07), Max: 97.9 (08 Mar 2021 16:39)  HR: 80 (09 Mar 2021 09:07) (80 - 93)  BP: 108/64 (09 Mar 2021 09:07) (95/59 - 108/64)  BP(mean): --  ABP: --  ABP(mean): --  RR: 18 (09 Mar 2021 09:07) (17 - 18)  SpO2: 93% (09 Mar 2021 09:07) (90% - 93%)                                12.4   23.06 )-----------( 235      ( 09 Mar 2021 06:55 )             39.5       CBC Full  -  ( 09 Mar 2021 06:55 )  WBC Count : 23.06 K/uL  RBC Count : 4.01 M/uL  Hemoglobin : 12.4 g/dL  Hematocrit : 39.5 %  Platelet Count - Automated : 235 K/uL  Mean Cell Volume : 98.5 fl  Mean Cell Hemoglobin : 30.9 pg  Mean Cell Hemoglobin Concentration : 31.4 gm/dL  Auto Neutrophil # : x  Auto Lymphocyte # : x  Auto Monocyte # : x  Auto Eosinophil # : x  Auto Basophil # : x  Auto Neutrophil % : x  Auto Lymphocyte % : x  Auto Monocyte % : x  Auto Eosinophil % : x  Auto Basophil % : x          141  |  107  |  67<H>  ----------------------------<  134<H>  4.9   |  25  |  1.86<H>    Ca    8.4<L>      09 Mar 2021 06:55    TPro  6.0  /  Alb  2.3<L>  /  TBili  0.4  /  DBili  x   /  AST  21  /  ALT  63  /  AlkPhos  168<H>        LIVER FUNCTIONS - ( 09 Mar 2021 06:55 )  Alb: 2.3 g/dL / Pro: 6.0 gm/dL / ALK PHOS: 168 U/L / ALT: 63 U/L / AST: 21 U/L / GGT: x             PT/INR - ( 09 Mar 2021 06:55 )   PT: 48.5 sec;   INR: 4.45 ratio         PTT - ( 09 Mar 2021 06:55 )  PTT:31.7 sec          Urinalysis Basic - ( 08 Mar 2021 15:45 )    Color: Yellow / Appearance: very cloudy / S.020 / pH: x  Gluc: x / Ketone: Negative  / Bili: Negative / Urobili: Negative mg/dL   Blood: x / Protein: 30 mg/dL / Nitrite: Negative   Leuk Esterase: Small / RBC: 3-5 /HPF / WBC 6-10   Sq Epi: x / Non Sq Epi: Occasional / Bacteria: Occasional            MEDICATIONS  (STANDING):  acetaminophen  IVPB .. 1000 milliGRAM(s) IV Intermittent once  aspirin enteric coated 81 milliGRAM(s) Oral daily  budesonide 160 MICROgram(s)/formoterol 4.5 MICROgram(s) Inhaler 2 Puff(s) Inhalation two times a day  calcium carbonate 1250 mG  + Vitamin D (OsCal 500 + D) 1 Tablet(s) Oral daily  carvedilol Oral Tab/Cap - Peds 50 milliGRAM(s) Oral two times a day  cyclobenzaprine 5 milliGRAM(s) Oral three times a day  dextrose 40% Gel 15 Gram(s) Oral once  dextrose 5%. 1000 milliLiter(s) (50 mL/Hr) IV Continuous <Continuous>  dextrose 5%. 1000 milliLiter(s) (100 mL/Hr) IV Continuous <Continuous>  dextrose 50% Injectable 25 Gram(s) IV Push once  dextrose 50% Injectable 12.5 Gram(s) IV Push once  dextrose 50% Injectable 25 Gram(s) IV Push once  folic acid 1 milliGRAM(s) Oral daily  glucagon  Injectable 1 milliGRAM(s) IntraMuscular once  guaiFENesin  milliGRAM(s) Oral every 12 hours  insulin lispro (ADMELOG) corrective regimen sliding scale   SubCutaneous three times a day before meals  insulin lispro (ADMELOG) corrective regimen sliding scale   SubCutaneous at bedtime  lidocaine   Patch 1 Patch Transdermal daily  meropenem  IVPB 1000 milliGRAM(s) IV Intermittent every 12 hours  methylPREDNISolone sodium succinate Injectable 40 milliGRAM(s) IV Push every 8 hours  polyethylene glycol 3350 17 Gram(s) Oral daily  simvastatin 20 milliGRAM(s) Oral at bedtime

## 2021-03-09 NOTE — DIETITIAN INITIAL EVALUATION ADULT. - PERTINENT MEDS FT
MEDICATIONS  (STANDING):  acetaminophen  IVPB .. 1000 milliGRAM(s) IV Intermittent once  aspirin enteric coated 81 milliGRAM(s) Oral daily  budesonide 160 MICROgram(s)/formoterol 4.5 MICROgram(s) Inhaler 2 Puff(s) Inhalation two times a day  calcium carbonate 1250 mG  + Vitamin D (OsCal 500 + D) 1 Tablet(s) Oral daily  carvedilol Oral Tab/Cap - Peds 50 milliGRAM(s) Oral two times a day  cyclobenzaprine 5 milliGRAM(s) Oral three times a day  dextrose 40% Gel 15 Gram(s) Oral once  dextrose 5%. 1000 milliLiter(s) (50 mL/Hr) IV Continuous <Continuous>  dextrose 5%. 1000 milliLiter(s) (100 mL/Hr) IV Continuous <Continuous>  dextrose 50% Injectable 25 Gram(s) IV Push once  dextrose 50% Injectable 12.5 Gram(s) IV Push once  dextrose 50% Injectable 25 Gram(s) IV Push once  folic acid 1 milliGRAM(s) Oral daily  glucagon  Injectable 1 milliGRAM(s) IntraMuscular once  guaiFENesin  milliGRAM(s) Oral every 12 hours  insulin lispro (ADMELOG) corrective regimen sliding scale   SubCutaneous three times a day before meals  insulin lispro (ADMELOG) corrective regimen sliding scale   SubCutaneous at bedtime  lidocaine   Patch 1 Patch Transdermal daily  meropenem  IVPB 1000 milliGRAM(s) IV Intermittent every 12 hours  methylPREDNISolone sodium succinate Injectable 40 milliGRAM(s) IV Push every 8 hours  polyethylene glycol 3350 17 Gram(s) Oral daily  simvastatin 20 milliGRAM(s) Oral at bedtime    MEDICATIONS  (PRN):  acetaminophen   Tablet .. 650 milliGRAM(s) Oral every 6 hours PRN Temp greater or equal to 38C (100.4F), fever, headaches  ALBUTerol    90 MICROgram(s) HFA Inhaler 2 Puff(s) Inhalation every 6 hours PRN Shortness of Breath and/or Wheezing  cyclobenzaprine 5 milliGRAM(s) Oral at bedtime PRN Muscle Spasm

## 2021-03-09 NOTE — DIETITIAN INITIAL EVALUATION ADULT. - OTHER INFO
74yo female with PMH significant for Afib cardiomyopathy EF 25% and AICD, urinary incontinence and chronic knee and hip pain with severe back pain found to have L2 Fx.  hospitalization c/b hypoxic resp failure worsening with HCAP and CHF.  COVID negative, however, CT scan consistent with COVID per pulmonary.  Now with STACIE and hyperkalemia in setting of bactrim use while on lasix and entresto; seen by nephrology.

## 2021-03-09 NOTE — DIETITIAN INITIAL EVALUATION ADULT. - NS FNS REASON FOR WEIGHT CHANG
pt with decreased edema status since admission; part of wt loss from edema decrease/decreased po intake/fluid loss

## 2021-03-09 NOTE — DIETITIAN INITIAL EVALUATION ADULT. - NAME AND PHONE
Makayla Whaley MA, RDN, CDN, [facility]  [phone] (office number)

## 2021-03-10 NOTE — PROGRESS NOTE ADULT - SUBJECTIVE AND OBJECTIVE BOX
Patient is a 75y Female who reports no complaints as new  remains on VM  but feeling better     MEDICATIONS  (STANDING):  acetaminophen  IVPB .. 1000 milliGRAM(s) IV Intermittent once  aspirin enteric coated 81 milliGRAM(s) Oral daily  budesonide 160 MICROgram(s)/formoterol 4.5 MICROgram(s) Inhaler 2 Puff(s) Inhalation two times a day  calcium carbonate 1250 mG  + Vitamin D (OsCal 500 + D) 1 Tablet(s) Oral daily  carvedilol Oral Tab/Cap - Peds 50 milliGRAM(s) Oral two times a day  cyclobenzaprine 5 milliGRAM(s) Oral three times a day  dextrose 40% Gel 15 Gram(s) Oral once  dextrose 5%. 1000 milliLiter(s) (50 mL/Hr) IV Continuous <Continuous>  dextrose 5%. 1000 milliLiter(s) (100 mL/Hr) IV Continuous <Continuous>  dextrose 50% Injectable 25 Gram(s) IV Push once  dextrose 50% Injectable 12.5 Gram(s) IV Push once  dextrose 50% Injectable 25 Gram(s) IV Push once  folic acid 1 milliGRAM(s) Oral daily  glucagon  Injectable 1 milliGRAM(s) IntraMuscular once  guaiFENesin  milliGRAM(s) Oral every 12 hours  insulin lispro (ADMELOG) corrective regimen sliding scale   SubCutaneous three times a day before meals  insulin lispro (ADMELOG) corrective regimen sliding scale   SubCutaneous at bedtime  lidocaine   Patch 1 Patch Transdermal daily  meropenem  IVPB 1000 milliGRAM(s) IV Intermittent every 12 hours  methylPREDNISolone sodium succinate Injectable 40 milliGRAM(s) IV Push every 8 hours  polyethylene glycol 3350 17 Gram(s) Oral daily  simvastatin 20 milliGRAM(s) Oral at bedtime    MEDICATIONS  (PRN):  acetaminophen   Tablet .. 650 milliGRAM(s) Oral every 6 hours PRN Temp greater or equal to 38C (100.4F), fever, headaches  ALBUTerol    90 MICROgram(s) HFA Inhaler 2 Puff(s) Inhalation every 6 hours PRN Shortness of Breath and/or Wheezing  cyclobenzaprine 5 milliGRAM(s) Oral at bedtime PRN Muscle Spasm      Vital Signs Last 24 Hrs  T(C): 36.3 (10 Mar 2021 07:57), Max: 36.4 (09 Mar 2021 16:24)  T(F): 97.4 (10 Mar 2021 07:57), Max: 97.6 (09 Mar 2021 22:35)  HR: 93 (10 Mar 2021 07:57) (83 - 95)  BP: 104/78 (10 Mar 2021 07:57) (100/65 - 112/58)  BP(mean): --  RR: 20 (10 Mar 2021 07:57) (18 - 20)  SpO2: 94% (10 Mar 2021 07:57) (93% - 100%)      I&O's Detail    09 Mar 2021 07:01  -  10 Mar 2021 07:00  --------------------------------------------------------  IN:  Total IN: 0 mL    OUT:    Voided (mL): 400 mL  Total OUT: 400 mL    Total NET: -400 mL      PHYSICAL EXAM:    Constitutional: NAD  Extremities: No peripheral edema  Neurological: A/O x 3, no focal deficits  : No Quezada  Skin: No rashes  Access: Not applicable        LABS:               142    |  109    |  72     ----------------------------<  147       10 Mar 2021 08:26  4.5     |  27     |  1.63     141    |  107    |  67     ----------------------------<  134       09 Mar 2021 06:55  4.9     |  25     |  1.86     134    |  105    |  69     ----------------------------<  161       08 Mar 2021 08:37  5.8     |  24     |  1.85     Ca    8.9        10 Mar 2021 08:26  Ca    8.4        09 Mar 2021 06:55        TPro  6.2    /  Alb  2.3    /  TBili  0.6    /        10 Mar 2021 08:26  DBili  x      /  AST  18     /  ALT  51     /  AlkPhos  180      TPro  6.0    /  Alb  2.3    /  TBili  0.4    /        09 Mar 2021 06:55  DBili  x      /  AST  21     /  ALT  63     /  AlkPhos  168                                12.6   21.01 )-----------( 192      ( 10 Mar 2021 08:26 )             40.0                         12.4   23.06 )-----------( 235      ( 09 Mar 2021 06:55 )             39.5     Urine Studies:  Urinalysis Basic - ( 08 Mar 2021 15:45 )    Color: Yellow / Appearance: very cloudy / S.020 / pH: x  Gluc: x / Ketone: Negative  / Bili: Negative / Urobili: Negative mg/dL   Blood: x / Protein: 30 mg/dL / Nitrite: Negative   Leuk Esterase: Small / RBC: 3-5 /HPF / WBC 6-10   Sq Epi: x / Non Sq Epi: Occasional / Bacteria: Occasional      Protein/Creatinine Ratio Calculation: 1.3 Ratio ( @ 15:45)      RADIOLOGY & ADDITIONAL STUDIES:

## 2021-03-10 NOTE — PROGRESS NOTE ADULT - ASSESSMENT
74 yo female with hx of AICD, CM, and now presenting with lower back pains wth l2 fracture and hypoxia with bilateral GGO infiltrates on CT but with negative COVID (PUI)    STACIE in setting of bactrim use while on lasix and entresto   Cr trending down   K stable   Maintain holding lasix, Entresto and Bactrim   formal renal sono noted, monitor voiding - if Cr rises then bladder scan  hematuria with pulm findings - F/u serologies    d/c with staff and team

## 2021-03-10 NOTE — PROGRESS NOTE ADULT - SUBJECTIVE AND OBJECTIVE BOX
Date of service: 03-10-21 @ 13:06    pt seen and examined  on VM  feels better   has dyspnea  no reported fevers     ROS: no fever or chills; denies dizziness, no HA, no abdominal pain, no diarrhea or constipation; no dysuria, no urinary frequency, no legs pain, no rashes      MEDICATIONS  (STANDING):  acetaminophen  IVPB .. 1000 milliGRAM(s) IV Intermittent once  aspirin enteric coated 81 milliGRAM(s) Oral daily  budesonide 160 MICROgram(s)/formoterol 4.5 MICROgram(s) Inhaler 2 Puff(s) Inhalation two times a day  calcium carbonate 1250 mG  + Vitamin D (OsCal 500 + D) 1 Tablet(s) Oral daily  carvedilol Oral Tab/Cap - Peds 50 milliGRAM(s) Oral two times a day  cyclobenzaprine 5 milliGRAM(s) Oral three times a day  dextrose 40% Gel 15 Gram(s) Oral once  dextrose 5%. 1000 milliLiter(s) (50 mL/Hr) IV Continuous <Continuous>  dextrose 5%. 1000 milliLiter(s) (100 mL/Hr) IV Continuous <Continuous>  dextrose 50% Injectable 25 Gram(s) IV Push once  dextrose 50% Injectable 12.5 Gram(s) IV Push once  dextrose 50% Injectable 25 Gram(s) IV Push once  folic acid 1 milliGRAM(s) Oral daily  glucagon  Injectable 1 milliGRAM(s) IntraMuscular once  guaiFENesin  milliGRAM(s) Oral every 12 hours  insulin lispro (ADMELOG) corrective regimen sliding scale   SubCutaneous three times a day before meals  insulin lispro (ADMELOG) corrective regimen sliding scale   SubCutaneous at bedtime  lidocaine   Patch 1 Patch Transdermal daily  meropenem  IVPB 1000 milliGRAM(s) IV Intermittent every 12 hours  methylPREDNISolone sodium succinate Injectable 40 milliGRAM(s) IV Push every 8 hours  polyethylene glycol 3350 17 Gram(s) Oral daily  simvastatin 20 milliGRAM(s) Oral at bedtime      Vital Signs Last 24 Hrs  T(C): 36.3 (10 Mar 2021 07:57), Max: 36.4 (09 Mar 2021 16:24)  T(F): 97.4 (10 Mar 2021 07:57), Max: 97.6 (09 Mar 2021 22:35)  HR: 93 (10 Mar 2021 07:57) (83 - 95)  BP: 104/78 (10 Mar 2021 07:57) (100/65 - 112/58)  BP(mean): --  RR: 20 (10 Mar 2021 07:57) (18 - 20)  SpO2: 94% (10 Mar 2021 07:57) (93% - 100%)    PE:  Constitutional: frail looking  HEENT: NC/AT, EOMI, PERRLA, conjunctivae clear; ears and nose atraumatic; pharynx benign  Neck: supple; thyroid not palpable  Back: no tenderness  Respiratory: decreased breath sounds, crackles   Cardiovascular: S1S2 regular, no murmurs  Abdomen: soft, not tender, not distended, positive BS; liver and spleen WNL  Genitourinary: no suprapubic tenderness  Lymphatic: no LN palpable  Musculoskeletal: no muscle tenderness, no joint swelling or tenderness  Extremities: no pedal edema  Neurological/ Psychiatric: moving all extremities  Skin: no rashes; no palpable lesions    Labs: all available labs reviewed                                                              12.3   19.63 )-----------( 238      ( 08 Mar 2021 08:37 )             39.6     03-08    134<L>  |  105  |  69<H>  ----------------------------<  161<H>  5.8<H>   |  24  |  1.85<H>    Ca    8.7      08 Mar 2021 08:37    TPro  5.9<L>  /  Alb  2.3<L>  /  TBili  0.4  /  DBili  x   /  AST  25  /  ALT  73  /  AlkPhos  165<H>  03-08      C-Reactive Protein, Serum: 17 mg/L (03-05-21 @ 07:18)  Ferritin, Serum: 301 ng/mL (03-05-21 @ 07:18)  D-Dimer Assay, Quantitative: 301 ng/mL DDU (03-05-21 @ 07:18)  D-Dimer Assay, Quantitative: 256 ng/mL DDU (03-04-21 @ 17:30)  D-Dimer Assay, Quantitative: 275 ng/mL DDU (03-01-21 @ 07:23)  C-Reactive Protein, Serum: 206 mg/L (03-01-21 @ 07:23)  Ferritin, Serum: 383 ng/mL (03-01-21 @ 07:23)      Radiology: all available radiological tests reviewed    EXAM:  CT CHEST                            PROCEDURE DATE:  02/28/2021          INTERPRETATION:  EXAMINATION: CT CHEST    CLINICAL INDICATION: Covid.    TECHNIQUE: Noncontrast CT of the chest was obtained.    COMPARISON: Multiple CT, most recent 8/11/2020.    FINDINGS:    AIRWAYS AND LUNGS: The central tracheobronchial tree is patent.  Extensive bilateral groundglass and reticular opacities.    MEDIASTINUM AND PLEURA: There are no enlarged mediastinal, hilar or axillary lymph nodes. The visualized portion of the thyroid gland is heterogeneous. There is no pleural effusion. There is no pneumothorax.    HEART AND VESSELS: There is cardiomegaly.  There are atherosclerotic calcifications of the aorta and coronary arteries.  There is no pericardial effusion.  Aortic valve calcification. Left-sided defibrillator.    UPPER ABDOMEN: Images of the upper abdomen demonstrate cholelithiasis. Prominent left renal pelvis..    BONES AND SOFT TISSUES: The bones are unremarkable.  The soft tissues are unremarkable.    TUBES/LINES: None.    IMPRESSION:  COVID pneumonia    Prominent left renal pelvis is new from the prior study and may represent hydronephrosis. Renal ultrasound versus CT abdomen/pelvis recommended for complete evaluation.      < end of copied text >  < from: MR Lumbar Spine No Cont (03.01.21 @ 12:29) >    EXAM:  MR SPINE LUMBAR                            PROCEDURE DATE:  03/01/2021          INTERPRETATION:  Exam Date: 3/1/2021 12:29 PM    MR lumbar without gadolinium    CLINICAL INFORMATION:   L2 compression fracture    TECHNIQUE:   Sagittal and axial T1-weighted images, sagittal inversion recovery images, and sagittal and axial T1-weighted and T2-weighted images of the lumbar spine were obtained.    FINDINGS: Comparison is made to CT lumbar of 2/26/2021.    Reidentified is a acute compression fracture of the L2 vertebral body with approximately 45% vertebral body height loss within the mid body. There is no significant bony retropulsion. No associated paraspinal or epidural disease. No additional fractures.    Multilevel degenerative changes as follows:    At L1/L2 and L2/L3 there are small posterior disc bulges without significant foraminal or central spinal canal stenosis.    At L3/L4, there is a posterior disc bulge resulting in mild narrowing of the right neural foramen without significant left foraminal or central spinal canal stenosis.    At L4/L5 and L5/S1, there are small posterior disc bulges without significant foraminal or central spinal canal stenosis.    The distal cord maintains intact morphology.  Distal cord signal intensity is preserved.  The conus is normally positioned at the T12 level.  Nerve roots of the cauda equina appear intact.      IMPRESSION:    Reidentified is a acute compression fracture of the L2 vertebral body with approximately 45% vertebral body height loss within the mid body. There is no significant bony retropulsion. No associated paraspinal or epidural disease. No additional fractures.    Mild degenerative changes as above.        < end of copied text >    Advanced directives addressed: full resuscitation

## 2021-03-10 NOTE — PROGRESS NOTE ADULT - SUBJECTIVE AND OBJECTIVE BOX
75y old  Female who presents with a chief complaint of pain uncontrolled L2 fracture    3/8 pt seen and evaluated at bedside. Pt denies complaints of pain. Pt states she is still experiencing REGAN but feels about the same as yesterday. Pt reports 1 normal BM since yesterday.    3/10 feels winded on minimal exertion  ROS:   All 10 systems reviewed and found to be negative with the exception of what has been described above.  GEN: lying in bed, mild respiratory distress.  HEENT:   NC/AT, pupils equal and reactive, EOMI  CV:  +S1, +S2, RRR  RESP:   fine crackles appreciated at b/l lung bases, no expiratory wheeze  BREAST:  not examined  GI:  abdomen soft, non-tender, non-distended, normoactive BS  RECTAL:  not examined  :  not examined  MSK:   normal muscle tone  EXT:  no edema  NEURO:  AAOX3, no focal neurological deficits  SKIN:  no rashes      PHYSICAL EXAM:    Daily     Daily     ICU Vital Signs Last 24 Hrs  T(C): 36.3 (10 Mar 2021 16:51), Max: 36.4 (09 Mar 2021 22:35)  T(F): 97.3 (10 Mar 2021 16:51), Max: 97.6 (09 Mar 2021 22:35)  HR: 75 (10 Mar 2021 16:51) (75 - 95)  BP: 91/46 (10 Mar 2021 16:51) (91/46 - 112/58)  BP(mean): --  ABP: --  ABP(mean): --  RR: 20 (10 Mar 2021 16:51) (20 - 20)  SpO2: 91% (10 Mar 2021 16:51) (91% - 100%)                              12.6   21.01 )-----------( 192      ( 10 Mar 2021 08:26 )             40.0       CBC Full  -  ( 10 Mar 2021 08:26 )  WBC Count : 21.01 K/uL  RBC Count : 4.04 M/uL  Hemoglobin : 12.6 g/dL  Hematocrit : 40.0 %  Platelet Count - Automated : 192 K/uL  Mean Cell Volume : 99.0 fl  Mean Cell Hemoglobin : 31.2 pg  Mean Cell Hemoglobin Concentration : 31.5 gm/dL  Auto Neutrophil # : 20.38 K/uL  Auto Lymphocyte # : 0.21 K/uL  Auto Monocyte # : 0.21 K/uL  Auto Eosinophil # : 0.00 K/uL  Auto Basophil # : 0.00 K/uL  Auto Neutrophil % : 97.0 %  Auto Lymphocyte % : 1.0 %  Auto Monocyte % : 1.0 %  Auto Eosinophil % : 0.0 %  Auto Basophil % : 0.0 %      03-10    142  |  109<H>  |  72<H>  ----------------------------<  147<H>  4.5   |  27  |  1.63<H>    Ca    8.9      10 Mar 2021 08:26    TPro  6.2  /  Alb  2.3<L>  /  TBili  0.6  /  DBili  x   /  AST  18  /  ALT  51  /  AlkPhos  180<H>  03-10      LIVER FUNCTIONS - ( 10 Mar 2021 08:26 )  Alb: 2.3 g/dL / Pro: 6.2 gm/dL / ALK PHOS: 180 U/L / ALT: 51 U/L / AST: 18 U/L / GGT: x             PT/INR - ( 10 Mar 2021 08:26 )   PT: 28.8 sec;   INR: 2.60 ratio         PTT - ( 10 Mar 2021 08:26 )  PTT:30.4 sec                  MEDICATIONS  (STANDING):  acetaminophen  IVPB .. 1000 milliGRAM(s) IV Intermittent once  aspirin enteric coated 81 milliGRAM(s) Oral daily  budesonide 160 MICROgram(s)/formoterol 4.5 MICROgram(s) Inhaler 2 Puff(s) Inhalation two times a day  calcium carbonate 1250 mG  + Vitamin D (OsCal 500 + D) 1 Tablet(s) Oral daily  carvedilol Oral Tab/Cap - Peds 50 milliGRAM(s) Oral two times a day  cyclobenzaprine 5 milliGRAM(s) Oral three times a day  dextrose 40% Gel 15 Gram(s) Oral once  dextrose 5%. 1000 milliLiter(s) (50 mL/Hr) IV Continuous <Continuous>  dextrose 5%. 1000 milliLiter(s) (100 mL/Hr) IV Continuous <Continuous>  dextrose 50% Injectable 25 Gram(s) IV Push once  dextrose 50% Injectable 12.5 Gram(s) IV Push once  dextrose 50% Injectable 25 Gram(s) IV Push once  folic acid 1 milliGRAM(s) Oral daily  glucagon  Injectable 1 milliGRAM(s) IntraMuscular once  guaiFENesin  milliGRAM(s) Oral every 12 hours  insulin lispro (ADMELOG) corrective regimen sliding scale   SubCutaneous three times a day before meals  insulin lispro (ADMELOG) corrective regimen sliding scale   SubCutaneous at bedtime  lidocaine   Patch 1 Patch Transdermal daily  meropenem  IVPB 1000 milliGRAM(s) IV Intermittent every 12 hours  methylPREDNISolone sodium succinate Injectable 40 milliGRAM(s) IV Push every 8 hours  polyethylene glycol 3350 17 Gram(s) Oral daily  simvastatin 20 milliGRAM(s) Oral at bedtime  warfarin 1 milliGRAM(s) Oral once

## 2021-03-10 NOTE — PROGRESS NOTE ADULT - ASSESSMENT
HCAP/CHF/ILD  on ventimask , contrinue trial of weaning  on solumedrol symbicort, s/p cefepime #7, now meropenem    STACIE on CKD  held lasix , held bactrim, held entresto    hx afib   INr therapeutic  resume coumadin     75y old  Female who presents with a chief complaint of pain uncontrolled L2 fracture.      # Acute spontaneous L2 fracture:  - could be related to osteoporosis/chronic steroid use.   - cyclobenzaprine PO PRN for muscle spasm  - lidocaine 1 patch daily for pain  - continue bracing  - continue physical therapy  - incentive spirometry  - started on OsCal 500 + D 1 tab PO qd to promote fx healing.    # Hypoxic respiratory failure - stable  - multifactorial:  HCAP; CHF.  - ddx: ILD due to MTX and/or amiodarone.  - Bactrim #5 / cefepime #6 - continue as per ID.  - as per ID - d/c Bactrim 2/2 worsening BUN / Cr / Hyperkalemia.   - azithromycin discontinued as per ID 3/6.  - Remdesevir completed 2 days  - WBC still elevated at 19.63 - possibly due in part to solumedrol.  - O2 taper to 40% via VM attempted 3/7 - pt did not tolerate, O2 titrated back up to 50% via VM and pt is stable.  - solumedrol #4 - as per pulm, decreased to 40mg q8h. Consider further taper if pt can tolerate decreased O2 support.  - as per pulm, bronchoscopy too high risk at this point.   - acetaminophen PRN for fever >100.4 F.  - add Mucinex BID for cough    # constipation  - pt reports normal BM 3/8.  - continue Miralax 17 gm PO qd    # STACIE without hx CKD  - worsening BUN / Cr (69 / 1.85)  - hyperkalemia to 5.8  - possibly 2/2 Bactrim administration  - pt refused kayexalate yesterday 3/7  - kayexalate 30 gm PO x 1 today  - continue to monitor BMP   - nephrology consult noted - will discuss whether to d/c Entresto in the setting of STACIE.   - will d/c Lasix in the setting of rising Cr and /55.   - urine electrolytes pending   - as per ID - d/c Bactrim  - avoid nephrotoxic agents    # poor PO intake  - Nutrition eval noted    # Hypotensive at baseline, BP 90s-100s systolic  - d/c Lasix 3/8    # Hyperglycemia, likely secondary to solumedrol - stable  - ISS  - routine POCT     # PT/INR elevated from yesterday  - continue pt on warfarin with hold instructions  - no signs of active bleeding / anemia - continue to observe  - Hgb 12.9 / Hct 39.6 /   - monitor CBC, LFTs      # COPD:  - albuterol prn.   - inhaled steroid.    # A fib:  - continue coumadin, aspirin  - continue carvedilol  - Cardiology consult appreciated.    # Chronic systolic CHF/AICD:  - d/c Lasix in the setting of STACIE.   - continue entresto, carvedilol  - nephrology consult noted - will defer to them decision to continue Entresto in the setting of STACIE.  - Cardiology consult appreciated.     # Rheumatoid arthritis:  - continue to hold prednisone while solumedrol is administered  - hold mtx.     # DVT px:  - pt on coumadin with hold parameters.    Dispo - pt stable on 50% O2 via VM. Titrate O2 down as tolerated. Consider steroid taper once O2 requirement improved. Discharge not likely until pt more stable.

## 2021-03-10 NOTE — PROGRESS NOTE ADULT - ASSESSMENT
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 acute worsening L sided low back pain and she is unable to ambulate. Patient denies any recent falls or trauma. pain started past few days. She states the pain is mainly localized to her lower back and does not radiate, worsens with movement. She denies any numbness or tingling in her bilateral lower extremities. She has had arthritis and pain both knees and hip, and has follows out patient with Dr Clancy. Here noted with acute L2 compression fracture hospital course c/b acute hypoxic resp failure, CT chest with extensive b/l lung infiltrates unclear etiology ? pna ? ild ? chf? viral process ?     1. acute hypoxic respiratory failure. multifocal pneumonia -atypical/PCP? interstitial lung disease. chf. immunosuppressed host.  - imaging reviewed, ddx atypical pna, viral pna, ? mtx/amio related ?boop   - covid-19 pcr x 2 and covid ab (-)  x 2   - on decadron 9mg daily #8  - s/p remdesivir #2 - discontinued  - completed azithromycin #5 --> 3/6   - s/p cefepime #7 --> 3/8  - s/p bactrim #5  possible pcp - d/t dorinda - discontinue   - broaden abx coverage to meropenem [id number]   - continue with above antibiotics   - consider bronchoscopy/lung biopsy when pt is stable   - isolation precautions  - f/u cbc  - monitor temps  - supportive care    2. 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 acute worsening L sided low back pain and she is unable to ambulate. Patient denies any recent falls or trauma. pain started past few days. She states the pain is mainly localized to her lower back and does not radiate, worsens with movement. She denies any numbness or tingling in her bilateral lower extremities. She has had arthritis and pain both knees and hip, and has follows out patient with Dr Clancy. Here noted with acute L2 compression fracture hospital course c/b acute hypoxic resp failure, CT chest with extensive b/l lung infiltrates unclear etiology ? pna ? ild ? chf? viral process ?     1. acute hypoxic respiratory failure. multifocal pneumonia -atypical/PCP? interstitial lung disease ?vasculitis. chf. immunosuppressed host  - imaging reviewed, ddx atypical pna, viral pna, ? mtx/amio related ?boop ?vasculitis   - covid-19 pcr x 2 and covid ab (-)  x 2   - on decadron 9mg daily #10  - s/p remdesivir #2 - discontinued  - completed azithromycin #5 --> 3/6  - s/p cefepime #7 --> 3/8  - s/p bactrim #5  possible pcp - d/t dorinda - discontinue   - on IV meropenem [id number] #3  - continue with above antibiotics   - consider bronchoscopy/lung biopsy when pt is stable   - isolation precautions  - f/u cbc  - monitor temps  - supportive care    2. other issues - care per medicine

## 2021-03-11 NOTE — PROGRESS NOTE ADULT - ASSESSMENT
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 acute worsening L sided low back pain and she is unable to ambulate. Patient denies any recent falls or trauma. pain started past few days. She states the pain is mainly localized to her lower back and does not radiate, worsens with movement. She denies any numbness or tingling in her bilateral lower extremities. She has had arthritis and pain both knees and hip, and has follows out patient with Dr Clancy. Here noted with acute L2 compression fracture hospital course c/b acute hypoxic resp failure, CT chest with extensive b/l lung infiltrates unclear etiology ? pna ? ild ? chf? viral process ?     1. acute hypoxic respiratory failure. multifocal pneumonia -atypical/PCP? interstitial lung disease ?vasculitis. chf. immunosuppressed host  - imaging reviewed, ddx atypical pna, viral pna, ? mtx/amio related ?boop ?vasculitis   - covid-19 pcr x 2 and covid ab (-)  x 2   - on IV steroids   - s/p remdesivir #2 - discontinued  - completed azithromycin #5 --> 3/6  - s/p cefepime #7 --> 3/8  - s/p bactrim #5  possible pcp - d/t dorinda - discontinue   - on IV meropenem [id number] #4   - continue with above antibiotics, suggest 5-7 day course   - diarrhea d/t laxative, monitor if continues off stool softner then check stool studies   - consider bronchoscopy/lung biopsy when pt is stable   - isolation precautions  - f/u cbc  - monitor temps  - supportive care    2. other issues - care per medicine

## 2021-03-11 NOTE — PROGRESS NOTE ADULT - SUBJECTIVE AND OBJECTIVE BOX
Patient is a 75y Female who reports no complaints as new  remains on VM   drinking fluids   limited movement due to dyspnea      MEDICATIONS  (STANDING):  acetaminophen  IVPB .. 1000 milliGRAM(s) IV Intermittent once  aspirin enteric coated 81 milliGRAM(s) Oral daily  budesonide 160 MICROgram(s)/formoterol 4.5 MICROgram(s) Inhaler 2 Puff(s) Inhalation two times a day  calcium carbonate 1250 mG  + Vitamin D (OsCal 500 + D) 1 Tablet(s) Oral daily  carvedilol Oral Tab/Cap - Peds 50 milliGRAM(s) Oral two times a day  cyclobenzaprine 5 milliGRAM(s) Oral three times a day  dextrose 40% Gel 15 Gram(s) Oral once  dextrose 5%. 1000 milliLiter(s) (50 mL/Hr) IV Continuous <Continuous>  dextrose 5%. 1000 milliLiter(s) (100 mL/Hr) IV Continuous <Continuous>  dextrose 50% Injectable 25 Gram(s) IV Push once  dextrose 50% Injectable 12.5 Gram(s) IV Push once  dextrose 50% Injectable 25 Gram(s) IV Push once  folic acid 1 milliGRAM(s) Oral daily  glucagon  Injectable 1 milliGRAM(s) IntraMuscular once  guaiFENesin  milliGRAM(s) Oral every 12 hours  insulin lispro (ADMELOG) corrective regimen sliding scale   SubCutaneous three times a day before meals  insulin lispro (ADMELOG) corrective regimen sliding scale   SubCutaneous at bedtime  lidocaine   Patch 1 Patch Transdermal daily  meropenem  IVPB 1000 milliGRAM(s) IV Intermittent every 12 hours  methylPREDNISolone sodium succinate Injectable 40 milliGRAM(s) IV Push every 12 hours  simvastatin 20 milliGRAM(s) Oral at bedtime    MEDICATIONS  (PRN):  acetaminophen   Tablet .. 650 milliGRAM(s) Oral every 6 hours PRN Temp greater or equal to 38C (100.4F), fever, headaches  ALBUTerol    90 MICROgram(s) HFA Inhaler 2 Puff(s) Inhalation every 6 hours PRN Shortness of Breath and/or Wheezing  cyclobenzaprine 5 milliGRAM(s) Oral at bedtime PRN Muscle Spasm      Vital Signs Last 24 Hrs  T(C): 36.4 (11 Mar 2021 15:42), Max: 36.7 (10 Mar 2021 22:27)  T(F): 97.6 (11 Mar 2021 15:42), Max: 98 (10 Mar 2021 22:27)  HR: 92 (11 Mar 2021 15:42) (75 - 101)  BP: 119/88 (11 Mar 2021 15:42) (91/46 - 121/62)  BP(mean): --  RR: 20 (11 Mar 2021 15:42) (20 - 23)  SpO2: 90% (11 Mar 2021 15:42) (90% - 93%)      I&O's Detail    10 Mar 2021 07:01  -  11 Mar 2021 07:00  --------------------------------------------------------  IN:  Total IN: 0 mL    OUT:    Incontinent per Diaper, Weight (mL): 1 mL    Voided (mL): 700 mL  Total OUT: 701 mL    Total NET: -701 mL      11 Mar 2021 07:01  -  11 Mar 2021 15:45  --------------------------------------------------------  IN:  Total IN: 0 mL    OUT:    Incontinent per Diaper, Weight (mL): 3 mL  Total OUT: 3 mL    Total NET: -3 mL      PHYSICAL EXAM:    Constitutional: NAD  Extremities: No peripheral edema  Neurological: A/O x 3, no focal deficits  : No Quezada  Skin: No rashes  Access: Not applicable        LABS:    143    |  112    |  70     ----------------------------<  154       11 Mar 2021 07:10  4.6     |  25     |  1.43     142    |  109    |  72     ----------------------------<  147       10 Mar 2021 08:26  4.5     |  27     |  1.63     141    |  107    |  67     ----------------------------<  134       09 Mar 2021 06:55  4.9     |  25     |  1.86     Ca    8.9        11 Mar 2021 07:10  Ca    8.9        10 Mar 2021 08:26        TPro  5.7    /  Alb  2.2    /  TBili  0.6    /        11 Mar 2021 07:10  DBili  x      /  AST  16     /  ALT  43     /  AlkPhos  167      TPro  6.2    /  Alb  2.3    /  TBili  0.6    /        10 Mar 2021 08:26  DBili  x      /  AST  18     /  ALT  51     /  AlkPhos  180                                11.5   19.46 )-----------( 195      ( 11 Mar 2021 07:10 )             36.0                         12.6   21.01 )-----------( 192      ( 10 Mar 2021 08:26 )             40.0                  Urine Studies:  Urinalysis Basic - ( 08 Mar 2021 15:45 )    Color: Yellow / Appearance: very cloudy / S.020 / pH: x  Gluc: x / Ketone: Negative  / Bili: Negative / Urobili: Negative mg/dL   Blood: x / Protein: 30 mg/dL / Nitrite: Negative   Leuk Esterase: Small / RBC: 3-5 /HPF / WBC 6-10   Sq Epi: x / Non Sq Epi: Occasional / Bacteria: Occasional      Protein/Creatinine Ratio Calculation: 1.3 Ratio ( @ 15:45)      RADIOLOGY & ADDITIONAL STUDIES:

## 2021-03-11 NOTE — PROGRESS NOTE ADULT - SUBJECTIVE AND OBJECTIVE BOX
Date of service: 03-11-21 @ 13:06    pt seen and examined  on VM  had miralax then loose stools  reports 2 formed stools today  denies abd pain   no reported fevers     ROS: no fever or chills; denies dizziness, no HA, no abdominal pain, no constipation; no dysuria, no urinary frequency, no legs pain, no rashes      MEDICATIONS  (STANDING):  acetaminophen  IVPB .. 1000 milliGRAM(s) IV Intermittent once  aspirin enteric coated 81 milliGRAM(s) Oral daily  budesonide 160 MICROgram(s)/formoterol 4.5 MICROgram(s) Inhaler 2 Puff(s) Inhalation two times a day  calcium carbonate 1250 mG  + Vitamin D (OsCal 500 + D) 1 Tablet(s) Oral daily  carvedilol Oral Tab/Cap - Peds 50 milliGRAM(s) Oral two times a day  cyclobenzaprine 5 milliGRAM(s) Oral three times a day  dextrose 40% Gel 15 Gram(s) Oral once  dextrose 5%. 1000 milliLiter(s) (50 mL/Hr) IV Continuous <Continuous>  dextrose 5%. 1000 milliLiter(s) (100 mL/Hr) IV Continuous <Continuous>  dextrose 50% Injectable 25 Gram(s) IV Push once  dextrose 50% Injectable 12.5 Gram(s) IV Push once  dextrose 50% Injectable 25 Gram(s) IV Push once  folic acid 1 milliGRAM(s) Oral daily  glucagon  Injectable 1 milliGRAM(s) IntraMuscular once  guaiFENesin  milliGRAM(s) Oral every 12 hours  insulin lispro (ADMELOG) corrective regimen sliding scale   SubCutaneous three times a day before meals  insulin lispro (ADMELOG) corrective regimen sliding scale   SubCutaneous at bedtime  lidocaine   Patch 1 Patch Transdermal daily  meropenem  IVPB 1000 milliGRAM(s) IV Intermittent every 12 hours  methylPREDNISolone sodium succinate Injectable 40 milliGRAM(s) IV Push every 8 hours  polyethylene glycol 3350 17 Gram(s) Oral daily  simvastatin 20 milliGRAM(s) Oral at bedtime      Vital Signs Last 24 Hrs  T(C): 36.3 (10 Mar 2021 07:57), Max: 36.4 (09 Mar 2021 16:24)  T(F): 97.4 (10 Mar 2021 07:57), Max: 97.6 (09 Mar 2021 22:35)  HR: 93 (10 Mar 2021 07:57) (83 - 95)  BP: 104/78 (10 Mar 2021 07:57) (100/65 - 112/58)  BP(mean): --  RR: 20 (10 Mar 2021 07:57) (18 - 20)  SpO2: 94% (10 Mar 2021 07:57) (93% - 100%)    PE:  Constitutional: frail looking, on VM  HEENT: NC/AT, EOMI, PERRLA, conjunctivae clear; ears and nose atraumatic; pharynx benign  Neck: supple; thyroid not palpable  Back: no tenderness  Respiratory: decreased breath sounds, crackles   Cardiovascular: S1S2 regular, no murmurs  Abdomen: soft, not tender, not distended, positive BS; liver and spleen WNL  Genitourinary: no suprapubic tenderness  Lymphatic: no LN palpable  Musculoskeletal: no muscle tenderness, no joint swelling or tenderness  Extremities: no pedal edema  Neurological/ Psychiatric: moving all extremities  Skin: no rashes; no palpable lesions    Labs: all available labs reviewed                                              11.5   19.46 )-----------( 195      ( 11 Mar 2021 07:10 )             36.0     03-11    143  |  112<H>  |  70<H>  ----------------------------<  154<H>  4.6   |  25  |  1.43<H>    Ca    8.9      11 Mar 2021 07:10    TPro  5.7<L>  /  Alb  2.2<L>  /  TBili  0.6  /  DBili  x   /  AST  16  /  ALT  43  /  AlkPhos  167<H>  03-11          C-Reactive Protein, Serum: 17 mg/L (03-05-21 @ 07:18)  Ferritin, Serum: 301 ng/mL (03-05-21 @ 07:18)  D-Dimer Assay, Quantitative: 301 ng/mL DDU (03-05-21 @ 07:18)  D-Dimer Assay, Quantitative: 256 ng/mL DDU (03-04-21 @ 17:30)        Radiology: all available radiological tests reviewed    EXAM:  CT CHEST                            PROCEDURE DATE:  02/28/2021          INTERPRETATION:  EXAMINATION: CT CHEST    CLINICAL INDICATION: Covid.    TECHNIQUE: Noncontrast CT of the chest was obtained.    COMPARISON: Multiple CT, most recent 8/11/2020.    FINDINGS:    AIRWAYS AND LUNGS: The central tracheobronchial tree is patent.  Extensive bilateral groundglass and reticular opacities.    MEDIASTINUM AND PLEURA: There are no enlarged mediastinal, hilar or axillary lymph nodes. The visualized portion of the thyroid gland is heterogeneous. There is no pleural effusion. There is no pneumothorax.    HEART AND VESSELS: There is cardiomegaly.  There are atherosclerotic calcifications of the aorta and coronary arteries.  There is no pericardial effusion.  Aortic valve calcification. Left-sided defibrillator.    UPPER ABDOMEN: Images of the upper abdomen demonstrate cholelithiasis. Prominent left renal pelvis..    BONES AND SOFT TISSUES: The bones are unremarkable.  The soft tissues are unremarkable.    TUBES/LINES: None.    IMPRESSION:  COVID pneumonia    Prominent left renal pelvis is new from the prior study and may represent hydronephrosis. Renal ultrasound versus CT abdomen/pelvis recommended for complete evaluation.      < end of copied text >  < from: MR Lumbar Spine No Cont (03.01.21 @ 12:29) >    EXAM:  MR SPINE LUMBAR                            PROCEDURE DATE:  03/01/2021          INTERPRETATION:  Exam Date: 3/1/2021 12:29 PM    MR lumbar without gadolinium    CLINICAL INFORMATION:   L2 compression fracture    TECHNIQUE:   Sagittal and axial T1-weighted images, sagittal inversion recovery images, and sagittal and axial T1-weighted and T2-weighted images of the lumbar spine were obtained.    FINDINGS: Comparison is made to CT lumbar of 2/26/2021.    Reidentified is a acute compression fracture of the L2 vertebral body with approximately 45% vertebral body height loss within the mid body. There is no significant bony retropulsion. No associated paraspinal or epidural disease. No additional fractures.    Multilevel degenerative changes as follows:    At L1/L2 and L2/L3 there are small posterior disc bulges without significant foraminal or central spinal canal stenosis.    At L3/L4, there is a posterior disc bulge resulting in mild narrowing of the right neural foramen without significant left foraminal or central spinal canal stenosis.    At L4/L5 and L5/S1, there are small posterior disc bulges without significant foraminal or central spinal canal stenosis.    The distal cord maintains intact morphology.  Distal cord signal intensity is preserved.  The conus is normally positioned at the T12 level.  Nerve roots of the cauda equina appear intact.      IMPRESSION:    Reidentified is a acute compression fracture of the L2 vertebral body with approximately 45% vertebral body height loss within the mid body. There is no significant bony retropulsion. No associated paraspinal or epidural disease. No additional fractures.    Mild degenerative changes as above.        < end of copied text >    Advanced directives addressed: full resuscitation

## 2021-03-11 NOTE — PROGRESS NOTE ADULT - ASSESSMENT
76 yo female with hx of AICD, CM, and now presenting with lower back pains wth l2 fracture and hypoxia with bilateral GGO infiltrates on CT but with negative COVID (PUI)    STACIE in setting of bactrim use while on lasix and entresto   Cr trending down   K stable   Maintain holding lasix, Entresto and Bactrim for now   formal renal sono noted, monitor voiding - if Cr rises then bladder scan  low grade hematuria with pulm findings - serologies are negative  - outpatient urology evaluation     d/w RN

## 2021-03-11 NOTE — PROGRESS NOTE ADULT - SUBJECTIVE AND OBJECTIVE BOX
Subjective:    Awake, alert. Still breathless w/ min exertion. +Diarrhea    MEDICATIONS  (STANDING):  acetaminophen  IVPB .. 1000 milliGRAM(s) IV Intermittent once  aspirin enteric coated 81 milliGRAM(s) Oral daily  budesonide 160 MICROgram(s)/formoterol 4.5 MICROgram(s) Inhaler 2 Puff(s) Inhalation two times a day  calcium carbonate 1250 mG  + Vitamin D (OsCal 500 + D) 1 Tablet(s) Oral daily  carvedilol Oral Tab/Cap - Peds 50 milliGRAM(s) Oral two times a day  cyclobenzaprine 5 milliGRAM(s) Oral three times a day  dextrose 40% Gel 15 Gram(s) Oral once  dextrose 5%. 1000 milliLiter(s) (50 mL/Hr) IV Continuous <Continuous>  dextrose 5%. 1000 milliLiter(s) (100 mL/Hr) IV Continuous <Continuous>  dextrose 50% Injectable 25 Gram(s) IV Push once  dextrose 50% Injectable 12.5 Gram(s) IV Push once  dextrose 50% Injectable 25 Gram(s) IV Push once  folic acid 1 milliGRAM(s) Oral daily  glucagon  Injectable 1 milliGRAM(s) IntraMuscular once  guaiFENesin  milliGRAM(s) Oral every 12 hours  insulin lispro (ADMELOG) corrective regimen sliding scale   SubCutaneous three times a day before meals  insulin lispro (ADMELOG) corrective regimen sliding scale   SubCutaneous at bedtime  lidocaine   Patch 1 Patch Transdermal daily  meropenem  IVPB 1000 milliGRAM(s) IV Intermittent every 12 hours  methylPREDNISolone sodium succinate Injectable 40 milliGRAM(s) IV Push every 12 hours  polyethylene glycol 3350 17 Gram(s) Oral daily  simvastatin 20 milliGRAM(s) Oral at bedtime    MEDICATIONS  (PRN):  acetaminophen   Tablet .. 650 milliGRAM(s) Oral every 6 hours PRN Temp greater or equal to 38C (100.4F), fever, headaches  ALBUTerol    90 MICROgram(s) HFA Inhaler 2 Puff(s) Inhalation every 6 hours PRN Shortness of Breath and/or Wheezing  cyclobenzaprine 5 milliGRAM(s) Oral at bedtime PRN Muscle Spasm      Allergies    apixaban (Other)  fesoterodine (Unknown)  Macrobid (Other)    Intolerances        Vital Signs Last 24 Hrs  T(C): 36.5 (11 Mar 2021 08:27), Max: 36.7 (10 Mar 2021 22:27)  T(F): 97.7 (11 Mar 2021 08:27), Max: 98 (10 Mar 2021 22:27)  HR: 100 (11 Mar 2021 08:27) (75 - 101)  BP: 115/68 (11 Mar 2021 08:27) (91/46 - 121/62)  BP(mean): --  RR: 23 (11 Mar 2021 08:27) (20 - 23)  SpO2: 90% (11 Mar 2021 08:27) (90% - 93%)    PHYSICAL EXAMINATION:    NECK:  Supple. No lymphadenopathy. Jugular venous pressure not elevated.    HEART:   The cardiac impulse has a normal quality. Reg., Nl S1 and S2.    CHEST:  Chest is clear to auscultation anteriorly. Normal respiratory effort.  ABDOMEN:  Soft and nontender.   EXTREMITIES:  There is no edema.       LABS:                        11.5   19.46 )-----------( 195      ( 11 Mar 2021 07:10 )             36.0     03-11    143  |  112<H>  |  70<H>  ----------------------------<  154<H>  4.6   |  25  |  1.43<H>    Ca    8.9      11 Mar 2021 07:10    TPro  5.7<L>  /  Alb  2.2<L>  /  TBili  0.6  /  DBili  x   /  AST  16  /  ALT  43  /  AlkPhos  167<H>  03-11    PT/INR - ( 11 Mar 2021 07:10 )   PT: 23.4 sec;   INR: 2.07 ratio         PTT - ( 11 Mar 2021 07:10 )  PTT:29.2 sec      RADIOLOGY & ADDITIONAL TESTS:    Assessment and Plan:   · Assessment  	  - Repeat CXR today  - on Meropenem. Bactrim D/C'ed due to renal insufficiency/hyperkalemia. May need to D/C Meropenem-+Diarrhea, ?C-Dif  - Decrease solumedrol to 40mg q12h  - cont symbicort  - dvt proph  - Renal F/U  - OOB to chair    Problem/Plan - 1:  ·  Problem: Acute respiratory failure with hypoxia.   Problem/Plan - 2:  ·  Problem: Multifocal pneumonia.   Problem/Plan - 3:  ·  Problem: Emphysema of lung.   Problem/Plan - 4:  ·  Problem: CHF with cardiomyopathy.   Problem/Plan - 5:  ·  Problem: Anemia.   Problem/Plan - 6:  Problem: Coagulopathy.

## 2021-03-11 NOTE — PROGRESS NOTE ADULT - SUBJECTIVE AND OBJECTIVE BOX
75y old  Female who presents with a chief complaint of pain uncontrolled L2 fracture    3/8 pt seen and evaluated at bedside. Pt denies complaints of pain. Pt states she is still experiencing REGAN but feels about the same as yesterday. Pt reports 1 normal BM since yesterday.    3/11 feels winded on minimal exertion, diarrhea overnight nonbloody x2  ROS:   All 10 systems reviewed and found to be negative with the exception of what has been described above.  GEN: lying in bed, mild respiratory distress.  HEENT:   NC/AT, pupils equal and reactive, EOMI  CV:  +S1, +S2, RRR  RESP:   fine crackles appreciated at b/l lung bases, no expiratory wheeze  BREAST:  not examined  GI:  abdomen soft, non-tender, non-distended, normoactive BS  RECTAL:  not examined  :  not examined  MSK:   normal muscle tone  EXT:  no edema  NEURO:  AAOX3, no focal neurological deficits  SKIN:  no rashes        PHYSICAL EXAM:    Daily     Daily     ICU Vital Signs Last 24 Hrs  T(C): 36.4 (11 Mar 2021 15:42), Max: 36.7 (10 Mar 2021 22:27)  T(F): 97.6 (11 Mar 2021 15:42), Max: 98 (10 Mar 2021 22:27)  HR: 92 (11 Mar 2021 15:42) (75 - 101)  BP: 119/88 (11 Mar 2021 15:42) (91/46 - 121/62)  BP(mean): --  ABP: --  ABP(mean): --  RR: 20 (11 Mar 2021 15:42) (20 - 23)  SpO2: 90% (11 Mar 2021 15:42) (90% - 93%)                                11.5   19.46 )-----------( 195      ( 11 Mar 2021 07:10 )             36.0       CBC Full  -  ( 11 Mar 2021 07:10 )  WBC Count : 19.46 K/uL  RBC Count : 3.67 M/uL  Hemoglobin : 11.5 g/dL  Hematocrit : 36.0 %  Platelet Count - Automated : 195 K/uL  Mean Cell Volume : 98.1 fl  Mean Cell Hemoglobin : 31.3 pg  Mean Cell Hemoglobin Concentration : 31.9 gm/dL  Auto Neutrophil # : 17.41 K/uL  Auto Lymphocyte # : 0.31 K/uL  Auto Monocyte # : 0.67 K/uL  Auto Eosinophil # : 0.00 K/uL  Auto Basophil # : 0.05 K/uL  Auto Neutrophil % : 89.5 %  Auto Lymphocyte % : 1.6 %  Auto Monocyte % : 3.4 %  Auto Eosinophil % : 0.0 %  Auto Basophil % : 0.3 %      03-11    143  |  112<H>  |  70<H>  ----------------------------<  154<H>  4.6   |  25  |  1.43<H>    Ca    8.9      11 Mar 2021 07:10    TPro  5.7<L>  /  Alb  2.2<L>  /  TBili  0.6  /  DBili  x   /  AST  16  /  ALT  43  /  AlkPhos  167<H>  03-11      LIVER FUNCTIONS - ( 11 Mar 2021 07:10 )  Alb: 2.2 g/dL / Pro: 5.7 gm/dL / ALK PHOS: 167 U/L / ALT: 43 U/L / AST: 16 U/L / GGT: x             PT/INR - ( 11 Mar 2021 07:10 )   PT: 23.4 sec;   INR: 2.07 ratio         PTT - ( 11 Mar 2021 07:10 )  PTT:29.2 sec                  MEDICATIONS  (STANDING):  acetaminophen  IVPB .. 1000 milliGRAM(s) IV Intermittent once  aspirin enteric coated 81 milliGRAM(s) Oral daily  budesonide 160 MICROgram(s)/formoterol 4.5 MICROgram(s) Inhaler 2 Puff(s) Inhalation two times a day  calcium carbonate 1250 mG  + Vitamin D (OsCal 500 + D) 1 Tablet(s) Oral daily  carvedilol Oral Tab/Cap - Peds 50 milliGRAM(s) Oral two times a day  cyclobenzaprine 5 milliGRAM(s) Oral three times a day  dextrose 40% Gel 15 Gram(s) Oral once  dextrose 5%. 1000 milliLiter(s) (50 mL/Hr) IV Continuous <Continuous>  dextrose 5%. 1000 milliLiter(s) (100 mL/Hr) IV Continuous <Continuous>  dextrose 50% Injectable 25 Gram(s) IV Push once  dextrose 50% Injectable 12.5 Gram(s) IV Push once  dextrose 50% Injectable 25 Gram(s) IV Push once  folic acid 1 milliGRAM(s) Oral daily  glucagon  Injectable 1 milliGRAM(s) IntraMuscular once  guaiFENesin  milliGRAM(s) Oral every 12 hours  insulin lispro (ADMELOG) corrective regimen sliding scale   SubCutaneous three times a day before meals  insulin lispro (ADMELOG) corrective regimen sliding scale   SubCutaneous at bedtime  lidocaine   Patch 1 Patch Transdermal daily  meropenem  IVPB 1000 milliGRAM(s) IV Intermittent every 12 hours  methylPREDNISolone sodium succinate Injectable 40 milliGRAM(s) IV Push every 12 hours  simvastatin 20 milliGRAM(s) Oral at bedtime  warfarin 2 milliGRAM(s) Oral daily

## 2021-03-11 NOTE — PROGRESS NOTE ADULT - ASSESSMENT
HCAP/CHF/ILD- still SOB with minimal exertion  on ventimask , contrinue trial of weaning  on solumedrol decreased to BID , symbicort, s/p cefepime #7, now meropenem#3    STACIE on CKD, cr improving  held lasix , held bactrim, held entresto    hx afib   INr therapeutic  resume coumadin     diarrhea 2 episodes   stop miralax , if does not improve will order c diff PCR     75y old  Female who presents with a chief complaint of pain uncontrolled L2 fracture.      # Acute spontaneous L2 fracture:  - could be related to osteoporosis/chronic steroid use.   - cyclobenzaprine PO PRN for muscle spasm  - lidocaine 1 patch daily for pain  - continue bracing  - continue physical therapy  - incentive spirometry  - started on OsCal 500 + D 1 tab PO qd to promote fx healing.    # Hypoxic respiratory failure - stable  - multifactorial:  HCAP; CHF.  - ddx: ILD due to MTX and/or amiodarone.  - Bactrim #5 / cefepime #6 - continue as per ID.  - as per ID - d/c Bactrim 2/2 worsening BUN / Cr / Hyperkalemia.   - azithromycin discontinued as per ID 3/6.  - Remdesevir completed 2 days  - WBC still elevated at 19.63 - possibly due in part to solumedrol.  - O2 taper to 40% via VM attempted 3/7 - pt did not tolerate, O2 titrated back up to 50% via VM and pt is stable.  - solumedrol #4 - as per pulm, decreased to 40mg q8h. Consider further taper if pt can tolerate decreased O2 support.  - as per pulm, bronchoscopy too high risk at this point.   - acetaminophen PRN for fever >100.4 F.  - add Mucinex BID for cough    # constipation  - pt reports normal BM 3/8.  - continue Miralax 17 gm PO qd    # STACIE without hx CKD  - worsening BUN / Cr (69 / 1.85)  - hyperkalemia to 5.8  - possibly 2/2 Bactrim administration  - pt refused kayexalate yesterday 3/7  - kayexalate 30 gm PO x 1 today  - continue to monitor BMP   - nephrology consult noted - will discuss whether to d/c Entresto in the setting of STACIE.   - will d/c Lasix in the setting of rising Cr and /55.   - urine electrolytes pending   - as per ID - d/c Bactrim  - avoid nephrotoxic agents    # poor PO intake  - Nutrition eval noted    # Hypotensive at baseline, BP 90s-100s systolic  - d/c Lasix 3/8    # Hyperglycemia, likely secondary to solumedrol - stable  - ISS  - routine POCT     # PT/INR elevated from yesterday  - continue pt on warfarin with hold instructions  - no signs of active bleeding / anemia - continue to observe  - Hgb 12.9 / Hct 39.6 /   - monitor CBC, LFTs      # COPD:  - albuterol prn.   - inhaled steroid.    # A fib:  - continue coumadin, aspirin  - continue carvedilol  - Cardiology consult appreciated.    # Chronic systolic CHF/AICD:  - d/c Lasix in the setting of STACIE.   - continue entresto, carvedilol  - nephrology consult noted - will defer to them decision to continue Entresto in the setting of STACIE.  - Cardiology consult appreciated.     # Rheumatoid arthritis:  - continue to hold prednisone while solumedrol is administered  - hold mtx.     # DVT px:  - pt on coumadin with hold parameters.    Dispo - pt stable on 50% O2 via VM. Titrate O2 down as tolerated. Consider steroid taper once O2 requirement improved. Discharge not likely until pt more stable.

## 2021-03-12 NOTE — PROGRESS NOTE ADULT - ASSESSMENT
76 yo female with hx of AICD, CM, and now presenting with lower back pains wth l2 fracture and hypoxia with bilateral GGO infiltrates on CT but with negative COVID (PUI)    STACIE sec to bactrim use while on lasix and entresto   Cr trending down now   K borderline - low K diet,  Avoid constipation ( reduced K excretion)   Maintain holding lasix, Entresto and Bactrim for now   if Cr rises then bladder scan  Proteinuria with low grade hematuria with pulm findings - ANCA and GBM negative rest serologies are negative    - outpatient urology evaluation     Hypoxic resp failure -  now increasing O2 requirements   ? etiolgy  - underlying COPD, GGO on CT , COVID negative x multiple samples , negative COVID Ab's    s/p decadron and short course of remdesivir    close pulm monitoring/IV steroids and IV abx for possible secondary infection    ID following    pulmonary condition unique tenuous - pt is full code     d/w RN and Dr Gabriel Walkre covering weekend if needed

## 2021-03-12 NOTE — PROGRESS NOTE ADULT - SUBJECTIVE AND OBJECTIVE BOX
Date of service: 03-12-21 @ 15:31    pt seen and examined  on VM, feels slightly better  able to take deep breaths   no abd pain  no further loose stools    ROS: no fever or chills; denies dizziness, no HA, no abdominal pain, no constipation; no dysuria, no urinary frequency, no legs pain, no rashes    MEDICATIONS  (STANDING):  acetaminophen  IVPB .. 1000 milliGRAM(s) IV Intermittent once  aspirin enteric coated 81 milliGRAM(s) Oral daily  budesonide 160 MICROgram(s)/formoterol 4.5 MICROgram(s) Inhaler 2 Puff(s) Inhalation two times a day  calcium carbonate 1250 mG  + Vitamin D (OsCal 500 + D) 1 Tablet(s) Oral daily  carvedilol Oral Tab/Cap - Peds 50 milliGRAM(s) Oral two times a day  cyclobenzaprine 5 milliGRAM(s) Oral three times a day  dextrose 40% Gel 15 Gram(s) Oral once  dextrose 5%. 1000 milliLiter(s) (50 mL/Hr) IV Continuous <Continuous>  dextrose 5%. 1000 milliLiter(s) (100 mL/Hr) IV Continuous <Continuous>  dextrose 50% Injectable 25 Gram(s) IV Push once  dextrose 50% Injectable 12.5 Gram(s) IV Push once  dextrose 50% Injectable 25 Gram(s) IV Push once  folic acid 1 milliGRAM(s) Oral daily  glucagon  Injectable 1 milliGRAM(s) IntraMuscular once  guaiFENesin  milliGRAM(s) Oral every 12 hours  insulin lispro (ADMELOG) corrective regimen sliding scale   SubCutaneous three times a day before meals  insulin lispro (ADMELOG) corrective regimen sliding scale   SubCutaneous at bedtime  lidocaine   Patch 1 Patch Transdermal daily  meropenem  IVPB 1000 milliGRAM(s) IV Intermittent every 12 hours  methylPREDNISolone sodium succinate Injectable 40 milliGRAM(s) IV Push every 12 hours  simvastatin 20 milliGRAM(s) Oral at bedtime  warfarin 2.5 milliGRAM(s) Oral once      Vital Signs Last 24 Hrs  T(C): 36.7 (12 Mar 2021 08:50), Max: 36.7 (12 Mar 2021 08:50)  T(F): 98.1 (12 Mar 2021 08:50), Max: 98.1 (12 Mar 2021 08:50)  HR: 75 (12 Mar 2021 08:50) (75 - 98)  BP: 142/75 (12 Mar 2021 08:50) (119/88 - 142/75)  BP(mean): --  RR: 20 (12 Mar 2021 08:50) (20 - 20)  SpO2: 89% (12 Mar 2021 08:50) (89% - 90%)    PE:  Constitutional: frail looking, on VM  HEENT: NC/AT, EOMI, PERRLA, conjunctivae clear; ears and nose atraumatic; pharynx benign  Neck: supple; thyroid not palpable  Back: no tenderness  Respiratory: decreased breath sounds, crackles   Cardiovascular: S1S2 regular, no murmurs  Abdomen: soft, not tender, not distended, positive BS; liver and spleen WNL  Genitourinary: no suprapubic tenderness  Lymphatic: no LN palpable  Musculoskeletal: no muscle tenderness, no joint swelling or tenderness  Extremities: no pedal edema  Neurological/ Psychiatric: moving all extremities  Skin: no rashes; no palpable lesions    Labs: all available labs reviewed                                              11.7   23.44 )-----------( 224      ( 12 Mar 2021 06:36 )             37.8     03-12    143  |  113<H>  |  71<H>  ----------------------------<  182<H>  5.0   |  23  |  1.25    Ca    9.2      12 Mar 2021 06:36    TPro  5.7<L>  /  Alb  2.2<L>  /  TBili  0.6  /  DBili  x   /  AST  16  /  ALT  43  /  AlkPhos  167<H>  03-11          C-Reactive Protein, Serum: 17 mg/L (03-05-21 @ 07:18)  Ferritin, Serum: 301 ng/mL (03-05-21 @ 07:18)  D-Dimer Assay, Quantitative: 301 ng/mL DDU (03-05-21 @ 07:18)  D-Dimer Assay, Quantitative: 256 ng/mL DDU (03-04-21 @ 17:30)        Radiology: all available radiological tests reviewed    EXAM:  CT CHEST                            PROCEDURE DATE:  02/28/2021          INTERPRETATION:  EXAMINATION: CT CHEST    CLINICAL INDICATION: Covid.    TECHNIQUE: Noncontrast CT of the chest was obtained.    COMPARISON: Multiple CT, most recent 8/11/2020.    FINDINGS:    AIRWAYS AND LUNGS: The central tracheobronchial tree is patent.  Extensive bilateral groundglass and reticular opacities.    MEDIASTINUM AND PLEURA: There are no enlarged mediastinal, hilar or axillary lymph nodes. The visualized portion of the thyroid gland is heterogeneous. There is no pleural effusion. There is no pneumothorax.    HEART AND VESSELS: There is cardiomegaly.  There are atherosclerotic calcifications of the aorta and coronary arteries.  There is no pericardial effusion.  Aortic valve calcification. Left-sided defibrillator.    UPPER ABDOMEN: Images of the upper abdomen demonstrate cholelithiasis. Prominent left renal pelvis..    BONES AND SOFT TISSUES: The bones are unremarkable.  The soft tissues are unremarkable.    TUBES/LINES: None.    IMPRESSION:  COVID pneumonia    Prominent left renal pelvis is new from the prior study and may represent hydronephrosis. Renal ultrasound versus CT abdomen/pelvis recommended for complete evaluation.      < end of copied text >  < from: MR Lumbar Spine No Cont (03.01.21 @ 12:29) >    EXAM:  MR SPINE LUMBAR                            PROCEDURE DATE:  03/01/2021          INTERPRETATION:  Exam Date: 3/1/2021 12:29 PM    MR lumbar without gadolinium    CLINICAL INFORMATION:   L2 compression fracture    TECHNIQUE:   Sagittal and axial T1-weighted images, sagittal inversion recovery images, and sagittal and axial T1-weighted and T2-weighted images of the lumbar spine were obtained.    FINDINGS: Comparison is made to CT lumbar of 2/26/2021.    Reidentified is a acute compression fracture of the L2 vertebral body with approximately 45% vertebral body height loss within the mid body. There is no significant bony retropulsion. No associated paraspinal or epidural disease. No additional fractures.    Multilevel degenerative changes as follows:    At L1/L2 and L2/L3 there are small posterior disc bulges without significant foraminal or central spinal canal stenosis.    At L3/L4, there is a posterior disc bulge resulting in mild narrowing of the right neural foramen without significant left foraminal or central spinal canal stenosis.    At L4/L5 and L5/S1, there are small posterior disc bulges without significant foraminal or central spinal canal stenosis.    The distal cord maintains intact morphology.  Distal cord signal intensity is preserved.  The conus is normally positioned at the T12 level.  Nerve roots of the cauda equina appear intact.      IMPRESSION:    Reidentified is a acute compression fracture of the L2 vertebral body with approximately 45% vertebral body height loss within the mid body. There is no significant bony retropulsion. No associated paraspinal or epidural disease. No additional fractures.    Mild degenerative changes as above.        < end of copied text >    Advanced directives addressed: full resuscitation

## 2021-03-12 NOTE — PROGRESS NOTE ADULT - SUBJECTIVE AND OBJECTIVE BOX
75y old  Female who presents with a chief complaint of pain uncontrolled L2 fracture    3/8 pt seen and evaluated at bedside. Pt denies complaints of pain. Pt states she is still experiencing REGAN but feels about the same as yesterday. Pt reports 1 normal BM since yesterday.    3/11 feels winded on minimal exertion, diarrhea overnight nonbloody x2  3/12 still sob on minimal exertion, reports she is comfortable at rest , on NRB  ROS:   All 10 systems reviewed and found to be negative with the exception of what has been described above.  GEN: lying in bed, mild respiratory distress.  HEENT:   NC/AT, pupils equal and reactive, EOMI  CV:  +S1, +S2, RRR  RESP:   fine crackles appreciated at b/l lung bases, no expiratory wheeze  BREAST:  not examined  GI:  abdomen soft, non-tender, non-distended, normoactive BS  RECTAL:  not examined  :  not examined  MSK:   normal muscle tone  EXT:  no edema  NEURO:  AAOX3, no focal neurological deficits  SKIN:  no rashes      PHYSICAL EXAM:    Daily     Daily Weight in k.5 (12 Mar 2021 06:47)    ICU Vital Signs Last 24 Hrs  T(C): 36.7 (12 Mar 2021 08:50), Max: 36.7 (12 Mar 2021 08:50)  T(F): 98.1 (12 Mar 2021 08:50), Max: 98.1 (12 Mar 2021 08:50)  HR: 75 (12 Mar 2021 08:50) (75 - 98)  BP: 142/75 (12 Mar 2021 08:50) (119/88 - 142/75)  BP(mean): --  ABP: --  ABP(mean): --  RR: 20 (12 Mar 2021 08:50) (20 - 20)  SpO2: 89% (12 Mar 2021 08:50) (89% - 90%)                              11.7   23.44 )-----------( 224      ( 12 Mar 2021 06:36 )             37.8       CBC Full  -  ( 12 Mar 2021 06:36 )  WBC Count : 23.44 K/uL  RBC Count : 3.79 M/uL  Hemoglobin : 11.7 g/dL  Hematocrit : 37.8 %  Platelet Count - Automated : 224 K/uL  Mean Cell Volume : 99.7 fl  Mean Cell Hemoglobin : 30.9 pg  Mean Cell Hemoglobin Concentration : 31.0 gm/dL  Auto Neutrophil # : 21.03 K/uL  Auto Lymphocyte # : 0.37 K/uL  Auto Monocyte # : 0.61 K/uL  Auto Eosinophil # : 0.00 K/uL  Auto Basophil # : 0.13 K/uL  Auto Neutrophil % : 89.7 %  Auto Lymphocyte % : 1.6 %  Auto Monocyte % : 2.6 %  Auto Eosinophil % : 0.0 %  Auto Basophil % : 0.6 %          143  |  113<H>  |  71<H>  ----------------------------<  182<H>  5.0   |  23  |  1.25    Ca    9.2      12 Mar 2021 06:36    TPro  5.7<L>  /  Alb  2.2<L>  /  TBili  0.6  /  DBili  x   /  AST  16  /  ALT  43  /  AlkPhos  167<H>  0311      LIVER FUNCTIONS - ( 11 Mar 2021 07:10 )  Alb: 2.2 g/dL / Pro: 5.7 gm/dL / ALK PHOS: 167 U/L / ALT: 43 U/L / AST: 16 U/L / GGT: x             PT/INR - ( 12 Mar 2021 06:36 )   PT: 22.8 sec;   INR: 2.03 ratio         PTT - ( 12 Mar 2021 06:36 )  PTT:26.4 sec              ABG - ( 12 Mar 2021 11:42 )  pH, Arterial: 7.42  pH, Blood: x     /  pCO2: 37    /  pO2: 113   / HCO3: 23    / Base Excess: -.4   /  SaO2: 98                  MEDICATIONS  (STANDING):  acetaminophen  IVPB .. 1000 milliGRAM(s) IV Intermittent once  aspirin enteric coated 81 milliGRAM(s) Oral daily  budesonide 160 MICROgram(s)/formoterol 4.5 MICROgram(s) Inhaler 2 Puff(s) Inhalation two times a day  calcium carbonate 1250 mG  + Vitamin D (OsCal 500 + D) 1 Tablet(s) Oral daily  carvedilol Oral Tab/Cap - Peds 50 milliGRAM(s) Oral two times a day  cyclobenzaprine 5 milliGRAM(s) Oral three times a day  dextrose 40% Gel 15 Gram(s) Oral once  dextrose 5%. 1000 milliLiter(s) (50 mL/Hr) IV Continuous <Continuous>  dextrose 5%. 1000 milliLiter(s) (100 mL/Hr) IV Continuous <Continuous>  dextrose 50% Injectable 25 Gram(s) IV Push once  dextrose 50% Injectable 12.5 Gram(s) IV Push once  dextrose 50% Injectable 25 Gram(s) IV Push once  folic acid 1 milliGRAM(s) Oral daily  glucagon  Injectable 1 milliGRAM(s) IntraMuscular once  guaiFENesin  milliGRAM(s) Oral every 12 hours  insulin lispro (ADMELOG) corrective regimen sliding scale   SubCutaneous three times a day before meals  insulin lispro (ADMELOG) corrective regimen sliding scale   SubCutaneous at bedtime  lidocaine   Patch 1 Patch Transdermal daily  meropenem  IVPB 1000 milliGRAM(s) IV Intermittent every 12 hours  methylPREDNISolone sodium succinate Injectable 40 milliGRAM(s) IV Push every 12 hours  simvastatin 20 milliGRAM(s) Oral at bedtime  warfarin 2.5 milliGRAM(s) Oral once

## 2021-03-12 NOTE — PROGRESS NOTE ADULT - ASSESSMENT
superimposed HCAP//ILD/hx emphysema /- still SOB with minimal exertion  on NRB ,   on solumedrol to BID , symbicort, s/p cefepime #7, now meropenem#4/7  CXR 3/12 mildly increase in interstitial markings   transfer to floor for continuos pulse ox monitoring   ABG unremarkable      STACIE  resolved on CKD  held lasix , held bactrim, held entresto    hx afib   INr therapeutic  resume coumadin     diarrhea 2 episodes resolved    stoppe miralax ,    75y old  Female who presents with a chief complaint of pain uncontrolled L2 fracture.      # Acute spontaneous L2 fracture:  - could be related to osteoporosis/chronic steroid use.   - cyclobenzaprine PO PRN for muscle spasm  - lidocaine 1 patch daily for pain  - continue bracing  - continue physical therapy  - incentive spirometry  - started on OsCal 500 + D 1 tab PO qd to promote fx healing.    # Hypoxic respiratory failure - stable  - multifactorial:  HCAP; CHF.  - ddx: ILD due to MTX and/or amiodarone.  - Bactrim #5 / cefepime #6 - continue as per ID.  - as per ID - d/c Bactrim 2/2 worsening BUN / Cr / Hyperkalemia.   - azithromycin discontinued as per ID 3/6.  - Remdesevir completed 2 days  - WBC still elevated at 19.63 - possibly due in part to solumedrol.  - O2 taper to 40% via VM attempted 3/7 - pt did not tolerate, O2 titrated back up to 50% via VM and pt is stable.  - solumedrol #4 - as per pulm, decreased to 40mg q8h. Consider further taper if pt can tolerate decreased O2 support.  - as per pulm, bronchoscopy too high risk at this point.   - acetaminophen PRN for fever >100.4 F.  - add Mucinex BID for cough    # constipation  - pt reports normal BM 3/8.  - continue Miralax 17 gm PO qd    # STACIE without hx CKD  - worsening BUN / Cr (69 / 1.85)  - hyperkalemia to 5.8  - possibly 2/2 Bactrim administration  - pt refused kayexalate yesterday 3/7  - kayexalate 30 gm PO x 1 today  - continue to monitor BMP   - nephrology consult noted - will discuss whether to d/c Entresto in the setting of STACIE.   - will d/c Lasix in the setting of rising Cr and /55.   - urine electrolytes pending   - as per ID - d/c Bactrim  - avoid nephrotoxic agents    # poor PO intake  - Nutrition eval noted    # Hypotensive at baseline, BP 90s-100s systolic  - d/c Lasix 3/8    # Hyperglycemia, likely secondary to solumedrol - stable  - ISS  - routine POCT     # PT/INR elevated from yesterday  - continue pt on warfarin with hold instructions  - no signs of active bleeding / anemia - continue to observe  - Hgb 12.9 / Hct 39.6 /   - monitor CBC, LFTs      # COPD:  - albuterol prn.   - inhaled steroid.    # A fib:  - continue coumadin, aspirin  - continue carvedilol  - Cardiology consult appreciated.    # Chronic systolic CHF/AICD:  - d/c Lasix in the setting of STACIE.   - continue entresto, carvedilol  - nephrology consult noted - will defer to them decision to continue Entresto in the setting of STACIE.  - Cardiology consult appreciated.     # Rheumatoid arthritis:  - continue to hold prednisone while solumedrol is administered  - hold mtx.     # DVT px:  - pt on coumadin with hold parameters.    Dispo - pt stable on 50% O2 via VM. Titrate O2 down as tolerated. Consider steroid taper once O2 requirement improved. Discharge not likely until pt more stable.       superimposed HCAP//ILD/hx emphysema /- still SOB with minimal exertion  on NRB ,   on solumedrol to BID , symbicort, s/p cefepime #7, now meropenem#4/7  CXR 3/12 mildly increase in interstitial markings   transfer to floor for continuos pulse ox monitoring   ABG unremarkable  not candidate for bronch/bx due to acute respiratory  condition      STACIE  resolved on CKD  held lasix , held bactrim, held entresto    hx afib   INr therapeutic  resume coumadin     diarrhea 2 episodes resolved    stoppe miralax ,  i left message for son jm Clancy for call back [phone]  75y old  Female who presents with a chief complaint of pain uncontrolled L2 fracture.      # Acute spontaneous L2 fracture:  - could be related to osteoporosis/chronic steroid use.   - cyclobenzaprine PO PRN for muscle spasm  - lidocaine 1 patch daily for pain  - continue bracing  - continue physical therapy  - incentive spirometry  - started on OsCal 500 + D 1 tab PO qd to promote fx healing.    # Hypoxic respiratory failure - stable  - multifactorial:  HCAP; CHF.  - ddx: ILD due to MTX and/or amiodarone.  - Bactrim #5 / cefepime #6 - continue as per ID.  - as per ID - d/c Bactrim 2/2 worsening BUN / Cr / Hyperkalemia.   - azithromycin discontinued as per ID 3/6.  - Remdesevir completed 2 days  - WBC still elevated at 19.63 - possibly due in part to solumedrol.  - O2 taper to 40% via VM attempted 3/7 - pt did not tolerate, O2 titrated back up to 50% via VM and pt is stable.  - solumedrol #4 - as per pulm, decreased to 40mg q8h. Consider further taper if pt can tolerate decreased O2 support.  - as per pulm, bronchoscopy too high risk at this point.   - acetaminophen PRN for fever >100.4 F.  - add Mucinex BID for cough    # constipation  - pt reports normal BM 3/8.  - continue Miralax 17 gm PO qd    # STACIE without hx CKD  - worsening BUN / Cr (69 / 1.85)  - hyperkalemia to 5.8  - possibly 2/2 Bactrim administration  - pt refused kayexalate yesterday 3/7  - kayexalate 30 gm PO x 1 today  - continue to monitor BMP   - nephrology consult noted - will discuss whether to d/c Entresto in the setting of STACIE.   - will d/c Lasix in the setting of rising Cr and /55.   - urine electrolytes pending   - as per ID - d/c Bactrim  - avoid nephrotoxic agents    # poor PO intake  - Nutrition eval noted    # Hypotensive at baseline, BP 90s-100s systolic  - d/c Lasix 3/8    # Hyperglycemia, likely secondary to solumedrol - stable  - ISS  - routine POCT     # PT/INR elevated from yesterday  - continue pt on warfarin with hold instructions  - no signs of active bleeding / anemia - continue to observe  - Hgb 12.9 / Hct 39.6 /   - monitor CBC, LFTs      # COPD:  - albuterol prn.   - inhaled steroid.    # A fib:  - continue coumadin, aspirin  - continue carvedilol  - Cardiology consult appreciated.    # Chronic systolic CHF/AICD:  - d/c Lasix in the setting of STACIE.   - continue entresto, carvedilol  - nephrology consult noted - will defer to them decision to continue Entresto in the setting of STACIE.  - Cardiology consult appreciated.     # Rheumatoid arthritis:  - continue to hold prednisone while solumedrol is administered  - hold mtx.     # DVT px:  - pt on coumadin with hold parameters.    Full code

## 2021-03-12 NOTE — PROGRESS NOTE ADULT - ASSESSMENT
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 acute worsening L sided low back pain and she is unable to ambulate. Patient denies any recent falls or trauma. pain started past few days. She states the pain is mainly localized to her lower back and does not radiate, worsens with movement. She denies any numbness or tingling in her bilateral lower extremities. She has had arthritis and pain both knees and hip, and has follows out patient with Dr Clancy. Here noted with acute L2 compression fracture hospital course c/b acute hypoxic resp failure, CT chest with extensive b/l lung infiltrates unclear etiology ? pna ? ild ? chf? viral process ?     1. acute hypoxic respiratory failure. multifocal pneumonia -atypical/PCP? interstitial lung disease ?vasculitis. chf. immunosuppressed host  - imaging reviewed, ddx atypical pna, viral pna, ? mtx/amio related ?boop ?vasculitis   - covid-19 pcr x 2 and covid ab (-)  x 2   - on IV steroids   - on IV meropenem [id number] #5  - continue with abx coverage, suggest 5-7 day course   - consider bronchoscopy/lung biopsy when pt is stable  - check sputum cx if able    - completed azithromycin #5 --> 3/6  - s/p cefepime #7 --> 3/8  - s/p bactrim #5  possible pcp - d/t dorinda - discontinued 3/8  - s/p remdesivir #2 - discontinued  - isolation precautions  - f/u cbc  - monitor temps  - supportive care    2. other issues - care per medicine

## 2021-03-12 NOTE — PROGRESS NOTE ADULT - SUBJECTIVE AND OBJECTIVE BOX
Patient is a 75y Female who reports still sob - now on 100 % NRB  limited movement due to dyspnea      MEDICATIONS  (STANDING):  acetaminophen  IVPB .. 1000 milliGRAM(s) IV Intermittent once  aspirin enteric coated 81 milliGRAM(s) Oral daily  budesonide 160 MICROgram(s)/formoterol 4.5 MICROgram(s) Inhaler 2 Puff(s) Inhalation two times a day  calcium carbonate 1250 mG  + Vitamin D (OsCal 500 + D) 1 Tablet(s) Oral daily  carvedilol Oral Tab/Cap - Peds 50 milliGRAM(s) Oral two times a day  cyclobenzaprine 5 milliGRAM(s) Oral three times a day  dextrose 40% Gel 15 Gram(s) Oral once  dextrose 5%. 1000 milliLiter(s) (50 mL/Hr) IV Continuous <Continuous>  dextrose 5%. 1000 milliLiter(s) (100 mL/Hr) IV Continuous <Continuous>  dextrose 50% Injectable 25 Gram(s) IV Push once  dextrose 50% Injectable 12.5 Gram(s) IV Push once  dextrose 50% Injectable 25 Gram(s) IV Push once  folic acid 1 milliGRAM(s) Oral daily  glucagon  Injectable 1 milliGRAM(s) IntraMuscular once  guaiFENesin  milliGRAM(s) Oral every 12 hours  insulin lispro (ADMELOG) corrective regimen sliding scale   SubCutaneous three times a day before meals  insulin lispro (ADMELOG) corrective regimen sliding scale   SubCutaneous at bedtime  lidocaine   Patch 1 Patch Transdermal daily  meropenem  IVPB 1000 milliGRAM(s) IV Intermittent every 12 hours  methylPREDNISolone sodium succinate Injectable 40 milliGRAM(s) IV Push every 12 hours  simvastatin 20 milliGRAM(s) Oral at bedtime  warfarin 2 milliGRAM(s) Oral daily    MEDICATIONS  (PRN):  acetaminophen   Tablet .. 650 milliGRAM(s) Oral every 6 hours PRN Temp greater or equal to 38C (100.4F), fever, headaches  ALBUTerol    90 MICROgram(s) HFA Inhaler 2 Puff(s) Inhalation every 6 hours PRN Shortness of Breath and/or Wheezing  cyclobenzaprine 5 milliGRAM(s) Oral at bedtime PRN Muscle Spasm      Vital Signs Last 24 Hrs  T(C): 36.7 (12 Mar 2021 08:50), Max: 36.7 (12 Mar 2021 08:50)  T(F): 98.1 (12 Mar 2021 08:50), Max: 98.1 (12 Mar 2021 08:50)  HR: 75 (12 Mar 2021 08:50) (75 - 98)  BP: 142/75 (12 Mar 2021 08:50) (119/88 - 142/75)  BP(mean): --  RR: 20 (12 Mar 2021 08:50) (20 - 20)  SpO2: 89% (12 Mar 2021 08:50) (89% - 90%)    I&O's Detail    11 Mar 2021 07:01  -  12 Mar 2021 07:00  --------------------------------------------------------  IN:  Total IN: 0 mL    OUT:    Incontinent per Diaper, Weight (mL): 3 mL    Voided (mL): 200 mL  Total OUT: 203 mL    Total NET: -203 mL      PHYSICAL EXAM:    Constitutional: frail , in mod resp distress, obese   Resp poor AE   HS: S1 and S2   Extremities: trace peripheral edema  Neurological: A/O x 3, no focal deficits  : No Quezada  Skin: No rashes  Access: Not applicable        LABS:      143    |  113    |  71     ----------------------------<  182       12 Mar 2021 06:36  5.0     |  23     |  1.25     143    |  112    |  70     ----------------------------<  154       11 Mar 2021 07:10  4.6     |  25     |  1.43     142    |  109    |  72     ----------------------------<  147       10 Mar 2021 08:26  4.5     |  27     |  1.63     Ca    9.2        12 Mar 2021 06:36  Ca    8.9        11 Mar 2021 07:10                            11.7   23.44 )-----------( 224      ( 12 Mar 2021 06:36 )             37.8                         11.5   19.46 )-----------( 195      ( 11 Mar 2021 07:10 )             360           TPro  5.7    /  Alb  2.2    /  TBili  0.6    /        11 Mar 2021 07:10  DBili  x      /  AST  16     /  ALT  43     /  AlkPhos  167      TPro  6.2    /  Alb  2.3    /  TBili  0.6    /        10 Mar 2021 08:26  DBili  x      /  AST  18     /  ALT  51     /  AlkPhos  180                     Urine Studies:  Urinalysis Basic - ( 08 Mar 2021 15:45 )    Color: Yellow / Appearance: very cloudy / S.020 / pH: x  Gluc: x / Ketone: Negative  / Bili: Negative / Urobili: Negative mg/dL   Blood: x / Protein: 30 mg/dL / Nitrite: Negative   Leuk Esterase: Small / RBC: 3-5 /HPF / WBC 6-10   Sq Epi: x / Non Sq Epi: Occasional / Bacteria: Occasional      Protein/Creatinine Ratio Calculation: 1.3 Ratio ( @ 15:45)      RADIOLOGY & ADDITIONAL STUDIES:

## 2021-03-12 NOTE — PROGRESS NOTE ADULT - SUBJECTIVE AND OBJECTIVE BOX
Subjective:    Awake, alert. Still breathless with min exertion.    MEDICATIONS  (STANDING):  acetaminophen  IVPB .. 1000 milliGRAM(s) IV Intermittent once  aspirin enteric coated 81 milliGRAM(s) Oral daily  budesonide 160 MICROgram(s)/formoterol 4.5 MICROgram(s) Inhaler 2 Puff(s) Inhalation two times a day  calcium carbonate 1250 mG  + Vitamin D (OsCal 500 + D) 1 Tablet(s) Oral daily  carvedilol Oral Tab/Cap - Peds 50 milliGRAM(s) Oral two times a day  cyclobenzaprine 5 milliGRAM(s) Oral three times a day  dextrose 40% Gel 15 Gram(s) Oral once  dextrose 5%. 1000 milliLiter(s) (50 mL/Hr) IV Continuous <Continuous>  dextrose 5%. 1000 milliLiter(s) (100 mL/Hr) IV Continuous <Continuous>  dextrose 50% Injectable 25 Gram(s) IV Push once  dextrose 50% Injectable 12.5 Gram(s) IV Push once  dextrose 50% Injectable 25 Gram(s) IV Push once  folic acid 1 milliGRAM(s) Oral daily  glucagon  Injectable 1 milliGRAM(s) IntraMuscular once  guaiFENesin  milliGRAM(s) Oral every 12 hours  insulin lispro (ADMELOG) corrective regimen sliding scale   SubCutaneous three times a day before meals  insulin lispro (ADMELOG) corrective regimen sliding scale   SubCutaneous at bedtime  lidocaine   Patch 1 Patch Transdermal daily  meropenem  IVPB 1000 milliGRAM(s) IV Intermittent every 12 hours  methylPREDNISolone sodium succinate Injectable 40 milliGRAM(s) IV Push every 12 hours  simvastatin 20 milliGRAM(s) Oral at bedtime  warfarin 2 milliGRAM(s) Oral daily    MEDICATIONS  (PRN):  acetaminophen   Tablet .. 650 milliGRAM(s) Oral every 6 hours PRN Temp greater or equal to 38C (100.4F), fever, headaches  ALBUTerol    90 MICROgram(s) HFA Inhaler 2 Puff(s) Inhalation every 6 hours PRN Shortness of Breath and/or Wheezing  cyclobenzaprine 5 milliGRAM(s) Oral at bedtime PRN Muscle Spasm      Allergies    apixaban (Other)  fesoterodine (Unknown)  Macrobid (Other)    Intolerances        Vital Signs Last 24 Hrs  T(C): 36.7 (12 Mar 2021 08:50), Max: 36.7 (12 Mar 2021 08:50)  T(F): 98.1 (12 Mar 2021 08:50), Max: 98.1 (12 Mar 2021 08:50)  HR: 75 (12 Mar 2021 08:50) (75 - 98)  BP: 142/75 (12 Mar 2021 08:50) (119/88 - 142/75)  BP(mean): --  RR: 20 (12 Mar 2021 08:50) (20 - 20)  SpO2: 89% (12 Mar 2021 08:50) (89% - 90%)    PHYSICAL EXAMINATION:    NECK:  Supple. No lymphadenopathy. Jugular venous pressure not elevated.    HEART:   The cardiac impulse has a normal quality. Reg., Nl S1 and S2.   CHEST:  Chest with basilar crackles. Normal respiratory effort.  ABDOMEN:  Soft and nontender.   EXTREMITIES:  There is no edema.       LABS:                        11.7   23.44 )-----------( 224      ( 12 Mar 2021 06:36 )             37.8     03-12    143  |  113<H>  |  71<H>  ----------------------------<  182<H>  5.0   |  23  |  1.25    Ca    9.2      12 Mar 2021 06:36    TPro  5.7<L>  /  Alb  2.2<L>  /  TBili  0.6  /  DBili  x   /  AST  16  /  ALT  43  /  AlkPhos  167<H>  03-11    PT/INR - ( 12 Mar 2021 06:36 )   PT: 22.8 sec;   INR: 2.03 ratio         PTT - ( 12 Mar 2021 06:36 )  PTT:26.4 sec      RADIOLOGY & ADDITIONAL TESTS:    Assessment and Plan:   · Assessment  	  - Repeat CXR today  - on Meropenem. Bactrim D/C'ed due to renal insufficiency/hyperkalemia.   - Continue solumedrol at 40mg q12h  - cont symbicort  - dvt proph  - Renal F/U  - OOB to chair    Problem/Plan - 1:  ·  Problem: Acute respiratory failure with hypoxia.   Problem/Plan - 2:  ·  Problem: Multifocal pneumonia.   Problem/Plan - 3:  ·  Problem: Emphysema of lung.   Problem/Plan - 4:  ·  Problem: CHF with cardiomyopathy.   Problem/Plan - 5:  ·  Problem: Anemia.   Problem/Plan - 6:  Problem: Coagulopathy.

## 2021-03-13 NOTE — PROVIDER CONTACT NOTE (CHANGE IN STATUS NOTIFICATION) - SITUATION
Pt calling out saying she can't breathe. Pt On NRB mask, RR 32 labored, O2 Sat on monitor 86%. S/p resp treatment. Pt reposition to lay on side, refused prone position. O2 sat Increased to 89%, Breathing still labored. RR called. MD at bedside, ordered High flow O2.

## 2021-03-13 NOTE — PROGRESS NOTE ADULT - SUBJECTIVE AND OBJECTIVE BOX
Subjective:    Awake, alert. Still breathless with min exertion.    Events noted, pt on BIPAP, mildly short of breath.     MEDICATIONS  (STANDING):  acetaminophen  IVPB .. 1000 milliGRAM(s) IV Intermittent once  aspirin enteric coated 81 milliGRAM(s) Oral daily  budesonide 160 MICROgram(s)/formoterol 4.5 MICROgram(s) Inhaler 2 Puff(s) Inhalation two times a day  calcium carbonate 1250 mG  + Vitamin D (OsCal 500 + D) 1 Tablet(s) Oral daily  carvedilol Oral Tab/Cap - Peds 50 milliGRAM(s) Oral two times a day  cyclobenzaprine 5 milliGRAM(s) Oral three times a day  dextrose 40% Gel 15 Gram(s) Oral once  dextrose 5%. 1000 milliLiter(s) (50 mL/Hr) IV Continuous <Continuous>  dextrose 5%. 1000 milliLiter(s) (100 mL/Hr) IV Continuous <Continuous>  dextrose 50% Injectable 25 Gram(s) IV Push once  dextrose 50% Injectable 12.5 Gram(s) IV Push once  dextrose 50% Injectable 25 Gram(s) IV Push once  folic acid 1 milliGRAM(s) Oral daily  glucagon  Injectable 1 milliGRAM(s) IntraMuscular once  guaiFENesin  milliGRAM(s) Oral every 12 hours  insulin lispro (ADMELOG) corrective regimen sliding scale   SubCutaneous three times a day before meals  insulin lispro (ADMELOG) corrective regimen sliding scale   SubCutaneous at bedtime  lidocaine   Patch 1 Patch Transdermal daily  meropenem  IVPB 1000 milliGRAM(s) IV Intermittent every 12 hours  methylPREDNISolone sodium succinate Injectable 40 milliGRAM(s) IV Push every 12 hours  simvastatin 20 milliGRAM(s) Oral at bedtime  warfarin 2 milliGRAM(s) Oral daily    MEDICATIONS  (PRN):  acetaminophen   Tablet .. 650 milliGRAM(s) Oral every 6 hours PRN Temp greater or equal to 38C (100.4F), fever, headaches  ALBUTerol    90 MICROgram(s) HFA Inhaler 2 Puff(s) Inhalation every 6 hours PRN Shortness of Breath and/or Wheezing  cyclobenzaprine 5 milliGRAM(s) Oral at bedtime PRN Muscle Spasm      Allergies    apixaban (Other)  fesoterodine (Unknown)  Macrobid (Other)    Intolerances        Vital Signs Last 24 Hrs  T(C): 36.7 (12 Mar 2021 08:50), Max: 36.7 (12 Mar 2021 08:50)  T(F): 98.1 (12 Mar 2021 08:50), Max: 98.1 (12 Mar 2021 08:50)  HR: 75 (12 Mar 2021 08:50) (75 - 98)  BP: 142/75 (12 Mar 2021 08:50) (119/88 - 142/75)  BP(mean): --  RR: 20 (12 Mar 2021 08:50) (20 - 20)  SpO2: 89% (12 Mar 2021 08:50) (89% - 90%)    PHYSICAL EXAMINATION:    NECK:  Supple. No lymphadenopathy. Jugular venous pressure not elevated.    HEART:   The cardiac impulse has a normal quality. Reg., Nl S1 and S2.   CHEST:  Chest with basilar crackles. Normal respiratory effort.  ABDOMEN:  Soft and nontender.   EXTREMITIES:  There is no edema.       LABS:                        11.7   23.44 )-----------( 224      ( 12 Mar 2021 06:36 )             37.8     03-12    143  |  113<H>  |  71<H>  ----------------------------<  182<H>  5.0   |  23  |  1.25    Ca    9.2      12 Mar 2021 06:36    TPro  5.7<L>  /  Alb  2.2<L>  /  TBili  0.6  /  DBili  x   /  AST  16  /  ALT  43  /  AlkPhos  167<H>  03-11    PT/INR - ( 12 Mar 2021 06:36 )   PT: 22.8 sec;   INR: 2.03 ratio         PTT - ( 12 Mar 2021 06:36 )  PTT:26.4 sec            RADIOLOGY & ADDITIONAL TESTS:    Assessment and Plan:   · Assessment  	  - continue BIPAP as needed  - Repeat CXR - no change in bilat infiltrates.  - on Meropenem. Bactrim D/C'ed due to renal insufficiency/hyperkalemia.   - Continue solumedrol at 40mg q12h  - cont symbicort  - dvt proph  - Renal following  - Respiratory status has worsened. Case d/w'ed primary team doctor.  To give IV lasix today.   - Pt is DNR, DNI.     Problem/Plan - 1:  ·  Problem: Acute respiratory failure with hypoxia.   Problem/Plan - 2:  ·  Problem: Multifocal pneumonia.   Problem/Plan - 3:  ·  Problem: Emphysema of lung.   Problem/Plan - 4:  ·  Problem: CHF with cardiomyopathy.   Problem/Plan - 5:  ·  Problem: Anemia.   Problem/Plan - 6:  Problem: Coagulopathy. Subjective:    Awake, alert. Still breathless with min exertion.    Events noted, pt on BIPAP, mildly short of breath.      MEDICATIONS  (STANDING):  acetaminophen  IVPB .. 1000 milliGRAM(s) IV Intermittent once  aspirin enteric coated 81 milliGRAM(s) Oral daily  budesonide 160 MICROgram(s)/formoterol 4.5 MICROgram(s) Inhaler 2 Puff(s) Inhalation two times a day  calcium carbonate 1250 mG  + Vitamin D (OsCal 500 + D) 1 Tablet(s) Oral daily  carvedilol Oral Tab/Cap - Peds 50 milliGRAM(s) Oral two times a day  cyclobenzaprine 5 milliGRAM(s) Oral three times a day  dextrose 40% Gel 15 Gram(s) Oral once  dextrose 5%. 1000 milliLiter(s) (50 mL/Hr) IV Continuous <Continuous>  dextrose 5%. 1000 milliLiter(s) (100 mL/Hr) IV Continuous <Continuous>  dextrose 50% Injectable 25 Gram(s) IV Push once  dextrose 50% Injectable 12.5 Gram(s) IV Push once  dextrose 50% Injectable 25 Gram(s) IV Push once  folic acid 1 milliGRAM(s) Oral daily  glucagon  Injectable 1 milliGRAM(s) IntraMuscular once  guaiFENesin  milliGRAM(s) Oral every 12 hours  insulin lispro (ADMELOG) corrective regimen sliding scale   SubCutaneous three times a day before meals  insulin lispro (ADMELOG) corrective regimen sliding scale   SubCutaneous at bedtime  lidocaine   Patch 1 Patch Transdermal daily  meropenem  IVPB 1000 milliGRAM(s) IV Intermittent every 12 hours  methylPREDNISolone sodium succinate Injectable 40 milliGRAM(s) IV Push every 12 hours  simvastatin 20 milliGRAM(s) Oral at bedtime  warfarin 2 milliGRAM(s) Oral daily    MEDICATIONS  (PRN):  acetaminophen   Tablet .. 650 milliGRAM(s) Oral every 6 hours PRN Temp greater or equal to 38C (100.4F), fever, headaches  ALBUTerol    90 MICROgram(s) HFA Inhaler 2 Puff(s) Inhalation every 6 hours PRN Shortness of Breath and/or Wheezing  cyclobenzaprine 5 milliGRAM(s) Oral at bedtime PRN Muscle Spasm      Allergies    apixaban (Other)  fesoterodine (Unknown)  Macrobid (Other)    Intolerances        Vital Signs Last 24 Hrs  T(C): 36.7 (12 Mar 2021 08:50), Max: 36.7 (12 Mar 2021 08:50)  T(F): 98.1 (12 Mar 2021 08:50), Max: 98.1 (12 Mar 2021 08:50)  HR: 75 (12 Mar 2021 08:50) (75 - 98)  BP: 142/75 (12 Mar 2021 08:50) (119/88 - 142/75)  BP(mean): --  RR: 20 (12 Mar 2021 08:50) (20 - 20)  SpO2: 89% (12 Mar 2021 08:50) (89% - 90%)    PHYSICAL EXAMINATION:    NECK:  Supple. No lymphadenopathy. Jugular venous pressure not elevated.    HEART:   The cardiac impulse has a normal quality. Reg., Nl S1 and S2.   CHEST:  Chest with basilar crackles. Normal respiratory effort.  ABDOMEN:  Soft and nontender.   EXTREMITIES:  There is no edema.       LABS:                        11.7   23.44 )-----------( 224      ( 12 Mar 2021 06:36 )             37.8     03-12    143  |  113<H>  |  71<H>  ----------------------------<  182<H>  5.0   |  23  |  1.25    Ca    9.2      12 Mar 2021 06:36    TPro  5.7<L>  /  Alb  2.2<L>  /  TBili  0.6  /  DBili  x   /  AST  16  /  ALT  43  /  AlkPhos  167<H>  03-11    PT/INR - ( 12 Mar 2021 06:36 )   PT: 22.8 sec;   INR: 2.03 ratio         PTT - ( 12 Mar 2021 06:36 )  PTT:26.4 sec            RADIOLOGY & ADDITIONAL TESTS:    Assessment and Plan:   · Assessment  	  - ABG's on BIPAP noted (7.44/35/74)  - continue BIPAP as needed  - Repeat CXR - no change in bilat infiltrates.  - on Meropenem. Bactrim D/C'ed due to renal insufficiency/hyperkalemia.   - Continue solumedrol at 40mg q12h  - cont symbicort  - dvt proph  - Renal following  - Respiratory status has worsened. Case d/w'ed primary team doctor.  To give IV lasix today.   - Pt is DNR, DNI.     Problem/Plan - 1:  ·  Problem: Acute respiratory failure with hypoxia.   Problem/Plan - 2:  ·  Problem: Multifocal pneumonia.   Problem/Plan - 3:  ·  Problem: Emphysema of lung.   Problem/Plan - 4:  ·  Problem: CHF with cardiomyopathy.   Problem/Plan - 5:  ·  Problem: Anemia.   Problem/Plan - 6:  Problem: Coagulopathy. Subjective:    Awake, alert. Still breathless with min exertion.    Events noted, pt on BIPAP, mildly short of breath.      MEDICATIONS  (STANDING):  acetaminophen  IVPB .. 1000 milliGRAM(s) IV Intermittent once  aspirin enteric coated 81 milliGRAM(s) Oral daily  budesonide 160 MICROgram(s)/formoterol 4.5 MICROgram(s) Inhaler 2 Puff(s) Inhalation two times a day  calcium carbonate 1250 mG  + Vitamin D (OsCal 500 + D) 1 Tablet(s) Oral daily  carvedilol Oral Tab/Cap - Peds 50 milliGRAM(s) Oral two times a day  cyclobenzaprine 5 milliGRAM(s) Oral three times a day  dextrose 40% Gel 15 Gram(s) Oral once  dextrose 5%. 1000 milliLiter(s) (50 mL/Hr) IV Continuous <Continuous>  dextrose 5%. 1000 milliLiter(s) (100 mL/Hr) IV Continuous <Continuous>  dextrose 50% Injectable 25 Gram(s) IV Push once  dextrose 50% Injectable 12.5 Gram(s) IV Push once  dextrose 50% Injectable 25 Gram(s) IV Push once  folic acid 1 milliGRAM(s) Oral daily  glucagon  Injectable 1 milliGRAM(s) IntraMuscular once  guaiFENesin  milliGRAM(s) Oral every 12 hours  insulin lispro (ADMELOG) corrective regimen sliding scale   SubCutaneous three times a day before meals  insulin lispro (ADMELOG) corrective regimen sliding scale   SubCutaneous at bedtime  lidocaine   Patch 1 Patch Transdermal daily  meropenem  IVPB 1000 milliGRAM(s) IV Intermittent every 12 hours  methylPREDNISolone sodium succinate Injectable 40 milliGRAM(s) IV Push every 12 hours  simvastatin 20 milliGRAM(s) Oral at bedtime  warfarin 2 milliGRAM(s) Oral daily    MEDICATIONS  (PRN):  acetaminophen   Tablet .. 650 milliGRAM(s) Oral every 6 hours PRN Temp greater or equal to 38C (100.4F), fever, headaches  ALBUTerol    90 MICROgram(s) HFA Inhaler 2 Puff(s) Inhalation every 6 hours PRN Shortness of Breath and/or Wheezing  cyclobenzaprine 5 milliGRAM(s) Oral at bedtime PRN Muscle Spasm      Allergies    apixaban (Other)  fesoterodine (Unknown)  Macrobid (Other)    Intolerances        Vital Signs Last 24 Hrs  T(C): 36.7 (12 Mar 2021 08:50), Max: 36.7 (12 Mar 2021 08:50)  T(F): 98.1 (12 Mar 2021 08:50), Max: 98.1 (12 Mar 2021 08:50)  HR: 75 (12 Mar 2021 08:50) (75 - 98)  BP: 142/75 (12 Mar 2021 08:50) (119/88 - 142/75)  BP(mean): --  RR: 20 (12 Mar 2021 08:50) (20 - 20)  SpO2: 89% (12 Mar 2021 08:50) (89% - 90%)    PHYSICAL EXAMINATION:    NECK:  Supple. No lymphadenopathy. Jugular venous pressure not elevated.    HEART:   The cardiac impulse has a normal quality. Reg., Nl S1 and S2.   CHEST:  Chest with basilar crackles. Normal respiratory effort.  ABDOMEN:  Soft and nontender.   EXTREMITIES:  There is no edema.       LABS:                        11.7   23.44 )-----------( 224      ( 12 Mar 2021 06:36 )             37.8     03-12    143  |  113<H>  |  71<H>  ----------------------------<  182<H>  5.0   |  23  |  1.25    Ca    9.2      12 Mar 2021 06:36    TPro  5.7<L>  /  Alb  2.2<L>  /  TBili  0.6  /  DBili  x   /  AST  16  /  ALT  43  /  AlkPhos  167<H>  03-11    PT/INR - ( 12 Mar 2021 06:36 )   PT: 22.8 sec;   INR: 2.03 ratio         PTT - ( 12 Mar 2021 06:36 )  PTT:26.4 sec            RADIOLOGY & ADDITIONAL TESTS:    Assessment and Plan:   · Assessment  	  - ABG's on BIPAP noted (7.44/35/74)  - continue BIPAP as needed  - Repeat CXR - no change in bilat infiltrates.  - on Meropenem. Bactrim D/C'ed due to renal insufficiency/hyperkalemia.   - Continue solumedrol at 40mg q12h  - cont symbicort  - dvt proph  - Renal following  - Respiratory status has worsened. Case d/w'ed primary team doctor.  To give IV lasix today.       Problem/Plan - 1:  ·  Problem: Acute respiratory failure with hypoxia.   Problem/Plan - 2:  ·  Problem: Multifocal pneumonia.   Problem/Plan - 3:  ·  Problem: Emphysema of lung.   Problem/Plan - 4:  ·  Problem: CHF with cardiomyopathy.   Problem/Plan - 5:  ·  Problem: Anemia.   Problem/Plan - 6:  Problem: Coagulopathy.

## 2021-03-13 NOTE — PROGRESS NOTE ADULT - ASSESSMENT
superimposed HCAP//ILD/hx emphysema /- still SOB with minimal exertion  did not tolerate BIPAP, conditions worsening   poor prognosis   switch BIPAP to NRB for comfort   increase dilaudid and altivan  no blood work, no vitals  goal is comfort care only s per patient's HCP marti Clancy   i discussed with HCP marti clancy   stop all meds except those use for comfort care  CXR 3/12 mildly increase in interstitial markings     not candidate for bronch/bx due to acute respiratory  condition      STACIE  resolved on CKD  held lasix , held bactrim, held entresto    hx afib   INr therapeutic  resume coumadin     diarrhea 2 episodes resolved    stoppe miralax ,  i left message for marti Clancy for call back [phone]  75y old  Female who presents with a chief complaint of pain uncontrolled L2 fracture.      # Acute spontaneous L2 fracture:  - could be related to osteoporosis/chronic steroid use.   - cyclobenzaprine PO PRN for muscle spasm  - lidocaine 1 patch daily for pain  - continue bracing  - continue physical therapy  - incentive spirometry  - started on OsCal 500 + D 1 tab PO qd to promote fx healing.    # Hypoxic respiratory failure - stable  - multifactorial:  HCAP; CHF.  - ddx: ILD due to MTX and/or amiodarone.  - Bactrim #5 / cefepime #6 - continue as per ID.  - as per ID - d/c Bactrim 2/2 worsening BUN / Cr / Hyperkalemia.   - azithromycin discontinued as per ID 3/6.  - Remdesevir completed 2 days  - WBC still elevated at 19.63 - possibly due in part to solumedrol.  - O2 taper to 40% via VM attempted 3/7 - pt did not tolerate, O2 titrated back up to 50% via VM and pt is stable.  - solumedrol #4 - as per pulm, decreased to 40mg q8h. Consider further taper if pt can tolerate decreased O2 support.  - as per pulm, bronchoscopy too high risk at this point.   - acetaminophen PRN for fever >100.4 F.  - add Mucinex BID for cough    # constipation  - pt reports normal BM 3/8.  - continue Miralax 17 gm PO qd    # STACIE without hx CKD  - worsening BUN / Cr (69 / 1.85)  - hyperkalemia to 5.8  - possibly 2/2 Bactrim administration  - pt refused kayexalate yesterday 3/7  - kayexalate 30 gm PO x 1 today  - continue to monitor BMP   - nephrology consult noted - will discuss whether to d/c Entresto in the setting of STACIE.   - will d/c Lasix in the setting of rising Cr and /55.   - urine electrolytes pending   - as per ID - d/c Bactrim  - avoid nephrotoxic agents    # poor PO intake  - Nutrition eval noted    # Hypotensive at baseline, BP 90s-100s systolic  - d/c Lasix 3/8    # Hyperglycemia, likely secondary to solumedrol - stable  - ISS  - routine POCT     # PT/INR elevated from yesterday  - continue pt on warfarin with hold instructions  - no signs of active bleeding / anemia - continue to observe  - Hgb 12.9 / Hct 39.6 /   - monitor CBC, LFTs      # COPD:  - albuterol prn.   - inhaled steroid.    # A fib:  - continue coumadin, aspirin  - continue carvedilol  - Cardiology consult appreciated.    # Chronic systolic CHF/AICD:  - d/c Lasix in the setting of STACIE.   - continue entresto, carvedilol  - nephrology consult noted - will defer to them decision to continue Entresto in the setting of STACIE.  - Cardiology consult appreciated.     # Rheumatoid arthritis:  - continue to hold prednisone while solumedrol is administered  - hold mtx.     # DVT px:  - pt on coumadin with hold parameters.    DNR/DNI comfort care only  i discussed with marti clancy superimposed HCAP//ILD/hx emphysema /- still SOB with minimal exertion  did not tolerate BIPAP, conditions worsening   poor prognosis   switch BIPAP to NRB for comfort   increase dilaudid and altivan  no blood work, no vitals  goal is comfort care only s per patient's HCP marti Clancy   i discussed with HCP marti clancy   stop all meds except those use for comfort care  CXR 3/12 mildly increase in interstitial markings     not candidate for bronch/bx due to acute respiratory  condition      STACIE  resolved on CKD  hyperkalemia likely from chf  previously held lasix, given 1 dose iv lasix 40  ,   held bactrim, held entresto    hx afib   INr therapeutic  resume coumadin     diarrhea 2 episodes resolved    stoppe miralax ,  i left message for marti Clancy for call back [phone]  75y old  Female who presents with a chief complaint of pain uncontrolled L2 fracture.      # Acute spontaneous L2 fracture:  - could be related to osteoporosis/chronic steroid use.   - cyclobenzaprine PO PRN for muscle spasm  - lidocaine 1 patch daily for pain  - continue bracing  - continue physical therapy  - incentive spirometry  - started on OsCal 500 + D 1 tab PO qd to promote fx healing.    # Hypoxic respiratory failure - stable  - multifactorial:  HCAP; CHF.  - ddx: ILD due to MTX and/or amiodarone.  - Bactrim #5 / cefepime #6 - continue as per ID.  - as per ID - d/c Bactrim 2/2 worsening BUN / Cr / Hyperkalemia.   - azithromycin discontinued as per ID 3/6.  - Remdesevir completed 2 days  - WBC still elevated at 19.63 - possibly due in part to solumedrol.  - O2 taper to 40% via VM attempted 3/7 - pt did not tolerate, O2 titrated back up to 50% via VM and pt is stable.  - solumedrol #4 - as per pulm, decreased to 40mg q8h. Consider further taper if pt can tolerate decreased O2 support.  - as per pulm, bronchoscopy too high risk at this point.   - acetaminophen PRN for fever >100.4 F.  - add Mucinex BID for cough    # constipation  - pt reports normal BM 3/8.  - continue Miralax 17 gm PO qd    # STACIE without hx CKD  - worsening BUN / Cr (69 / 1.85)  - hyperkalemia to 5.8  - possibly 2/2 Bactrim administration  - pt refused kayexalate yesterday 3/7  - kayexalate 30 gm PO x 1 today  - continue to monitor BMP   - nephrology consult noted - will discuss whether to d/c Entresto in the setting of STACIE.   - will d/c Lasix in the setting of rising Cr and /55.   - urine electrolytes pending   - as per ID - d/c Bactrim  - avoid nephrotoxic agents    # poor PO intake  - Nutrition eval noted    # Hypotensive at baseline, BP 90s-100s systolic  - d/c Lasix 3/8    # Hyperglycemia, likely secondary to solumedrol - stable  - ISS  - routine POCT     # PT/INR elevated from yesterday  - continue pt on warfarin with hold instructions  - no signs of active bleeding / anemia - continue to observe  - Hgb 12.9 / Hct 39.6 /   - monitor CBC, LFTs      # COPD:  - albuterol prn.   - inhaled steroid.    # A fib:  - continue coumadin, aspirin  - continue carvedilol  - Cardiology consult appreciated.    # Chronic systolic CHF/AICD:  - d/c Lasix in the setting of STACIE.   - continue entresto, carvedilol  - nephrology consult noted - will defer to them decision to continue Entresto in the setting of STACIE.  - Cardiology consult appreciated.     # Rheumatoid arthritis:  - continue to hold prednisone while solumedrol is administered  - hold mtx.     # DVT px:  - pt on coumadin with hold parameters.    DNR/DNI comfort care only  i discussed with marti clancy

## 2021-03-13 NOTE — CHART NOTE - NSCHARTNOTEFT_GEN_A_CORE
MRI L Spine completed today    -Imaging re demonstrates acute L2 VCF without signs of malignancy or metastatic nature.   -Discussed read with attending radiologist Dr. Connor  -Continue to recommend WBAT / PT  -DVT PPx Per Medicine  -No acute orthopedic surgical intervention needed at this time  -Discussed with attending who agrees with plan  -Ortho stable for discharge
Patient is a 75y old  Female who presents with a chief complaint of pain uncontrolled L2 fracture (12 Mar 2021 15:30)      BRIEF HOSPITAL COURSE: 75 year old female with known history of Afib cardiomyopathy EF 25% and AICD, urinary incontinence and chronic knee and hip pain presents with severe back pain- found to have L2 fracture. Hospital course complicated by hypoxemia and pneumonia.    RRT called tonight as patient became hypoxic to 80's and increased WOB despite 100% NRB. At bedside patient O2 sat in high 80's with increased WOB. Patient refused to prone, laying on her side.     PAST MEDICAL & SURGICAL HISTORY:  Urine incontinence    Stented coronary artery  BMS x3  2019    CAD (coronary artery disease), native coronary artery    CHF with cardiomyopathy  2019 EF 25%    Rheumatoid arthritis    UTI (urinary tract infection)    Afib    Diverticulitis    Kidney stones    Psoriasis    Edema    HLD (hyperlipidemia)    HTN (hypertension)    S/P   x2    H/O bladder repair surgery    H/O arthroscopy of shoulder  left     H/O: hysterectomy  due to bleeding           Hosp day #15d        Vital signs / Reviewed and Physical Exam Performed where pertinent and urgently required    Lab / Radiology  studies / ABG / Meds -  reviewed and interpreted into the assessment and treatment plan.      Assessment/Plan/Therapeutic interventions      -Acute hypoxic respiratory failure; increased WOB despite 100% NRB w/ O2 sat ~88-90%  -Patient refuses proning, tolerates laying on her side  -Ordered for High flow NC for additional PEEP. Titrate for O2 sat >90%  -CXR w/ extensive b/l infiltrates. Pulmonology following; Solu-medrol   -COVID negative x2. Unclear etiology; ID following. Empiric abx w/ Meropenem     Plan discussed w/ Hospitalist at bedside. Re-consult ICU as needed    CC time: 37 minutes including time spent reviewing chart, orderin tests/labs, discussing with interdisciplinary team. Not includin time spent performing procedures.
Patient Anastacia Clancy was delivered a LSO with rigid anterior posterior and lateral panels. Anastacia was educated on the care use and function of the orthosis. All went without incident.   Desmond STALLWORTH, [facility]  [phone]

## 2021-03-13 NOTE — PROGRESS NOTE ADULT - SUBJECTIVE AND OBJECTIVE BOX
75y old  Female who presents with a chief complaint of pain uncontrolled L2 fracture    3/8 pt seen and evaluated at bedside. Pt denies complaints of pain. Pt states she is still experiencing REGAN but feels about the same as yesterday. Pt reports 1 normal BM since yesterday.    3/11 feels winded on minimal exertion, diarrhea overnight nonbloody x2  3/12 still sob on minimal exertion, reports she is comfortable at rest , on NRB  3/13 uncomfortable on BIPAP, tachypneic, intermittently confused, keeps taking bipap off , off BIPAP patient's pulse ox 60's  ROS:   All 10 systems reviewed and found to be negative with the exception of what has been described above.  GEN: lying in bed, uncomfortable on BIPAP, tachypneic  HEENT:   NC/AT, pupils equal and reactive, EOMI  CV:  +S1, +S2, RRR  RESP:   fine crackles appreciated at b/l lung bases, no expiratory wheeze  BREAST:  not examined  GI:  abdomen soft, non-tender, non-distended, normoactive BS  RECTAL:  not examined  :  not examined  MSK:   normal muscle tone  EXT:  no edema  NEURO:  AAOX3, no focal neurological deficits  SKIN:  no rashes      PHYSICAL EXAM:    Daily     Daily Weight in k.5 (13 Mar 2021 06:20)    ICU Vital Signs Last 24 Hrs  T(C): 35.4 (13 Mar 2021 02:09), Max: 36.6 (12 Mar 2021 16:43)  T(F): 95.7 (13 Mar 2021 02:09), Max: 97.8 (12 Mar 2021 16:43)  HR: 80 (13 Mar 2021 07:27) (80 - 108)  BP: 124/96 (13 Mar 2021 07:49) (102/71 - 127/90)  BP(mean): --  ABP: --  ABP(mean): --  RR: 35 (13 Mar 2021 07:49) (16 - 35)  SpO2: 97% (13 Mar 2021 14:27) (86% - 99%)                                12.3   22.96 )-----------( 165      ( 13 Mar 2021 10:50 )             40.0       CBC Full  -  ( 13 Mar 2021 10:50 )  WBC Count : 22.96 K/uL  RBC Count : 3.93 M/uL  Hemoglobin : 12.3 g/dL  Hematocrit : 40.0 %  Platelet Count - Automated : 165 K/uL  Mean Cell Volume : 101.8 fl  Mean Cell Hemoglobin : 31.3 pg  Mean Cell Hemoglobin Concentration : 30.8 gm/dL  Auto Neutrophil # : 21.58 K/uL  Auto Lymphocyte # : 0.00 K/uL  Auto Monocyte # : 0.69 K/uL  Auto Eosinophil # : 0.00 K/uL  Auto Basophil # : 0.00 K/uL  Auto Neutrophil % : 94.0 %  Auto Lymphocyte % : 0.0 %  Auto Monocyte % : 3.0 %  Auto Eosinophil % : 0.0 %  Auto Basophil % : 0.0 %      03-13    x   |  x   |  x   ----------------------------<  x   5.7<H>   |  x   |  x     Ca    9.1      13 Mar 2021 10:50            PT/INR - ( 13 Mar 2021 10:50 )   PT: 30.1 sec;   INR: 2.70 ratio         PTT - ( 13 Mar 2021 10:50 )  PTT:28.9 sec              ABG - ( 13 Mar 2021 06:08 )  pH, Arterial: 7.44  pH, Blood: x     /  pCO2: 35    /  pO2: 74    / HCO3: 23    / Base Excess: -.1   /  SaO2: 94                  MEDICATIONS  (STANDING):  acetaminophen  IVPB .. 1000 milliGRAM(s) IV Intermittent once  aspirin enteric coated 81 milliGRAM(s) Oral daily  budesonide 160 MICROgram(s)/formoterol 4.5 MICROgram(s) Inhaler 2 Puff(s) Inhalation two times a day  calcium carbonate 1250 mG  + Vitamin D (OsCal 500 + D) 1 Tablet(s) Oral daily  carvedilol Oral Tab/Cap - Peds 50 milliGRAM(s) Oral two times a day  cyclobenzaprine 5 milliGRAM(s) Oral three times a day  dextrose 40% Gel 15 Gram(s) Oral once  dextrose 5%. 1000 milliLiter(s) (50 mL/Hr) IV Continuous <Continuous>  dextrose 5%. 1000 milliLiter(s) (100 mL/Hr) IV Continuous <Continuous>  dextrose 50% Injectable 25 Gram(s) IV Push once  dextrose 50% Injectable 25 Gram(s) IV Push once  dextrose 50% Injectable 12.5 Gram(s) IV Push once  folic acid 1 milliGRAM(s) Oral daily  glucagon  Injectable 1 milliGRAM(s) IntraMuscular once  guaiFENesin  milliGRAM(s) Oral every 12 hours  insulin lispro (ADMELOG) corrective regimen sliding scale   SubCutaneous three times a day before meals  insulin lispro (ADMELOG) corrective regimen sliding scale   SubCutaneous at bedtime  lidocaine   Patch 1 Patch Transdermal daily  meropenem  IVPB 1000 milliGRAM(s) IV Intermittent every 12 hours  methylPREDNISolone sodium succinate Injectable 40 milliGRAM(s) IV Push every 12 hours  simvastatin 20 milliGRAM(s) Oral at bedtime  warfarin 2 milliGRAM(s) Oral daily

## 2021-03-13 NOTE — PROVIDER CONTACT NOTE (OTHER) - SITUATION
called md service spoke with Haven
Dr. Rios aware of patient admission to [address] [place] [address]
Pt received this AM restless pulling off bipap x3, desating in 70s. Bipap replaced o2 now 94%. Pt has increased respirations RR 40. Dr Black nagy Md to call pt son and icu.
spoke with Mariela, office made aware of consult
for hypoxia
patient's BP in evening was 90/58, HR 93. patient asymptomatic,  made aware

## 2021-03-13 NOTE — PROVIDER CONTACT NOTE (OTHER) - DATE AND TIME:
03-Mar-2021 21:30
08-Mar-2021 12:20
11-Mar-2021 12:08
13-Mar-2021 07:45
28-Feb-2021 16:10
04-Mar-2021 18:05

## 2021-03-14 NOTE — PROGRESS NOTE ADULT - SUBJECTIVE AND OBJECTIVE BOX
75y old  Female who presents with a chief complaint of pain uncontrolled L2 fracture    3/8 pt seen and evaluated at bedside. Pt denies complaints of pain. Pt states she is still experiencing REGAN but feels about the same as yesterday. Pt reports 1 normal BM since yesterday.    3/11 feels winded on minimal exertion, diarrhea overnight nonbloody x2  3/12 still sob on minimal exertion, reports she is comfortable at rest , on NRB  3/13 uncomfortable on BIPAP, tachypneic, intermittently confused, keeps taking bipap off , off BIPAP patient's pulse ox 60's  ROS:   All 10 systems reviewed and found to be negative with the exception of what has been described above.  GEN: lying in bed, uncomfortable on BIPAP, tachypneic  HEENT:   NC/AT, pupils equal and reactive, EOMI  CV:  +S1, +S2, RRR  RESP:   fine crackles appreciated at b/l lung bases, no expiratory wheeze  BREAST:  not examined  GI:  abdomen soft, non-tender, non-distended, normoactive BS  RECTAL:  not examined  :  not examined  MSK:   normal muscle tone  EXT:  no edema  NEURO:  AAOX3, no focal neurological deficits  SKIN:  no rashes   75y old  Female who presents with a chief complaint of pain uncontrolled L2 fracture    3/8 pt seen and evaluated at bedside. Pt denies complaints of pain. Pt states she is still experiencing REGAN but feels about the same as yesterday. Pt reports 1 normal BM since yesterday.    3/11 feels winded on minimal exertion, diarrhea overnight nonbloody x2  3/12 still sob on minimal exertion, reports she is comfortable at rest , on NRB  3/13 uncomfortable on BIPAP, tachypneic, intermittently confused, keeps taking bipap off , off BIPAP patient's pulse ox 60's  3/14 patien was seen at 10 AM tsedated on NRB, does not respond to verbal stimuli, pt is comfort care only  ROS:   All 10 systems reviewed and found to be negative with the exception of what has been described above.  GEN: sedated , NOT IN ACUTE DISTRESS  CV:  +S1, +S2,present  RESP:   fine crackles appreciated at b/l lung bases,   BREAST:  not examined  GI:  abdomen soft,   RECTAL:  not examined  :  not examined

## 2021-03-14 NOTE — PROGRESS NOTE ADULT - ASSESSMENT
superimposed HCAP//ILD/hx emphysema /- still SOB with minimal exertion  did not tolerate BIPAP, conditions worsening   poor prognosis   switch BIPAP to NRB for comfort   increase dilaudid and altivan  no blood work, no vitals  goal is comfort care only s per patient's HCP marti Clancy   i discussed with HCP marti clancy   stop all meds except those use for comfort care  CXR 3/12 mildly increase in interstitial markings     not candidate for bronch/bx due to acute respiratory  condition      STACIE  resolved on CKD  hyperkalemia likely from chf  previously held lasix, given 1 dose iv lasix 40  ,   held bactrim, held entresto    hx afib   INr therapeutic  resume coumadin     diarrhea 2 episodes resolved    stoppe miralax ,  i left message for marti Clancy for call back [phone]  75y old  Female who presents with a chief complaint of pain uncontrolled L2 fracture.      # Acute spontaneous L2 fracture:  - could be related to osteoporosis/chronic steroid use.   - cyclobenzaprine PO PRN for muscle spasm  - lidocaine 1 patch daily for pain  - continue bracing  - continue physical therapy  - incentive spirometry  - started on OsCal 500 + D 1 tab PO qd to promote fx healing.    # Hypoxic respiratory failure - stable  - multifactorial:  HCAP; CHF.  - ddx: ILD due to MTX and/or amiodarone.  - Bactrim #5 / cefepime #6 - continue as per ID.  - as per ID - d/c Bactrim 2/2 worsening BUN / Cr / Hyperkalemia.   - azithromycin discontinued as per ID 3/6.  - Remdesevir completed 2 days  - WBC still elevated at 19.63 - possibly due in part to solumedrol.  - O2 taper to 40% via VM attempted 3/7 - pt did not tolerate, O2 titrated back up to 50% via VM and pt is stable.  - solumedrol #4 - as per pulm, decreased to 40mg q8h. Consider further taper if pt can tolerate decreased O2 support.  - as per pulm, bronchoscopy too high risk at this point.   - acetaminophen PRN for fever >100.4 F.  - add Mucinex BID for cough    # constipation  - pt reports normal BM 3/8.  - continue Miralax 17 gm PO qd    # STACIE without hx CKD  - worsening BUN / Cr (69 / 1.85)  - hyperkalemia to 5.8  - possibly 2/2 Bactrim administration  - pt refused kayexalate yesterday 3/7  - kayexalate 30 gm PO x 1 today  - continue to monitor BMP   - nephrology consult noted - will discuss whether to d/c Entresto in the setting of STACIE.   - will d/c Lasix in the setting of rising Cr and /55.   - urine electrolytes pending   - as per ID - d/c Bactrim  - avoid nephrotoxic agents    # poor PO intake  - Nutrition eval noted    # Hypotensive at baseline, BP 90s-100s systolic  - d/c Lasix 3/8    # Hyperglycemia, likely secondary to solumedrol - stable  - ISS  - routine POCT     # PT/INR elevated from yesterday  - continue pt on warfarin with hold instructions  - no signs of active bleeding / anemia - continue to observe  - Hgb 12.9 / Hct 39.6 /   - monitor CBC, LFTs      # COPD:  - albuterol prn.   - inhaled steroid.    # A fib:  - continue coumadin, aspirin  - continue carvedilol  - Cardiology consult appreciated.    # Chronic systolic CHF/AICD:  - d/c Lasix in the setting of STACIE.   - continue entresto, carvedilol  - nephrology consult noted - will defer to them decision to continue Entresto in the setting of STACIE.  - Cardiology consult appreciated.     # Rheumatoid arthritis:  - continue to hold prednisone while solumedrol is administered  - hold mtx.     # DVT px:  - pt on coumadin with hold parameters.    DNR/DNI comfort care only  i discussed with marti clancy       3/14 -Acute hypoxic respiratory failure   Superimposed HCAP//   ILD unclear etiology  secondary to? methotrexate /amiodarone   /hx emphysema /-   hx CHF  with acute decompensation  treated with cefepime #6, bactrim #5, then switched to meropeneM to cover MDR PNA  s/p solumedrol taper  initially held lasix for STACIE  , then admisnitered 1 dose iv lasix   did not respond to any of the above treatment and respiratory status continued to decline,   serology studies to evaluate ILD were unremarkable   did not tolerate BIPAP, noncooperative with BIPAP,    In lieu of very poor prognosis due to severe extensive lung disease  and acutely declining respiratory status and being that intubtion may not improve prognosis   HCP son wished for pt to be DNR/DNI and comfort care only as son reported that patient would have wished for the same    poor prognosis   switched BIPAP to NRB for comfort   dilaudid for breakthrough , morhine  and altivan for comfort  no  more blood work, no vitals-son agreed   goal is comfort care only s per patient's HCP son Jm Clancy   i discussed with HCP son jm clancy   stop all meds except those use for comfort care  CXR 3/12 mildly increase in interstitial markings      was not a candidate for bronch/bx due to acute respiratory    I discussed with dr galindo and dr manzano who agreed with very poor prognosis and comfort care plan       STACIE  resolved on CKD  hyperkalemia likely from  acute chf  due to  lasix being  held in lieu of STACIE   s/p 1 dose iv lasix 40  ,   held bactrim, held entresto    hx afib   s/p coumadijn     s/p diarrhea 2 episodes rlikely due to miralx - resolved after stopping miralx            #  initially admitted with intractable pain from Acute spontaneous L2 fracture:  - could be related to osteoporosis/chronic steroid use.   -      # STACIE without hx CKD  -  - hyperkalemia   - possibly 2/2 Bactrim administration  -disocntinued bactrim, entresto and lasix   -s/p  kayexalate 30 gm PO   - avoid nephrotoxic agents    # poor PO intake  - Nutrition eval noted    # Hypotensive at baseline, BP 90s-100s systolic  - d/c Lasix 3/8    # Hyperglycemia, likely secondary to solumedrol -   - ISS  - routine POCT      # COPD:  - albuterol prn.   - inhaled steroid.    #  hx A fib: s/p coumadin, coreg, asa      # hx Chronic systolic CHF/AICD:  - d/c Lasix in the setting of STACIE.   - s/p  entresto, carvedilol       # Rheumatoid arthritis:on long term prednisone at home     - held mtx.         DNR/DNI comfort care only  i discussed with marti clancy

## 2021-03-15 NOTE — DISCHARGE NOTE PROVIDER - NSDCFUSCHEDAPPT_GEN_ALL_CORE_FT
ZEKE CHAUDHARY ; 03/25/2021 ; [facility] [address] [facility]  ZEKE CHAUDHARY ; 03/25/2021 ; [facility] [address] [facility] ; 04/13/2021 ; [facility] [address] ; 04/20/2021 ; N[facility] [address]

## 2021-03-15 NOTE — PROGRESS NOTE ADULT - SUBJECTIVE AND OBJECTIVE BOX
75y old  Female who presents with a chief complaint of pain uncontrolled L2 fracture    3/15 - pt seen and evaluated at bedside.     ROS:   All 10 systems reviewed and found to be negative with the exception of what has been described above.    ICU Vital Signs Last 24 Hrs - as dw son, pt will no longer receive vitals/labs, comfort care only.  T(C): --  T(F): --  HR: --  BP: --  BP(mean): --  ABP: --  ABP(mean): --  RR: --  SpO2: --    GEN: lying in bed, NAD  HEENT:   NC/AT, pupils equal and reactive, EOMI  CV:  +S1, +S2, RRR  RESP:   lungs clear to auscultation bilaterally, no wheeze, rales, rhonchi   BREAST:  not examined  GI:  abdomen soft, non-tender, non-distended, normoactive BS  RECTAL:  not examined  :  not examined  MSK:   normal muscle tone  EXT:  no edema  NEURO:  AAOX3, no focal neurological deficits  SKIN:  no rashes      most recent labs reviewed- 3/13                        12.3   22.96 )-----------( 165      ( 13 Mar 2021 10:50 )             40.0     03-13    x   |  x   |  x   ----------------------------<  x   5.7<H>   |  x   |  x     Ca    9.1      13 Mar 2021 10:50    PT/INR - ( 13 Mar 2021 10:50 )   PT: 30.1 sec;   INR: 2.70 ratio      PTT - ( 13 Mar 2021 10:50 )  PTT:28.9 sec    < from: 12 Lead ECG (03.13.21 @ 14:11) >  Ventricular Rate 119 BPM  Atrial Rate 136 BPM  QRS Duration 98 ms  Q-T Interval 316 ms  QTC Calculation(Bazett) 444 ms  R Axis -17 degrees  T Axis 90 degrees  Diagnosis Line Atrial fibrillation with rapid ventricular response  Minimalvoltage criteria for LVH, may be normal variant  Abnormal ECG  When compared with ECG of 27-FEB-2021 07:37,  Nonspecific T wave abnormality no longer evident in Anterior leads  Confirmed by Corby Calderón ([id number]) on 3/15/2021 8:46:23 AM  < end of copied text > 75y old  Female who presents with a chief complaint of pain uncontrolled L2 fracture    3/15 - pt seen and evaluated at bedside.     ROS:   All 10 systems reviewed and found to be negative with the exception of what has been described above.    Vital Signs Last 24 Hrs  T(C): --  T(F): --  HR: --  BP: --  BP(mean): --  RR: --  SpO2: --    GEN: lying in bed, NAD  HEENT:   NC/AT, pupils equal and reactive, EOMI  CV:  +S1, +S2, RRR  RESP:   lungs clear to auscultation bilaterally, no wheeze, rales, rhonchi   BREAST:  not examined  GI:  abdomen soft, non-tender, non-distended, normoactive BS  RECTAL:  not examined  :  not examined  MSK:   normal muscle tone  EXT:  no edema  NEURO: somnolent  SKIN:  no rashes      most recent labs reviewed- 3/13                        12.3   22.96 )-----------( 165      ( 13 Mar 2021 10:50 )             40.0     03-13    x   |  x   |  x   ----------------------------<  x   5.7<H>   |  x   |  x     Ca    9.1      13 Mar 2021 10:50    PT/INR - ( 13 Mar 2021 10:50 )   PT: 30.1 sec;   INR: 2.70 ratio      PTT - ( 13 Mar 2021 10:50 )  PTT:28.9 sec    < from: 12 Lead ECG (03.13.21 @ 14:11) >  Ventricular Rate 119 BPM  Atrial Rate 136 BPM  QRS Duration 98 ms  Q-T Interval 316 ms  QTC Calculation(Bazett) 444 ms  R Axis -17 degrees  T Axis 90 degrees  Diagnosis Line Atrial fibrillation with rapid ventricular response  Minimalvoltage criteria for LVH, may be normal variant  Abnormal ECG  When compared with ECG of 27-FEB-2021 07:37,  Nonspecific T wave abnormality no longer evident in Anterior leads  Confirmed by Corby Calderón ([id number]) on 3/15/2021 8:46:23 AM  < end of copied text > 75y old  Female who presents with a chief complaint of pain uncontrolled L2 fracture    3/15 - pt seen and evaluated at bedside. Resting comfortably, on NRB, nonverbal.    ROS:   pt currently nonverbal    Vital Signs Last 24 Hrs  T(C): --  T(F): --  HR: --  BP: --  BP(mean): --  RR: --  SpO2: --    GEN: lying in bed, NAD, on NRB, nonverbal  HEENT:   NC/AT, pupils equal and reactive, EOMI  CV:  +S1, +S2, RRR  RESP:   lungs clear to auscultation bilaterally, no wheeze, rales, rhonchi   BREAST:  not examined  GI:  abdomen soft, non-tender, non-distended, normoactive BS  RECTAL:  not examined  :  not examined  MSK:   normal muscle tone  EXT:  no edema  NEURO: somnolent, awakens to tactile stimulus  SKIN:  no rashes    most recent labs reviewed- 3/13                        12.3   22.96 )-----------( 165      ( 13 Mar 2021 10:50 )             40.0     03-13    x   |  x   |  x   ----------------------------<  x   5.7<H>   |  x   |  x     Ca    9.1      13 Mar 2021 10:50    PT/INR - ( 13 Mar 2021 10:50 )   PT: 30.1 sec;   INR: 2.70 ratio      PTT - ( 13 Mar 2021 10:50 )  PTT:28.9 sec    < from: 12 Lead ECG (03.13.21 @ 14:11) >  Ventricular Rate 119 BPM  Atrial Rate 136 BPM  QRS Duration 98 ms  Q-T Interval 316 ms  QTC Calculation(Bazett) 444 ms  R Axis -17 degrees  T Axis 90 degrees  Diagnosis Line Atrial fibrillation with rapid ventricular response  Minimalvoltage criteria for LVH, may be normal variant  Abnormal ECG  When compared with ECG of 27-FEB-2021 07:37,  Nonspecific T wave abnormality no longer evident in Anterior leads  Confirmed by Corby Calderón ([id number]) on 3/15/2021 8:46:23 AM  < end of copied text >

## 2021-03-15 NOTE — DISCHARGE NOTE PROVIDER - NSDCCPCAREPLAN_GEN_ALL_CORE_FT
PRINCIPAL DISCHARGE DIAGNOSIS  Diagnosis: Acute respiratory failure with hypoxia  Assessment and Plan of Treatment: Acute respiratory failure with hypoxia  plan for inpatient hospice care

## 2021-03-15 NOTE — PROCEDURE NOTE - ADDITIONAL PROCEDURE DETAILS
[employer] ICD all therapies were turned off for ICD as ordered.  There were no other changes made. Goals of care documented in the chart.

## 2021-03-15 NOTE — DISCHARGE NOTE PROVIDER - NSDCMRMEDTOKEN_GEN_ALL_CORE_FT
cyclobenzaprine 5 mg oral tablet: 1 tab(s) orally once a day (at bedtime), As needed, Muscle Spasm  LORazepam: 1 milligram(s) intravenous every 6 hours, As Needed anxiety or agitation   morphine: 4 milligram(s) intravenous every 4 hours, As Needed for respiratory distress

## 2021-03-15 NOTE — PROGRESS NOTE ADULT - ASSESSMENT
75y old  Female who presents with a chief complaint of pain uncontrolled L2 fracture    3/15 -  # Acute hypoxic respiratory failure  - Superimposed HCAP/ILD unclear etiology  secondary to? methotrexate /amiodarone   - hx emphysema  - hx CHF  with acute decompensation  - s/p cefepime x6, bactrim x5, meropenem x6  - s/p solumedrol taper  - initially held lasix for STACIE  , then administered 1 dose iv lasix   - did not respond to any of the above treatment and respiratory status continued to decline  - serology studies to evaluate ILD were unremarkable   - did not tolerate BIPAP, noncooperative with BIPAP  - In lieu of very poor prognosis due to severe extensive lung disease and acutely declining respiratory status and being that intubation may not improve prognosis - HCP son wished for pt to be DNR/DNI and comfort care only as son reported that patient would have wished for the same    # poor prognosis / DNR/DNI / comfort care only  - switched BIPAP to NRB for comfort - continue   - continue dilaudid for breakthrough , morphine  and ativan for comfort  - no more blood work, no vitals - son agreed   - goal is comfort care only as per patient's HCP (son, Jm Clancy)   - case dw discussed with HCP son jm clancy   - stop all meds except those use for comfort care  - CXR 3/12 showed mildly increase in interstitial markings     # pt not a candidate for bronch/bx due to acute respiratory failure  - Case dw dr galindo and dr manzano who agreed with very poor prognosis and comfort care plan     # STACIE on CKD  - hyperkalemia likely from acute CHF / d/c lasix 3/8  - s/p kayexalate 30 gm PO   - s/p 1 dose iv lasix 40mg  - held bactrim, held entresto - all meds except those for comfort care d/c at this time    # hx afib   - s/p coumadin, coreg, asa    # s/p diarrhea 2 episodes likely due to Miralax - resolved after stopping Miralax      #  initially admitted with intractable pain from Acute spontaneous L2 fracture:  - could be related to osteoporosis/chronic steroid use.     # poor PO intake  - Nutrition eval appreciated    # Hypotensive at baseline, BP 90s-100s systolic  - d/c Lasix 3/8    # Hyperglycemia, likely secondary to solumedrol  - ISS  - routine POCT - no labs/vitals on pt at this time.     # hx COPD:  - albuterol prn.   - inhaled steroid.    # hx Chronic systolic CHF/AICD:  - d/c Lasix in the setting of STACIE.   - s/p  entresto, carvedilol       # Rheumatoid arthritis on long term prednisone at home   - held mtx.     # DNR/DNI comfort care only  - case discussed with marti clancy    Dispo: continue comfort care only, no labs/vitals, observe

## 2021-03-15 NOTE — PROGRESS NOTE ADULT - SUBJECTIVE AND OBJECTIVE BOX
Subjective:    Obtunded    MEDICATIONS  (STANDING):  dextrose 40% Gel 15 Gram(s) Oral once  dextrose 5%. 1000 milliLiter(s) (50 mL/Hr) IV Continuous <Continuous>  dextrose 5%. 1000 milliLiter(s) (100 mL/Hr) IV Continuous <Continuous>  dextrose 50% Injectable 25 Gram(s) IV Push once  dextrose 50% Injectable 12.5 Gram(s) IV Push once  dextrose 50% Injectable 25 Gram(s) IV Push once  folic acid 1 milliGRAM(s) Oral daily  glucagon  Injectable 1 milliGRAM(s) IntraMuscular once  insulin lispro (ADMELOG) corrective regimen sliding scale   SubCutaneous at bedtime  lidocaine   Patch 1 Patch Transdermal daily  simvastatin 20 milliGRAM(s) Oral at bedtime    MEDICATIONS  (PRN):  acetaminophen   Tablet .. 650 milliGRAM(s) Oral every 6 hours PRN Temp greater or equal to 38C (100.4F), fever, headaches  ALBUTerol    90 MICROgram(s) HFA Inhaler 2 Puff(s) Inhalation every 6 hours PRN Shortness of Breath and/or Wheezing  cyclobenzaprine 5 milliGRAM(s) Oral at bedtime PRN Muscle Spasm  HYDROmorphone  Injectable 0.5 milliGRAM(s) IV Push every 2 hours PRN breakthrough pain and distress  LORazepam   Injectable 1 milliGRAM(s) IV Push every 6 hours PRN discomfort  LORazepam   Injectable 1.5 milliGRAM(s) IV Push every 6 hours PRN restlessness  morphine  - Injectable 4 milliGRAM(s) IV Push every 4 hours PRN repiratory distress      Allergies    apixaban (Other)  fesoterodine (Unknown)  Macrobid (Other)    Intolerances        Vital Signs Last 24 Hrs  T(C): --  T(F): --  HR: --  BP: --  BP(mean): --  RR: --  SpO2: --    PHYSICAL EXAMINATION:    NECK:  Supple. No lymphadenopathy. Jugular venous pressure not elevated.   HEART:   The cardiac impulse has a normal quality. Reg., Nl S1 and S2.   CHEST:  Chest is clear to auscultation anteriorly. Decreased BS at bases Normal respiratory effort.  ABDOMEN:  Soft and nontender.   EXTREMITIES:  There is tr edema.       LABS:                        12.3   22.96 )-----------( 165      ( 13 Mar 2021 10:50 )             40.0     03-13    x   |  x   |  x   ----------------------------<  x   5.7<H>   |  x   |  x     Ca    9.1      13 Mar 2021 10:50      PT/INR - ( 13 Mar 2021 10:50 )   PT: 30.1 sec;   INR: 2.70 ratio         PTT - ( 13 Mar 2021 10:50 )  PTT:28.9 sec      RADIOLOGY & ADDITIONAL TESTS:    Assessment and Plan:   · Assessment  	  - Comfort care only-on NRB  - ABX D/C'ed     Problem/Plan - 1:  ·  Problem: Acute respiratory failure with hypoxia.   Problem/Plan - 2:  ·  Problem: Multifocal pneumonia.   Problem/Plan - 3:  ·  Problem: Emphysema of lung.   Problem/Plan - 4:  ·  Problem: CHF with cardiomyopathy.   Problem/Plan - 5:  ·  Problem: Anemia.   Problem/Plan - 6:  Problem: Coagulopathy.

## 2021-03-15 NOTE — PROGRESS NOTE ADULT - ATTENDING COMMENTS
patient seen and examined with PA student Trice Menjivar,  I  was physically present for the key portions of the evaluation and management (E/M) service provided.  I agree with the above history, physical, and plan which I have reviewed and edited where appropriate.  - pt now somnolent but appears comfortable.  - plan for inpatient hospice when bed available  - case d/w SW and CTI completed

## 2021-03-15 NOTE — DISCHARGE NOTE PROVIDER - HOSPITAL COURSE
75y old  Female who presents with a chief complaint of pain uncontrolled L2 fracture. pt noted to have acute hypoxic resp failure and seen by pulm and suspected cvoid initially but coivd tests negative.  pt optimized for CHF and treated for HCAP/ILD as per pulm recommendaton but given severe underlying lung disease she continued to detriorate.  plan for hospice.    3/15 -  # Acute hypoxic respiratory failure  - Superimposed HCAP/ILD unclear etiology  secondary to? methotrexate /amiodarone   - hx emphysema  - hx CHF  with acute decompensation  - s/p cefepime x6, bactrim x5, meropenem x6  - s/p solumedrol taper  - initially held lasix for STACIE  , then administered 1 dose iv lasix   - did not respond to any of the above treatment and respiratory status continued to decline  - serology studies to evaluate ILD were unremarkable   - did not tolerate BIPAP, noncooperative with BIPAP  - In lieu of very poor prognosis due to severe extensive lung disease and acutely declining respiratory status and being that intubation may not improve prognosis - HCP son wished for pt to be DNR/DNI and comfort care only as son reported that patient would have wished for the same    # poor prognosis / DNR/DNI / comfort care only  - switched BIPAP to NRB for comfort - continue   - continue dilaudid for breakthrough , morphine  and ativan for comfort  - no more blood work, no vitals - son agreed   - goal is comfort care only as per patient's HCP (son, Jm Clancy)   - case dw discussed with HCP son jm clancy   - stop all meds except those use for comfort care  - CXR 3/12 showed mildly increase in interstitial markings     # pt not a candidate for bronch/bx due to acute respiratory failure  - Case dw dr galindo and dr manzano who agreed with very poor prognosis and comfort care plan     # STACIE on CKD  - hyperkalemia likely from acute CHF / d/c lasix 3/8  - s/p kayexalate 30 gm PO   - s/p 1 dose iv lasix 40mg  - held bactrim, held entresto - all meds except those for comfort care d/c at this time    # hx afib   - s/p coumadin, coreg, asa    # s/p diarrhea 2 episodes likely due to Miralax - resolved after stopping Miralax      #  initially admitted with intractable pain from Acute spontaneous L2 fracture:  - could be related to osteoporosis/chronic steroid use.     # poor PO intake  - Nutrition eval appreciated    # Hypotensive at baseline, BP 90s-100s systolic  - d/c Lasix 3/8    # Hyperglycemia, likely secondary to solumedrol  - ISS  - routine POCT - no labs/vitals on pt at this time.     # hx COPD:  - albuterol prn.   - inhaled steroid.    # hx Chronic systolic CHF/AICD:  - d/c Lasix in the setting of STACIE.   - s/p  entresto, carvedilol       # Rheumatoid arthritis on long term prednisone at home   - held mtx.     # DNR/DNI comfort care only  - case discussed with marti clancy

## 2021-03-15 NOTE — PROGRESS NOTE ADULT - PROVIDER SPECIALTY LIST ADULT
Hospitalist
Infectious Disease
Infectious Disease
Pulmonology
Pulmonology
Hospitalist
Hospitalist
Infectious Disease
MICU
Orthopedics
Orthopedics
Pulmonology
Hospitalist
Infectious Disease
Nephrology
Pulmonology
Hospitalist
Infectious Disease
Infectious Disease
Nephrology
Pulmonology
Hospitalist
Infectious Disease
Pulmonology

## 2021-03-15 NOTE — DISCHARGE NOTE NURSING/CASE MANAGEMENT/SOCIAL WORK - PATIENT PORTAL LINK FT
You can access the FollowMyHealth Patient Portal offered by [facility] by registering at the following website: [email or web address]. By joining [employer]’s FollowMyHealth portal, you will also be able to view your health information using other applications (apps) compatible with our system.

## 2021-03-15 NOTE — PROGRESS NOTE ADULT - REASON FOR ADMISSION
pain uncontrolled L2 fracture

## 2021-03-16 ENCOUNTER — NON-APPOINTMENT (OUTPATIENT)
Age: 76
End: 2021-03-16

## 2021-03-17 DIAGNOSIS — Z01.812 ENCOUNTER FOR PREPROCEDURAL LABORATORY EXAMINATION: ICD-10-CM

## 2021-03-17 DIAGNOSIS — Z20.822 ENCOUNTER FOR PREPROCEDURAL LABORATORY EXAMINATION: ICD-10-CM

## 2021-03-23 DIAGNOSIS — J43.9 EMPHYSEMA, UNSPECIFIED: ICD-10-CM
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
## 2021-03-23 DIAGNOSIS — E87.5 HYPERKALEMIA: ICD-10-CM

## 2021-03-23 DIAGNOSIS — J96.01 ACUTE RESPIRATORY FAILURE WITH HYPOXIA: ICD-10-CM

## 2021-03-23 DIAGNOSIS — E78.5 HYPERLIPIDEMIA, UNSPECIFIED: ICD-10-CM

## 2021-03-23 DIAGNOSIS — D64.9 ANEMIA, UNSPECIFIED: ICD-10-CM

## 2021-03-23 DIAGNOSIS — M80.08XA AGE-RELATED OSTEOPOROSIS WITH CURRENT PATHOLOGICAL FRACTURE, VERTEBRA(E), INITIAL ENCOUNTER FOR FRACTURE: ICD-10-CM

## 2021-03-23 DIAGNOSIS — Z20.822 CONTACT WITH AND (SUSPECTED) EXPOSURE TO COVID-19: ICD-10-CM

## 2021-03-23 DIAGNOSIS — I95.9 HYPOTENSION, UNSPECIFIED: ICD-10-CM

## 2021-03-23 DIAGNOSIS — M19.90 UNSPECIFIED OSTEOARTHRITIS, UNSPECIFIED SITE: ICD-10-CM

## 2021-03-23 DIAGNOSIS — J18.9 PNEUMONIA, UNSPECIFIED ORGANISM: ICD-10-CM

## 2021-03-23 DIAGNOSIS — M06.9 RHEUMATOID ARTHRITIS, UNSPECIFIED: ICD-10-CM

## 2021-03-23 DIAGNOSIS — Z95.5 PRESENCE OF CORONARY ANGIOPLASTY IMPLANT AND GRAFT: ICD-10-CM

## 2021-03-23 DIAGNOSIS — R60.9 EDEMA, UNSPECIFIED: ICD-10-CM

## 2021-03-23 DIAGNOSIS — R73.9 HYPERGLYCEMIA, UNSPECIFIED: ICD-10-CM

## 2021-03-23 DIAGNOSIS — N18.9 CHRONIC KIDNEY DISEASE, UNSPECIFIED: ICD-10-CM

## 2021-03-23 DIAGNOSIS — K59.00 CONSTIPATION, UNSPECIFIED: ICD-10-CM

## 2021-03-23 DIAGNOSIS — I48.91 UNSPECIFIED ATRIAL FIBRILLATION: ICD-10-CM

## 2021-03-23 DIAGNOSIS — I25.10 ATHEROSCLEROTIC HEART DISEASE OF NATIVE CORONARY ARTERY WITHOUT ANGINA PECTORIS: ICD-10-CM

## 2021-03-25 ENCOUNTER — APPOINTMENT (OUTPATIENT)
Dept: INTERNAL MEDICINE | Facility: CLINIC | Age: 76
End: 2021-03-25

## 2021-04-13 ENCOUNTER — APPOINTMENT (OUTPATIENT)
Dept: ELECTROPHYSIOLOGY | Facility: CLINIC | Age: 76
End: 2021-04-13

## 2021-04-20 ENCOUNTER — APPOINTMENT (OUTPATIENT)
Dept: ELECTROPHYSIOLOGY | Facility: CLINIC | Age: 76
End: 2021-04-20

## 2021-04-27 ENCOUNTER — APPOINTMENT (OUTPATIENT)
Dept: ELECTROPHYSIOLOGY | Facility: CLINIC | Age: 76
End: 2021-04-27

## 2021-06-18 NOTE — H&P PST ADULT - PRO PAIN EXPRESSION
Dermatology Rooming Note    Maggy Goldberg's goals for this visit include:   Chief Complaint   Patient presents with     Psoriasis     Maggy is here for follow up of her psoriasis. She has no complaints today.     Nicole Ackerman, CMA    
verbalization

## 2022-06-14 NOTE — PROVIDER CONTACT NOTE (MEDICATION) - ACTION/TREATMENT ORDERED:
hold BP medications, carvedilol and Entresto. hold flexeril.
hold Entresto , carvedilol, and flexeril.  continue to monitor BP and RR
yes

## 2023-04-05 NOTE — ED PROVIDER NOTE - ENMT, MLM
Occupational Therapy Discharge Summary    Reason for therapy discharge:    All goals and outcomes met, no further needs identified.    Progress towards therapy goal(s). See goals on Care Plan in [facility] electronic health record for goal details.  Goals met    Therapy recommendation(s):    No further therapy is recommended.                           Oropharynx clear, mucous membranes mildly dry. No bloody nose.

## 2023-07-22 NOTE — DISCHARGE NOTE PROVIDER - CARE PROVIDERS DIRECT ADDRESSES
FAMILY HISTORY:  Father  Still living? Unknown  Family history unknown, Age at diagnosis: Age Unknown    Mother  Still living? Unknown  Family history unknown, Age at diagnosis: Age Unknown     [email or web address]

## 2023-08-03 NOTE — ED PROVIDER NOTE - GASTROINTESTINAL, MLM
Patient ambulatory to triage. Patient c\o migraine that has caused some nausea.      Patient notes new glasses and contacts
Abdomen soft, non-tender, no guarding.

## 2023-08-25 NOTE — PROGRESS NOTE ADULT - RS GEN PE MLT RESP DETAILS PC
rales/breath sounds equal
wheezes/rales
diminished breath sounds, R/diminished breath sounds, L
wheezes/diminished breath sounds, R/diminished breath sounds, L
Lázaro Laguerre(Resident)

## 2023-09-28 NOTE — PROVIDER CONTACT NOTE (CRITICAL VALUE NOTIFICATION) - NS PROVIDER READ BACK TO LAB
Protocol For Bath Puva: The patient received Bath PUVA. Treatment Number: 67 Protocol For Photochemotherapy For Severe Photoresponsive Dermatoses: Tar And Broad Band Uvb (Goeckerman Treatment): The patient received Photochemotherapy for severe photoresponsive dermatoses: Tar and Broad Band UVB (Goeckerman treatment) requiring at least 4 to 8 hours of care under direct physician supervision. yes Protocol For Photochemotherapy: Petrolatum And Nbuvb: The patient received Photochemotherapy: Petrolatum and NBUVB (petrolatum applied to all lesions prior to phototherapy). Protocol For Photochemotherapy For Severe Photoresponsive Dermatoses: Tar And Nbuvb (Goeckerman Treatment): The patient received Photochemotherapy for severe photoresponsive dermatoses: Tar and NBUVB (Goeckerman treatment) requiring at least 4 to 8 hours of care under direct physician supervision. Protocol For Uva1: The patient received UVA1. Protocol For Photochemotherapy: Baby Oil And Nbuvb: The patient received Photochemotherapy: Baby Oil and NBUVB (baby oil applied to all lesions prior to phototherapy). Protocol For Nbuvb: The patient received NBUVB. Protocol For Protocol For Photochemotherapy For Severe Photoresponsive Dermatoses: Bath Puva: The patient received Photochemotherapy for severe photoresponsive dermatoses: Bath PUVA requiring at least 4 to 8 hours of care under direct physician supervision. Protocol For Photochemotherapy For Severe Photoresponsive Dermatoses: Petrolatum And Broad Band Uvb: The patient received Photochemotherapyfor severe photoresponsive dermatoses: Petrolatum and Broad Band UVB requiring at least 4 to 8 hours of care under direct physician supervision. Protocol For Photochemotherapy For Severe Photoresponsive Dermatoses: Petrolatum And Nbuvb: The patient received Photochemotherapy for severe photoresponsive dermatoses: Petrolatum and NBUVB requiring at least 4 to 8 hours of care under direct physician supervision. Total Body Energy: 1600 mJ Post-Care Instructions: I reviewed with the patient in detail post-care instructions. Patient is to wear sun protection. Patients may expect sunburn like redness, discomfort and scabbing. Name Of Supervising Technician: Mary Jo H, LPN Protocol For Broad Band Uvb: The patient received Broad Band UVB. Protocol For Puva: The patient received PUVA. Protocol For Photochemotherapy: Petrolatum And Broad Band Uvb: The patient received Photochemotherapy: Petrolatum and Broad Band UVB. Protocol For Photochemotherapy: Mineral Oil And Nbuvb: The patient received Photochemotherapy: Mineral Oil and NBUVB (mineral oil applied to all lesions prior to phototherapy). Protocol: NBUVB Protocol For Nb Uva: The patient received NB UVA. Protocol For Photochemotherapy: Tar And Broad Band Uvb (Goeckerman Treatment): The patient received Photochemotherapy: Tar and Broad Band UVB (Goeckerman treatment). Consent: Written consent obtained.  The risks were reviewed with the patient including but not limited to: burn, pigmentary changes, pain, blistering, scabbing, redness, increased risk of skin cancers, and the remote possibility of scarring. Changes In Treatment Protocol: No burning reported during last treatment, continue and hold at 1600mj once weekly Protocol For Photochemotherapy: Tar And Nbuvb (Goeckerman Treatment): The patient received Photochemotherapy: Tar and NBUVB (Goeckerman treatment). Protocol For Photochemotherapy: Triamcinolone Ointment And Nbuvb: The patient received Photochemotherapy: Triamcinolone and NBUVB (triamcinolone ointment applied to all lesions prior to phototherapy). Protocol For Photochemotherapy: Mineral Oil And Broad Band Uvb: The patient received Photochemotherapy: Mineral Oil and Broad Band UVB. Render Post-Care In The Note: no Protocol For Photochemotherapy For Severe Photoresponsive Dermatoses: Puva: The patient received Photochemotherapy for severe photoresponsive dermatoses: PUVA requiring at least 4 to 8 hours of care under direct physician supervision. Protocol For Uva: The patient received UVA. Detail Level: Zone

## 2023-10-02 NOTE — DISCHARGE NOTE PROVIDER - NSDCDCMDCOMP_GEN_ALL_CORE
This document is complete and the patient is ready for discharge. Albendazole Pregnancy And Lactation Text: This medication is Pregnancy Category C and it isn't known if it is safe during pregnancy. It is also excreted in breast milk.

## 2023-10-12 NOTE — PROGRESS NOTE ADULT - GASTROINTESTINAL DETAILS
Maintained on rivaroxaban 20mg nightly   Normal rate and rhythm on auscultation    Due to history of CVA, maintain NOAC therapy  
nephrostomy minimal drainage
soft/nontender

## 2024-01-11 NOTE — PATIENT PROFILE ADULT. - SOCIAL CONCERNS
- Given extensive history of heavy menstrual bleeding difficult to control since onset of menses, recommend testing for coagulopathy. Below labs ordered. If abnormal, will refer to Hematology.  - IUD in place, discussed management options. Recommend proceeding with Lysteda 1300mg TID x 5d for immediate stabilization, and continuous norgestimate-estradiol (Sprintec) for ongoing control. Patient amenable to plan of care.  - To contact clinic if still bleeding heavily in 3-4 weeks, otherwise return to clinic in 3 months for follow up assessment   None

## 2024-02-13 NOTE — PROGRESS NOTE ADULT - ASSESSMENT
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 acute worsening L sided low back pain and she is unable to ambulate. Patient denies any recent falls or trauma. pain started past few days. She states the pain is mainly localized to her lower back and does not radiate, worsens with movement. She denies any numbness or tingling in her bilateral lower extremities. She has had arthritis and pain both knees and hip, and has follows out patient with Dr Clancy. Here noted with acute L2 compression fracture hospital course c/b acute hypoxic resp failure, CT chest with extensive b/l lung infiltrates unclear etiology ? pna ? ild ? chf? viral process ?     1. acute hypoxic respiratory failure. multifocal pneumonia -atypical/PCP? interstitial lung disease. chf. immunosuppressed host.  - imaging reviewed, ddx atypical pna, viral pna, ? mtx/amio related ?boop   - covid-19 pcr x 2 and covid ab (-)  x 2   - on decadron 9mg daily #8  - s/p remdesivir #2 - discontinued  - completed azithromycin #5 --> 3/6   - s/p cefepime #7 --> 3/8  - s/p bactrim #5  possible pcp - d/t dorinda - discontinue   - broaden abx coverage to meropenem [id number]   - continue with above antibiotics   - consider bronchoscopy/lung biopsy when pt is stable   - isolation precautions  - f/u cbc  - monitor temps  - supportive care    2. other issues - care per medicine  Call made to patient.  No answer, will try again.  Last office visit incomplete by Dr. Leos.   Nothing available this week with any provider.     No documentation found of patient seeing Joseph Alva PA in the past. This is new patient.       Message routed to Joseph Alva PA  . Please advise. Thanks.

## 2024-08-16 NOTE — ED ADULT TRIAGE NOTE - MEANS OF ARRIVAL
Patient with a mild colitis on abdominal CT.  No specific treatment ordered for the colitis today but she is on metronidazole twice daily x 7 days for BV.  Would like her to be rechecked next week to make sure she is getting better.  If not getting better consider adding Cipro and extending the metronidazole. I ordered an outpatient pelvic ultrasound to follow-up on the fluid collection in the cervix seen on abdominal CT.  Patient preferred to do the ultrasound as an outpatient.  I ordered it under your name so it would come to your inbox in preparation for her follow-up visit.  Thank you so much! wheelchair

## 2025-01-27 NOTE — ED ADULT TRIAGE NOTE - CCCP TRG CHIEF CMPLNT
Patient seen for physical therapy session on this date.  Patient oriented to self only, does not follow simple motor commands more than 25%.  Patient with B UE restraints today, but is not combative during therapy session.  Patient performs supine to sit with mod assist for encouragement, then performs sit to stand x 1 trial with mod assist, max posterior lean, sits quickly and does not agree to ambulation.  Patient returns back to supine with min/mod assist.  Patient moving B LE while in bed, however does not comprehend motor commands to walk.  Patient returns to supine, restraints reapplied.  Patient soiled with urine, nursing aware.  Continue to recommend moderate intensity therapy to address functional limitations.  Inpatient physical therapy will continue to follow.      Problem: Physical Therapy  Goal: Physical Therapy Goal  Description: Goals to be met by: 25     Patient will increase functional independence with mobility by performin. Supine to sit with Modified [place]  2. Sit to supine with Modified [place]  3. Sit to stand transfer with Supervision/Mod I  4. Gait  x 100 feet with Supervision/ Mod I using LRAD.     Outcome: Progressing      see chief complaint quote/on anti coag bleeding

## 2025-06-19 NOTE — BRIEF OPERATIVE NOTE - ANTIBIOTIC PROTOCOL
"Well Woman Visit    CC: Scheduled annual well gyn visit  Chief Complaint   Patient presents with    Annual Exam     Discuss IUD removal         HPI:   19 y.o. who presents today for annual exam. Patient states periods are normal but she has been having heavier periods with the IUD. She is now wanting it removed.  She is sexually active with transgender partner who has had a hysterectomy. She denies any H/O STDS.  She denies any pain with intercourse. She denies any family H/O  uterine or ovarian cancer.  States maternal grandmother had breast cancer, not sure on her age but does think she was diagnosed prior to age 50. She was counseled to see if she had any genetic testing done. She denies any vaginal odor, vaginal discharge, dysuria/hematuria, F/C, D/C, N/V, CP or SOB. Patient reports that she is not currently experiencing any symptoms of urinary incontinence.      History: PMHx, Meds, Allergies, PSHx, Social Hx, and POBHx all reviewed and updated.    ROS:  Review of Systems - General ROS: negative  Psychological ROS: negative  Endocrine ROS: negative  Breast ROS: negative  Respiratory ROS: negative  Cardiovascular ROS: negative  Gastrointestinal ROS: negative  Genito-Urinary ROS: Heavier menses but regular  Musculoskeletal ROS: negative  Neurological ROS: negative  Dermatological ROS: negative        PHYSICAL EXAM:      /90   Pulse 104   Ht 172.7 cm (68\")   Wt 54.4 kg (120 lb)   LMP 2025 (Within Days)   BMI 18.25 kg/m²  Not found.    General- NAD, alert and oriented, appropriate  Psych- Normal mood, good memory  Neck- No masses, no thyroid enlargement  CV- Regular rhythm, no murnurs  Resp- CTA to bases, no wheezes  Abdomen- Soft, non distended, non tender, no masses    Breast Exam: Breast self awareness counseled  Breast left-  Bilaterally symmetrical, no masses, non tender, no nipple discharge  Breast right- Bilaterally symmetrical, no masses, non tender, no nipple discharge    Pelvic " Exam:   External genitalia- Normal female, no lesions  Urethra/meatus- Normal, no masses, non tender  Bladder- Normal, no masses, non tender  Vagina- Normal, no atrophy, no lesions, no discharge.    Cvx- Normal, no lesions, no discharge, No cervical motion tenderness, IUD easily removed, intact, and discarded.  Patient tolerated well  Uterus- Normal size, shape & consistency.  Non tender, mobile.  Adnexa- No mass, non tender      Chaperone present for pelvic exam       Lymphatic- No palpable neck, axillary, or groin nodes  Ext- No edema, no cyanosis    Skin- No lesions, no rashes, no acanthosis nigricans      ASSESSMENT and PLAN:    Diagnoses and all orders for this visit:    1. Well woman exam (Primary)  -     IGP,rfx Aptima HPV All Pth    2. Encounter for IUD removal        Preventative:  1. Annuals every year; Cytology collections per prevailing guidelines.   2. Mammograms begin every year at 39 yo if no abnormalities are found and no family history.  3. Bone density studies begin at 64 yo. If no fracture history or osteoporosis family history.  4. Colonoscopy begins at 44 yo. Repeat every ten years if negative and no family history.  5. Calcium of 0765-9294 mg/day in split dosing  6. Vitamin D 400-800 IU/day  7. All other preventative health recommendations will be managed by the patients Primary care physician.    She understands the importance of having any ordered tests to be performed in a timely fashion.  The risks of not performing them include, but are not limited to, advanced cancer stages, bone loss from osteoporosis and/or subsequent increase in morbidity and/or mortality.  She is encouraged to review her results online and/or contact or office if she has questions.     Follow Up:  Return in about 1 year (around 6/19/2026) for Annual exam.    I spent 20 minutes on the separately reported service of Annual with IUD removal. This time is not included in the time used to support the E/M service also  reported today.        Jessica Avila DO  06/19/2025    [facility] [address]  [facility]  [address] [place] [address]  Dept: [phone]  Dept Fax: [phone]  Loc: [phone]    Followed protocol